# Patient Record
Sex: MALE | Race: BLACK OR AFRICAN AMERICAN | NOT HISPANIC OR LATINO | Employment: OTHER | URBAN - METROPOLITAN AREA
[De-identification: names, ages, dates, MRNs, and addresses within clinical notes are randomized per-mention and may not be internally consistent; named-entity substitution may affect disease eponyms.]

---

## 2017-01-05 ENCOUNTER — ANTI-COAG VISIT (OUTPATIENT)
Dept: CARDIOLOGY | Facility: CLINIC | Age: 67
End: 2017-01-05

## 2017-01-05 LAB — INR PPP: 4.52

## 2017-01-05 NOTE — PROGRESS NOTES
Verbal result taken from __[facility] with [facility]_______. PT/INR _45.3/4.52______ Date drawn_1/5/17_______ Hardcopy to be faxed.

## 2017-01-05 NOTE — PROGRESS NOTES
"Per MANDI Davis, "Pt took 20mg 12/28, 15mg 12/29, 10mg daily except 15mg we/fr of Coumadin. Pt had diarrhea and he took pepto bismol. He stopped the cranberry juice a month ago. Pt ate cabbage. "  Patient received a higher dose of warfarin last week and had diarrhea.  Hold then resume warfarin dose.  He will be counseled to take imodium instead of pepto bismol.  "

## 2017-01-11 ENCOUNTER — ANTI-COAG VISIT (OUTPATIENT)
Dept: CARDIOLOGY | Facility: CLINIC | Age: 67
End: 2017-01-11

## 2017-01-11 LAB — INR PPP: 1.49

## 2017-01-12 ENCOUNTER — PATIENT MESSAGE (OUTPATIENT)
Dept: DIABETES | Facility: CLINIC | Age: 67
End: 2017-01-12

## 2017-01-18 LAB — INR PPP: 3.36

## 2017-01-19 ENCOUNTER — ANTI-COAG VISIT (OUTPATIENT)
Dept: CARDIOLOGY | Facility: CLINIC | Age: 67
End: 2017-01-19

## 2017-01-19 NOTE — PROGRESS NOTES
INR significantly increased since last week without reported changes.  INR low on warfarin 70mg/week.  Begin new dose.

## 2017-01-25 ENCOUNTER — ANTI-COAG VISIT (OUTPATIENT)
Dept: CARDIOLOGY | Facility: CLINIC | Age: 67
End: 2017-01-25

## 2017-01-25 LAB — INR PPP: 2.32

## 2017-01-31 ENCOUNTER — LAB VISIT (OUTPATIENT)
Dept: LAB | Facility: HOSPITAL | Age: 67
End: 2017-01-31
Payer: MEDICARE

## 2017-01-31 ENCOUNTER — ANTI-COAG VISIT (OUTPATIENT)
Dept: CARDIOLOGY | Facility: CLINIC | Age: 67
End: 2017-01-31

## 2017-01-31 DIAGNOSIS — Z79.01 LONG TERM (CURRENT) USE OF ANTICOAGULANTS: ICD-10-CM

## 2017-01-31 DIAGNOSIS — Z79.01 LONG-TERM (CURRENT) USE OF ANTICOAGULANTS: ICD-10-CM

## 2017-01-31 DIAGNOSIS — I48.92 PAROXYSMAL ATRIAL FLUTTER: ICD-10-CM

## 2017-01-31 DIAGNOSIS — I48.91 ATRIAL FIBRILLATION: ICD-10-CM

## 2017-01-31 LAB
INR PPP: 4.7
PROTHROMBIN TIME: 47.9 SEC

## 2017-01-31 PROCEDURE — 36415 COLL VENOUS BLD VENIPUNCTURE: CPT

## 2017-01-31 PROCEDURE — 85610 PROTHROMBIN TIME: CPT

## 2017-01-31 NOTE — PROGRESS NOTES
Patient reports diarrhea last week and denies other changes.  Hold then rechallenge prior therapeutic warfarin regimen.

## 2017-02-08 ENCOUNTER — ANTI-COAG VISIT (OUTPATIENT)
Dept: CARDIOLOGY | Facility: CLINIC | Age: 67
End: 2017-02-08

## 2017-02-08 DIAGNOSIS — Z79.01 LONG TERM (CURRENT) USE OF ANTICOAGULANTS: ICD-10-CM

## 2017-02-08 DIAGNOSIS — I48.92 PAROXYSMAL ATRIAL FLUTTER: ICD-10-CM

## 2017-02-08 LAB — INR PPP: 2.28

## 2017-02-10 DIAGNOSIS — I10 ESSENTIAL HYPERTENSION: Chronic | ICD-10-CM

## 2017-02-10 DIAGNOSIS — I48.92 PAROXYSMAL ATRIAL FLUTTER: ICD-10-CM

## 2017-02-10 RX ORDER — DILTIAZEM HYDROCHLORIDE 240 MG/1
240 CAPSULE, EXTENDED RELEASE ORAL DAILY
Qty: 90 CAPSULE | Refills: 3 | Status: SHIPPED | OUTPATIENT
Start: 2017-02-10 | End: 2017-07-13 | Stop reason: ALTCHOICE

## 2017-02-15 ENCOUNTER — ANTI-COAG VISIT (OUTPATIENT)
Dept: CARDIOLOGY | Facility: CLINIC | Age: 67
End: 2017-02-15

## 2017-02-15 DIAGNOSIS — Z79.01 LONG TERM (CURRENT) USE OF ANTICOAGULANTS: ICD-10-CM

## 2017-02-15 DIAGNOSIS — I48.92 PAROXYSMAL ATRIAL FLUTTER: ICD-10-CM

## 2017-02-15 LAB — INR PPP: 3.88

## 2017-03-01 ENCOUNTER — ANTI-COAG VISIT (OUTPATIENT)
Dept: CARDIOLOGY | Facility: CLINIC | Age: 67
End: 2017-03-01

## 2017-03-01 DIAGNOSIS — I48.92 PAROXYSMAL ATRIAL FLUTTER: ICD-10-CM

## 2017-03-01 DIAGNOSIS — Z79.01 LONG TERM (CURRENT) USE OF ANTICOAGULANTS: ICD-10-CM

## 2017-03-01 LAB — INR PPP: 2.22

## 2017-03-07 DIAGNOSIS — I10 ESSENTIAL HYPERTENSION: ICD-10-CM

## 2017-03-07 DIAGNOSIS — E11.9 TYPE 2 DIABETES MELLITUS WITHOUT COMPLICATION: ICD-10-CM

## 2017-03-08 RX ORDER — PEN NEEDLE, DIABETIC 30 GX3/16"
NEEDLE, DISPOSABLE MISCELLANEOUS
Qty: 100 EACH | Refills: 3 | Status: SHIPPED | OUTPATIENT
Start: 2017-03-08 | End: 2017-08-10 | Stop reason: SDUPTHER

## 2017-03-08 RX ORDER — LOSARTAN POTASSIUM 100 MG/1
100 TABLET ORAL DAILY
Qty: 90 TABLET | Refills: 3 | Status: SHIPPED | OUTPATIENT
Start: 2017-03-08

## 2017-03-15 ENCOUNTER — ANTI-COAG VISIT (OUTPATIENT)
Dept: CARDIOLOGY | Facility: CLINIC | Age: 67
End: 2017-03-15

## 2017-03-15 DIAGNOSIS — I48.92 PAROXYSMAL ATRIAL FLUTTER: ICD-10-CM

## 2017-03-15 DIAGNOSIS — Z79.01 LONG TERM (CURRENT) USE OF ANTICOAGULANTS: ICD-10-CM

## 2017-03-15 LAB — INR PPP: 2.45

## 2017-03-16 ENCOUNTER — OFFICE VISIT (OUTPATIENT)
Dept: INTERNAL MEDICINE | Facility: CLINIC | Age: 67
End: 2017-03-16
Payer: MEDICARE

## 2017-03-16 ENCOUNTER — HOSPITAL ENCOUNTER (EMERGENCY)
Facility: HOSPITAL | Age: 67
Discharge: HOME | End: 2017-03-16
Attending: EMERGENCY MEDICINE
Payer: MEDICARE

## 2017-03-16 VITALS
DIASTOLIC BLOOD PRESSURE: 95 MMHG | SYSTOLIC BLOOD PRESSURE: 148 MMHG | TEMPERATURE: 98 F | WEIGHT: 172.38 LBS | HEART RATE: 88 BPM | BODY MASS INDEX: 27.7 KG/M2 | HEIGHT: 66 IN

## 2017-03-16 VITALS
SYSTOLIC BLOOD PRESSURE: 150 MMHG | HEIGHT: 66 IN | OXYGEN SATURATION: 100 % | RESPIRATION RATE: 18 BRPM | TEMPERATURE: 98 F | BODY MASS INDEX: 28.77 KG/M2 | DIASTOLIC BLOOD PRESSURE: 86 MMHG | WEIGHT: 179 LBS | HEART RATE: 88 BPM

## 2017-03-16 DIAGNOSIS — I48.91 ATRIAL FIBRILLATION WITH RVR: Primary | ICD-10-CM

## 2017-03-16 DIAGNOSIS — I48.91 ATRIAL FIBRILLATION WITH RAPID VENTRICULAR RESPONSE: Primary | ICD-10-CM

## 2017-03-16 DIAGNOSIS — E78.5 HYPERLIPIDEMIA, UNSPECIFIED HYPERLIPIDEMIA TYPE: ICD-10-CM

## 2017-03-16 DIAGNOSIS — E11.65 TYPE 2 DIABETES MELLITUS WITH HYPERGLYCEMIA, UNSPECIFIED LONG TERM INSULIN USE STATUS: ICD-10-CM

## 2017-03-16 DIAGNOSIS — I10 ESSENTIAL HYPERTENSION: ICD-10-CM

## 2017-03-16 DIAGNOSIS — Z79.4 TYPE 2 DIABETES MELLITUS WITH COMPLICATION, WITH LONG-TERM CURRENT USE OF INSULIN: ICD-10-CM

## 2017-03-16 DIAGNOSIS — E66.3 OVERWEIGHT: ICD-10-CM

## 2017-03-16 DIAGNOSIS — E11.8 TYPE 2 DIABETES MELLITUS WITH COMPLICATION, WITH LONG-TERM CURRENT USE OF INSULIN: ICD-10-CM

## 2017-03-16 DIAGNOSIS — Z91.81 RISK FOR FALLS: ICD-10-CM

## 2017-03-16 LAB
ALBUMIN SERPL BCP-MCNC: 3.8 G/DL
ALP SERPL-CCNC: 82 U/L
ALT SERPL W/O P-5'-P-CCNC: 30 U/L
ANION GAP SERPL CALC-SCNC: 12 MMOL/L
AST SERPL-CCNC: 17 U/L
BASOPHILS # BLD AUTO: 0.02 K/UL
BASOPHILS NFR BLD: 0.4 %
BILIRUB SERPL-MCNC: 1.6 MG/DL
BUN SERPL-MCNC: 20 MG/DL
CALCIUM SERPL-MCNC: 9.6 MG/DL
CHLORIDE SERPL-SCNC: 104 MMOL/L
CO2 SERPL-SCNC: 19 MMOL/L
CREAT SERPL-MCNC: 1.3 MG/DL
DIFFERENTIAL METHOD: ABNORMAL
EOSINOPHIL # BLD AUTO: 0 K/UL
EOSINOPHIL NFR BLD: 0.4 %
ERYTHROCYTE [DISTWIDTH] IN BLOOD BY AUTOMATED COUNT: 13.4 %
EST. GFR  (AFRICAN AMERICAN): >60 ML/MIN/1.73 M^2
EST. GFR  (NON AFRICAN AMERICAN): 56.1 ML/MIN/1.73 M^2
GLUCOSE SERPL-MCNC: 474 MG/DL
GLUCOSE SERPL-MCNC: 483 MG/DL (ref 70–110)
HCT VFR BLD AUTO: 41.5 %
HGB BLD-MCNC: 15.2 G/DL
INR PPP: 1.8
LYMPHOCYTES # BLD AUTO: 1.3 K/UL
LYMPHOCYTES NFR BLD: 27.2 %
MCH RBC QN AUTO: 29.1 PG
MCHC RBC AUTO-ENTMCNC: 36.6 %
MCV RBC AUTO: 79 FL
MONOCYTES # BLD AUTO: 0.4 K/UL
MONOCYTES NFR BLD: 8.4 %
NEUTROPHILS # BLD AUTO: 3.1 K/UL
NEUTROPHILS NFR BLD: 63.4 %
PLATELET # BLD AUTO: 201 K/UL
PMV BLD AUTO: 12.2 FL
POCT GLUCOSE: 262 MG/DL (ref 70–110)
POTASSIUM SERPL-SCNC: 4 MMOL/L
PROT SERPL-MCNC: 7.5 G/DL
PROTHROMBIN TIME: 17.9 SEC
RBC # BLD AUTO: 5.23 M/UL
SODIUM SERPL-SCNC: 135 MMOL/L
WBC # BLD AUTO: 4.86 K/UL

## 2017-03-16 PROCEDURE — 99284 EMERGENCY DEPT VISIT MOD MDM: CPT | Mod: 25

## 2017-03-16 PROCEDURE — 99291 CRITICAL CARE FIRST HOUR: CPT | Mod: ,,, | Performed by: EMERGENCY MEDICINE

## 2017-03-16 PROCEDURE — 63600175 PHARM REV CODE 636 W HCPCS: Performed by: EMERGENCY MEDICINE

## 2017-03-16 PROCEDURE — 3074F SYST BP LT 130 MM HG: CPT | Mod: GC,S$GLB,, | Performed by: HOSPITALIST

## 2017-03-16 PROCEDURE — 85610 PROTHROMBIN TIME: CPT

## 2017-03-16 PROCEDURE — 1160F RVW MEDS BY RX/DR IN RCRD: CPT | Mod: GC,S$GLB,, | Performed by: HOSPITALIST

## 2017-03-16 PROCEDURE — 93005 ELECTROCARDIOGRAM TRACING: CPT | Mod: S$GLB,,, | Performed by: INTERNAL MEDICINE

## 2017-03-16 PROCEDURE — 3046F HEMOGLOBIN A1C LEVEL >9.0%: CPT | Mod: GC,S$GLB,, | Performed by: HOSPITALIST

## 2017-03-16 PROCEDURE — 1157F ADVNC CARE PLAN IN RCRD: CPT | Mod: GC,S$GLB,, | Performed by: HOSPITALIST

## 2017-03-16 PROCEDURE — 4010F ACE/ARB THERAPY RXD/TAKEN: CPT | Mod: GC,S$GLB,, | Performed by: HOSPITALIST

## 2017-03-16 PROCEDURE — 96361 HYDRATE IV INFUSION ADD-ON: CPT

## 2017-03-16 PROCEDURE — 96374 THER/PROPH/DIAG INJ IV PUSH: CPT

## 2017-03-16 PROCEDURE — 82962 GLUCOSE BLOOD TEST: CPT

## 2017-03-16 PROCEDURE — 82948 REAGENT STRIP/BLOOD GLUCOSE: CPT | Mod: GC,S$GLB,, | Performed by: HOSPITALIST

## 2017-03-16 PROCEDURE — 25000003 PHARM REV CODE 250: Performed by: PHYSICIAN ASSISTANT

## 2017-03-16 PROCEDURE — 99999 PR PBB SHADOW E&M-EST. PATIENT-LVL IV: CPT | Mod: PBBFAC,GC,, | Performed by: HOSPITALIST

## 2017-03-16 PROCEDURE — 1159F MED LIST DOCD IN RCRD: CPT | Mod: GC,S$GLB,, | Performed by: HOSPITALIST

## 2017-03-16 PROCEDURE — 25000003 PHARM REV CODE 250: Performed by: EMERGENCY MEDICINE

## 2017-03-16 PROCEDURE — 3078F DIAST BP <80 MM HG: CPT | Mod: GC,S$GLB,, | Performed by: HOSPITALIST

## 2017-03-16 PROCEDURE — 85025 COMPLETE CBC W/AUTO DIFF WBC: CPT

## 2017-03-16 PROCEDURE — 1126F AMNT PAIN NOTED NONE PRSNT: CPT | Mod: GC,S$GLB,, | Performed by: HOSPITALIST

## 2017-03-16 PROCEDURE — 96376 TX/PRO/DX INJ SAME DRUG ADON: CPT

## 2017-03-16 PROCEDURE — 93010 ELECTROCARDIOGRAM REPORT: CPT | Mod: S$GLB,,, | Performed by: INTERNAL MEDICINE

## 2017-03-16 PROCEDURE — 93010 ELECTROCARDIOGRAM REPORT: CPT | Mod: ,,, | Performed by: INTERNAL MEDICINE

## 2017-03-16 PROCEDURE — 2022F DILAT RTA XM EVC RTNOPTHY: CPT | Mod: GC,S$GLB,, | Performed by: HOSPITALIST

## 2017-03-16 PROCEDURE — 99214 OFFICE O/P EST MOD 30 MIN: CPT | Mod: GC,S$GLB,, | Performed by: HOSPITALIST

## 2017-03-16 PROCEDURE — 96375 TX/PRO/DX INJ NEW DRUG ADDON: CPT

## 2017-03-16 PROCEDURE — 80053 COMPREHEN METABOLIC PANEL: CPT

## 2017-03-16 RX ORDER — DILTIAZEM HYDROCHLORIDE 240 MG/1
240 CAPSULE, COATED, EXTENDED RELEASE ORAL
Status: COMPLETED | OUTPATIENT
Start: 2017-03-16 | End: 2017-03-16

## 2017-03-16 RX ORDER — DILTIAZEM HYDROCHLORIDE 5 MG/ML
20 INJECTION INTRAVENOUS
Status: COMPLETED | OUTPATIENT
Start: 2017-03-16 | End: 2017-03-16

## 2017-03-16 RX ADMIN — SODIUM CHLORIDE 1000 ML: 0.9 INJECTION, SOLUTION INTRAVENOUS at 07:03

## 2017-03-16 RX ADMIN — INSULIN HUMAN 8 UNITS: 100 INJECTION, SOLUTION PARENTERAL at 06:03

## 2017-03-16 RX ADMIN — DILTIAZEM HYDROCHLORIDE 20 MG: 5 INJECTION INTRAVENOUS at 07:03

## 2017-03-16 RX ADMIN — DILTIAZEM HYDROCHLORIDE 20 MG: 5 INJECTION INTRAVENOUS at 05:03

## 2017-03-16 RX ADMIN — SODIUM CHLORIDE 1000 ML: 0.9 INJECTION, SOLUTION INTRAVENOUS at 06:03

## 2017-03-16 RX ADMIN — DILTIAZEM HYDROCHLORIDE 240 MG: 240 CAPSULE, COATED, EXTENDED RELEASE ORAL at 07:03

## 2017-03-16 NOTE — PROGRESS NOTES
I have reviewed and concur with the resident's history, physical, assessment, and plan.  I have personally interviewed and examined the patient  Ciro Chandler at bedside. See below addendum for my evaluation and additional findings.    Patient with h/o paroxysmal atrial fibrillation / flutter, HTN, h/o CVA, and uncontrolled DM here today for follow-up. Not adherent to medication regimen generally. Not sure of when he last took his long-acting insulin. Saw Cardiology 10/2016 and has not followed up as recommended. Also had initial visit with [facility] previously and has not yet followed up.    Tachycardic today with elevated BP, fatigued and anxious but no chest pain or dyspnea at present, no headache, vision change, or new focal neurologic change. Irregular rhythm on exam.   EKG performed. Patient began developing intermittent dizziness just after EKG.  Shows atrial fibrillation with RVR.    Sending patient to ER given atrial fib with RVR; he is high risk for further complications with this and this is likely complicated by his hyperglycemia. Merits further urgent/emergent monitoring and treatment beyond what can be done as outpatient.     Patient to return for close follow-up with primary care pending ER course.   Will also need follow up with cardiology.    Chang Mcdonnell MD

## 2017-03-16 NOTE — PROVIDER PROGRESS NOTES - EMERGENCY DEPT.
Encounter Date: 3/16/2017    ED Physician Progress Notes       SCRIBE NOTE: IBipin, am scribing for, and in the presence of,  Wanda Singer MD.  Physician Statement: IWanda MD, personally performed the services described in this documentation as scribed by Bipin Seay in my presence, and it is both accurate and complete.     Physician Note:   Moved to monitor bed.    EKG - STEMI Decision  Initial Reading: No STEMI present.

## 2017-03-16 NOTE — ED PROVIDER NOTES
Encounter Date: 3/16/2017    SCRIBE #1 NOTE: I, Giulia Valerio, am scribing for, and in the presence of,  Dr. Lopez. I have scribed the following portions of the note - the APC attestation.       History     Chief Complaint   Patient presents with    Atrial Fibrillation     Review of patient's allergies indicates:  No Known Allergies  HPI Comments: 66 yo M with a PMH of paroxysmal atrial flutter on coumadin and diltiazem, DM2 on insulin, HTN, HLD, hx of stroke, aneurysm presents to the ED send from primary care clinic for a fib with RVR. Pt was noted to be tachycardic in clinc. An EKG was performed in clinic which revealed A. fib with RVR.  Patient was sent to the ED for further evaluation.  Currently, patient denies chest pain, shortness of breath.  He reports an episode of lightheadedness upon standing earlier today.  He denies fever, chills, nausea, vomiting.    The history is provided by the patient.     Past Medical History:   Diagnosis Date    Aneurysm     Clotting disorder     blood clot takes coumadin    Diabetes mellitus     Diabetes mellitus type II     Hyperlipidemia     Hypertension     Stroke     (2) in the past    Urinary tract infection      History reviewed. No pertinent surgical history.  Family History   Problem Relation Age of Onset    Heart disease Mother     Diabetes Mother     Heart attack Mother     Hypertension Mother     Asthma Father     Vision loss Sister     Cancer Sister      breast    Cancer Brother      lung     Social History   Substance Use Topics    Smoking status: Never Smoker    Smokeless tobacco: None    Alcohol use No     Review of Systems   Constitutional: Negative for chills and fever.   HENT: Negative for sore throat.    Respiratory: Negative for shortness of breath.    Cardiovascular: Negative for chest pain.   Gastrointestinal: Negative for nausea and vomiting.   Genitourinary: Negative for dysuria.   Skin: Negative for rash.   Neurological: Positive for  light-headedness. Negative for syncope.       Physical Exam   Initial Vitals   BP Pulse Resp Temp SpO2   03/16/17 1651 03/16/17 1651 03/16/17 1651 03/16/17 1651 03/16/17 1651   156/101 131 20 98.4 °F (36.9 °C) 98 %     Physical Exam    Nursing note and vitals reviewed.  Constitutional: He appears well-developed and well-nourished. He is not diaphoretic.  Non-toxic appearance. He does not appear ill. No distress.   HENT:   Head: Normocephalic and atraumatic.   Eyes: EOM are normal.   Neck: Normal range of motion and phonation normal. Neck supple.   Cardiovascular: Regular rhythm. Tachycardia present.  Exam reveals no gallop, no distant heart sounds and no friction rub.    No murmur heard.  Pulmonary/Chest: Effort normal and breath sounds normal. No accessory muscle usage. No tachypnea. No respiratory distress. He has no decreased breath sounds. He has no wheezes. He has no rhonchi. He has no rales.   Abdominal: Soft. Normal appearance. He exhibits no distension and no mass. There is no tenderness.   Musculoskeletal: Normal range of motion.   Neurological: He is alert and oriented to person, place, and time.   Skin: Skin is warm and dry. No rash noted. No pallor.   Psychiatric: He has a normal mood and affect. His behavior is normal. Judgment and thought content normal.         ED Course   Procedures  Labs Reviewed   CBC W/ AUTO DIFFERENTIAL - Abnormal; Notable for the following:        Result Value    MCV 79 (*)     MCHC 36.6 (*)     All other components within normal limits   COMPREHENSIVE METABOLIC PANEL - Abnormal; Notable for the following:     Sodium 135 (*)     CO2 19 (*)     Glucose 474 (*)     Total Bilirubin 1.6 (*)     eGFR if non  56.1 (*)     All other components within normal limits   PROTIME-INR - Abnormal; Notable for the following:     Prothrombin Time 17.9 (*)     INR 1.8 (*)     All other components within normal limits   POCT GLUCOSE - Abnormal; Notable for the following:     POCT  Glucose 262 (*)     All other components within normal limits             Medical Decision Making:   History:   Old Medical Records: I decided to obtain old medical records.  Clinical Tests:   Lab Tests: Ordered and Reviewed  Medical Tests: Ordered and Reviewed       APC / Resident Notes:   MDM:  67-year-old male with a history of atrial flutter presents to the ED from clinic for evaluation of atrial fibrillation with RVR as well as hyperglycemia and uncontrolled DM.  Patient is tachycardic.  He is hypertensive at 156/101.  He is alert and responding appropriately.  He is nontoxic-appearing and in NAD.     Patient given IV diltiazem 20 mg in the ED.    Lab work remarkable for elevated glucose of 474.  Patient given IV fluids and insulin. Will reassess glucose after treatment. This pt was signed out to my attending, Dr. Lopez, who will assume continued care of this patient.            Scribe Attestation:   Scribe #1: I performed the above scribed service and the documentation accurately describes the services I performed. I attest to the accuracy of the note.    Attending Attestation:     Physician Attestation Statement for NP/PA:   I have conducted a face to face encounter with this patient in addition to the NP/PA, due to Medical Complexity    Other NP/PA Attestation Additions:      Medical Decision Making: A-fib with RVR, sent from clinic. Sugar is also elevated. His rate has been controlled with dose of diltiazem. Will give insulin and fluid to control sugar. He feels well. Will reassess and may be able to discharge if he continues to feel well.    8:24 PM  His rate is controlled and his sugar is down. I will discharge him home.     Attending Critical Care:   Critical Care Times:   ==============================================================  · Total Critical Care Time - exclusive of procedural time: 40 minutes.  ==============================================================  Critical care was necessary to  treat or prevent imminent or life-threatening deterioration of the following conditions: cardiac arrhythmia.     Physician Attestation for Scribe:  Physician Attestation Statement for Scribe #1: I, Dr. Lopez, reviewed documentation, as scribed by Giulia Valerio in my presence, and it is both accurate and complete.                 ED Course     Clinical Impression:   The primary encounter diagnosis was Atrial fibrillation with RVR. A diagnosis of Type 2 diabetes mellitus with hyperglycemia, unspecified long term insulin use status was also pertinent to this visit.    Disposition:   Disposition: Discharged  Condition: Stable       Tejas Lopez MD  03/23/17 0937

## 2017-03-16 NOTE — ED AVS SNAPSHOT
[facility]  [address]  [facility] [address]               Ciro Chandler   3/16/2017  5:21 PM   ED    Description:  Male : 1950   Department:  [facility]           Your Care was Coordinated By:     Provider Role From To    Tejas Lopez MD Attending Provider 17 0943 --    Mindy Estes PA-C Physician Assistant 17 [id number] --      Reason for Visit     Atrial Fibrillation           Diagnoses this Visit        Comments    Atrial fibrillation with RVR    -  Primary     Type 2 diabetes mellitus with hyperglycemia, unspecified long term insulin use status           ED Disposition     ED Disposition Condition Comment    Discharge             To Do List           Follow-up Information     Follow up with Lio Torres MD In 2 days.    Specialty:  Internal Medicine    Contact information:    [address]  [facility] [address]  [phone]        [facility]     [facility] -  Assistance  Registered nurses in the [facility] provide clinical advisement, health education, appointment booking, and other advisory services.  Call for this free service at [phone].             Medications           Message regarding Medications     Verify the changes and/or additions to your medication regime listed below are the same as discussed with your clinician today.  If any of these changes or additions are incorrect, please notify your healthcare provider.        These medications were administered today        Dose Freq    diltiaZEM injection 20 mg 20 mg ED 1 Time    Sig: Inject 4 mLs (20 mg total) into the vein ED 1 Time.    Class: Normal    Route: Intravenous    Cosign for Ordering: Accepted by Lalit Polo MD on 3/16/2017  5:58 PM    sodium chloride 0.9% bolus 1,000 mL 1,000 mL ED 1 Time    Sig: Inject 1,000 mLs into the vein ED 1 Time.    Class: Normal    Route: Intravenous    insulin  "regular injection 8 Units 8 Units ED 1 Time    Sig: Inject 8 Units into the vein ED 1 Time.    Class: Normal    Route: Intravenous    sodium chloride 0.9% bolus 1,000 mL 1,000 mL ED 1 Time    Sig: Inject 1,000 mLs into the vein ED 1 Time.    Class: Normal    Route: Intravenous    diltiaZEM injection 20 mg 20 mg ED 1 Time    Sig: Inject 4 mLs (20 mg total) into the vein ED 1 Time.    Class: Normal    Route: Intravenous    diltiaZEM 24 hr capsule 240 mg 240 mg ED 1 Time    Sig: Take 1 capsule (240 mg total) by mouth ED 1 Time.    Class: Normal    Route: Oral           Verify that the below list of medications is an accurate representation of the medications you are currently taking.  If none reported, the list may be blank. If incorrect, please contact your healthcare provider. Carry this list with you in case of emergency.           Current Medications     atorvastatin (LIPITOR) 80 MG tablet Take 1 tablet (80 mg total) by mouth once daily.    blood sugar diagnostic (ACCU-CHEK SMARTVIEW TEST STRIP) Strp 1 each by Misc.(Non-Drug; Combo Route) route 4 (four) times daily.    diltiaZEM (DILACOR XR) 240 MG CDCR Take 1 capsule (240 mg total) by mouth once daily.    docusate sodium (COLACE) 100 MG capsule Take 100 mg by mouth once daily.    econazole nitrate 1 % cream Apply topically 2 (two) times daily.    insulin aspart (NOVOLOG) 100 unit/mL injection Inject 6 Units into the skin 3 (three) times daily before meals.    insulin glargine (LANTUS) 100 unit/mL injection Increase daily Lantus to 35 U daily    lancets (ACCU-CHEK FASTCLIX) Misc 1 each by Misc.(Non-Drug; Combo Route) route 4 (four) times daily.    losartan (COZAAR) 100 MG tablet Take 1 tablet (100 mg total) by mouth once daily.    metformin (GLUCOPHAGE) 1000 MG tablet Take 1 tablet (1,000 mg total) by mouth 2 (two) times daily with meals.    pen needle, diabetic (BD ULTRA-FINE MALLORY PEN NEEDLES) 32 gauge x 5/32" Ndle Use with lantus pen    PREVNAR 13, PF, 0.5 mL " "Syrg ADM 0.5ML IM UTD    trazodone (DESYREL) 50 MG tablet 1 - 2 Tablet Oral At bedtime    warfarin (COUMADIN) 5 MG tablet As directed by coumadin clinic    aspirin (ECOTRIN) 81 MG EC tablet Take 1 tablet (81 mg total) by mouth once daily.    blood-glucose meter (ACCU-CHEK MALLORY) Misc 1 each by Misc.(Non-Drug; Combo Route) route once daily.    diltiaZEM 24 hr capsule 240 mg Take 1 capsule (240 mg total) by mouth ED 1 Time.    diltiaZEM injection 20 mg Inject 4 mLs (20 mg total) into the vein ED 1 Time.    diltiaZEM injection 20 mg Inject 4 mLs (20 mg total) into the vein ED 1 Time.    lancing device with lancets (ONE TOUCH DELICA LANC DEVICE) Kit 1 each by Misc.(Non-Drug; Combo Route) route 2 (two) times daily.           Clinical Reference Information           Your Vitals Were     BP Pulse Temp Resp Height Weight    138/78 78 98.4 °F (36.9 °C) (Oral) 20 5' 6" (1.676 m) 81.2 kg (179 lb)    SpO2 BMI             100% 28.89 kg/m2         Allergies as of 3/16/2017     No Known Allergies      Immunizations Administered on Date of Encounter - 3/16/2017     None      ED Micro, Lab, POCT     Start Ordered       Status Ordering Provider    03/16/17 1937 03/16/17 1937  POCT glucose  Once      Final result     03/16/17 1929 03/16/17 1928  POCT glucose  Once      Acknowledged     03/16/17 1743 03/16/17 1744  CBC auto differential  Once      Final result     03/16/17 1743 03/16/17 1744  Comprehensive metabolic panel  Once      Final result     03/16/17 1743 03/16/17 1744  Protime-INR  STAT      Final result       ED Imaging Orders     None       [facility] complies with applicable Federal civil rights laws and does not discriminate on the basis of race, color, national origin, age, disability, or sex.        Language Assistance Services     ATTENTION: Language assistance services are available, free of charge. Please call [phone].      ATENCIÓN: Si davisla esplady, tiene a her disposición servicios " gratuitos de asistencia lingüística. Lilia chery [phone].     MARCIN Ý: N?u b?n nói Ti?ng Vi?t, có các d?ch v? h? tr? ngôn ng? mi?n phí bambih cho b?n. G?i s? [phone].

## 2017-03-16 NOTE — PROGRESS NOTES
Subjective:       Patient ID: Ciro Chandler is a 67 y.o. male.      Chief Complaint: re-establish care, follow up for diabetes      HPI  Ciro Chandler is a 67 y.o. male with PMH of HTN, HLD, DMT2, paroxysmal Afib, depressive disorder who presents for 3 month follow up and re-establish care    He was last seen by Dr. Torres (PGY-1) November 2017.  Plan at that time was to increase home lantus to 35 U qhs, continue Novolog 6 U TIDWM, and continue metformin 1000 TWICE DAILY, with repeat HbA1C in three months from then.   His POCT glucose in clinic today is 483.    He has not been taking his insulin consistently.  He has difficulty recalling the last time he administered himself insulin.  He reports he often forgets and his wife has to remember for him.  The last time he got any insulin was his mealtime insulin at dinner last night.  He has not taken his lantus last night nor has he taken any of his meal time insulin today.  He saw diabetes education last time in October but missed his follow up appointment.      He does not have glucose logs with him but has the glucometer.  90 day average is 325.  30 day average is 349.      Today his BP is 148/95.   His wife reports he has been taking diltiazem 240 and losartan 100 mg every night consistently.    Cardiology was previously following for paroxysmal afib.  They showed up for their last appointment, but didn't get checked in, so they didn't get seen, and no appointment was rescheduled.      He had a 5 mm polyp resected during a colonoscopy October 2012.  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  He uses a broken cane.  His wife thinks he gets up too fast, takes a couple steps, and then falls.      He has never been a smoker  Last drink was over 5 years ago.          Review of Systems   Constitutional: Negative for chills, fever and malaise/fatigue.   HENT: Negative for sore  "throat.    Eyes: Negative for blurred vision and pain.   Respiratory: Negative for cough and shortness of breath.    Cardiovascular: Negative for chest pain and leg swelling.   Gastrointestinal: Negative for abdominal pain, nausea and vomiting.   Genitourinary: Negative for dysuria and frequency.   Musculoskeletal: Negative for back pain and myalgias.   Skin: Negative for itching and rash.   Neurological: Positive for dizziness. Negative for loss of consciousness and headaches.           Objective:     Vitals:    03/16/17 1422   BP: (!) 148/95   Pulse: 88   Temp: 97.7 °F (36.5 °C)     Estimated body mass index is 27.83 kg/(m^2) as calculated from the following:    Height as of this encounter: 5' 6" (1.676 m).    Weight as of this encounter: 78.2 kg (172 lb 6.4 oz).    Physical Exam   Constitutional: He is oriented to person, place, and time and well-developed, well-nourished, and in no distress.   HENT:   Head: Normocephalic and atraumatic.   Eyes: Conjunctivae and EOM are normal. Pupils are equal, round, and reactive to light.   Neck: Neck supple. No tracheal deviation present. No thyromegaly present.   Cardiovascular: Normal heart sounds and intact distal pulses.    No murmur heard.  Tachycardic on auscultation.  HR at least 120.   Pulmonary/Chest: Effort normal and breath sounds normal. No respiratory distress. He has no wheezes. He has no rales.   Abdominal: Soft. Bowel sounds are normal. He exhibits no distension. There is no tenderness.   Neurological: He is alert and oriented to person, place, and time. GCS score is 15.   Sensation in tact as tested by monofilament in 5 points in both feet bilaterally.   Skin: Skin is warm and dry.   Appears to have toenail fungus.         Assessment/Plan:       Afib with RVR  --was found to be tachycardic during physical exam, pulse at least >120  --EKG in clinic revealed Afib with RVR  --sending patient to the ER  --falls may be related to afib  --they showed up their last " cardiology appointment earlier this year (probably February), but they didn't get checked in and the doctor had left by then  --may need to have them re-schedule an appointment    DMT2  --needs more follow up with diabetes empowerment  --needs optometry referral  --needs repeat HbA1C    Health Maintenance  --no alcohol counseling, smoking/tobacco cessation counseling, illicit drug use cessation counseling needed at this time.  --has previously been tested for hepatitis before - was negative  --has previously been tested for HIV before - was negative    Fall risk  --will likely need PT  --may need walker.    HTN  --need to resolve Afib with RVR first     Overweight  --discussed appropriate diet prior to physical exam  --mobility is limited    HLD  --Continue with atorvastatin 80    Medication List with Changes/Refills   Current Medications    ASPIRIN (ECOTRIN) 81 MG EC TABLET    Take 1 tablet (81 mg total) by mouth once daily.    ATORVASTATIN (LIPITOR) 80 MG TABLET    Take 1 tablet (80 mg total) by mouth once daily.    BLOOD SUGAR DIAGNOSTIC (ACCU-CHEK SMARTVIEW TEST STRIP) STRP    1 each by Misc.(Non-Drug; Combo Route) route 4 (four) times daily.    BLOOD-GLUCOSE METER (ACCU-CHEK MALLORY) MISC    1 each by Misc.(Non-Drug; Combo Route) route once daily.    DILTIAZEM (DILACOR XR) 240 MG CDCR    Take 1 capsule (240 mg total) by mouth once daily.    DOCUSATE SODIUM (COLACE) 100 MG CAPSULE    Take 100 mg by mouth once daily.    ECONAZOLE NITRATE 1 % CREAM    Apply topically 2 (two) times daily.    INSULIN ASPART (NOVOLOG) 100 UNIT/ML INJECTION    Inject 6 Units into the skin 3 (three) times daily before meals.    INSULIN GLARGINE (LANTUS) 100 UNIT/ML INJECTION    Increase daily Lantus to 35 U daily    LANCETS (ACCU-CHEK FASTCLIX) MISC    1 each by Misc.(Non-Drug; Combo Route) route 4 (four) times daily.    LANCING DEVICE WITH LANCETS (ONE TOUCH DELICA LANC DEVICE) KIT    1 each by Misc.(Non-Drug; Combo Route) route 2 (two)  "times daily.    LOSARTAN (COZAAR) 100 MG TABLET    Take 1 tablet (100 mg total) by mouth once daily.    METFORMIN (GLUCOPHAGE) 1000 MG TABLET    Take 1 tablet (1,000 mg total) by mouth 2 (two) times daily with meals.    PEN NEEDLE, DIABETIC (BD ULTRA-FINE MALLORY PEN NEEDLES) 32 GAUGE X 5/32" NDLE    Use with lantus pen    PREVNAR 13, PF, 0.5 ML SYRG    ADM 0.5ML IM UTD    TRAZODONE (DESYREL) 50 MG TABLET    1 - 2 Tablet Oral At bedtime    WARFARIN (COUMADIN) 5 MG TABLET    As directed by coumadin clinic       RTC soon after discharge from ER.  Needs follow up with me in 6 weeks.      This case was discussed with Dr. Mcdonnell.    Nellie Bravo, PGY-1       "

## 2017-03-16 NOTE — ED NOTES
Patient moved to ED room 6 via wheelchair, patient assisted onto stretcher and changed into a gown. Patient placed on cardiac monitor, continuous pulse oximetry and automatic blood pressure cuff. Bed placed in low locked position, side rails up x 2, call light is within reach of patient or family, orientation to room and explanation of wait provided to family and patient, alarms set and turned on for monitor and pulse ox, awaiting MD evaluation and orders, will continue to monitor.

## 2017-03-16 NOTE — ED NOTES
LOC: The patient is awake, alert and aware of environment with an appropriate affect, the patient is oriented x 3 and speaking appropriately.  APPEARANCE: Patient resting comfortably and in no acute distress, patient is clean and well groomed, patient's clothing is properly fastened.  SKIN: The skin is warm and dry, patient has normal skin turgor and moist mucus membranes, skin intact, no breakdown or brusing noted.  MUSKULOSKELETAL: Patient moving all extremities well, no obvious swelling or deformities noted.  RESPIRATORY: Airway is open and patent, respirations are spontaneous, patient has a normal effort and rate. Breath sounds are clear and equal bilaterally.  CARDIAC: Abnormal heart sounds. No peripheral edema.  ABDOMEN: Soft and non tender to palpation, no distention noted. Bowel sounds present.  NEURO: No neuro deficits, hand grasp equal, no drift noted, no facial droop noted. Speech is clear.

## 2017-03-16 NOTE — ED TRIAGE NOTES
Patient states he was sent from the clinic for high blood pressure and high heart rate. Patient has no complaints. Denies fever, chest pain, sob.

## 2017-03-16 NOTE — Clinical Note
Tony - this patient was discharged home from the ER, and needs close ER follow up as well as f/u with cardiology and [facility]. Can you help get those scheduled?  Thank you! Chang Mcdonnell MD

## 2017-03-21 ENCOUNTER — TELEPHONE (OUTPATIENT)
Dept: INTERNAL MEDICINE | Facility: CLINIC | Age: 67
End: 2017-03-21

## 2017-03-21 DIAGNOSIS — Z79.4 TYPE 2 DIABETES MELLITUS WITH HYPERGLYCEMIA, WITH LONG-TERM CURRENT USE OF INSULIN: Primary | ICD-10-CM

## 2017-03-21 DIAGNOSIS — E11.65 TYPE 2 DIABETES MELLITUS WITH HYPERGLYCEMIA, WITH LONG-TERM CURRENT USE OF INSULIN: Primary | ICD-10-CM

## 2017-03-21 NOTE — TELEPHONE ENCOUNTER
He is coming to see us Thursday ,  We set up cardiology , the next Thursday , pt you will need to put a referral in and they will call him , empowerment we are un able to schedule he needs a refferal to diabetic education , thanks , leslie

## 2017-03-21 NOTE — TELEPHONE ENCOUNTER
----- Message from Nellie Bravo MD sent at 3/17/2017  2:31 PM CDT -----  Kalen Jimenez,    This patient got sent to the ER yesterday, but he needs to be seen next week.  He was also lost to follow up with Cardiology.  Can we set up an appointment for him to see cardiology and Diabetes Empowerment? He also likely needs PT.  Since he needs all these follow ups and PT he may need to be seen by a resident in clinic or at the very least be seen by me when I come back in 4 weeks.    Thanks for your help

## 2017-03-23 ENCOUNTER — OFFICE VISIT (OUTPATIENT)
Dept: INTERNAL MEDICINE | Facility: CLINIC | Age: 67
End: 2017-03-23
Payer: MEDICARE

## 2017-03-23 VITALS
HEIGHT: 67 IN | HEART RATE: 83 BPM | BODY MASS INDEX: 27.93 KG/M2 | DIASTOLIC BLOOD PRESSURE: 80 MMHG | SYSTOLIC BLOOD PRESSURE: 128 MMHG | WEIGHT: 177.94 LBS

## 2017-03-23 DIAGNOSIS — E11.22 TYPE 2 DIABETES MELLITUS WITH DIABETIC CHRONIC KIDNEY DISEASE, UNSPECIFIED CKD STAGE, UNSPECIFIED LONG TERM INSULIN USE STATUS: ICD-10-CM

## 2017-03-23 DIAGNOSIS — I48.92 PAROXYSMAL ATRIAL FLUTTER: Primary | ICD-10-CM

## 2017-03-23 PROCEDURE — 99999 PR PBB SHADOW E&M-EST. PATIENT-LVL III: CPT | Mod: PBBFAC,GC,, | Performed by: INTERNAL MEDICINE

## 2017-03-23 PROCEDURE — 1157F ADVNC CARE PLAN IN RCRD: CPT | Mod: GC,S$GLB,, | Performed by: INTERNAL MEDICINE

## 2017-03-23 PROCEDURE — 1126F AMNT PAIN NOTED NONE PRSNT: CPT | Mod: GC,S$GLB,, | Performed by: INTERNAL MEDICINE

## 2017-03-23 PROCEDURE — 3079F DIAST BP 80-89 MM HG: CPT | Mod: GC,S$GLB,, | Performed by: INTERNAL MEDICINE

## 2017-03-23 PROCEDURE — 2022F DILAT RTA XM EVC RTNOPTHY: CPT | Mod: GC,S$GLB,, | Performed by: INTERNAL MEDICINE

## 2017-03-23 PROCEDURE — 99214 OFFICE O/P EST MOD 30 MIN: CPT | Mod: GC,S$GLB,, | Performed by: INTERNAL MEDICINE

## 2017-03-23 PROCEDURE — 3046F HEMOGLOBIN A1C LEVEL >9.0%: CPT | Mod: GC,S$GLB,, | Performed by: INTERNAL MEDICINE

## 2017-03-23 PROCEDURE — 1159F MED LIST DOCD IN RCRD: CPT | Mod: GC,S$GLB,, | Performed by: INTERNAL MEDICINE

## 2017-03-23 PROCEDURE — 3074F SYST BP LT 130 MM HG: CPT | Mod: GC,S$GLB,, | Performed by: INTERNAL MEDICINE

## 2017-03-23 PROCEDURE — 4010F ACE/ARB THERAPY RXD/TAKEN: CPT | Mod: GC,S$GLB,, | Performed by: INTERNAL MEDICINE

## 2017-03-23 RX ORDER — AMOXICILLIN 250 MG
1 CAPSULE ORAL DAILY
COMMUNITY
Start: 2017-03-23

## 2017-03-23 NOTE — MR AVS SNAPSHOT
[facility]  [address]  [facility] [address]  Phone: [phone]  Fax: [phone]                  Ciro Chandler   3/23/2017 2:00 PM   Office Visit    Description:  Male : 1950   Provider:  GAYE Frederick   Department:  [facility]           Reason for Visit     Follow-up           Diagnoses this Visit        Comments    Paroxysmal atrial flutter    -  Primary     Type 2 diabetes mellitus with diabetic chronic kidney disease, unspecified CKD stage, unspecified long term insulin use status                To Do List           Future Appointments        Provider Department Dept Phone    3/30/2017 9:20 AM MD Rodrigo Jones Jr. [facility] - Cardiology [phone]    3/30/2017 1:00 PM Mary Alice Cornejo OD [facility] - Optometry [phone]      Goals (5 Years of Data)     None      PURCHASE these Medications (No prescription required)        Start End    senna-docusate 8.6-50 mg (SENNA WITH DOCUSATE SODIUM) 8.6-50 mg per tablet 3/23/2017     Sig - Route: Take 1 tablet by mouth once daily. - Oral    Class: OTC      [facility] On Call     [facility] On Call Nurse Care Line -  Assistance  Registered nurses in the [facility] On Call Center provide clinical advisement, health education, appointment booking, and other advisory services.  Call for this free service at [phone].             Medications           Message regarding Medications     Verify the changes and/or additions to your medication regime listed below are the same as discussed with your clinician today.  If any of these changes or additions are incorrect, please notify your healthcare provider.        START taking these NEW medications        Refills    senna-docusate 8.6-50 mg (SENNA WITH DOCUSATE SODIUM) 8.6-50 mg per tablet     Sig: Take 1 tablet by mouth once daily.    Class: OTC    Route: Oral      STOP taking these medications     docusate sodium (COLACE) 100 MG capsule Take 100  "mg by mouth once daily.           Verify that the below list of medications is an accurate representation of the medications you are currently taking.  If none reported, the list may be blank. If incorrect, please contact your healthcare provider. Carry this list with you in case of emergency.           Current Medications     aspirin (ECOTRIN) 81 MG EC tablet Take 1 tablet (81 mg total) by mouth once daily.    atorvastatin (LIPITOR) 80 MG tablet Take 1 tablet (80 mg total) by mouth once daily.    blood sugar diagnostic (ACCU-CHEK SMARTVIEW TEST STRIP) Strp 1 each by Misc.(Non-Drug; Combo Route) route 4 (four) times daily.    blood-glucose meter (ACCU-CHEK MALLORY) Misc 1 each by Misc.(Non-Drug; Combo Route) route once daily.    diltiaZEM (DILACOR XR) 240 MG CDCR Take 1 capsule (240 mg total) by mouth once daily.    econazole nitrate 1 % cream Apply topically 2 (two) times daily.    insulin aspart (NOVOLOG) 100 unit/mL injection Inject 6 Units into the skin 3 (three) times daily before meals.    insulin glargine (LANTUS) 100 unit/mL injection Increase daily Lantus to 35 U daily    lancets (ACCU-CHEK FASTCLIX) Misc 1 each by Misc.(Non-Drug; Combo Route) route 4 (four) times daily.    lancing device with lancets (ONE TOUCH DELICA LANC DEVICE) Kit 1 each by Misc.(Non-Drug; Combo Route) route 2 (two) times daily.    losartan (COZAAR) 100 MG tablet Take 1 tablet (100 mg total) by mouth once daily.    metformin (GLUCOPHAGE) 1000 MG tablet Take 1 tablet (1,000 mg total) by mouth 2 (two) times daily with meals.    pen needle, diabetic (BD ULTRA-FINE MALLORY PEN NEEDLES) 32 gauge x 5/32" Ndle Use with lantus pen    PREVNAR 13, PF, 0.5 mL Syrg ADM 0.5ML IM UTD    senna-docusate 8.6-50 mg (SENNA WITH DOCUSATE SODIUM) 8.6-50 mg per tablet Take 1 tablet by mouth once daily.    trazodone (DESYREL) 50 MG tablet 1 - 2 Tablet Oral At bedtime    warfarin (COUMADIN) 5 MG tablet As directed by coumadin clinic           Clinical Reference " "Information           Your Vitals Were     BP Pulse Height Weight BMI    128/80 (BP Location: Right arm, Patient Position: Sitting, BP Method: Automatic) 83 5' 6.5" (1.689 m) 80.7 kg (177 lb 14.6 oz) 28.29 kg/m2      Blood Pressure          Most Recent Value    BP  128/80      Allergies as of 3/23/2017     No Known Allergies      Immunizations Administered on Date of Encounter - 3/23/2017     None      Orders Placed During Today's Visit      Normal Orders This Visit    Ambulatory consult to Optometry     Future Labs/Procedures Expected by Expires    Hemoglobin A1c  3/23/2017 5/22/2018      Language Assistance Services     ATTENTION: Language assistance services are available, free of charge. Please call [phone].      ATENCIÓN: Si bailey truong, tiene a her disposición servicios gratuitos de asistencia lingüística. Llame al [phone].     CHÚ Ý: N?u b?n nói Ti?ng Vi?t, có các d?ch v? h? tr? ngôn ng? mi?n phí dành cho b?n. G?i s? [phone].         [facility] complies with applicable Federal civil rights laws and does not discriminate on the basis of race, color, national origin, age, disability, or sex.        "

## 2017-03-23 NOTE — PROGRESS NOTES
Subjective:       Patient ID: Ciro Chandler is a 67 y.o. male.    Chief Complaint: Follow-up ([facility] )    HPI     This is a 67 year old male with hx of pAFib/Aflutter on coumadin, HFpEF, DM type II on insulin with hgb a1c of 10.4 and hx of non-compliance, HTN, CVA with cerebral aneurysm s/p clipping and angioplasty with no residual weakness who comes in today for an ER follow up last Thursday. He came in to see his PCP and was noted to have irregular rhythm on exam with a following EKG revealing Aflutter also found to have hyperglycemia in the 400's and was sent to the ED where he received fluids and his arrhythmia resolved with receiving his home cardizem. Since discharged from the ED, he has the active complaints: postural dizziness/lihgtheadedness and constipation. He and his wife have been following his insulin regiment and checking his poct sugars [id number] with notable improvement in the past 3 days with good am checks and elevated sugars in the evening in comparison but mainly <200mg/dL. He is currently taking insulin lantus 35u qhs and 6u novolog tidwm. Compliant with other medications which include: coumadin 10mg daily, losartan 100mg daily, lipitor 80mg daily, diltiazem XR 240mg daily, and metformin 1000mg bid.       Review of Systems    Constitutional: no fever or chills  ENT: no nasal congestion or sore throat  Respiratory: no cough or shortness of breath  Cardiovascular: no chest pain or palpitations  Gastrointestinal: no nausea or vomiting, no abdominal pain or abdominal distention, +ve for constipation    Genitourinary: no hematuria or dysuria  Integument/Breast: no rash or pruritis  Hematologic/Lymphatic: no easy bruising or lymphadenopathy  Musculoskeletal: no arthralgias or myalgias  Neurological: no seizures or tremors  Endocrine: no heat or cold intolerance    Objective:      Physical Exam    General: chronically ill appearing, NAD  Eyes: conjunctivae/corneas clear. PERRL.  Neck: supple,  symmetrical, trachea midline, no JVD  Cardiovascular: Heart: irregular rate and rhythm, S1, S2 normal, no murmur, click, rub or gallop.  Lungs: left basilar crackles, no wheezes, normal respiratory effort  Chest Wall: no tenderness  Abdomen/Rectal: Abdomen: Non distended. + BS. No masses. No TTP. No rebound or guarding. Negative Guy's sign. Rectal: not examined  Extremities: no edema, redness or tenderness in the calves or thighs. Pulses: 2+ and symmetric  Skin: Skin color, texture, turgor normal. No rashes or lesions  Musculoskeletal: full range of motion of joints  Lymph Nodes: No cervical or supraclavicular adenopathy  Psych/Behavioral: Normal. Alert and oriented, appropriate affect.    Assessment:       1. Paroxysmal atrial flutter    2. Type 2 diabetes mellitus with diabetic chronic kidney disease, unspecified CKD stage, unspecified long term insulin use status        Plan:         ED follow up for   1. Hyperglycemia in patient with insulin-non-compliance: currently compliant. Obtaining hgb a1c today to check for new level, continue regiment. Was not able to get urine sample for microalbumin but will provide sample when comes next from [place], [place]  2. Aflutter: on diltiazem, rate controlled today although after walking. Consider switching to Toprol-XL given hx of HFpEF and AFlutter for AV barbara blocking.    Follow with PCP in one month.    Case and plan d/w Dr. Golden Mays, GAYE, PGY-2  [phone]

## 2017-03-23 NOTE — PROGRESS NOTES
Teaching Statement  I have personally taken the history and examined this patient and agree with the resident's history, exam and assessment and plan as stated above.  Cristofer Franks MD

## 2017-03-24 ENCOUNTER — PATIENT MESSAGE (OUTPATIENT)
Dept: INTERNAL MEDICINE | Facility: CLINIC | Age: 67
End: 2017-03-24

## 2017-03-28 ENCOUNTER — TELEPHONE (OUTPATIENT)
Dept: DIABETES | Facility: CLINIC | Age: 67
End: 2017-03-28

## 2017-03-28 NOTE — TELEPHONE ENCOUNTER
Left message for pt to return call to schedule follow up appt with the Diabetes educator.  Pt already had his initial assessment.  He canceled and no showed for his follow up visits.  Waiting for pt to return call to reschedule the follow up.  The new order is not needed.  Contact information provided.

## 2017-03-30 ENCOUNTER — OFFICE VISIT (OUTPATIENT)
Dept: CARDIOLOGY | Facility: CLINIC | Age: 67
End: 2017-03-30
Payer: MEDICARE

## 2017-03-30 ENCOUNTER — INITIAL CONSULT (OUTPATIENT)
Dept: OPTOMETRY | Facility: CLINIC | Age: 67
End: 2017-03-30
Payer: MEDICARE

## 2017-03-30 VITALS
BODY MASS INDEX: 28.91 KG/M2 | WEIGHT: 179.88 LBS | DIASTOLIC BLOOD PRESSURE: 79 MMHG | SYSTOLIC BLOOD PRESSURE: 128 MMHG | HEIGHT: 66 IN | OXYGEN SATURATION: 98 % | HEART RATE: 67 BPM

## 2017-03-30 DIAGNOSIS — R06.09 DYSPNEA ON EXERTION: ICD-10-CM

## 2017-03-30 DIAGNOSIS — H52.03 HYPEROPIA WITH PRESBYOPIA OF BOTH EYES: ICD-10-CM

## 2017-03-30 DIAGNOSIS — H52.4 HYPEROPIA WITH PRESBYOPIA OF BOTH EYES: ICD-10-CM

## 2017-03-30 DIAGNOSIS — H25.13 NUCLEAR SCLEROSIS, BILATERAL: ICD-10-CM

## 2017-03-30 DIAGNOSIS — E11.9 TYPE 2 DIABETES MELLITUS WITHOUT OPHTHALMIC MANIFESTATIONS: Primary | ICD-10-CM

## 2017-03-30 DIAGNOSIS — I48.92 PAROXYSMAL ATRIAL FLUTTER: Primary | ICD-10-CM

## 2017-03-30 DIAGNOSIS — I10 ESSENTIAL HYPERTENSION: ICD-10-CM

## 2017-03-30 DIAGNOSIS — E78.5 DYSLIPIDEMIA: ICD-10-CM

## 2017-03-30 PROCEDURE — 3074F SYST BP LT 130 MM HG: CPT | Mod: S$GLB,,, | Performed by: INTERNAL MEDICINE

## 2017-03-30 PROCEDURE — 1159F MED LIST DOCD IN RCRD: CPT | Mod: S$GLB,,, | Performed by: INTERNAL MEDICINE

## 2017-03-30 PROCEDURE — 1157F ADVNC CARE PLAN IN RCRD: CPT | Mod: S$GLB,,, | Performed by: INTERNAL MEDICINE

## 2017-03-30 PROCEDURE — 3074F SYST BP LT 130 MM HG: CPT | Mod: S$GLB,,, | Performed by: OPTOMETRIST

## 2017-03-30 PROCEDURE — 99214 OFFICE O/P EST MOD 30 MIN: CPT | Mod: S$GLB,,, | Performed by: STUDENT IN AN ORGANIZED HEALTH CARE EDUCATION/TRAINING PROGRAM

## 2017-03-30 PROCEDURE — 3078F DIAST BP <80 MM HG: CPT | Mod: S$GLB,,, | Performed by: INTERNAL MEDICINE

## 2017-03-30 PROCEDURE — 3078F DIAST BP <80 MM HG: CPT | Mod: S$GLB,,, | Performed by: OPTOMETRIST

## 2017-03-30 PROCEDURE — 93000 ELECTROCARDIOGRAM COMPLETE: CPT | Mod: S$GLB,,, | Performed by: INTERNAL MEDICINE

## 2017-03-30 PROCEDURE — 99999 PR PBB SHADOW E&M-EST. PATIENT-LVL III: CPT | Mod: PBBFAC,,, | Performed by: OPTOMETRIST

## 2017-03-30 PROCEDURE — 1160F RVW MEDS BY RX/DR IN RCRD: CPT | Mod: S$GLB,,, | Performed by: INTERNAL MEDICINE

## 2017-03-30 PROCEDURE — 99999 PR PBB SHADOW E&M-EST. PATIENT-LVL IV: CPT | Mod: PBBFAC,,,

## 2017-03-30 PROCEDURE — 1126F AMNT PAIN NOTED NONE PRSNT: CPT | Mod: S$GLB,,, | Performed by: INTERNAL MEDICINE

## 2017-03-30 PROCEDURE — 92004 COMPRE OPH EXAM NEW PT 1/>: CPT | Mod: S$GLB,,, | Performed by: OPTOMETRIST

## 2017-03-30 PROCEDURE — 92015 DETERMINE REFRACTIVE STATE: CPT | Mod: S$GLB,,, | Performed by: OPTOMETRIST

## 2017-03-30 NOTE — LETTER
March 30, 2017      GAYE Frederick  [address]  [facility] [address]           [facility] - Opt[facility]  [address]  [facility] [address]  Phone: [phone]  Fax: [phone]          Patient: Ciro Chandler   MR Number: [id number]   YOB: 1950   Date of Visit: 3/30/2017       Dear Dr. Esther Mays:    Thank you for referring Ciro Chandler to me for evaluation. Attached you will find relevant portions of my assessment and plan of care.    If you have questions, please do not hesitate to call me. I look forward to following Ciro Chandler along with you.    Sincerely,    Mary Alice Cornejo, OD    Enclosure  CC:  No Recipients    If you would like to receive this communication electronically, please contact [email or web address] or [phone] to request more information on [employer] access.    For providers and/or their staff who would like to refer a patient to [facility], please contact us through our one-stop-shop provider referral line, [facility], at [phone].    If you feel you have received this communication in error or would no longer like to receive these types of communications, please e-mail [email or web address]

## 2017-03-30 NOTE — PROGRESS NOTES
HPI     Patient's last dilated exam was: 2016 in [place]  Hemoglobin A1C       Date                     Value               Ref Range             Status                03/23/2017               11.7 (H)            4.5 - 6.2 %           Final                Pt states:lost glasses rx,  Blurred vision. Patient denies flashes,   floaters, pain and double vision. Here for diabetic ocular health check.   Blood sugar this morning was 198. Bump JANAE started apprx 2 weeks ago, has   not treated          Last edited by BEA Garcia on 3/30/2017  1:15 PM.     ROS     Positive for: Eyes    Negative for: Constitutional, Gastrointestinal, Neurological, Skin,   Genitourinary, Musculoskeletal, HENT, Endocrine, Cardiovascular,   Respiratory, Psychiatric, Allergic/Imm, Heme/Lymph    Last edited by Mary Alice Cornejo, AMIE on 3/30/2017  3:49 PM. (History)        Assessment /Plan     For exam results, see Encounter Report.    Type 2 diabetes mellitus without ophthalmic manifestations    Nuclear sclerosis, bilateral    Hyperopia with presbyopia of both eyes            1.  No retinopathy--monitor yearly.  Educated pt eye health correlates with blood sugar control.    2.  Educated on cataracts and affects on vision.  Monitor.  3.  Bifocal rx given      RTC 1 year for dm exam.

## 2017-03-30 NOTE — MR AVS SNAPSHOT
[facility]  [address]  [facility] [address]  Phone: [phone]                  Ciro Chandler   3/30/2017 9:20 AM   Office Visit    Description:  Male : 1950   Provider:  CARDIO, RESIDENT   Department:  [facility]           Reason for Visit     Atrial Flutter     Shortness of Breath           Diagnoses this Visit        Comments    Paroxysmal atrial flutter    -  Primary     Essential hypertension         Dyslipidemia         Dyspnea on exertion                To Do List           Future Appointments        Provider Department Dept Phone    3/30/2017 1:00 PM AMIE [facility] - Optometry [phone]      Goals (5 Years of Data)     None      Follow-Up and Disposition     Return in about 3 months (around 2017).    Follow-up and Disposition History      [facility] On Call     [facility] On Call Nurse Care Line -  Assistance  Unless otherwise directed by your provider, please contact [facility], our nurse care line that is available for  assistance.     Registered nurses in the [facility] On Call Center provide: appointment scheduling, clinical advisement, health education, and other advisory services.  Call: [phone] (toll free)               Medications           Message regarding Medications     Verify the changes and/or additions to your medication regime listed below are the same as discussed with your clinician today.  If any of these changes or additions are incorrect, please notify your healthcare provider.             Verify that the below list of medications is an accurate representation of the medications you are currently taking.  If none reported, the list may be blank. If incorrect, please contact your healthcare provider. Carry this list with you in case of emergency.           Current Medications     aspirin (ECOTRIN) 81 MG EC tablet Take 1 tablet (81 mg total) by mouth once daily.    atorvastatin (LIPITOR) 80 MG tablet Take 1  "tablet (80 mg total) by mouth once daily.    blood sugar diagnostic (ACCU-CHEK SMARTVIEW TEST STRIP) Strp 1 each by Misc.(Non-Drug; Combo Route) route 4 (four) times daily.    diltiaZEM (DILACOR XR) 240 MG CDCR Take 1 capsule (240 mg total) by mouth once daily.    econazole nitrate 1 % cream Apply topically 2 (two) times daily.    insulin aspart (NOVOLOG) 100 unit/mL injection Inject 6 Units into the skin 3 (three) times daily before meals.    insulin glargine (LANTUS) 100 unit/mL injection Increase daily Lantus to 35 U daily    lancets (ACCU-CHEK FASTCLIX) Misc 1 each by Misc.(Non-Drug; Combo Route) route 4 (four) times daily.    losartan (COZAAR) 100 MG tablet Take 1 tablet (100 mg total) by mouth once daily.    metformin (GLUCOPHAGE) 1000 MG tablet Take 1 tablet (1,000 mg total) by mouth 2 (two) times daily with meals.    pen needle, diabetic (BD ULTRA-FINE MALLORY PEN NEEDLES) 32 gauge x 5/32" Ndle Use with lantus pen    PREVNAR 13, PF, 0.5 mL Syrg ADM 0.5ML IM UTD    senna-docusate 8.6-50 mg (SENNA WITH DOCUSATE SODIUM) 8.6-50 mg per tablet Take 1 tablet by mouth once daily.    trazodone (DESYREL) 50 MG tablet 1 - 2 Tablet Oral At bedtime    warfarin (COUMADIN) 5 MG tablet As directed by coumadin clinic    blood-glucose meter (ACCU-CHEK MALLORY) Misc 1 each by Misc.(Non-Drug; Combo Route) route once daily.    lancing device with lancets (ONE TOUCH DELICA LANC DEVICE) Kit 1 each by Misc.(Non-Drug; Combo Route) route 2 (two) times daily.           Clinical Reference Information           Your Vitals Were     BP Pulse Height Weight SpO2 BMI    128/79 (BP Location: Left arm, Patient Position: Sitting, BP Method: Automatic) 67 5' 6" (1.676 m) 81.6 kg (179 lb 14.3 oz) 98% 29.04 kg/m2      Blood Pressure          Most Recent Value    Right Arm BP - Sitting  124/72    Left Arm BP - Sitting  128/79    BP  128/79      Allergies as of 3/30/2017     No Known Allergies      Immunizations Administered on Date of Encounter - " 3/30/2017     None      Orders Placed During Today's Visit      Normal Orders This Visit    Ambulatory Referral to Electrophysiology     IN OFFICE EKG 12-LEAD (to [place])     Future Labs/Procedures Expected by Expires    TSH  3/30/2017 5/29/2018      Language Assistance Services     ATTENTION: Language assistance services are available, free of charge. Please call [phone].      ATENCIÓN: Si habla español, tiene a her disposición servicios gratuitos de asistencia lingüística. Llame al [phone].     CHÚ Ý: N?u b?n nói Ti?ng Vi?t, có các d?ch v? h? tr? ngôn ng? mi?n phí dành cho b?n. G?i s? [phone].         [facility] complies with applicable Federal civil rights laws and does not discriminate on the basis of race, color, national origin, age, disability, or sex.

## 2017-03-30 NOTE — PROGRESS NOTES
I have personally seen and examined the patient. All labs and studies were reviewed. I agree with the resident's findings and plan as above. It is unclear if he is symptomatic due to his atrial arrhythmias, but it may be be the cause of his dyspnea. He is currently rate controlled and anticoagulated. Agree with referral to EP for CV vs ablation.

## 2017-03-30 NOTE — PROGRESS NOTES
Subjective:       Patient ID: Ciro Chandler is a 67 y.o. male.    Chief Complaint: Atrial Flutter (5 month f/u ) and Shortness of Breath (with exertion )    HPI   67 year old male with history of HLD, DM2 (11.7 3/17) HTN, CVA with cerebral aneurysm s/p clipping (2002),  paroxysmal Aflutter (coumadin and rate controlled diltiazem) presents for follow up following ED visit for Aflutter with RVR.  Patient was previously seen by Dr. Cervantes in 10/2016 where he switched his amlodipine to diltiazem.  Since his last cardiology visit patient was sent to the ED by his PCP for aflutter with RVR, and was found to be hyperglycemic.  He was referred to Diabetes management.  His last visit with his PCP on the 23rd showed better rate control.  Today patient reports postural dizziness/lightness, and SOB exertion 10-15 feet which he notes has improved since last visit in October.  Not aware of when he is out of rhythm.  Reports compliance with his medications and follows with coumadin clinic at [facility] in [facility].  He is retired, lives at home with his wife, and able to attend to his ADLs.  Currently denies chest pain, palpitations, worsening SOB on exertion, weight gain, abdominal pain, HA, or syncope.            Review of Systems   Constitutional: Negative for chills and fever.   HENT: Negative for congestion, rhinorrhea and sore throat.    Eyes: Negative for pain and visual disturbance.   Respiratory: Positive for shortness of breath. Negative for cough and chest tightness.    Cardiovascular: Negative for chest pain, palpitations and leg swelling.   Gastrointestinal: Positive for constipation. Negative for abdominal pain, diarrhea, nausea and vomiting.   Genitourinary: Negative for dysuria and urgency.   Musculoskeletal: Negative for back pain, gait problem and neck pain.   Skin: Negative for color change and rash.   Neurological: Positive for dizziness (postural ) and light-headedness (postural ). Negative for  syncope and headaches.   Hematological: Negative for adenopathy. Bruises/bleeds easily.         Past Medical History:   Diagnosis Date    Aneurysm     Clotting disorder     blood clot takes coumadin    Diabetes mellitus     Diabetes mellitus type II     Hyperlipidemia     Hypertension     Stroke     (2) in the past    Urinary tract infection        No past surgical history on file.    Family History   Problem Relation Age of Onset    Heart disease Mother     Diabetes Mother     Heart attack Mother     Hypertension Mother     Asthma Father     Vision loss Sister     Cancer Sister      breast    Cancer Brother      lung       Social History     Social History    Marital status:      Spouse name: N/A    Number of children: N/A    Years of education: N/A     Social History Main Topics    Smoking status: Never Smoker    Smokeless tobacco: Not on file    Alcohol use No    Drug use: No    Sexual activity: Not on file     Other Topics Concern    Not on file     Social History Narrative       Current Outpatient Prescriptions   Medication Sig Dispense Refill    atorvastatin (LIPITOR) 80 MG tablet Take 1 tablet (80 mg total) by mouth once daily. 90 tablet 3    blood sugar diagnostic (ACCU-CHEK SMARTVIEW TEST STRIP) Strp 1 each by Misc.(Non-Drug; Combo Route) route 4 (four) times daily. 150 each 11    diltiaZEM (DILACOR XR) 240 MG CDCR Take 1 capsule (240 mg total) by mouth once daily. 90 capsule 3    econazole nitrate 1 % cream Apply topically 2 (two) times daily. 85 g 2    insulin aspart (NOVOLOG) 100 unit/mL injection Inject 6 Units into the skin 3 (three) times daily before meals. 10 mL 3    insulin glargine (LANTUS) 100 unit/mL injection Increase daily Lantus to 35 U daily 1 vial 3    lancets (ACCU-CHEK FASTCLIX) Misc 1 each by Misc.(Non-Drug; Combo Route) route 4 (four) times daily. 150 each 11    losartan (COZAAR) 100 MG tablet Take 1 tablet (100 mg total) by mouth once daily. 90  "tablet 3    metformin (GLUCOPHAGE) 1000 MG tablet Take 1 tablet (1,000 mg total) by mouth 2 (two) times daily with meals. 180 tablet 3    pen needle, diabetic (BD ULTRA-FINE MALLORY PEN NEEDLES) 32 gauge x 5/32" Ndle Use with lantus pen 100 each 3    PREVNAR 13, PF, 0.5 mL Syrg ADM 0.5ML IM UTD  0    senna-docusate 8.6-50 mg (SENNA WITH DOCUSATE SODIUM) 8.6-50 mg per tablet Take 1 tablet by mouth once daily.      trazodone (DESYREL) 50 MG tablet 1 - 2 Tablet Oral At bedtime 90 tablet 3    warfarin (COUMADIN) 5 MG tablet As directed by coumadin clinic 100 tablet 11    aspirin (ECOTRIN) 81 MG EC tablet Take 1 tablet (81 mg total) by mouth once daily. 30 tablet 11    blood-glucose meter (ACCU-CHEK MALLORY) Misc 1 each by Misc.(Non-Drug; Combo Route) route once daily. 1 each 0    lancing device with lancets (ONE TOUCH DELICA LANC DEVICE) Kit 1 each by Misc.(Non-Drug; Combo Route) route 2 (two) times daily. 1 each 1     No current facility-administered medications for this visit.        Review of patient's allergies indicates:  No Known Allergies    Objective:      Physical Exam   Constitutional: He is oriented to person, place, and time. He appears well-developed and well-nourished.   HENT:   Head: Normocephalic and atraumatic.   Right Ear: External ear normal.   Left Ear: External ear normal.   Eyes: Conjunctivae and EOM are normal. Pupils are equal, round, and reactive to light.   Neck: Normal range of motion. Neck supple.   Cardiovascular: S1 normal and S2 normal.  An irregular rhythm present.   Decreased radial and pedal pulses  +2 Brachial     Pulmonary/Chest: Effort normal and breath sounds normal.   Abdominal: Soft. Bowel sounds are normal. He exhibits no distension. There is no tenderness.   Neurological: He is alert and oriented to person, place, and time.   Skin: Skin is dry.   Nursing note and vitals reviewed.      Assessment:       1. Paroxysmal atrial flutter    2. Essential hypertension    3. " Dyslipidemia    4. Dyspnea on exertion        Plan:       Paroxysmal Aflutter   - Continue diltiazem   - EKG today shows aflutter will refer to EP for ablation or cardioversion     HTN  - Continue current medical management, losartan and diltiazem     Dyslipidemia  -  Continue statin, labs per PCP     Dyspnea on exertion   - Reports chronic for past couple of months, but improved since previous visits  - EKG shows Aflutter will refer to EP  - Symptoms may improve with return to sinus rhythm      RTC in 3 months, obtain TSH, follow up with EP     Case discussed with Dr. Alycia Capps M.D.  IM PGY-1  Pager [phone]

## 2017-03-31 ENCOUNTER — TELEPHONE (OUTPATIENT)
Dept: CARDIOLOGY | Facility: CLINIC | Age: 67
End: 2017-03-31

## 2017-03-31 DIAGNOSIS — I15.9 SECONDARY HYPERTENSION: Primary | ICD-10-CM

## 2017-04-01 DIAGNOSIS — I48.92 ATRIAL FLUTTER, UNSPECIFIED TYPE: Primary | ICD-10-CM

## 2017-04-06 ENCOUNTER — ANTI-COAG VISIT (OUTPATIENT)
Dept: CARDIOLOGY | Facility: CLINIC | Age: 67
End: 2017-04-06

## 2017-04-06 ENCOUNTER — HOSPITAL ENCOUNTER (OUTPATIENT)
Dept: CARDIOLOGY | Facility: CLINIC | Age: 67
Discharge: HOME | End: 2017-04-06
Payer: MEDICARE

## 2017-04-06 ENCOUNTER — INITIAL CONSULT (OUTPATIENT)
Dept: ELECTROPHYSIOLOGY | Facility: CLINIC | Age: 67
End: 2017-04-06
Payer: MEDICARE

## 2017-04-06 VITALS
BODY MASS INDEX: 27.61 KG/M2 | WEIGHT: 175.94 LBS | HEIGHT: 67 IN | SYSTOLIC BLOOD PRESSURE: 138 MMHG | DIASTOLIC BLOOD PRESSURE: 72 MMHG | HEART RATE: 91 BPM

## 2017-04-06 DIAGNOSIS — E11.22 TYPE 2 DIABETES MELLITUS WITH DIABETIC CHRONIC KIDNEY DISEASE, UNSPECIFIED CKD STAGE, UNSPECIFIED LONG TERM INSULIN USE STATUS: ICD-10-CM

## 2017-04-06 DIAGNOSIS — I63.9 CEREBROVASCULAR ACCIDENT (CVA), UNSPECIFIED MECHANISM: ICD-10-CM

## 2017-04-06 DIAGNOSIS — Z79.01 LONG TERM (CURRENT) USE OF ANTICOAGULANTS: ICD-10-CM

## 2017-04-06 DIAGNOSIS — I10 ESSENTIAL HYPERTENSION: ICD-10-CM

## 2017-04-06 DIAGNOSIS — I48.92 ATRIAL FLUTTER, UNSPECIFIED TYPE: ICD-10-CM

## 2017-04-06 DIAGNOSIS — I48.3 TYPICAL ATRIAL FLUTTER: Primary | ICD-10-CM

## 2017-04-06 DIAGNOSIS — I48.19 PERSISTENT ATRIAL FIBRILLATION: ICD-10-CM

## 2017-04-06 DIAGNOSIS — I48.3 TYPICAL ATRIAL FLUTTER: ICD-10-CM

## 2017-04-06 LAB — INR PPP: 6.5

## 2017-04-06 PROCEDURE — 1159F MED LIST DOCD IN RCRD: CPT | Mod: S$GLB,,, | Performed by: INTERNAL MEDICINE

## 2017-04-06 PROCEDURE — 1160F RVW MEDS BY RX/DR IN RCRD: CPT | Mod: S$GLB,,, | Performed by: INTERNAL MEDICINE

## 2017-04-06 PROCEDURE — 3078F DIAST BP <80 MM HG: CPT | Mod: S$GLB,,, | Performed by: INTERNAL MEDICINE

## 2017-04-06 PROCEDURE — 93000 ELECTROCARDIOGRAM COMPLETE: CPT | Mod: S$GLB,,, | Performed by: INTERNAL MEDICINE

## 2017-04-06 PROCEDURE — 3075F SYST BP GE 130 - 139MM HG: CPT | Mod: S$GLB,,, | Performed by: INTERNAL MEDICINE

## 2017-04-06 PROCEDURE — 1157F ADVNC CARE PLAN IN RCRD: CPT | Mod: S$GLB,,, | Performed by: INTERNAL MEDICINE

## 2017-04-06 PROCEDURE — 99999 PR PBB SHADOW E&M-EST. PATIENT-LVL IV: CPT | Mod: PBBFAC,,, | Performed by: INTERNAL MEDICINE

## 2017-04-06 PROCEDURE — 99214 OFFICE O/P EST MOD 30 MIN: CPT | Mod: S$GLB,,, | Performed by: INTERNAL MEDICINE

## 2017-04-06 PROCEDURE — 3046F HEMOGLOBIN A1C LEVEL >9.0%: CPT | Mod: S$GLB,,, | Performed by: INTERNAL MEDICINE

## 2017-04-06 PROCEDURE — 1126F AMNT PAIN NOTED NONE PRSNT: CPT | Mod: S$GLB,,, | Performed by: INTERNAL MEDICINE

## 2017-04-06 PROCEDURE — 4010F ACE/ARB THERAPY RXD/TAKEN: CPT | Mod: S$GLB,,, | Performed by: INTERNAL MEDICINE

## 2017-04-06 NOTE — PROGRESS NOTES
"Per MANDI Davis, "Pt had diarrhea on Tuesday. Pt taking 10mg daily of Coumadin. He know to hold tonight. Pt went to ER a few weeks ago. For his heart ( trying to get it back in rhythm)."  Hold warfarin x 2 days then reduce weekly dose.  Patient will be advised to eat greens 1-2 times and Seek medical attention for signs/sx of bleeding.  Per Dr. Bloom note, patient rate controlled on diltiazem at recent ER visit.  He is considering DARRYL guided DCCV.  "

## 2017-04-06 NOTE — MR AVS SNAPSHOT
[facility]  [address]  [facility] [address]  Phone: [phone]  Fax: [phone]                  Ciro Chandler   2017 2:40 PM   Initial consult    Description:  Male : 1950   Provider:  Kar Bloom MD   Department:  [facility]           Reason for Visit     Atrial Fibrillation     Atrial Flutter           Diagnoses this Visit        Comments    Typical atrial flutter    -  Primary     Long term (current) use of anticoagulants         Cerebrovascular accident (CVA), unspecified mechanism         Essential hypertension         Type 2 diabetes mellitus with diabetic chronic kidney disease, unspecified CKD stage, unspecified long term insulin use status         Persistent atrial fibrillation                To Do List           Future Appointments        Provider Department Dept Phone    4/10/2017 1:45 PM ECHO, MAIN [place] [facility] Echo[facility]Stress Lab [phone]    2017 12:30 PM EKG, APPT [facility] - [phone]    2017 1:00 PM Kar Bloom MD [facility] - Arrhythmia [phone]      Goals (5 Years of Data)     None      Follow-Up and Disposition     Return in about 3 months (around 2017).      [facility] On [facility]     [facility] On Call Nurse Care Line -  Assistance  Unless otherwise directed by your provider, please contact [facility], our nurse care line that is available for  assistance.     Registered nurses in the [facility] Center provide: appointment scheduling, clinical advisement, health education, and other advisory services.  Call: [phone] (toll free)               Medications           Message regarding Medications     Verify the changes and/or additions to your medication regime listed below are the same as discussed with your clinician today.  If any of these changes or additions are incorrect, please notify your healthcare provider.        STOP taking these medications     aspirin (ECOTRIN) 81 MG EC tablet Take 1  "tablet (81 mg total) by mouth once daily.           Verify that the below list of medications is an accurate representation of the medications you are currently taking.  If none reported, the list may be blank. If incorrect, please contact your healthcare provider. Carry this list with you in case of emergency.           Current Medications     atorvastatin (LIPITOR) 80 MG tablet Take 1 tablet (80 mg total) by mouth once daily.    blood sugar diagnostic (ACCU-CHEK SMARTVIEW TEST STRIP) Strp 1 each by Misc.(Non-Drug; Combo Route) route 4 (four) times daily.    blood-glucose meter (ACCU-CHEK MALLORY) Misc 1 each by Misc.(Non-Drug; Combo Route) route once daily.    diltiaZEM (DILACOR XR) 240 MG CDCR Take 1 capsule (240 mg total) by mouth once daily.    econazole nitrate 1 % cream Apply topically 2 (two) times daily.    insulin aspart (NOVOLOG) 100 unit/mL injection Inject 6 Units into the skin 3 (three) times daily before meals.    insulin glargine (LANTUS) 100 unit/mL injection Increase daily Lantus to 35 U daily    lancets (ACCU-CHEK FASTCLIX) Misc 1 each by Misc.(Non-Drug; Combo Route) route 4 (four) times daily.    lancing device with lancets (ONE TOUCH DELICA LANC DEVICE) Kit 1 each by Misc.(Non-Drug; Combo Route) route 2 (two) times daily.    losartan (COZAAR) 100 MG tablet Take 1 tablet (100 mg total) by mouth once daily.    metformin (GLUCOPHAGE) 1000 MG tablet Take 1 tablet (1,000 mg total) by mouth 2 (two) times daily with meals.    pen needle, diabetic (BD ULTRA-FINE MALLORY PEN NEEDLES) 32 gauge x 5/32" Ndle Use with lantus pen    PREVNAR 13, PF, 0.5 mL Syrg ADM 0.5ML IM UTD    senna-docusate 8.6-50 mg (SENNA WITH DOCUSATE SODIUM) 8.6-50 mg per tablet Take 1 tablet by mouth once daily.    trazodone (DESYREL) 50 MG tablet 1 - 2 Tablet Oral At bedtime    warfarin (COUMADIN) 5 MG tablet As directed by coumadin clinic           Clinical Reference Information           Your Vitals Were     BP Pulse Height Weight BMI " "   138/72 91 5' 6.5" (1.689 m) 79.8 kg (175 lb 14.8 oz) 27.97 kg/m2      Blood Pressure          Most Recent Value    BP  138/72      Allergies as of 4/6/2017     No Known Allergies      Immunizations Administered on Date of Encounter - 4/6/2017     None      Orders Placed During Today's Visit     Future Labs/Procedures Expected by Expires    2D Echo Only  As directed 4/6/2018      Instructions      What Is Atrial Flutter/Atrial Fibrillation?      The heart has its own electrical system. This system makes the signals that start each heartbeat. The heartbeat begins in 1 of the 2 upper chambers of the heart (atria). A problem can make the atria beat faster than normal. The atria may beat fast but still evenly. This problem is called atrial flutter. If the atria beat very fast and also unevenly, it is called atrial fibrillation (AFib).  Causes of Atrial Flutter and Atrial Fibrillation  Causes of these problems can include:  · Previous heart attack  · High blood pressure  · Thyroid problems  In many cases, the cause is unknown.  When the Atria Beat Too Fast  The atria may beat fast only once in a while. This is called a paroxysmal heart rhythm problem. If they beat fast all the time, it is a chronic problem.  Atrial Flutter  With atrial flutter, electrical signals travel around and around inside the atria. These circling signals make the atria beat too fast:  · Atrial flutter can cause symptoms similar to AFib. It can also lead to the even faster, uneven rhythms of AFib.  Atrial Fibrillation (AFib)  With AFib, cells in the atria send extra electrical signals. These extra signals make the atria beat very fast. They also beat unevenly:  · The atria beat so fast and unevenly that they may quiver instead of praveen. If the atria dont contract, they dont move enough blood into the 2 lower chambers of the heart (ventricles). This can cause you to feel dizzy or weak.  · Blood that doesnt keep moving can pool and form " clots in the atria. These clots can move into other parts of the body and cause serious problems such as a stroke.   Symptoms of Atrial Flutter and AFib  These symptoms include the following:  · Palpitations (a fluttering, fast heartbeat)  · Weakness or tiredness  · Shortness of breath  · Chest pain or tightness  · Dizziness or lightheadedness  · Fainting spells   Date Last Reviewed: 2/26/2014 © 2000-2016 [employer]. [address] [place] [address]. All rights reserved. This information is not intended as a substitute for professional medical care. Always follow your healthcare professional's instructions.        Cardioversion  Cardioversion is a procedure to restore your hearts normal rhythm from a fast or irregular rhythm (arrhythmia) in the top or bottom chambers of your heart. You may have the procedure in a hospital or surgery center. Its often done on an outpatient (same day) basis. During the procedure, your doctor will give you medication to minimize discomfort. Then the doctor gives you a brief electric shock. This helps your heartbeat become normal again. In most cases, you can go home within hours of the procedure.     During cardioversion, you are fully sedated and wont feel a thing.     Before Your Procedure  · Tell your doctor what over-the-counter and prescription medications, herbs, and supplements you are taking.  · Take medication as directed. Your doctor may prescribe anticoagulants (blood thinners), depending on your situation. They help prevent blood clots from forming. Your doctor may order an ultrasound called transesophageal echocardiography. This will help your doctor figure out if you need a blood thinner.   · Ask your doctor about the risks and benefits of cardioversion.  · Sign your consent form.  · Dont eat or drink anything for 8 to 12 hours before your procedure.  · Follow any other instructions your doctor gives you.   · Arrange for an adult to drive you  home after the procedure.  During Your Procedure  · Your health care provider will place small pads (electrodes) on your chest to record your heartbeat at all times.  · Your health care provider will place an intravenous (IV) line in your arm. This gives you medication (sedation) that keeps you free of pain. Youll feel sleepy.  · Your health care provider will give you oxygen through a soft, plastic tube in your nose.  · Pads will be placed on your chest and back. Your health care provider will give you a very brief electric shock through the pads. Remember, because of sedation, you wont feel the shock.  · Your doctor will watch your heartbeat to make sure your normal rhythm has been restored.   After Your Procedure  · Your health care provider will monitor you until you are fully awake. Then youll be able to sit up, walk, and eat.  · In most cases, youll be able to go home after the sedation wears off. This usually takes a few hours.  · For a few days, the skin on your chest may feel a little sore, like a mild sunburn.  · Dont drive or operate heavy machinery for 24 hours after the procedure.  · The day after your procedure, try to take it easy. Take medication as directed.  · Call your doctor if you notice skipped beats, a rapid heartbeat, or chest tightness. These may be signs that an irregular heartbeat has returned.  Date Last Reviewed: 2/26/2014  © 7646-9553 [employer]. [address] [place] [address]. Todos los derechos reservados. Esta información no pretende sustituir la atención médica profesional. Sólo her médico puede diagnosticar y tratar un problema de sarah.             Language Assistance Services     ATTENTION: Language assistance services are available, free of charge. Please call [phone].      ATENCIÓN: Si habla español, tiene a her disposición servicios gratuitos de asistencia lingüística. Llame al [phone].     CHÚ Ý: N?u b?n nói Ti?ng Vi?t, có các d?ch  v? h? tr? ngôn ng? mi?n phí bambih cho b?n. G?i s? [phone].         [facility] complies with applicable Federal civil rights laws and does not discriminate on the basis of race, color, national origin, age, disability, or sex.

## 2017-04-06 NOTE — LETTER
April 6, 2017      Constantine Capps MD  [address]  [facility] [address]           [facility]  [address]  [facility] [address]  Phone: [phone]  Fax: [phone]          Patient: Ciro Chandler   MR Number: [id number]   YOB: 1950   Date of Visit: 4/6/2017       Dear Dr. Constantine Capps:    Thank you for referring Ciro Chandler to me for evaluation. Attached you will find relevant portions of my assessment and plan of care.    If you have questions, please do not hesitate to call me. I look forward to following Ciro Chandler along with you.    Sincerely,    Kar Bloom MD    Enclosure  CC:  No Recipients    If you would like to receive this communication electronically, please contact [email or web address] or [phone] to request more information on [employer] access.    For providers and/or their staff who would like to refer a patient to [facility], please contact us through our one-stop-shop provider referral line, [facility], at [phone].    If you feel you have received this communication in error or would no longer like to receive these types of communications, please e-mail [email or web address]

## 2017-04-06 NOTE — PATIENT INSTRUCTIONS
What Is Atrial Flutter/Atrial Fibrillation?      The heart has its own electrical system. This system makes the signals that start each heartbeat. The heartbeat begins in 1 of the 2 upper chambers of the heart (atria). A problem can make the atria beat faster than normal. The atria may beat fast but still evenly. This problem is called atrial flutter. If the atria beat very fast and also unevenly, it is called atrial fibrillation (AFib).  Causes of Atrial Flutter and Atrial Fibrillation  Causes of these problems can include:  · Previous heart attack  · High blood pressure  · Thyroid problems  In many cases, the cause is unknown.  When the Atria Beat Too Fast  The atria may beat fast only once in a while. This is called a paroxysmal heart rhythm problem. If they beat fast all the time, it is a chronic problem.  Atrial Flutter  With atrial flutter, electrical signals travel around and around inside the atria. These circling signals make the atria beat too fast:  · Atrial flutter can cause symptoms similar to AFib. It can also lead to the even faster, uneven rhythms of AFib.  Atrial Fibrillation (AFib)  With AFib, cells in the atria send extra electrical signals. These extra signals make the atria beat very fast. They also beat unevenly:  · The atria beat so fast and unevenly that they may quiver instead of praveen. If the atria dont contract, they dont move enough blood into the 2 lower chambers of the heart (ventricles). This can cause you to feel dizzy or weak.  · Blood that doesnt keep moving can pool and form clots in the atria. These clots can move into other parts of the body and cause serious problems such as a stroke.   Symptoms of Atrial Flutter and AFib  These symptoms include the following:  · Palpitations (a fluttering, fast heartbeat)  · Weakness or tiredness  · Shortness of breath  · Chest pain or tightness  · Dizziness or lightheadedness  · Fainting spells   Date Last Reviewed: 2/26/2014  ©  0508-1829 [employer]. [address] [place] [address], [place] [address]. All rights reserved. This information is not intended as a substitute for professional medical care. Always follow your healthcare professional's instructions.        Cardioversion  Cardioversion is a procedure to restore your hearts normal rhythm from a fast or irregular rhythm (arrhythmia) in the top or bottom chambers of your heart. You may have the procedure in a hospital or surgery center. Its often done on an outpatient (same day) basis. During the procedure, your doctor will give you medication to minimize discomfort. Then the doctor gives you a brief electric shock. This helps your heartbeat become normal again. In most cases, you can go home within hours of the procedure.     During cardioversion, you are fully sedated and wont feel a thing.     Before Your Procedure  · Tell your doctor what over-the-counter and prescription medications, herbs, and supplements you are taking.  · Take medication as directed. Your doctor may prescribe anticoagulants (blood thinners), depending on your situation. They help prevent blood clots from forming. Your doctor may order an ultrasound called transesophageal echocardiography. This will help your doctor figure out if you need a blood thinner.   · Ask your doctor about the risks and benefits of cardioversion.  · Sign your consent form.  · Dont eat or drink anything for 8 to 12 hours before your procedure.  · Follow any other instructions your doctor gives you.   · Arrange for an adult to drive you home after the procedure.  During Your Procedure  · Your health care provider will place small pads (electrodes) on your chest to record your heartbeat at all times.  · Your health care provider will place an intravenous (IV) line in your arm. This gives you medication (sedation) that keeps you free of pain. Youll feel sleepy.  · Your health care provider will give you oxygen through a soft,  plastic tube in your nose.  · Pads will be placed on your chest and back. Your health care provider will give you a very brief electric shock through the pads. Remember, because of sedation, you wont feel the shock.  · Your doctor will watch your heartbeat to make sure your normal rhythm has been restored.   After Your Procedure  · Your health care provider will monitor you until you are fully awake. Then youll be able to sit up, walk, and eat.  · In most cases, youll be able to go home after the sedation wears off. This usually takes a few hours.  · For a few days, the skin on your chest may feel a little sore, like a mild sunburn.  · Dont drive or operate heavy machinery for 24 hours after the procedure.  · The day after your procedure, try to take it easy. Take medication as directed.  · Call your doctor if you notice skipped beats, a rapid heartbeat, or chest tightness. These may be signs that an irregular heartbeat has returned.  Date Last Reviewed: 2/26/2014  © 5462-2076 [employer]. [address] [place] [address], [place] [address]. Todos los derechos reservados. Esta información no pretende sustituir la atención médica profesional. Sólo her médico puede diagnosticar y tratar un problema de sarah.
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
## 2017-04-07 ENCOUNTER — TELEPHONE (OUTPATIENT)
Dept: ELECTROPHYSIOLOGY | Facility: CLINIC | Age: 67
End: 2017-04-07

## 2017-04-09 ENCOUNTER — TELEPHONE (OUTPATIENT)
Dept: EMERGENCY MEDICINE | Facility: HOSPITAL | Age: 67
End: 2017-04-09

## 2017-04-09 NOTE — TELEPHONE ENCOUNTER
Called [phone].  Mr. Chandler' wife answered (Elle).  I conveyed the message that diabetes education has been trying to get in touch with them and they had previously scheduled appointments which were missed unfortunately.  Elle said they will try to get in touch with the diabetes educator.  She did not know the number, but I asked her to review the voicemail message which should have the number to call.

## 2017-04-10 ENCOUNTER — HOSPITAL ENCOUNTER (OUTPATIENT)
Dept: CARDIOLOGY | Facility: CLINIC | Age: 67
Discharge: HOME | End: 2017-04-10
Payer: MEDICARE

## 2017-04-10 ENCOUNTER — ANTI-COAG VISIT (OUTPATIENT)
Dept: CARDIOLOGY | Facility: CLINIC | Age: 67
End: 2017-04-10

## 2017-04-10 DIAGNOSIS — I48.3 TYPICAL ATRIAL FLUTTER: ICD-10-CM

## 2017-04-10 DIAGNOSIS — Z79.01 LONG TERM (CURRENT) USE OF ANTICOAGULANTS: ICD-10-CM

## 2017-04-10 DIAGNOSIS — I48.19 PERSISTENT ATRIAL FIBRILLATION: ICD-10-CM

## 2017-04-10 LAB — RETIRED EF AND QEF - SEE NOTES: 40 (ref 55–65)

## 2017-04-10 PROCEDURE — 93307 TTE W/O DOPPLER COMPLETE: CPT | Mod: S$GLB,,, | Performed by: INTERNAL MEDICINE

## 2017-04-11 ENCOUNTER — TELEPHONE (OUTPATIENT)
Dept: ELECTROPHYSIOLOGY | Facility: CLINIC | Age: 67
End: 2017-04-11

## 2017-04-11 NOTE — TELEPHONE ENCOUNTER
"Called Mr. Chandler to discuss his ECHO results.  His EF has dropped from 60 to 40%.  I discussed with him what this meant and how it could be from a reversible cause such as his dysrhythmia.  I again offered a cardioversion to restore normal rhythm.  We discussed at length in clinic how it was a low risk procedure and he would be sleeping for it.  I also discussed that I would advise he undergo a stress test.  He refused both and stated he has been dealing with too much and when it is "my time to go, it's my time".  I informed him again that that need not be the case currently and that his EF may improve with normal rhythm.  I asked him if I could discuss this with his wife and he stated he wanted to talk with her about it.  I asked if I could call him back in a few days and he said that would be fine.  "

## 2017-04-17 ENCOUNTER — ANTI-COAG VISIT (OUTPATIENT)
Dept: CARDIOLOGY | Facility: CLINIC | Age: 67
End: 2017-04-17

## 2017-04-17 DIAGNOSIS — Z79.01 LONG TERM (CURRENT) USE OF ANTICOAGULANTS: ICD-10-CM

## 2017-04-17 DIAGNOSIS — I48.3 TYPICAL ATRIAL FLUTTER: ICD-10-CM

## 2017-04-17 LAB — INR PPP: 3.06

## 2017-04-17 NOTE — PROGRESS NOTES
Per Narinder Worthington, with [facility], wife refused meter. She feels patient is not capable of testing his own levels.

## 2017-04-18 DIAGNOSIS — N18.6 TYPE 2 DIABETES MELLITUS WITH END-STAGE RENAL DISEASE: ICD-10-CM

## 2017-04-18 DIAGNOSIS — E11.22 TYPE 2 DIABETES MELLITUS WITH END-STAGE RENAL DISEASE: ICD-10-CM

## 2017-04-18 DIAGNOSIS — E11.49 DIABETES MELLITUS TYPE 2 WITH NEUROLOGICAL MANIFESTATIONS: ICD-10-CM

## 2017-04-19 ENCOUNTER — TELEPHONE (OUTPATIENT)
Dept: ELECTROPHYSIOLOGY | Facility: CLINIC | Age: 67
End: 2017-04-19

## 2017-04-19 DIAGNOSIS — I42.9 CARDIOMYOPATHY, UNSPECIFIED TYPE: Primary | ICD-10-CM

## 2017-04-19 RX ORDER — LANCETS 26 GAUGE
1 EACH MISCELLANEOUS 2 TIMES DAILY
Qty: 1 EACH | Refills: 1 | Status: ON HOLD | OUTPATIENT
Start: 2017-04-19 | End: 2018-08-07 | Stop reason: HOSPADM

## 2017-04-19 NOTE — TELEPHONE ENCOUNTER
Called Mr. Chandler again after giving him time to think about stress testing and his atrial arrhythmia.  He refuses cardioversion.  However he is willing to only perform a treadmill exercise stress test.  He does not want a pharmacologic test and states he can walk and jog on a treadmill.  I will place the order and notify Dr. Tong as well.

## 2017-04-20 ENCOUNTER — TELEPHONE (OUTPATIENT)
Dept: ELECTROPHYSIOLOGY | Facility: CLINIC | Age: 67
End: 2017-04-20

## 2017-04-24 ENCOUNTER — ANTI-COAG VISIT (OUTPATIENT)
Dept: CARDIOLOGY | Facility: CLINIC | Age: 67
End: 2017-04-24

## 2017-04-24 DIAGNOSIS — Z79.01 LONG TERM (CURRENT) USE OF ANTICOAGULANTS: ICD-10-CM

## 2017-04-24 DIAGNOSIS — I48.3 TYPICAL ATRIAL FLUTTER: ICD-10-CM

## 2017-04-24 LAB — INR PPP: 3.32

## 2017-05-01 ENCOUNTER — ANTI-COAG VISIT (OUTPATIENT)
Dept: CARDIOLOGY | Facility: CLINIC | Age: 67
End: 2017-05-01

## 2017-05-01 DIAGNOSIS — Z79.01 LONG TERM (CURRENT) USE OF ANTICOAGULANTS: ICD-10-CM

## 2017-05-01 DIAGNOSIS — I48.3 TYPICAL ATRIAL FLUTTER: ICD-10-CM

## 2017-05-01 LAB — INR PPP: 2.36

## 2017-05-02 ENCOUNTER — PATIENT MESSAGE (OUTPATIENT)
Dept: INTERNAL MEDICINE | Facility: CLINIC | Age: 67
End: 2017-05-02

## 2017-05-02 NOTE — TELEPHONE ENCOUNTER
----- Message from Kar Bloom MD sent at 4/6/2017  3:08 PM CDT -----  Kalen Bowie    I saw Mr. Chandler in clinic today.  We discussed DARRYL/DCCV.  He wants to think about it.  Can you call him in a week and see if he has made a decision?    Regards    Kar  
Either myself or staff will call next week to speak with Pt, and possibly schedule DARRYL/DCCV.   
no known precautions/limitations

## 2017-05-03 ENCOUNTER — PATIENT MESSAGE (OUTPATIENT)
Dept: INTERNAL MEDICINE | Facility: CLINIC | Age: 67
End: 2017-05-03

## 2017-05-04 ENCOUNTER — CLINICAL SUPPORT (OUTPATIENT)
Dept: DIABETES | Facility: CLINIC | Age: 67
End: 2017-05-04
Payer: MEDICARE

## 2017-05-04 DIAGNOSIS — E11.22 TYPE 2 DIABETES MELLITUS WITH DIABETIC CHRONIC KIDNEY DISEASE, UNSPECIFIED CKD STAGE, UNSPECIFIED LONG TERM INSULIN USE STATUS: ICD-10-CM

## 2017-05-04 PROCEDURE — G0108 DIAB MANAGE TRN  PER INDIV: HCPCS | Mod: S$GLB,,, | Performed by: DIETITIAN, REGISTERED

## 2017-05-04 NOTE — LETTER
May 4, 2017      Nellie Bravo MD  [address]  [facility] [address]         Patient: Ciro Chandler   MR Number: [id number]   YOB: 1950   Date of Visit: 5/4/2017       Dear Dr. Bravo:    Thank you for referring Ciro for diabetes self-management education and support. He has completed all components of our Diabetes Management Program and his Self-Management Support Plan. Below is a summary of his clinical outcomes and goal progress.    Patient Outcomes:    A1c Status:   Lab Results   Component Value Date    HGBA1C 11.7 (H) 03/23/2017    HGBA1C 10.4 (H) 11/30/2016     Goals  Healthy Eating: % Met (PT will eat more balanced meals and will switch to diet juice)  Met Percentage : 100%  Monitoring: Set (Pt will SMBG before every meal and before bed)  Start Date: 05/04/17  Target Date: 08/04/17  Medications: Set (Pt will take prandial insulin before meal rather than after meal)  Start Date: 05/04/17  Target Date: 08/04/17    Diabetes Self-Management Support Plan  Diabetes Learning: DM support group  Review Status: Patient has selected and agrees to support plan.    Follow up:   · Ciro to follow diabetes support plan indicated above  · Ciro to attend medical appointments as scheduled  · Ciro to update you on his DM education progress as needed      If you have questions, please do not hesitate to call me. I look forward to providing additional education and support as needed.    Sincerely,    Laura Mancera RD

## 2017-05-04 NOTE — PROGRESS NOTES
"Diabetes Education  Author: Laura Mancera RD, CDE  Date: 5/4/2017    Diabetes Education Visit  Diabetes Education Record Assessment/Progress: Comprehensive/Group (Pt present for 3 mo f/u. Pt seen for initial DM education in October 2016. Pt no-showed 2 previous appointments in January)    Diabetes Type  Diabetes Type : Type II    Nutrition  Meal Plan 24 Hour Recall - Breakfast: oatmeal OR grits with juice   Meal Plan 24 Hour Recall - Lunch: sandwich with water to drink  Meal Plan 24 Hour Recall - Dinner: smoked sausage sandwich OR restaurant sometimes  Meal Plan 24 Hour Recall - Snack: occasionally snacks on crackers, but not often    Monitoring   Blood Glucose Logs: Yes (logs present at visit showing SMBG 1-4 times per day BG ranging from  mg/dL)    Exercise   Exercise Type: walking  Intensity: Moderate  Frequency: 3-5 Times per week  Duration: 30 min    Current Diabetes Treatment   Current Treatment: Oral Medication, Insulin    Social History  Preferred Learning Method: Face to Face, Demonstration, Hands On, Reading Materials  Primary Support: Self, Spouse    Barriers to Change  Barriers to Change: None  Learning Challenges : Literacy    Readiness to Learn   Readiness to Learn : Acceptance    Diabetes Education Assessment/Progress  Acute Complications (preventing, detecting, and treating acute complications): Discussion, Individual Session, Written Materials Provided, Instructed, Competent (verbalizes/demonstrates) (discussed s/s of hypo and proper tx with "rule of 15")  Diabetes Disease Process (diabetes disease process and treatment options): Discussion, Individual Session, Competent (verbalizes/demonstrates)  Nutrition (Incorporating nutritional management into one's lifestyle): Discussion, Individual Session, Written Materials Provided, Instructed, Competent (verbalizes/demonstrates) (reviewed sources of carbs and importance of portion control with carb foods)  Physical Activity (incorporating " physical activity into one's lifestyle): Discussion, Individual Session, Competent (verbalizes/demonstrates) (encoruaged to continue with current activity)  Medications (states correct name, dose, onset, peak, duration, side effects & timing of meds): Discussion, Individual Session, Instructed, Competent (verbalizes/demonstrates) (stressed importance of not skipping doses of meds and timing of insulin - specifically taking prandial insulin before meals rather than after meals)  Monitoring (monitoring blood glucose/other parameters & using results): Discussion, Individual Session, Instructed, Competent (verbalizes/demonstrates) (instructed to SMBG ac/hs and to bring logs to all visits. Also instructed to contact provider if BG is consistently over 250 or under 70)  Goal Setting and Problem Solving (verbalizes behavior change strategies & sets realistic goals): Discussion, Individual Session, Competent (verbalizes/demonstrates)  Behavior Change (developing personal strategies to health & behavior change): Discussion, Individual Session, Comnpetent (verbalizes/demonstrates)  Psychosocial Issues (developing personal srategies to address psychosocial concerns): Discussion, Individual Session, Competent (verbalizes/demonstrates)    Goals  Healthy Eating: % Met (PT will eat more balanced meals and will switch to diet juice)  Met Percentage : 100%  Monitoring: Set (Pt will SMBG before every meal and before bed)  Start Date: 05/04/17  Target Date: 08/04/17  Medications: Set (Pt will take prandial insulin before meal rather than after meal)  Start Date: 05/04/17  Target Date: 08/04/17    Diabetes Self-Management Support Plan  Diabetes Learning: DM support group  Review Status: Patient has selected and agrees to support plan.    Diabetes Care Plan/Intervention  Education Plan/Intervention:  (Will contact PCP to have pt set up for f/u)    Education Units of Time   Time Spent: 30 min      Health Maintenance Topics with due  status: Not Due       Topic Last Completion Date    Colonoscopy 10/15/2012    Foot Exam 07/26/2016    Lipid Panel 07/26/2016    Influenza Vaccine 09/13/2016    Hemoglobin A1c 03/23/2017    Eye Exam 03/30/2017     Health Maintenance Due   Topic Date Due    TETANUS VACCINE  01/24/1967    Zoster Vaccine  01/24/2009    Pneumococcal (65+) (1 of 2 - PCV13) 01/24/2014    Abdominal Aortic Aneurysm Screening  01/24/2014

## 2017-05-08 ENCOUNTER — ANTI-COAG VISIT (OUTPATIENT)
Dept: CARDIOLOGY | Facility: CLINIC | Age: 67
End: 2017-05-08

## 2017-05-08 DIAGNOSIS — Z79.01 LONG TERM (CURRENT) USE OF ANTICOAGULANTS: ICD-10-CM

## 2017-05-08 DIAGNOSIS — I48.3 TYPICAL ATRIAL FLUTTER: ICD-10-CM

## 2017-05-08 LAB — INR PPP: 2.31

## 2017-05-22 ENCOUNTER — ANTI-COAG VISIT (OUTPATIENT)
Dept: CARDIOLOGY | Facility: CLINIC | Age: 67
End: 2017-05-22

## 2017-05-22 DIAGNOSIS — I48.3 TYPICAL ATRIAL FLUTTER: ICD-10-CM

## 2017-05-22 DIAGNOSIS — Z79.01 LONG TERM (CURRENT) USE OF ANTICOAGULANTS: ICD-10-CM

## 2017-05-22 LAB — INR PPP: 1.77

## 2017-05-25 ENCOUNTER — TELEPHONE (OUTPATIENT)
Dept: ELECTROPHYSIOLOGY | Facility: CLINIC | Age: 67
End: 2017-05-25

## 2017-05-25 NOTE — TELEPHONE ENCOUNTER
Please see previous notes. I called Pt again to scheduled stress test per Dr. Bloom, however, he did not answer. I left a voicemail asking him to call me back at his earliest convenience. I will leave myself a reminder to follow up again.

## 2017-06-01 ENCOUNTER — PATIENT MESSAGE (OUTPATIENT)
Dept: ELECTROPHYSIOLOGY | Facility: CLINIC | Age: 67
End: 2017-06-01

## 2017-06-05 ENCOUNTER — ANTI-COAG VISIT (OUTPATIENT)
Dept: CARDIOLOGY | Facility: CLINIC | Age: 67
End: 2017-06-05

## 2017-06-05 DIAGNOSIS — Z79.01 LONG TERM (CURRENT) USE OF ANTICOAGULANTS: ICD-10-CM

## 2017-06-05 DIAGNOSIS — I48.3 TYPICAL ATRIAL FLUTTER: ICD-10-CM

## 2017-06-05 LAB — INR PPP: 3

## 2017-06-28 ENCOUNTER — PATIENT MESSAGE (OUTPATIENT)
Dept: INTERNAL MEDICINE | Facility: CLINIC | Age: 67
End: 2017-06-28

## 2017-06-29 ENCOUNTER — ANTI-COAG VISIT (OUTPATIENT)
Dept: CARDIOLOGY | Facility: CLINIC | Age: 67
End: 2017-06-29

## 2017-06-29 DIAGNOSIS — I48.3 TYPICAL ATRIAL FLUTTER: ICD-10-CM

## 2017-06-29 DIAGNOSIS — Z79.01 LONG TERM (CURRENT) USE OF ANTICOAGULANTS: ICD-10-CM

## 2017-06-29 LAB — INR PPP: 2.2

## 2017-07-06 ENCOUNTER — PATIENT MESSAGE (OUTPATIENT)
Dept: INTERNAL MEDICINE | Facility: CLINIC | Age: 67
End: 2017-07-06

## 2017-07-06 DIAGNOSIS — N18.2 TYPE 2 DIABETES MELLITUS WITH STAGE 2 CHRONIC KIDNEY DISEASE, WITH LONG-TERM CURRENT USE OF INSULIN: ICD-10-CM

## 2017-07-06 DIAGNOSIS — E11.22 TYPE 2 DIABETES MELLITUS WITH STAGE 2 CHRONIC KIDNEY DISEASE, WITH LONG-TERM CURRENT USE OF INSULIN: ICD-10-CM

## 2017-07-06 DIAGNOSIS — Z79.4 TYPE 2 DIABETES MELLITUS WITH STAGE 2 CHRONIC KIDNEY DISEASE, WITH LONG-TERM CURRENT USE OF INSULIN: ICD-10-CM

## 2017-07-06 RX ORDER — INSULIN GLARGINE 100 [IU]/ML
35 INJECTION, SOLUTION SUBCUTANEOUS NIGHTLY
Qty: 2 VIAL | Refills: 0 | Status: SHIPPED | OUTPATIENT
Start: 2017-07-06 | End: 2017-09-11

## 2017-07-06 RX ORDER — INSULIN ASPART 100 [IU]/ML
6 INJECTION, SOLUTION INTRAVENOUS; SUBCUTANEOUS
Qty: 1 VIAL | Refills: 0 | Status: SHIPPED | OUTPATIENT
Start: 2017-07-06 | End: 2017-09-04

## 2017-07-06 NOTE — TELEPHONE ENCOUNTER
Refilled novolog and lantus enough for about 2 months, asked Tony to please schedule follow up with me as this patient has not had their HbA1C monitored or diabetes regimen directly addressed for several months.

## 2017-07-07 ENCOUNTER — PATIENT MESSAGE (OUTPATIENT)
Dept: INTERNAL MEDICINE | Facility: CLINIC | Age: 67
End: 2017-07-07

## 2017-07-07 NOTE — TELEPHONE ENCOUNTER
apparently he cancelled , I called and left a  message for him to call back , and also sent a my chart message

## 2017-07-10 ENCOUNTER — HOSPITAL ENCOUNTER (OUTPATIENT)
Dept: CARDIOLOGY | Facility: CLINIC | Age: 67
Discharge: HOME | End: 2017-07-10
Payer: MEDICARE

## 2017-07-10 DIAGNOSIS — I42.9 CARDIOMYOPATHY, UNSPECIFIED TYPE: ICD-10-CM

## 2017-07-10 LAB
AORTIC VALVE REGURGITATION: ABNORMAL
ESTIMATED PA SYSTOLIC PRESSURE: 26.43
RETIRED EF AND QEF - SEE NOTES: 38 (ref 55–65)
TRICUSPID VALVE REGURGITATION: ABNORMAL

## 2017-07-10 PROCEDURE — 93351 STRESS TTE COMPLETE: CPT | Mod: S$GLB,,, | Performed by: INTERNAL MEDICINE

## 2017-07-10 PROCEDURE — 93325 DOPPLER ECHO COLOR FLOW MAPG: CPT | Mod: S$GLB,,, | Performed by: INTERNAL MEDICINE

## 2017-07-10 PROCEDURE — 93320 DOPPLER ECHO COMPLETE: CPT | Mod: S$GLB,,, | Performed by: INTERNAL MEDICINE

## 2017-07-13 ENCOUNTER — OFFICE VISIT (OUTPATIENT)
Dept: ELECTROPHYSIOLOGY | Facility: CLINIC | Age: 67
End: 2017-07-13
Payer: MEDICARE

## 2017-07-13 ENCOUNTER — HOSPITAL ENCOUNTER (OUTPATIENT)
Dept: CARDIOLOGY | Facility: CLINIC | Age: 67
Discharge: HOME | End: 2017-07-13
Payer: MEDICARE

## 2017-07-13 VITALS
WEIGHT: 181.69 LBS | BODY MASS INDEX: 29.2 KG/M2 | HEIGHT: 66 IN | DIASTOLIC BLOOD PRESSURE: 82 MMHG | SYSTOLIC BLOOD PRESSURE: 116 MMHG | HEART RATE: 106 BPM

## 2017-07-13 DIAGNOSIS — I10 ESSENTIAL HYPERTENSION: ICD-10-CM

## 2017-07-13 DIAGNOSIS — I42.9 CARDIOMYOPATHY, UNSPECIFIED TYPE: ICD-10-CM

## 2017-07-13 DIAGNOSIS — E11.22 TYPE 2 DIABETES MELLITUS WITH DIABETIC CHRONIC KIDNEY DISEASE, UNSPECIFIED CKD STAGE, UNSPECIFIED LONG TERM INSULIN USE STATUS: ICD-10-CM

## 2017-07-13 DIAGNOSIS — I15.9 SECONDARY HYPERTENSION: ICD-10-CM

## 2017-07-13 DIAGNOSIS — I48.3 TYPICAL ATRIAL FLUTTER: Primary | ICD-10-CM

## 2017-07-13 DIAGNOSIS — I48.19 PERSISTENT ATRIAL FIBRILLATION: ICD-10-CM

## 2017-07-13 PROCEDURE — 1159F MED LIST DOCD IN RCRD: CPT | Mod: S$GLB,,, | Performed by: INTERNAL MEDICINE

## 2017-07-13 PROCEDURE — 4010F ACE/ARB THERAPY RXD/TAKEN: CPT | Mod: S$GLB,,, | Performed by: INTERNAL MEDICINE

## 2017-07-13 PROCEDURE — 1126F AMNT PAIN NOTED NONE PRSNT: CPT | Mod: S$GLB,,, | Performed by: INTERNAL MEDICINE

## 2017-07-13 PROCEDURE — 93000 ELECTROCARDIOGRAM COMPLETE: CPT | Mod: S$GLB,,, | Performed by: INTERNAL MEDICINE

## 2017-07-13 PROCEDURE — 3046F HEMOGLOBIN A1C LEVEL >9.0%: CPT | Mod: S$GLB,,, | Performed by: INTERNAL MEDICINE

## 2017-07-13 PROCEDURE — 99213 OFFICE O/P EST LOW 20 MIN: CPT | Mod: S$GLB,,, | Performed by: INTERNAL MEDICINE

## 2017-07-13 PROCEDURE — 99999 PR PBB SHADOW E&M-EST. PATIENT-LVL IV: CPT | Mod: PBBFAC,,, | Performed by: INTERNAL MEDICINE

## 2017-07-13 RX ORDER — METOPROLOL SUCCINATE 50 MG/1
50 TABLET, EXTENDED RELEASE ORAL DAILY
Qty: 90 TABLET | Refills: 3 | Status: ON HOLD | OUTPATIENT
Start: 2017-07-13 | End: 2018-08-07 | Stop reason: HOSPADM
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
## 2017-07-15 ENCOUNTER — PATIENT MESSAGE (OUTPATIENT)
Dept: ELECTROPHYSIOLOGY | Facility: CLINIC | Age: 67
End: 2017-07-15

## 2017-07-17 ENCOUNTER — TELEPHONE (OUTPATIENT)
Dept: ELECTROPHYSIOLOGY | Facility: CLINIC | Age: 67
End: 2017-07-17

## 2017-07-17 NOTE — TELEPHONE ENCOUNTER
Spoke to pt wife who had questions regarding cardioversion, answered all questions. She is going to discuss with the pt and call back with any further questions or to schedule a dccv. encouraged pt to call back with any questions or concerns.

## 2017-07-24 ENCOUNTER — PATIENT MESSAGE (OUTPATIENT)
Dept: INTERNAL MEDICINE | Facility: CLINIC | Age: 67
End: 2017-07-24

## 2017-07-31 ENCOUNTER — CLINICAL SUPPORT (OUTPATIENT)
Dept: ELECTROPHYSIOLOGY | Facility: CLINIC | Age: 67
End: 2017-07-31
Payer: MEDICARE

## 2017-07-31 DIAGNOSIS — I48.3 TYPICAL ATRIAL FLUTTER: ICD-10-CM

## 2017-07-31 DIAGNOSIS — I48.19 PERSISTENT ATRIAL FIBRILLATION: ICD-10-CM

## 2017-07-31 PROCEDURE — 93224 XTRNL ECG REC UP TO 48 HRS: CPT | Mod: S$GLB,,, | Performed by: INTERNAL MEDICINE

## 2017-08-03 ENCOUNTER — ANTI-COAG VISIT (OUTPATIENT)
Dept: CARDIOLOGY | Facility: CLINIC | Age: 67
End: 2017-08-03

## 2017-08-03 DIAGNOSIS — I48.3 TYPICAL ATRIAL FLUTTER: ICD-10-CM

## 2017-08-03 DIAGNOSIS — Z79.01 LONG TERM (CURRENT) USE OF ANTICOAGULANTS: ICD-10-CM

## 2017-08-03 LAB — INR PPP: 3.1

## 2017-08-07 ENCOUNTER — TELEPHONE (OUTPATIENT)
Dept: ELECTROPHYSIOLOGY | Facility: CLINIC | Age: 67
End: 2017-08-07

## 2017-08-07 DIAGNOSIS — I48.3 TYPICAL ATRIAL FLUTTER: ICD-10-CM

## 2017-08-07 DIAGNOSIS — I42.9 CARDIOMYOPATHY, UNSPECIFIED TYPE: ICD-10-CM

## 2017-08-07 DIAGNOSIS — I48.19 PERSISTENT ATRIAL FIBRILLATION: Primary | ICD-10-CM

## 2017-08-07 RX ORDER — AMIODARONE HYDROCHLORIDE 200 MG/1
TABLET ORAL
Qty: 120 TABLET | Refills: 2 | Status: SHIPPED | OUTPATIENT
Start: 2017-08-07

## 2017-08-07 NOTE — TELEPHONE ENCOUNTER
Called patient to discuss his holter monitor, which revealed he is in sinus rhythm. Do to him have some likely symptoms of heart failure with mild-moderately depressed LVEF which could be arrhythmia related, best option is for a trial of amiodarone to see if maintaining sinus rhythm will improve his heart function. I would not use multaq due to have some heart failure symptoms, albeit NYHA class II. I would not try sotalol or tikosyn as I think ECG monitoring will be difficult for him. So will try amiodarone if his EF improves with sinus rhythm after 3-4 months then will change him to a class Ic agent. If his EF remains the same despite prolonged sinus rhythm then will stop amiodarone and rate control if AF/AFL returns.    Will start amiodarone 400mg bid x 2 weeks then 200 mg daily. Will see him back in 4 months with an ECHO just prior to his return visit. Will notify coumadin clinic he is starting on amiodarone. The patient was educated about the long-term risks of amiodarone exposure.

## 2017-08-07 NOTE — PROGRESS NOTES
Received message from Dr. Bloom that patient will be starting amiodarone, beginning with 400mg BID loading dose.  Reschedule INR to next week to assess anticipated DDI.

## 2017-08-08 ENCOUNTER — OFFICE VISIT (OUTPATIENT)
Dept: INTERNAL MEDICINE | Facility: CLINIC | Age: 67
End: 2017-08-08
Payer: MEDICARE

## 2017-08-08 DIAGNOSIS — E78.5 HYPERLIPIDEMIA, UNSPECIFIED HYPERLIPIDEMIA TYPE: ICD-10-CM

## 2017-08-08 DIAGNOSIS — Z79.4 TYPE 2 DIABETES MELLITUS WITHOUT COMPLICATION, WITH LONG-TERM CURRENT USE OF INSULIN: ICD-10-CM

## 2017-08-08 DIAGNOSIS — R41.3 MEMORY CHANGES: Primary | ICD-10-CM

## 2017-08-08 DIAGNOSIS — E11.9 TYPE 2 DIABETES MELLITUS WITHOUT COMPLICATION, WITH LONG-TERM CURRENT USE OF INSULIN: ICD-10-CM

## 2017-08-08 DIAGNOSIS — Z86.010 HISTORY OF COLON POLYPS: ICD-10-CM

## 2017-08-08 DIAGNOSIS — Z00.00 HEALTHCARE MAINTENANCE: ICD-10-CM

## 2017-08-08 PROCEDURE — 1159F MED LIST DOCD IN RCRD: CPT | Mod: GC,S$GLB,, | Performed by: HOSPITALIST

## 2017-08-08 PROCEDURE — 3008F BODY MASS INDEX DOCD: CPT | Mod: GC,S$GLB,, | Performed by: HOSPITALIST

## 2017-08-08 PROCEDURE — 99214 OFFICE O/P EST MOD 30 MIN: CPT | Mod: GC,S$GLB,, | Performed by: HOSPITALIST

## 2017-08-08 PROCEDURE — 3045F PR MOST RECENT HEMOGLOBIN A1C LEVEL 7.0-9.0%: CPT | Mod: GC,S$GLB,, | Performed by: HOSPITALIST

## 2017-08-08 PROCEDURE — 4010F ACE/ARB THERAPY RXD/TAKEN: CPT | Mod: GC,S$GLB,, | Performed by: HOSPITALIST

## 2017-08-08 PROCEDURE — 99999 PR PBB SHADOW E&M-EST. PATIENT-LVL III: CPT | Mod: PBBFAC,GC,, | Performed by: HOSPITALIST

## 2017-08-08 NOTE — PROGRESS NOTES
Subjective:       Patient ID: Ciro Chandler is a 67 y.o. male.      Chief Complaint: follow up      HPI  Ciro Chandler is a 67 y.o. male with PMH of HTN, HLD, DM2, paroxysmal Afib, depressive disorder    Following up for diabetes, hypertension, fall risk, and new complaint of memory issues.    He is a poor historian and is not forthcoming with issues and is unable to accurately recall prior issues.    In terms of the diabetes.  He and his wife forgot to bring the glucometer today.  But wife says he has been running 160s-175s in blood glucose with am glucoses of 95. He has been complaint with his medications for diabetes but las hbA1C was in march and was 11.7.    Wife reports he is consistently following up with EP for his paroxysmal Afib.      He is due for colonoscopy from his history of tubular adenomatous polyp (was due last October).    He is a fall risk patient.  Has not had a fall in over a year.  Uses a cane at baseline.      Has been having memory issues.  Does not recall calling his wife multiple times, will call multiple times during the day.  He will forget whether or not he has done something.  Does not remember details of conversations.  He has been having these issues for the past several months.  Less conversational and interactive at home.  Can feed and dress himself but does not drive because wife is worried he will get lost if he drives on his own. Sleep has been good.  Interest and guilt level have been okay.  Energy has been low.  Appetite has been okay. Concentration has been low.  Denies depressed thoughts.      Wife requests more assistance with taking care of him at home.      Review of Systems   HENT: Negative for hearing loss.    Eyes: Negative for discharge.   Respiratory: Negative for wheezing.    Cardiovascular: Negative for chest pain and palpitations.   Gastrointestinal: Negative for blood in stool, constipation, diarrhea and vomiting.   Genitourinary: Negative for hematuria and  "urgency.   Musculoskeletal: Negative for neck pain.   Neurological: Negative for weakness and headaches.   Endo/Heme/Allergies: Negative for polydipsia.   Psychiatric/Behavioral: Positive for memory loss. Negative for depression.           Objective:     There were no vitals filed for this visit.  Estimated body mass index is 29.32 kg/m² as calculated from the following:    Height as of 7/13/17: 5' 6" (1.676 m).    Weight as of 7/13/17: 82.4 kg (181 lb 10.5 oz).    Physical Exam   Constitutional: He is oriented to person, place, and time.   Thin appearing   HENT:   Head: Normocephalic and atraumatic.   Eyes: Conjunctivae and EOM are normal. Pupils are equal, round, and reactive to light.   Neck: Neck supple. No tracheal deviation present. No thyromegaly present.   Cardiovascular: Normal rate, regular rhythm, normal heart sounds and intact distal pulses.    No murmur heard.  Pulmonary/Chest: Effort normal and breath sounds normal. No respiratory distress. He has no wheezes. He has no rales.   Abdominal: Soft. Bowel sounds are normal. He exhibits no distension. There is no tenderness.   Neurological: He is alert and oriented to person, place, and time. GCS score is 15.   No neuropathy appreciated on monofilament testing, no obvious diabetic foot wounds   Skin: Skin is warm and dry.         Assessment/Plan:       T2DM  Last HbA1C was in March 23 nd was 11.7  Repeat HbA1C  Saw optometry on 3/30/17 - no manifestations, presbyopia present  Last saw diabetes education 5/4/17  Will need refills for insulin and/or metformin based on HbA1C    HTN  --compliant with losart, blood pressure reasonably controlled in clinic    Memory Issues  --MMSE attempted but he seemed to have trouble comprehending most of the questions and/or couldn't answer most of them and was unable to perform most of the tasks.  --dementia work up: TSH, RPR, Vitamin B12    Paroxysmal Afib  --on coumadin, follows up in coumadin clinic  --follows closely with " "EP Dr. Bloom  --rate control medications per EP  --refuses cardioversion, EP trying to convince him  --24 hour Holter monitor per EP reveals NSR  --he will follow up with EP in 6 months    History of tubular adenomatous polyp  --repeat colonoscopy ordered    Fall Risk  --ambulatory referral to PT  --Home Health referal ordered: SW to help provide resources, RN to help with DM and BP monitoring  --highly encouraged patient and his wife to find a PCP in their local area in [place] where they live.      Medication List with Changes/Refills   Current Medications    AMIODARONE (PACERONE) 200 MG TAB    Take 2 pills twice daily for 2 weeks then take 1 pill daily thereafter    ATORVASTATIN (LIPITOR) 80 MG TABLET    Take 1 tablet (80 mg total) by mouth once daily.    BLOOD SUGAR DIAGNOSTIC (ACCU-CHEK SMARTVIEW TEST STRIP) STRP    1 each by Misc.(Non-Drug; Combo Route) route 4 (four) times daily.    BLOOD-GLUCOSE METER (ACCU-CHEK MALLORY) MISC    1 each by Misc.(Non-Drug; Combo Route) route once daily.    ECONAZOLE NITRATE 1 % CREAM    Apply topically 2 (two) times daily.    INSULIN ASPART (NOVOLOG) 100 UNIT/ML INJECTION    Inject 6 Units into the skin 3 (three) times daily before meals.    INSULIN GLARGINE (LANTUS) 100 UNIT/ML INJECTION    Inject 35 Units into the skin every evening.    LANCETS (ACCU-CHEK FASTCLIX) MISC    1 each by Misc.(Non-Drug; Combo Route) route 4 (four) times daily.    LANCING DEVICE WITH LANCETS (ONETOUCH DELICA LANC DEVICE) KIT    1 each by Misc.(Non-Drug; Combo Route) route 2 (two) times daily.    LOSARTAN (COZAAR) 100 MG TABLET    Take 1 tablet (100 mg total) by mouth once daily.    METFORMIN (GLUCOPHAGE) 1000 MG TABLET    Take 1 tablet (1,000 mg total) by mouth 2 (two) times daily with meals.    METOPROLOL SUCCINATE (TOPROL-XL) 50 MG 24 HR TABLET    Take 1 tablet (50 mg total) by mouth once daily.    PEN NEEDLE, DIABETIC (BD ULTRA-FINE MALLORY PEN NEEDLES) 32 GAUGE X 5/32" NDLE    Use with lantus " pen    PREVNAR 13, PF, 0.5 ML SYRG    ADM 0.5ML IM UTD    SENNA-DOCUSATE 8.6-50 MG (SENNA WITH DOCUSATE SODIUM) 8.6-50 MG PER TABLET    Take 1 tablet by mouth once daily.    TRAZODONE (DESYREL) 50 MG TABLET    1 - 2 Tablet Oral At bedtime    WARFARIN (COUMADIN) 5 MG TABLET    As directed by coumadin clinic       RTC in one month    This case was discussed with Dr. Chanel, PGY-2      I have personally seen and examined patient and agree with the A/P as noted above.    Poncho William

## 2017-08-09 ENCOUNTER — TELEPHONE (OUTPATIENT)
Dept: INTERNAL MEDICINE | Facility: CLINIC | Age: 67
End: 2017-08-09

## 2017-08-09 DIAGNOSIS — Z79.4 TYPE 2 DIABETES MELLITUS WITHOUT COMPLICATION, WITH LONG-TERM CURRENT USE OF INSULIN: ICD-10-CM

## 2017-08-09 DIAGNOSIS — E11.9 TYPE 2 DIABETES MELLITUS WITHOUT COMPLICATION, WITH LONG-TERM CURRENT USE OF INSULIN: ICD-10-CM

## 2017-08-09 NOTE — TELEPHONE ENCOUNTER
----- Message from Nellie Bravo MD sent at 8/9/2017 11:05 AM CDT -----  Dr. Louise    I wanted to touch base with you regarding this patient:    1.  The dementia work up we have done so far has been negative.  Do you think we need to move on to imaging or other diagnostic tests?    2. The HbA1C is 7.3.  As discussed, they have been waiting for refills on their long acting and short acting insulin.  The patient's wife was trying to tell me that the way their needles/vials work he is not able to inject himself with the 35 lantus because he doesn't want to stick himself twice after administering himself 30 units.  He and his wife prefer the quickpens.  I wanted to ask for your help on how exactly to order what they are asking for and whether or not we should refill his metformin.

## 2017-08-09 NOTE — TELEPHONE ENCOUNTER
Dr Bravo,   I recommend giving a trial to Tresiba 200u/ML which is a  double strength basal insulin so you would give 1/2 his normal dose; I would prescribe Tresiba 200U/ml at 18 units daily(=36 units of the lantus).  I would recommend a CT head due to his younger age and consider start of Aricept(at 5mg daily with food); you can increase to 10 mg with his RTC.  Let me know if I can help otherwise, Poncho William

## 2017-08-10 ENCOUNTER — TELEPHONE (OUTPATIENT)
Dept: INTERNAL MEDICINE | Facility: CLINIC | Age: 67
End: 2017-08-10

## 2017-08-10 DIAGNOSIS — Z79.4 TYPE 2 DIABETES MELLITUS WITHOUT COMPLICATION, WITH LONG-TERM CURRENT USE OF INSULIN: ICD-10-CM

## 2017-08-10 DIAGNOSIS — E11.9 TYPE 2 DIABETES MELLITUS WITHOUT COMPLICATION, WITH LONG-TERM CURRENT USE OF INSULIN: ICD-10-CM

## 2017-08-10 DIAGNOSIS — R41.89 COGNITIVE DECLINE: Primary | ICD-10-CM

## 2017-08-10 RX ORDER — METFORMIN HYDROCHLORIDE 1000 MG/1
1000 TABLET ORAL 2 TIMES DAILY WITH MEALS
Qty: 180 TABLET | Refills: 2 | Status: SHIPPED | OUTPATIENT
Start: 2017-08-10 | End: 2017-08-10 | Stop reason: SDUPTHER

## 2017-08-10 RX ORDER — INSULIN ASPART 100 [IU]/ML
6 INJECTION, SOLUTION INTRAVENOUS; SUBCUTANEOUS
Qty: 6 SYRINGE | Refills: 1 | Status: ON HOLD | OUTPATIENT
Start: 2017-08-10 | End: 2018-08-07 | Stop reason: HOSPADM

## 2017-08-10 RX ORDER — METFORMIN HYDROCHLORIDE 1000 MG/1
1000 TABLET ORAL 2 TIMES DAILY WITH MEALS
Qty: 180 TABLET | Refills: 2 | Status: ON HOLD | OUTPATIENT
Start: 2017-08-10 | End: 2018-08-07 | Stop reason: HOSPADM

## 2017-08-10 RX ORDER — INSULIN DEGLUDEC 200 U/ML
36 INJECTION, SOLUTION SUBCUTANEOUS NIGHTLY
Qty: 6 SYRINGE | Refills: 1 | Status: ON HOLD | OUTPATIENT
Start: 2017-08-10 | End: 2018-08-07 | Stop reason: HOSPADM

## 2017-08-10 RX ORDER — PEN NEEDLE, DIABETIC 30 GX3/16"
NEEDLE, DISPOSABLE MISCELLANEOUS
Qty: 100 EACH | Refills: 2 | Status: SHIPPED | OUTPATIENT
Start: 2017-08-10

## 2017-08-10 NOTE — TELEPHONE ENCOUNTER
Nellie,  I'm so sorry for the confusion. You're correct the doseage is till 36units but the volume is 1/2 of the 100Unit /ml volume of the lantus. It's given in an even # units and should be covered by his insurance. Call me at [phone] to follow up when you get a chance. Thanks, Poncho Louise

## 2017-08-10 NOTE — TELEPHONE ENCOUNTER
Spoke with Nellie and confirmed the below. His Lantus will be changed to Tresiba  200U/ml at 36units daily.

## 2017-08-10 NOTE — TELEPHONE ENCOUNTER
Called Mr Chandler, wife answered phone.  Wife is primary caretaker.  Explained to her that HbA1C is 7.4 a bit above goal range.  Sent new prescriptions for insulin to his pharmacy as well as renewed prescription for metformin.  Also conveyed need for CT head to evaluate for cause of cognitive decline given lab work up was negative.  Conveyed we could consider starting a new medication (aricpet) at the following visit with me.  Answered all questions.

## 2017-08-11 NOTE — PROGRESS NOTES
Garland melendez from lab stated patient had no pt/inr drawn since 8/3.  Called patient whom rescheduled missed 8/9 to 8/14.

## 2017-08-14 ENCOUNTER — PATIENT MESSAGE (OUTPATIENT)
Dept: INTERNAL MEDICINE | Facility: CLINIC | Age: 67
End: 2017-08-14

## 2017-08-15 ENCOUNTER — PATIENT MESSAGE (OUTPATIENT)
Dept: INTERNAL MEDICINE | Facility: CLINIC | Age: 67
End: 2017-08-15

## 2017-08-21 ENCOUNTER — PATIENT MESSAGE (OUTPATIENT)
Dept: INTERNAL MEDICINE | Facility: CLINIC | Age: 67
End: 2017-08-21

## 2017-08-24 ENCOUNTER — ANTI-COAG VISIT (OUTPATIENT)
Dept: CARDIOLOGY | Facility: CLINIC | Age: 67
End: 2017-08-24

## 2017-08-24 ENCOUNTER — PATIENT MESSAGE (OUTPATIENT)
Dept: INTERNAL MEDICINE | Facility: CLINIC | Age: 67
End: 2017-08-24

## 2017-08-24 DIAGNOSIS — E11.22 TYPE 2 DIABETES MELLITUS WITH END-STAGE RENAL DISEASE: ICD-10-CM

## 2017-08-24 DIAGNOSIS — F51.01 PRIMARY INSOMNIA: ICD-10-CM

## 2017-08-24 DIAGNOSIS — E11.49 DIABETES MELLITUS TYPE 2 WITH NEUROLOGICAL MANIFESTATIONS: ICD-10-CM

## 2017-08-24 DIAGNOSIS — N18.6 TYPE 2 DIABETES MELLITUS WITH END-STAGE RENAL DISEASE: ICD-10-CM

## 2017-08-24 DIAGNOSIS — I48.3 TYPICAL ATRIAL FLUTTER: ICD-10-CM

## 2017-08-24 DIAGNOSIS — Z79.01 LONG TERM (CURRENT) USE OF ANTICOAGULANTS: ICD-10-CM

## 2017-08-24 DIAGNOSIS — I48.0 PAROXYSMAL ATRIAL FIBRILLATION: ICD-10-CM

## 2017-08-24 LAB — INR PPP: 4.66

## 2017-08-24 NOTE — PROGRESS NOTES
Verbal result taken from ____David_____. PT/INR __46.2 / 4.66_____ Date drawn____8/24/2017____ Hardcopy to be faxed.    The pt missed his last INR check on 8/14 and went into the lab today - 8/24.

## 2017-08-24 NOTE — PROGRESS NOTES
Pt took the wrong dose (5mg daily except 10mg m/w/f ) of Coumadin.  We are likely seeing the interaction between coumadin and amiodarone

## 2017-08-24 NOTE — TELEPHONE ENCOUNTER
----- Message from Avril Stewart sent at 8/24/2017  1:56 PM CDT -----  Contact: spou [phone]  Patient is seeing dr Bravo and is calling regarding Home Hlth Order and Physical Therapy orders that need to be called into [employer] at [phone] for verbal order, reference number [id number].  Ms Chandler spoke with [facility] and they are waiting for 's office to call in the orders. Please advise.    Thank you

## 2017-08-26 RX ORDER — WARFARIN SODIUM 5 MG/1
TABLET ORAL
Qty: 100 TABLET | Refills: 11 | Status: SHIPPED | OUTPATIENT
Start: 2017-08-26 | End: 2017-08-31

## 2017-08-26 RX ORDER — TRAZODONE HYDROCHLORIDE 50 MG/1
TABLET ORAL
Qty: 90 TABLET | Refills: 3 | Status: SHIPPED | OUTPATIENT
Start: 2017-08-26 | End: 2017-08-31

## 2017-08-26 RX ORDER — ATORVASTATIN CALCIUM 80 MG/1
80 TABLET, FILM COATED ORAL DAILY
Qty: 90 TABLET | Refills: 3 | Status: SHIPPED | OUTPATIENT
Start: 2017-08-26 | End: 2017-08-31

## 2017-08-26 RX ORDER — LANCETS
1 EACH MISCELLANEOUS 4 TIMES DAILY
Qty: 150 EACH | Refills: 11 | Status: SHIPPED | OUTPATIENT
Start: 2017-08-26 | End: 2017-08-31

## 2017-08-27 ENCOUNTER — TELEPHONE (OUTPATIENT)
Dept: INTERNAL MEDICINE | Facility: CLINIC | Age: 67
End: 2017-08-27

## 2017-08-28 ENCOUNTER — PATIENT MESSAGE (OUTPATIENT)
Dept: ELECTROPHYSIOLOGY | Facility: CLINIC | Age: 67
End: 2017-08-28

## 2017-08-28 NOTE — TELEPHONE ENCOUNTER
----- Message from Nellie Bravo MD sent at 8/26/2017 11:41 AM CDT -----  Hi Dr. Louise,    I've received several refill requests for this patient.      1) I'm considering lowering the statin dose before refilling it given the last lipid panel results.    2) I'm not sure what to do with the warfarin request.  From what the coumadin clinic has been documenting, I'm not sure what his current dosing is.  Should I let the coumadin clinic exclusively refill any warfarin prescriptions?    3)  I'm not entirely sure I should refill the trazadone, given his family member had concerns with memory issues and I noticed issues with his alertness in clinic.

## 2017-08-31 ENCOUNTER — TELEPHONE (OUTPATIENT)
Dept: INTERNAL MEDICINE | Facility: CLINIC | Age: 67
End: 2017-08-31

## 2017-08-31 ENCOUNTER — ANTI-COAG VISIT (OUTPATIENT)
Dept: CARDIOLOGY | Facility: CLINIC | Age: 67
End: 2017-08-31

## 2017-08-31 DIAGNOSIS — Z79.01 LONG TERM (CURRENT) USE OF ANTICOAGULANTS: ICD-10-CM

## 2017-08-31 DIAGNOSIS — I48.3 TYPICAL ATRIAL FLUTTER: ICD-10-CM

## 2017-08-31 DIAGNOSIS — R53.81 DEBILITY: Primary | ICD-10-CM

## 2017-08-31 LAB — INR PPP: 3.33

## 2017-08-31 RX ORDER — WARFARIN SODIUM 5 MG/1
TABLET ORAL
Qty: 100 TABLET | Refills: 11 | Status: ON HOLD | OUTPATIENT
Start: 2017-08-31 | End: 2018-07-18

## 2017-08-31 RX ORDER — LANCETS
1 EACH MISCELLANEOUS 4 TIMES DAILY
Qty: 150 EACH | Refills: 11 | Status: SHIPPED | OUTPATIENT
Start: 2017-08-31

## 2017-08-31 RX ORDER — TRAZODONE HYDROCHLORIDE 50 MG/1
TABLET ORAL
Qty: 90 TABLET | Refills: 3 | Status: ON HOLD | OUTPATIENT
Start: 2017-08-31 | End: 2018-08-07 | Stop reason: HOSPADM

## 2017-08-31 RX ORDER — ATORVASTATIN CALCIUM 80 MG/1
40 TABLET, FILM COATED ORAL DAILY
Qty: 90 TABLET | Refills: 3 | Status: SHIPPED | OUTPATIENT
Start: 2017-08-31

## 2017-08-31 NOTE — TELEPHONE ENCOUNTER
Called insurance company which could not clarify what the issue with the referral request was.  Called Elle, Mr Ramesh' wife, who said issue had something to do with insurance not receiving the referral for home health.  [employer] conveyed to me that they do not need referrals for what was ordered at the appointment.  Conveyed to her she may need to find a PCP in her local area in [place] to help her with resources for home health.  Also conveyed that I ordered the referral again, but in case it does not go through she may need to find a doctor under her plan in her local area that can help.  Otherwise we are here to help as much as we can.  Also conveyed I refilled the requested prescriptions.

## 2017-09-07 ENCOUNTER — ANTI-COAG VISIT (OUTPATIENT)
Dept: CARDIOLOGY | Facility: CLINIC | Age: 67
End: 2017-09-07

## 2017-09-07 DIAGNOSIS — I48.3 TYPICAL ATRIAL FLUTTER: ICD-10-CM

## 2017-09-07 DIAGNOSIS — Z79.01 LONG TERM (CURRENT) USE OF ANTICOAGULANTS: ICD-10-CM

## 2017-09-07 LAB — INR PPP: 2.09

## 2017-09-11 ENCOUNTER — OFFICE VISIT (OUTPATIENT)
Dept: INTERNAL MEDICINE | Facility: CLINIC | Age: 67
End: 2017-09-11
Payer: MEDICARE

## 2017-09-11 VITALS
WEIGHT: 184.31 LBS | BODY MASS INDEX: 28.93 KG/M2 | HEIGHT: 67 IN | DIASTOLIC BLOOD PRESSURE: 88 MMHG | OXYGEN SATURATION: 97 % | HEART RATE: 89 BPM | SYSTOLIC BLOOD PRESSURE: 143 MMHG

## 2017-09-11 DIAGNOSIS — E11.9 TYPE 2 DIABETES MELLITUS WITHOUT COMPLICATION, WITH LONG-TERM CURRENT USE OF INSULIN: ICD-10-CM

## 2017-09-11 DIAGNOSIS — Z79.4 TYPE 2 DIABETES MELLITUS WITHOUT COMPLICATION, WITH LONG-TERM CURRENT USE OF INSULIN: ICD-10-CM

## 2017-09-11 DIAGNOSIS — Z09 FOLLOW UP: Primary | ICD-10-CM

## 2017-09-11 LAB
CREAT UR-MCNC: 161 MG/DL
MICROALBUMIN UR DL<=1MG/L-MCNC: 22 UG/ML
MICROALBUMIN/CREATININE RATIO: 13.7 UG/MG

## 2017-09-11 PROCEDURE — 1159F MED LIST DOCD IN RCRD: CPT | Mod: GC,S$GLB,, | Performed by: HOSPITALIST

## 2017-09-11 PROCEDURE — 4010F ACE/ARB THERAPY RXD/TAKEN: CPT | Mod: GC,S$GLB,, | Performed by: HOSPITALIST

## 2017-09-11 PROCEDURE — 3008F BODY MASS INDEX DOCD: CPT | Mod: GC,S$GLB,, | Performed by: HOSPITALIST

## 2017-09-11 PROCEDURE — 3079F DIAST BP 80-89 MM HG: CPT | Mod: GC,S$GLB,, | Performed by: HOSPITALIST

## 2017-09-11 PROCEDURE — 3077F SYST BP >= 140 MM HG: CPT | Mod: GC,S$GLB,, | Performed by: HOSPITALIST

## 2017-09-11 PROCEDURE — 82570 ASSAY OF URINE CREATININE: CPT

## 2017-09-11 PROCEDURE — 99999 PR PBB SHADOW E&M-EST. PATIENT-LVL V: CPT | Mod: PBBFAC,GC,, | Performed by: HOSPITALIST

## 2017-09-11 PROCEDURE — 3045F PR MOST RECENT HEMOGLOBIN A1C LEVEL 7.0-9.0%: CPT | Mod: GC,S$GLB,, | Performed by: HOSPITALIST

## 2017-09-11 PROCEDURE — 1126F AMNT PAIN NOTED NONE PRSNT: CPT | Mod: GC,S$GLB,, | Performed by: HOSPITALIST

## 2017-09-11 PROCEDURE — 99214 OFFICE O/P EST MOD 30 MIN: CPT | Mod: GC,S$GLB,, | Performed by: HOSPITALIST

## 2017-09-11 NOTE — PATIENT INSTRUCTIONS
We have ordered a HbA1C for two months from now.  We have ordered urine studies today.  Please complete this labwork prior to your follow up appointment.      We have reordered your Head CT scan.  Please be on the look out for a phone call.    We have ordered a referral to neurology to help further work up the memory issues.    We have ordered a case management referral to help us coordinate your care.
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
## 2017-09-12 ENCOUNTER — OUTPATIENT CASE MANAGEMENT (OUTPATIENT)
Dept: ADMINISTRATIVE | Facility: OTHER | Age: 67
End: 2017-09-12

## 2017-09-12 NOTE — PROGRESS NOTES
Thank you for the referral.  Patient has been assigned to Frank Espinosa LCSW for low risk screening for Outpatient Case Management.     Reason for referral: Low risk    Follow up  Need assistance with making sure he gets his Head CT, colonoscopy records, labwork, PT, homehealth. Wife has had difficulty receiving phone calls to schedule all these.    Thank you,    Ayana Flores, SSC

## 2017-09-13 ENCOUNTER — ANTI-COAG VISIT (OUTPATIENT)
Dept: CARDIOLOGY | Facility: CLINIC | Age: 67
End: 2017-09-13

## 2017-09-13 LAB — INR PPP: 1.92

## 2017-09-21 LAB — INR PPP: 2.27

## 2017-09-22 ENCOUNTER — ANTI-COAG VISIT (OUTPATIENT)
Dept: CARDIOLOGY | Facility: CLINIC | Age: 67
End: 2017-09-22

## 2017-09-22 DIAGNOSIS — I48.3 TYPICAL ATRIAL FLUTTER: ICD-10-CM

## 2017-09-22 DIAGNOSIS — Z79.01 LONG TERM (CURRENT) USE OF ANTICOAGULANTS: ICD-10-CM

## 2017-09-25 ENCOUNTER — OUTPATIENT CASE MANAGEMENT (OUTPATIENT)
Dept: ADMINISTRATIVE | Facility: OTHER | Age: 67
End: 2017-09-25

## 2017-09-25 NOTE — PROGRESS NOTES
9/25/2017:  Attempt #:  1  This LCSW attempted to reach patient/caregiver to provide resource and left a message requesting a return call.

## 2017-09-27 ENCOUNTER — ANTI-COAG VISIT (OUTPATIENT)
Dept: CARDIOLOGY | Facility: CLINIC | Age: 67
End: 2017-09-27

## 2017-09-27 LAB — INR PPP: 2.89

## 2017-10-02 ENCOUNTER — PATIENT MESSAGE (OUTPATIENT)
Dept: INTERNAL MEDICINE | Facility: CLINIC | Age: 67
End: 2017-10-02

## 2017-10-03 ENCOUNTER — PATIENT MESSAGE (OUTPATIENT)
Dept: INTERNAL MEDICINE | Facility: CLINIC | Age: 67
End: 2017-10-03

## 2017-10-09 ENCOUNTER — OUTPATIENT CASE MANAGEMENT (OUTPATIENT)
Dept: ADMINISTRATIVE | Facility: OTHER | Age: 67
End: 2017-10-09

## 2017-10-09 NOTE — PROGRESS NOTES
"10/9/2017:   spoke to patient's wife via telephone in order to complete assessment.  She reported that patient is home alone during the day while she is working at [employer].  She stated that she would like home health for this patient to assist with medications, fixing meals, and because patient is "weak" and walks with a cane.   contacted [facility] who reported that their sister agency, [facility] [phone] accepts [facility] and has an office in [facility] and can visit patient if doctor orders services.   sent message to PCP, Dr. Ballard notifying her of this.       also mailed wife information re: [place] Waiver Program.  Pt is not a .      Pt has appointment for Head CT (11/13) and stated that [place] doctors are trying to obtain medical records.    "

## 2017-10-13 ENCOUNTER — ANTI-COAG VISIT (OUTPATIENT)
Dept: CARDIOLOGY | Facility: CLINIC | Age: 67
End: 2017-10-13

## 2017-10-13 DIAGNOSIS — I48.3 TYPICAL ATRIAL FLUTTER: ICD-10-CM

## 2017-10-13 NOTE — PROGRESS NOTES
The pt has transferred his warfarin monitoring to his local cardiologist in [place].  Discharge from the .

## 2017-11-13 ENCOUNTER — HOSPITAL ENCOUNTER (OUTPATIENT)
Dept: RADIOLOGY | Facility: HOSPITAL | Age: 67
Discharge: HOME | End: 2017-11-13
Attending: HOSPITALIST
Payer: MEDICARE

## 2017-11-13 DIAGNOSIS — Z09 FOLLOW UP: ICD-10-CM

## 2017-11-13 PROCEDURE — 70450 CT HEAD/BRAIN W/O DYE: CPT | Mod: 26,,, | Performed by: RADIOLOGY

## 2017-11-13 PROCEDURE — 70450 CT HEAD/BRAIN W/O DYE: CPT | Mod: TC

## 2017-11-21 ENCOUNTER — PATIENT MESSAGE (OUTPATIENT)
Dept: INTERNAL MEDICINE | Facility: CLINIC | Age: 67
End: 2017-11-21

## 2017-11-28 ENCOUNTER — TELEPHONE (OUTPATIENT)
Dept: INTERNAL MEDICINE | Facility: CLINIC | Age: 67
End: 2017-11-28

## 2017-11-29 NOTE — TELEPHONE ENCOUNTER
Called Ms Chandler' wife, conveyed to her the head CT results.  Decreased cerebral volume and chronic changes.  Neurology referral was ordered at last visit but she and patient did not see a neurologist.  Offered her to have one of our MAs set up a neuro appointment however she was able to find a new PCP closer to home in [facility] and opted to ask for a referral through them.  Advised she give us a call if she is unable to have Mr Chandler see a neurologist in [place] so we can set up a referral here.

## 2018-01-10 ENCOUNTER — TELEPHONE (OUTPATIENT)
Dept: ELECTROPHYSIOLOGY | Facility: CLINIC | Age: 68
End: 2018-01-10

## 2018-07-16 PROBLEM — I61.9 ICH (INTRACEREBRAL HEMORRHAGE): Status: ACTIVE | Noted: 2018-07-16

## 2018-07-16 NOTE — PLAN OF CARE
Please call extension [phone] (if nobody answers, this will flip to a beeper, so put in your call back number) upon patient arrival to floor for Hospital Medicine admit team assignment and for additional admit orders for the patient.  Do not page the attending, staff physician associated with the patient on arrival (may not be in-house at the time of arrival).  Rather, always call [phone] to reach the triage physician for orders and team assignment.        Outside Transfer Acceptance Note     Patients name: FRANCISCO DE SOUZA, MRN: [id number]     Transferring Physician or Mid-Level provider/Clinic giving report:   DR. SAMANTA RODRIGUEZ, NP KATTY BROWN AT [facility] IN [place].       Accepting Physician for admission to hospital: Oz Blanco M.D.      Date of acceptance:  7/16/18, LEVEL 4, TELEMETRY    Reason for transfer:  FAMILY REQUEST FOR SECOND OPINION FOR ICH     Report from Physician/Mid-Level Provider:    HPI: Mr. De Souza is a 69yo man with a past medical history of aneurysm (some intervention 20 years ago at [facility] for this per daughter), PAfib, HLD, HTN, and DM2.  He was recently admitted to [facility] in [facility] on 7/9/18 (7 days ago).      He was found on CT head to have an intracranial hemorrhage to the right thalamas with extension to the right lateral ventricle and posterior horn with ventricular enlargement.  He was admitted to the [facility] with Neurosurgical consultation.  They elected to treat the patient conservatively with BP control, though no evacuation or  shunt placement.      Unfortunately, since that time the patients mental status has not improved.  At baseline (per wife), he is able to walk and talk some, but not able to perform his ADLs.  Now he just lies in bed and rarely opens his eyes to exam at times.  He is unable to eat, so an NGT has been placed, for which he is receiving tube feedings with Peptamen AF continuous at 60ccs per hour with  100cc free H2O flushes q4 hours.      The wife (and recently the daughter after arrival today) are not accepting of his condition, unwilling to discuss with Palliative Care, who were consulted today to meet with them, and are requesting transfer to [place] campus for NSG consult to clean out his head.  He has had an EEG which reveals diffuse slowing, but no epileptiform spikes.  He has not had a repeat CT head since 7/10, shortly after admission.  He is on no empiric AEDs, but has had no seizure activity since admission.      His glucose is being controlled on Lantus 15U qHS with Humalog 5 units Q8 hours with moderate dose SSI protocol.  He has no skin breakdown.  He has no sources of infection currently, and is on no antibiotics.  His rate is AFib, but controlled on Amiodorone and Lopressor.  His CXR is unremarkable.      Currently the family is unwilling to consider PEG placement until second opinion is performed.  He is unable to cooperate with PT at all, so does not qualify for SNF, LTAC or inpatient rehabilitation.      VS: T 98.6, P 86, RR 18, /73    Labs: ALL NORMAL: Na 138, Cr 0.7, Gluc 200s    Radiographs: SEE ABOVE  HPI    To Do List upon arrival:  Consult speech, nutrition, social work, Neurosurgery, PT and OT, and Palliative Care.  Will need long term, definitive source of nutrition (tube feeds currently via NGT, noted above).     Please call extension [phone] (if nobody answers, this will flip to a beeper, so put in your call back number) upon patient arrival to floor for Hospital Medicine admit team assignment and for additional admit orders for the patient.  Do not page the attending, staff physician associate with the patient on arrival (may not be in-house at the time of arrival).  Rather, always call [phone] to reach the triage physician for orders and team assignment.

## 2018-07-17 ENCOUNTER — HOSPITAL ENCOUNTER (INPATIENT)
Facility: HOSPITAL | Age: 68
LOS: 23 days | Discharge: NURSING, REHAB OR LONG-TERM CARE | DRG: 023 | End: 2018-08-09
Attending: INTERNAL MEDICINE | Admitting: INTERNAL MEDICINE
Payer: MEDICARE

## 2018-07-17 DIAGNOSIS — I61.3 RIGHT-SIDED NONTRAUMATIC INTRACEREBRAL HEMORRHAGE OF BRAINSTEM: ICD-10-CM

## 2018-07-17 DIAGNOSIS — I61.9 ICH (INTRACEREBRAL HEMORRHAGE): ICD-10-CM

## 2018-07-17 DIAGNOSIS — I61.0 NONTRAUMATIC SUBCORTICAL HEMORRHAGE OF RIGHT CEREBRAL HEMISPHERE: Primary | ICD-10-CM

## 2018-07-17 DIAGNOSIS — I48.91 ATRIAL FIBRILLATION: ICD-10-CM

## 2018-07-17 DIAGNOSIS — I61.5: ICD-10-CM

## 2018-07-17 DIAGNOSIS — I63.9 STROKE: ICD-10-CM

## 2018-07-17 DIAGNOSIS — G93.5 BRAIN COMPRESSION: ICD-10-CM

## 2018-07-17 PROCEDURE — 11000001 HC ACUTE MED/SURG PRIVATE ROOM

## 2018-07-18 PROBLEM — F03.90 DEMENTIA WITHOUT BEHAVIORAL DISTURBANCE: Status: ACTIVE | Noted: 2018-07-18

## 2018-07-18 PROBLEM — I61.3 NONTRAUMATIC INTRACEREBRAL HEMORRHAGE IN BRAINSTEM: Status: ACTIVE | Noted: 2018-07-16

## 2018-07-18 PROBLEM — I61.5 NONTRAUMATIC INTRAVENTRICULAR INTRACEREBRAL HEMORRHAGE: Status: ACTIVE | Noted: 2018-07-18

## 2018-07-18 LAB
ALBUMIN SERPL BCP-MCNC: 2.9 G/DL
ALLENS TEST: ABNORMAL
ALP SERPL-CCNC: 76 U/L
ALT SERPL W/O P-5'-P-CCNC: 24 U/L
ANION GAP SERPL CALC-SCNC: 12 MMOL/L
APTT BLDCRRT: 22.8 SEC
AST SERPL-CCNC: 11 U/L
BASOPHILS # BLD AUTO: 0.03 K/UL
BASOPHILS NFR BLD: 0.4 %
BILIRUB DIRECT SERPL-MCNC: 0.3 MG/DL
BILIRUB SERPL-MCNC: 0.8 MG/DL
BUN SERPL-MCNC: 23 MG/DL
CALCIUM SERPL-MCNC: 9.2 MG/DL
CHLORIDE SERPL-SCNC: 103 MMOL/L
CO2 SERPL-SCNC: 24 MMOL/L
CREAT SERPL-MCNC: 0.9 MG/DL
DELSYS: ABNORMAL
DIFFERENTIAL METHOD: ABNORMAL
EOSINOPHIL # BLD AUTO: 0.1 K/UL
EOSINOPHIL NFR BLD: 0.7 %
ERYTHROCYTE [DISTWIDTH] IN BLOOD BY AUTOMATED COUNT: 12.8 %
EST. GFR  (AFRICAN AMERICAN): >60 ML/MIN/1.73 M^2
EST. GFR  (NON AFRICAN AMERICAN): >60 ML/MIN/1.73 M^2
ESTIMATED AVG GLUCOSE: 160 MG/DL
FIO2: 21
GLUCOSE SERPL-MCNC: 239 MG/DL
HBA1C MFR BLD HPLC: 7.2 %
HCO3 UR-SCNC: 28.9 MMOL/L (ref 24–28)
HCT VFR BLD AUTO: 38.3 %
HGB BLD-MCNC: 12.7 G/DL
IMM GRANULOCYTES # BLD AUTO: 0.04 K/UL
IMM GRANULOCYTES NFR BLD AUTO: 0.5 %
INR PPP: 0.9
LYMPHOCYTES # BLD AUTO: 1.4 K/UL
LYMPHOCYTES NFR BLD: 17.4 %
MCH RBC QN AUTO: 29 PG
MCHC RBC AUTO-ENTMCNC: 33.2 G/DL
MCV RBC AUTO: 87 FL
MODE: ABNORMAL
MONOCYTES # BLD AUTO: 0.9 K/UL
MONOCYTES NFR BLD: 11.4 %
NEUTROPHILS # BLD AUTO: 5.6 K/UL
NEUTROPHILS NFR BLD: 69.6 %
NRBC BLD-RTO: 0 /100 WBC
PCO2 BLDA: 42.6 MMHG (ref 35–45)
PH SMN: 7.44 [PH] (ref 7.35–7.45)
PLATELET # BLD AUTO: 231 K/UL
PMV BLD AUTO: 12 FL
PO2 BLDA: 33 MMHG (ref 40–60)
POC BE: 5 MMOL/L
POC SATURATED O2: 65 % (ref 95–100)
POC TCO2: 30 MMOL/L (ref 24–29)
POCT GLUCOSE: 215 MG/DL (ref 70–110)
POCT GLUCOSE: 271 MG/DL (ref 70–110)
POCT GLUCOSE: 277 MG/DL (ref 70–110)
POCT GLUCOSE: 310 MG/DL (ref 70–110)
POTASSIUM SERPL-SCNC: 3.9 MMOL/L
PROT SERPL-MCNC: 6.9 G/DL
PROTHROMBIN TIME: 9.8 SEC
RBC # BLD AUTO: 4.38 M/UL
SAMPLE: ABNORMAL
SITE: ABNORMAL
SODIUM SERPL-SCNC: 139 MMOL/L
SP02: 95
WBC # BLD AUTO: 8.05 K/UL

## 2018-07-18 PROCEDURE — 83036 HEMOGLOBIN GLYCOSYLATED A1C: CPT

## 2018-07-18 PROCEDURE — 63600175 PHARM REV CODE 636 W HCPCS: Performed by: STUDENT IN AN ORGANIZED HEALTH CARE EDUCATION/TRAINING PROGRAM

## 2018-07-18 PROCEDURE — 25000003 PHARM REV CODE 250: Performed by: STUDENT IN AN ORGANIZED HEALTH CARE EDUCATION/TRAINING PROGRAM

## 2018-07-18 PROCEDURE — 93005 ELECTROCARDIOGRAM TRACING: CPT

## 2018-07-18 PROCEDURE — 80048 BASIC METABOLIC PNL TOTAL CA: CPT

## 2018-07-18 PROCEDURE — 85610 PROTHROMBIN TIME: CPT

## 2018-07-18 PROCEDURE — 99223 1ST HOSP IP/OBS HIGH 75: CPT | Mod: ,,, | Performed by: PHYSICIAN ASSISTANT

## 2018-07-18 PROCEDURE — 95951 HC EEG MONITORING/VIDEO RECORD: CPT

## 2018-07-18 PROCEDURE — 97162 PT EVAL MOD COMPLEX 30 MIN: CPT

## 2018-07-18 PROCEDURE — 95951 PR EEG MONITORING/VIDEORECORD: CPT | Mod: 26,,, | Performed by: PSYCHIATRY & NEUROLOGY

## 2018-07-18 PROCEDURE — 36415 COLL VENOUS BLD VENIPUNCTURE: CPT

## 2018-07-18 PROCEDURE — 85730 THROMBOPLASTIN TIME PARTIAL: CPT

## 2018-07-18 PROCEDURE — 93010 ELECTROCARDIOGRAM REPORT: CPT | Mod: ,,, | Performed by: INTERNAL MEDICINE

## 2018-07-18 PROCEDURE — 85025 COMPLETE CBC W/AUTO DIFF WBC: CPT

## 2018-07-18 PROCEDURE — 94761 N-INVAS EAR/PLS OXIMETRY MLT: CPT

## 2018-07-18 PROCEDURE — 20000000 HC ICU ROOM

## 2018-07-18 PROCEDURE — 97530 THERAPEUTIC ACTIVITIES: CPT

## 2018-07-18 PROCEDURE — 27000221 HC OXYGEN, UP TO 24 HOURS

## 2018-07-18 PROCEDURE — G8978 MOBILITY CURRENT STATUS: HCPCS | Mod: CM

## 2018-07-18 PROCEDURE — G8979 MOBILITY GOAL STATUS: HCPCS | Mod: CL

## 2018-07-18 PROCEDURE — 99223 1ST HOSP IP/OBS HIGH 75: CPT | Mod: AI,GC,, | Performed by: HOSPITALIST

## 2018-07-18 PROCEDURE — 25000003 PHARM REV CODE 250: Performed by: PHYSICIAN ASSISTANT

## 2018-07-18 PROCEDURE — 80076 HEPATIC FUNCTION PANEL: CPT

## 2018-07-18 RX ORDER — LABETALOL HYDROCHLORIDE 5 MG/ML
10 INJECTION, SOLUTION INTRAVENOUS EVERY 4 HOURS PRN
Status: DISCONTINUED | OUTPATIENT
Start: 2018-07-18 | End: 2018-07-19

## 2018-07-18 RX ORDER — AMOXICILLIN 250 MG
1 CAPSULE ORAL DAILY
Status: DISCONTINUED | OUTPATIENT
Start: 2018-07-18 | End: 2018-07-30

## 2018-07-18 RX ORDER — ATORVASTATIN CALCIUM 20 MG/1
40 TABLET, FILM COATED ORAL DAILY
Status: DISCONTINUED | OUTPATIENT
Start: 2018-07-18 | End: 2018-08-02

## 2018-07-18 RX ORDER — GLUCAGON 1 MG
1 KIT INJECTION
Status: DISCONTINUED | OUTPATIENT
Start: 2018-07-18 | End: 2018-07-18

## 2018-07-18 RX ORDER — INSULIN ASPART 100 [IU]/ML
1-10 INJECTION, SOLUTION INTRAVENOUS; SUBCUTANEOUS EVERY 6 HOURS PRN
Status: DISCONTINUED | OUTPATIENT
Start: 2018-07-18 | End: 2018-07-18

## 2018-07-18 RX ORDER — SODIUM CHLORIDE 0.9 % (FLUSH) 0.9 %
5 SYRINGE (ML) INJECTION
Status: DISCONTINUED | OUTPATIENT
Start: 2018-07-18 | End: 2018-08-09 | Stop reason: HOSPADM

## 2018-07-18 RX ORDER — LOSARTAN POTASSIUM 50 MG/1
100 TABLET ORAL DAILY
Status: DISCONTINUED | OUTPATIENT
Start: 2018-07-18 | End: 2018-08-02

## 2018-07-18 RX ORDER — INSULIN ASPART 100 [IU]/ML
0-5 INJECTION, SOLUTION INTRAVENOUS; SUBCUTANEOUS
Status: DISCONTINUED | OUTPATIENT
Start: 2018-07-18 | End: 2018-07-18

## 2018-07-18 RX ORDER — AMIODARONE HYDROCHLORIDE 200 MG/1
200 TABLET ORAL DAILY
Status: DISCONTINUED | OUTPATIENT
Start: 2018-07-18 | End: 2018-08-02

## 2018-07-18 RX ORDER — METOPROLOL TARTRATE 25 MG/1
25 TABLET, FILM COATED ORAL 2 TIMES DAILY
Status: DISCONTINUED | OUTPATIENT
Start: 2018-07-18 | End: 2018-08-02

## 2018-07-18 RX ORDER — INSULIN ASPART 100 [IU]/ML
1-10 INJECTION, SOLUTION INTRAVENOUS; SUBCUTANEOUS EVERY 6 HOURS PRN
Status: DISCONTINUED | OUTPATIENT
Start: 2018-07-18 | End: 2018-07-19

## 2018-07-18 RX ORDER — GLUCAGON 1 MG
1 KIT INJECTION
Status: DISCONTINUED | OUTPATIENT
Start: 2018-07-18 | End: 2018-08-09 | Stop reason: HOSPADM

## 2018-07-18 RX ORDER — HEPARIN SODIUM 5000 [USP'U]/ML
5000 INJECTION, SOLUTION INTRAVENOUS; SUBCUTANEOUS EVERY 8 HOURS
Status: DISCONTINUED | OUTPATIENT
Start: 2018-07-18 | End: 2018-07-18

## 2018-07-18 RX ADMIN — METOPROLOL TARTRATE 25 MG: 25 TABLET, FILM COATED ORAL at 09:07

## 2018-07-18 RX ADMIN — METOPROLOL TARTRATE 25 MG: 25 TABLET, FILM COATED ORAL at 10:07

## 2018-07-18 RX ADMIN — SENNOSIDES AND DOCUSATE SODIUM 1 TABLET: 8.6; 5 TABLET ORAL at 10:07

## 2018-07-18 RX ADMIN — LOSARTAN POTASSIUM 100 MG: 50 TABLET, FILM COATED ORAL at 10:07

## 2018-07-18 RX ADMIN — HEPARIN SODIUM 5000 UNITS: 5000 INJECTION, SOLUTION INTRAVENOUS; SUBCUTANEOUS at 06:07

## 2018-07-18 RX ADMIN — ATORVASTATIN CALCIUM 40 MG: 20 TABLET, FILM COATED ORAL at 10:07

## 2018-07-18 RX ADMIN — INSULIN DETEMIR 15 UNITS: 100 INJECTION, SOLUTION SUBCUTANEOUS at 09:07

## 2018-07-18 RX ADMIN — INSULIN DETEMIR 16 UNITS: 100 INJECTION, SOLUTION SUBCUTANEOUS at 02:07

## 2018-07-18 RX ADMIN — INSULIN ASPART 4 UNITS: 100 INJECTION, SOLUTION INTRAVENOUS; SUBCUTANEOUS at 06:07

## 2018-07-18 RX ADMIN — LABETALOL HYDROCHLORIDE 10 MG: 5 INJECTION, SOLUTION INTRAVENOUS at 06:07

## 2018-07-18 RX ADMIN — AMIODARONE HYDROCHLORIDE 200 MG: 200 TABLET ORAL at 10:07

## 2018-07-18 NOTE — CONSULTS
"[facility]  Neurosurgery  Consult Note    Consults  Subjective:     Chief Complaint: ICH    History of Present Illness: 67 yo M with PMHx of HTN, HLD, DM, dementia, A. Fib, on coumadin, prior cerebral aneurysm treated 20 years ago at [facility]. Pt who presented [facility] in [place], [place] on 7/9/18 to with sudden-onset headache, right eye deviation, and L sided weakness. Head CT at that time revealed right thalamic hemorrhage.  Pt was treated conservatively without neurosurgical intervention.  Per wife, she was unhappy with the care in [place].  States patient has been very lethargic since he was initially admitted, without improvement,  and felt as though the "nothing was being done for him" She requested that patient be transferred to [facility] for further evaluation/treatment.  Pt was accepted to [facility] by Hospital medicine service, and currently admitted to the floor.  Pt has been lethargic and and unable to FC's since admission.  Pt has NG tube in place, as unable to swallow.  Pt is DNR.  Prior to his recent hospital admission, wife states patient was functioning well, ambulating normally, no previous weakness/speech difficulty.  He takes aspirin and coumadin at home.  HCT was done this morning which shows acute          Prescriptions Prior to Admission   Medication Sig Dispense Refill Last Dose    amiodarone (PACERONE) 200 MG Tab Take 2 pills twice daily for 2 weeks then take 1 pill daily thereafter 120 tablet 2     atorvastatin (LIPITOR) 80 MG tablet Take 0.5 tablets (40 mg total) by mouth once daily. 90 tablet 3     blood sugar diagnostic (ACCU-CHEK SMARTVIEW TEST STRIP) Strp 1 each by Misc.(Non-Drug; Combo Route) route 4 (four) times daily. 150 each 11     blood-glucose meter (ACCU-CHEK MALLORY) Misc 1 each by Misc.(Non-Drug; Combo Route) route once daily. 1 each 0 Taking    econazole nitrate 1 % cream Apply topically 2 (two) times daily. 85 g 2 Taking    insulin aspart (NOVOLOG) 100 unit/mL InPn pen " "Inject 6 Units into the skin 3 (three) times daily with meals. 6 Syringe 1     insulin degludec (TRESIBA FLEXTOUCH U-200) 200 unit/mL (3 mL) InPn Inject 36 Units into the skin every evening. 6 Syringe 1     lancets (ACCU-CHEK FASTCLIX) Misc 1 each by Misc.(Non-Drug; Combo Route) route 4 (four) times daily. 150 each 11     lancing device with lancets (ONETOUCH DELICA LANC DEVICE) Kit 1 each by Misc.(Non-Drug; Combo Route) route 2 (two) times daily. 1 each 1 Taking    losartan (COZAAR) 100 MG tablet Take 1 tablet (100 mg total) by mouth once daily. 90 tablet 3 Taking    metformin (GLUCOPHAGE) 1000 MG tablet Take 1 tablet (1,000 mg total) by mouth 2 (two) times daily with meals. 180 tablet 2     metoprolol succinate (TOPROL-XL) 50 MG 24 hr tablet Take 1 tablet (50 mg total) by mouth once daily. 90 tablet 3     pen needle, diabetic (BD ULTRA-FINE MALLORY PEN NEEDLES) 32 gauge x 5/32" Ndle Use with lantus pen 100 each 2     PREVNAR 13, PF, 0.5 mL Syrg ADM 0.5ML IM UTD  0 Taking    senna-docusate 8.6-50 mg (SENNA WITH DOCUSATE SODIUM) 8.6-50 mg per tablet Take 1 tablet by mouth once daily.   Taking    trazodone (DESYREL) 50 MG tablet 1 - 2 Tablet Oral At bedtime 90 tablet 3     warfarin (COUMADIN) 5 MG tablet As directed by coumadin clinic 100 tablet 11        Review of patient's allergies indicates:  No Known Allergies    Past Medical History:   Diagnosis Date    Aneurysm     Clotting disorder     blood clot takes coumadin    Diabetes mellitus     Diabetes mellitus type II     Hyperlipidemia     Hypertension     PAF (paroxysmal atrial fibrillation)     Stroke     (2) in the past    Urinary tract infection      No past surgical history on file.  Family History     Problem Relation (Age of Onset)    Asthma Father    Cancer Sister, Brother    Diabetes Mother    Heart attack Mother    Heart disease Mother    Hypertension Mother    Vision loss Sister        Social History Main Topics    Smoking status: Never " Smoker    Smokeless tobacco: Not on file    Alcohol use No    Drug use: No    Sexual activity: Not on file     Review of Systems   Unable to obtain 2/2 mental status     Objective:     Weight: 78.9 kg (173 lb 15.1 oz)  Body mass index is 24.96 kg/m².  Vital Signs (Most Recent):  Temp: 98.7 °F (37.1 °C) (07/18/18 0726)  Pulse: 68 (07/18/18 1106)  Resp: 16 (07/18/18 0726)  BP: 136/81 (07/18/18 0726)  SpO2: 98 % (07/18/18 0914) Vital Signs (24h Range):  Temp:  [98.6 °F (37 °C)-100.1 °F (37.8 °C)] 98.7 °F (37.1 °C)  Pulse:  [68-96] 68  Resp:  [16-18] 16  SpO2:  [97 %-100 %] 98 %  BP: (116-159)/(69-81) 136/81       Date 07/18/18 0700 - 07/19/18 0659   Shift 7496-4875 3257-3846 7837-2170 24 Hour Total   I  N  T  A  K  E   Shift Total  (mL/kg)       O  U  T  P  U  T   Urine  (mL/kg/hr) 250   250    Shift Total  (mL/kg) 250  (3.2)   250  (3.2)   Weight (kg) 78.9 78.9 78.9 78.9                     Neurosurgery Physical Exam     General: Pt in bed, comatose, no distress   Neck: supple, without obvious masses or lesions  Skin: grossly intact in all 4 extremities without obvious rashes or lesions  Heart: RRR  Lungs:  normal respiratory effort.  No distress   Abdomen: soft, non-distended    Extremities: no cyanosis or edema, or clubbing  Neurologic:  GCS: Motor: 5/Verbal: 1/Eyes: 2 Total: GCS 8  PERRL  Opens eyes to deep stimulation   Juwan symmetrically. Localizing to pain   DTR's - 2 + and symmetric in UE and LE  Colón's - Negative           Ankle Clonus - Negative           Babinski  - Negative         Significant Labs:    Recent Labs  Lab 07/18/18  0212   *      K 3.9      CO2 24   BUN 23   CREATININE 0.9   CALCIUM 9.2       Recent Labs  Lab 07/18/18  0212   WBC 8.05   HGB 12.7*   HCT 38.3*          Recent Labs  Lab 07/18/18  1010   INR 0.9   APTT 22.8     Microbiology Results (last 7 days)     ** No results found for the last 168 hours. **          Significant Diagnostics:    HCT 7/18/18:  personally reviewed     Assessment/Plan:     Nontraumatic intraventricular intracerebral hemorrhage    69 yo male with right thalamic hemorrhage initially found on 7/9/18 at outside hospital in [place]  - HCT today shows acute right thalamic hemorrhage with intraventricular extension.  Ventricular system is enlarged on today's scan, HCT on 11/13/17 shows baseline ventricular enlargement.  There are no films available from recent admission in [place] for comparison   - Pt is currently comatose with GCS of 8  - Place on telemetry and continuous pulse ox  - Check Stat coags   - Recommend transfer to ICU status given current clinic status and concern for airway protection   - Pt needs full AMS work up including EEG to r/o non convulsive status   - Repeat HCT in 6 hours  - Will follow, please call with questions or any change in neurologic status  - Have discussed with LIANE Whitten  Neurosurgery  [facility]

## 2018-07-18 NOTE — PROGRESS NOTES
Pt is non responsive but does move all extremities. , VSS per flow sheet. [phone] paged. Daughter came to bedside and related some of pt neuro Hx, daughter says that pt does respond verbally to family and that mother is the one that can get pt to talk the most. Daughter states that pt can swallow and that pt recently drank 2 cups of water through a straw. Daughter stated that [facility] were giving pt 10mg of dementia medication and that pt is only supposed to get 5mg dose because the 10mg dose make pt basically catatonic.

## 2018-07-18 NOTE — ASSESSMENT & PLAN NOTE
R thalamic ICH while on warfarin with IVH, OSH declined intervention, here for second opinion. Patient lethargic with GCS 8, EEG shows diffuse slowing    - Head CT  - Neurosurgery consult  - PT, OT consults  - NG tube in place per x-ray. Swallow eval, continue Peptamin at 60 mL/hr  - Neuro checks  - F/u BMP, Mg, P, CBC

## 2018-07-18 NOTE — PLAN OF CARE
07/18/18 1216   Discharge Assessment   Assessment Type Discharge Planning Assessment   Confirmed/corrected address and phone number on facesheet? Yes   Assessment information obtained from? Caregiver;Medical Record  (wife at bedside)   Expected Length of Stay (days) 5   Communicated expected length of stay with patient/caregiver no   Prior to hospitilization cognitive status: Not Oriented to Time   Prior to hospitalization functional status: Needs Assistance   Current cognitive status: Coma/Sedated/Intubated   Current Functional Status: Completely Dependent   Facility Arrived From: [facility] in [place]   Lives With spouse   Able to Return to Prior Arrangements unable to determine at this time (comments)   Is patient able to care for self after discharge? No   Who are your caregiver(s) and their phone number(s)? wife, Elle   Patient's perception of discharge disposition other (comments)  (TBD)   Readmission Within The Last 30 Days no previous admission in last 30 days   Patient currently being followed by outpatient case management? No   Patient currently receives any other outside agency services? No   Equipment Currently Used at Home rollator  (sometimes uses)   Do you have any problems affording any of your prescribed medications? No   Is the patient taking medications as prescribed? yes   Does the patient have transportation home? Yes   Transportation Available family or friend will provide  (may need stretcher transport)   Does the patient receive services at the Coumadin Clinic? No   Discharge Plan A (TBD)   Patient/Family In Agreement With Plan yes

## 2018-07-18 NOTE — PLAN OF CARE
Problem: Physical Therapy Goal  Goal: Physical Therapy Goal  Goals to be met by: 18    Patient will increase functional independence with mobility by performin. Supine to sit with Maximum Assistance  2. Sit to supine with Maximum Assistance  3. Rolling to Left and Right with Maximum Assistance.  4. Sitting at edge of bed x10 minutes with Maximum Assistance  5. Lower extremity exercise program x10 reps per handout, with assistance as needed     Outcome: Ongoing (interventions implemented as appropriate)  Physical therapy evaluation completed. Plan of care initiated.    Nenita Giles, CALE  2018

## 2018-07-18 NOTE — PT/OT/SLP PROGRESS
Occupational Therapy      Patient Name:  Ciro Chandler   MRN:  [id number]    Patient not seen today secondary to t/f to ICU; please re-consult with OT evaluation and treat orders when/if appropriate.    ZOE Johnson  7/18/2018  Pager: [phone]

## 2018-07-18 NOTE — HPI
"69 yo M with PMHx of HTN, HLD, DM, dementia, A. Fib, on coumadin, prior cerebral aneurysm treated 20 years ago at [facility]. Pt who presented [facility] in [place], [place] on 7/9/18 to with sudden-onset headache, right eye deviation, and L sided weakness. Head CT at that time revealed right thalamic hemorrhage.  Pt was treated conservatively without neurosurgical intervention.  Per wife, she was unhappy with the care in [place].  States patient has been very lethargic since he was initially admitted, without improvement,  and felt as though the "nothing was being done for him" She requested that patient be transferred to [facility] for further evaluation/treatment.  Pt was accepted to [facility] by Hospital medicine service, and currently admitted to the floor.  Pt has been lethargic and and unable to FC's since admission.    68 M with R thalamic ICH/IVH (ICH score 2) initially found on 7/9/18 admitted to OSH in [place] w/o intervention.     7/18: Pt has NG tube in place, as unable to swallow.  Pt is DNR.  Prior to his recent hospital admission, wife states patient was functioning well, ambulating normally, no previous weakness/speech difficulty.  He takes aspirin and coumadin at home (on hold now).  HCT was done this morning which shows acute blood with ventriculomegaly.        "

## 2018-07-18 NOTE — PLAN OF CARE
Patient in the Neuro ICU.    Discharge Planning Assessment completed by prior CM.  This CM will follow patient for discharge needs while in the ICU.    Marisabel Paige RN, CCRN-K, [facility]  Neuro-Critical Care   [phone]

## 2018-07-18 NOTE — PROGRESS NOTES
Pt SBP parameter set below 160 per G.LIANE Turner. PT SBP above 160. NSCCU notified. Nurse awaiting PRN medication orders

## 2018-07-18 NOTE — PT/OT/SLP PROGRESS
Speech Language Pathology      Ciro Chandler  MRN: [id number]    Patient not seen today secondary to transfer to ICU. New ST orders needed for patient.    Raheem Guthrie CF-SLP  Speech-Language Pathology  Pager: [phone]

## 2018-07-18 NOTE — PROGRESS NOTES
Neuro Critical Care at bedside to evaluate.  Wife at bedside during evaluation.  RT at bedside attempting ABG.  Wife verbally agreed to intubation if necessary.  Patient to be transferred to neuro ICU for closer monitoring.  Primary team and Charge RN notified.

## 2018-07-18 NOTE — HPI
68M with dementia at baseline, on warfarin for afib, presented on 7/9/18 to [facility] in [place], [place] with sudden-onset headache, R eye deviation, and L sided weakness. CT revealed R thalamic hemorrhage. Treated conservatively with blood pressure control without evacuation or shunt placement. Unfortunately, his status has not improved since that time. Per chart review, he is largely unable to follow commands or open eyes. He is now getting nutrition via NGT. Family has been reportedly unwilling to discuss palliative care. He was transferred [place] [facility] by family request for second opinion regarding surgical intervention.

## 2018-07-18 NOTE — SUBJECTIVE & OBJECTIVE
"Past Medical History:   Diagnosis Date    Aneurysm     Clotting disorder     blood clot takes coumadin    Diabetes mellitus     Diabetes mellitus type II     Hyperlipidemia     Hypertension     PAF (paroxysmal atrial fibrillation)     Stroke     (2) in the past    Urinary tract infection        No past surgical history on file.    Review of patient's allergies indicates:  No Known Allergies    No current facility-administered medications on file prior to encounter.      Current Outpatient Prescriptions on File Prior to Encounter   Medication Sig    amiodarone (PACERONE) 200 MG Tab Take 2 pills twice daily for 2 weeks then take 1 pill daily thereafter    atorvastatin (LIPITOR) 80 MG tablet Take 0.5 tablets (40 mg total) by mouth once daily.    blood sugar diagnostic (ACCU-CHEK SMARTVIEW TEST STRIP) Strp 1 each by Misc.(Non-Drug; Combo Route) route 4 (four) times daily.    blood-glucose meter (ACCU-CHEK MALLORY) Misc 1 each by Misc.(Non-Drug; Combo Route) route once daily.    econazole nitrate 1 % cream Apply topically 2 (two) times daily.    insulin aspart (NOVOLOG) 100 unit/mL InPn pen Inject 6 Units into the skin 3 (three) times daily with meals.    insulin degludec (TRESIBA FLEXTOUCH U-200) 200 unit/mL (3 mL) InPn Inject 36 Units into the skin every evening.    lancets (ACCU-CHEK FASTCLIX) Misc 1 each by Misc.(Non-Drug; Combo Route) route 4 (four) times daily.    lancing device with lancets (ONETOUCH DELICA LANC DEVICE) Kit 1 each by Misc.(Non-Drug; Combo Route) route 2 (two) times daily.    losartan (COZAAR) 100 MG tablet Take 1 tablet (100 mg total) by mouth once daily.    metformin (GLUCOPHAGE) 1000 MG tablet Take 1 tablet (1,000 mg total) by mouth 2 (two) times daily with meals.    metoprolol succinate (TOPROL-XL) 50 MG 24 hr tablet Take 1 tablet (50 mg total) by mouth once daily.    pen needle, diabetic (BD ULTRA-FINE MALLORY PEN NEEDLES) 32 gauge x 5/32" Ndle Use with lantus pen    PREVNAR " 13, PF, 0.5 mL Syrg ADM 0.5ML IM UTD    senna-docusate 8.6-50 mg (SENNA WITH DOCUSATE SODIUM) 8.6-50 mg per tablet Take 1 tablet by mouth once daily.    trazodone (DESYREL) 50 MG tablet 1 - 2 Tablet Oral At bedtime    warfarin (COUMADIN) 5 MG tablet As directed by coumadin clinic     Family History     Problem Relation (Age of Onset)    Asthma Father    Cancer Sister, Brother    Diabetes Mother    Heart attack Mother    Heart disease Mother    Hypertension Mother    Vision loss Sister        Social History Main Topics    Smoking status: Never Smoker    Smokeless tobacco: Not on file    Alcohol use No    Drug use: No    Sexual activity: Not on file     Review of Systems   Unable to perform ROS: Patient unresponsive     Objective:     Vital Signs (Most Recent):  Temp: 98.6 °F (37 °C) (07/18/18 0015)  Pulse: 88 (07/18/18 0015)  Resp: 16 (07/18/18 0015)  BP: (!) 159/80 (07/18/18 0015)  SpO2: 100 % (07/18/18 0015) Vital Signs (24h Range):  Temp:  [98.3 °F (36.8 °C)-100.1 °F (37.8 °C)] 98.6 °F (37 °C)  Pulse:  [70-90] 88  Resp:  [16-18] 16  SpO2:  [97 %-100 %] 100 %  BP: (125-159)/(65-80) 159/80     Weight: 78.9 kg (173 lb 15.1 oz)  Body mass index is 24.96 kg/m².    Physical Exam   Constitutional: He appears well-developed. He appears lethargic.   HENT:   Head: Normocephalic and atraumatic.   Eyes: Pupils are equal, round, and reactive to light.   Cardiovascular: Normal rate, regular rhythm and normal heart sounds.  Exam reveals no gallop.    No murmur heard.  Pulmonary/Chest: Effort normal and breath sounds normal. He has no wheezes.   Abdominal: Soft. He exhibits no distension.   Neurological: He appears lethargic. He displays normal reflexes. GCS eye subscore is 2. GCS verbal subscore is 2. GCS motor subscore is 4.   Skin: Skin is warm and dry.         CRANIAL NERVES     CN III, IV, VI   Pupils are equal, round, and reactive to light.       Significant Labs: All pertinent labs within the past 24 hours have  been reviewed.    Significant Imaging: I have reviewed all pertinent imaging results/findings within the past 24 hours.

## 2018-07-18 NOTE — ASSESSMENT & PLAN NOTE
69 yo male with right thalamic hemorrhage initially found on 7/9/18 at outside hospital in [place]  - HCT today shows acute right thalamic hemorrhage with intraventricular extension.  Ventricular system is enlarged on today's scan, HCT on 11/13/17 shows baseline ventricular enlargement.  There are no films available from recent admission in [place] for comparison   - Pt is currently comatose with GCS of 8  - Place on telemetry and continuous pulse ox  - Check Stat coags   - Recommend transfer to ICU status given current clinic status and concern for airway protection   - Pt needs full AMS work up including EEG to r/o non convulsive status   - Repeat HCT in AM  - Will follow, please call with questions or any change in neurologic status  - Have discussed with Dr. Nazario

## 2018-07-18 NOTE — PT/OT/SLP EVAL
Physical Therapy Evaluation    Patient Name:  Ciro Chandler   MRN:  [id number]    Recommendations:     Discharge Recommendations:  nursing facility, basic, nursing facility, skilled (TBD pending patient ability to participate)  Discharge Equipment Recommendations:  (TBD)   Barriers to discharge: Decreased caregiver support    Assessment:     Ciro Chandler is a 68 y.o. 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  He presents with the following impairments/functional limitations:  weakness, impaired endurance, impaired self care skills, impaired functional mobilty, impaired balance, impaired cognition, decreased coordination, decreased upper extremity function, decreased lower extremity function, decreased safety awareness, decreased ROM.  Upon evaluation patient non-verbal and with low arousal, needing total A x2 for all bed mobility and to sit edge of bed. Unable to follow commands and eyes open <25% of session. Patient had R ICH on 7/9/18. Per wife's report at bedside, prior to ICH, patient was independent with all functional mobility and able to ambulate household and community distances without AD. Patient was independent with ADLs except using shower chair and wife assistance for bathing. Recommend d/c to SNF or basic nursing facility pending cognitive status and ability to participate. Patient appropriate for limited skilled PT services pending arousal and ability to participate; recommend at 2x/week to monitor.    Rehab Prognosis:  fair; patient would benefit from acute skilled PT services to address these deficits and reach maximum level of function.      Recent Surgery: * No surgery found *      Plan:     During this hospitalization, patient to be seen 2 x/week to address the above listed problems via therapeutic activities, therapeutic exercises, neuromuscular re-education  · Plan of Care Expires:  08/18/18   Plan of Care Reviewed with: daughter, spouse,  patient    Subjective     Communicated with nsg prior to session.  Patient found supine upon PT entry to room, agreeable to evaluation.      Chief Complaint: Decreased functional mobility  Patient comments/goals: Unable to verbalize  Pain/Comfort:  · Pain Rating 1: 0/10    Patients cultural, spiritual, Pentec[employer]al conflicts given the current situation: none stated    Living Environment:  Patient History:  · Home environment: Lives with wife in single story home with 1 ANABEL  · Assistance provided:  Wife (unable to provide assistance during the day while at work)  · Bathroom set up:  Walk-in shower with shower chair    Previous Level of Mobilty  · Transfers: independent   · Gait: independent without AD for household and community distances    Additional Roles and Hx of Patient  · Working: retired  · Driving: no  · Hobbies:spending time with family  · Hx of Falls: 1 fall within last 3 months    DME: shower chair  - Bold implies equipment being used      Objective:     Patient found with: peripheral IV, telemetry, Condom Catheter     General Precautions: Standard, fall, aspiration   Orthopedic Precautions:N/A   Braces: N/A       Exams:  Cognitive Exam  Patient is lethargic, unable to follow commands. Non-verbal during session, unable to assess orientation.   Fine Motor Coordination -       Impaired   Postural Exam Patient presented with the following abnormalities: -       Rounded shoulders  -       Forward head  -       Posterior pelvic tilt   Sensation Grimacing with sternal rub, cold wash cloth to face.  Non-responsive to pinching at forearm   Skin Integrity/Edema -       Skin integrity: Visible skin intact   R LE ROM Spontaneous movement of RLE   R LE Strength Unable to test 2/2 cognitive status   L LE ROM Spontaneous movement of LLE   L LE Strength  Unable to test 2/2 cognitive status       Functional Mobility  Bed Mobility  Scooting: dependence  Supine to Sit: total assistance and of 2 persons   Sit to Supine: total  assistance and of 2 persons   Transfers Sit <> Stand: Not attempted 2/2 assist needed for sit           Balance   Static Sitting total assistance       AM-PAC 6 CLICK MOBILITY  Total Score:8       Therapeutic Activities and Exercises:  Patient sat edge of bed for 10 minutes with total assistance. Slightly increased arousal compared to supine position but overall eyes open <25% of time. Patient not visually tracking when eyes open; potential L-sided neglect.     PT educated pt on the following  - role of PT  - PT POC- explained that physical therapy will keep on PT services to monitor appropriateness for skilled PT pending cognitive status/ability to participate. Explained that while PT was able to get patient sitting, it did take total A of 2 and that patient was non-responsive to any verbal commands and minimally responsive to noxious stimuli.  - discharge recommendation (nursing facility, skilled or basic) and equipment needs (TBD)  - level of assistance currently req (total A)and safety precautions with nsg staff   -Educated to keep lights on/blinds open during day to increase arousal/attempt to regulate sleep-wake cycle.   All questions and concerns answered and addressed. White board updated with pertinent information. Nsg notified.       Patient left supine with all lines intact and call button in reach.    GOALS:    Physical Therapy Goals        Problem: Physical Therapy Goal    Goal Priority Disciplines Outcome Goal Variances Interventions   Physical Therapy Goal     PT/OT, PT      Description:  Goals to be met by: 18    Patient will increase functional independence with mobility by performin. Supine to sit with Maximum Assistance  2. Sit to supine with Maximum Assistance  3. Rolling to Left and Right with Maximum Assistance.  4. Sitting at edge of bed x10 minutes with Maximum Assistance  5. Lower extremity exercise program x10 reps per handout, with assistance as needed                       History:     Past Medical History:   Diagnosis Date    Aneurysm     Clotting disorder     blood clot takes coumadin    Diabetes mellitus     Diabetes mellitus type II     Hyperlipidemia     Hypertension     PAF (paroxysmal atrial fibrillation)     Stroke     (2) in the past    Urinary tract infection        No past surgical history on file.    Clinical Decision Making:     History  Co-morbidities and personal factors that may impact the plan of care Examination  Body Structures and Functions, activity limitations and participation restrictions that may impact the plan of care Clinical Presentation   Decision Making/ Complexity Score   Co-morbidities:   [] Time since onset of injury / illness / exacerbation  [x] Status of current condition  [x]Patient's cognitive status and safety concerns    [] Multiple Medical Problems (see med hx)  Personal Factors:   [] Patient's age  [] Prior Level of function   [] Patient's home situation (environment and family support)  [] Patient's level of motivation  [] Expected progression of patient      HISTORY:(criteria)    [] 36600 - no personal factors/history    [x] 06805 - has 1-2 personal factor/comorbidity     [] 76207 - has >3 personal factor/comorbidity     Body Regions:  [] Objective examination findings  [] Head     []  Neck  [] Trunk   [x] Upper Extremity  [x] Lower Extremity    Body Systems:  [] For communication ability, affect, cognition, language, and learning style: the assessment of the ability to make needs known, consciousness, orientation (person, place, and time), expected emotional /behavioral responses, and learning preferences (eg, learning barriers, education  needs)  [x] For the neuromuscular system: a general assessment of gross coordinated movement (eg, balance, gait, locomotion, transfers, and transitions) and motor function  (motor control and motor learning)  [] For the musculoskeletal system: the assessment of gross symmetry, gross range  of motion, gross strength, height, and weight  [] For the integumentary system: the assessment of pliability(texture), presence of scar formation, skin color, and skin integrity  [] For cardiovascular/pulmonary system: the assessment of heart rate, respiratory rate, blood pressure, and edema     Activity limitations:    [x] Patient's cognitive status and saf ety concerns          [x] Status of current condition      [] Weight bearing restriction  [] Cardiopulmunary Restriction    Participation Restrictions:   [] Goals and goal agreement with the patient     [] Rehab potential (prognosis) and probable outcome      Examination of Body System: (criteria)    [] 85220 - addressing 1-2 elements    [] 75314 - addressing a total of 3 or more elements     [x] 36548 -  Addressing a total of 4 or more elements         Clinical Presentation: (criteria)  Evolving - 76238     On examination of body system using standardized tests and measures patient presents with 4 or more elements from any of the following: body structures and functions, activity limitations, and/or participation restrictions.  Leading to a clinical presentation that is considered evolving with changing characteristics                              Clinical Decision Making  (Eval Complexity):  Moderate - 39512     Time Tracking:     PT Received On: 07/18/18  PT Start Time: 0746     PT Stop Time: 0807  PT Total Time (min): 21 min     Billable Minutes: Evaluation 11 and Therapeutic Activity 10      CALE Michel  07/18/2018

## 2018-07-18 NOTE — SUBJECTIVE & OBJECTIVE
"Prescriptions Prior to Admission   Medication Sig Dispense Refill Last Dose    amiodarone (PACERONE) 200 MG Tab Take 2 pills twice daily for 2 weeks then take 1 pill daily thereafter 120 tablet 2     atorvastatin (LIPITOR) 80 MG tablet Take 0.5 tablets (40 mg total) by mouth once daily. 90 tablet 3     blood sugar diagnostic (ACCU-CHEK SMARTVIEW TEST STRIP) Strp 1 each by Misc.(Non-Drug; Combo Route) route 4 (four) times daily. 150 each 11     blood-glucose meter (ACCU-CHEK MALLORY) Misc 1 each by Misc.(Non-Drug; Combo Route) route once daily. 1 each 0 Taking    econazole nitrate 1 % cream Apply topically 2 (two) times daily. 85 g 2 Taking    insulin aspart (NOVOLOG) 100 unit/mL InPn pen Inject 6 Units into the skin 3 (three) times daily with meals. 6 Syringe 1     insulin degludec (TRESIBA FLEXTOUCH U-200) 200 unit/mL (3 mL) InPn Inject 36 Units into the skin every evening. 6 Syringe 1     lancets (ACCU-CHEK FASTCLIX) Misc 1 each by Misc.(Non-Drug; Combo Route) route 4 (four) times daily. 150 each 11     lancing device with lancets (ONETOUCH DELICA LANC DEVICE) Kit 1 each by Misc.(Non-Drug; Combo Route) route 2 (two) times daily. 1 each 1 Taking    losartan (COZAAR) 100 MG tablet Take 1 tablet (100 mg total) by mouth once daily. 90 tablet 3 Taking    metformin (GLUCOPHAGE) 1000 MG tablet Take 1 tablet (1,000 mg total) by mouth 2 (two) times daily with meals. 180 tablet 2     metoprolol succinate (TOPROL-XL) 50 MG 24 hr tablet Take 1 tablet (50 mg total) by mouth once daily. 90 tablet 3     pen needle, diabetic (BD ULTRA-FINE MALLORY PEN NEEDLES) 32 gauge x 5/32" Ndle Use with lantus pen 100 each 2     PREVNAR 13, PF, 0.5 mL Syrg ADM 0.5ML IM UTD  0 Taking    senna-docusate 8.6-50 mg (SENNA WITH DOCUSATE SODIUM) 8.6-50 mg per tablet Take 1 tablet by mouth once daily.   Taking    trazodone (DESYREL) 50 MG tablet 1 - 2 Tablet Oral At bedtime 90 tablet 3     warfarin (COUMADIN) 5 MG tablet As directed by " coumadin clinic 100 tablet 11        Review of patient's allergies indicates:  No Known Allergies    Past Medical History:   Diagnosis Date    Aneurysm     Clotting disorder     blood clot takes coumadin    Diabetes mellitus     Diabetes mellitus type II     Hyperlipidemia     Hypertension     PAF (paroxysmal atrial fibrillation)     Stroke     (2) in the past    Urinary tract infection      No past surgical history on file.  Family History     Problem Relation (Age of Onset)    Asthma Father    Cancer Sister, Brother    Diabetes Mother    Heart attack Mother    Heart disease Mother    Hypertension Mother    Vision loss Sister        Social History Main Topics    Smoking status: Never Smoker    Smokeless tobacco: Not on file    Alcohol use No    Drug use: No    Sexual activity: Not on file     Review of Systems   Unable to obtain 2/2 mental status     Objective:     Weight: 78.9 kg (173 lb 15.1 oz)  Body mass index is 24.96 kg/m².  Vital Signs (Most Recent):  Temp: 98.7 °F (37.1 °C) (07/18/18 0726)  Pulse: 68 (07/18/18 1106)  Resp: 16 (07/18/18 0726)  BP: 136/81 (07/18/18 0726)  SpO2: 98 % (07/18/18 0914) Vital Signs (24h Range):  Temp:  [98.6 °F (37 °C)-100.1 °F (37.8 °C)] 98.7 °F (37.1 °C)  Pulse:  [68-96] 68  Resp:  [16-18] 16  SpO2:  [97 %-100 %] 98 %  BP: (116-159)/(69-81) 136/81       Date 07/18/18 0700 - 07/19/18 0659   Shift 2357-5622 2938-3368 6883-6168 24 Hour Total   I  N  T  A  K  E   Shift Total  (mL/kg)       O  U  T  P  U  T   Urine  (mL/kg/hr) 250   250    Shift Total  (mL/kg) 250  (3.2)   250  (3.2)   Weight (kg) 78.9 78.9 78.9 78.9                     Neurosurgery Physical Exam     General: Pt in bed, comatose, no distress   Neck: supple, without obvious masses or lesions  Skin: grossly intact in all 4 extremities without obvious rashes or lesions  Heart: RRR  Lungs:  normal respiratory effort.  No distress   Abdomen: soft, non-distended    Extremities: no cyanosis or edema, or  clubbing  Neurologic:  GCS: Motor: 5/Verbal: 1/Eyes: 2 Total: GCS 8  PERRL  Opens eyes to deep stimulation   Juwan symmetrically. Localizing to pain   DTR's - 2 + and symmetric in UE and LE  Colón's - Negative           Ankle Clonus - Negative           Babinski  - Negative         Significant Labs:    Recent Labs  Lab 07/18/18  0212   *      K 3.9      CO2 24   BUN 23   CREATININE 0.9   CALCIUM 9.2       Recent Labs  Lab 07/18/18  0212   WBC 8.05   HGB 12.7*   HCT 38.3*          Recent Labs  Lab 07/18/18  1010   INR 0.9   APTT 22.8     Microbiology Results (last 7 days)     ** No results found for the last 168 hours. **          Significant Diagnostics:    HCT 7/18/18: personally reviewed

## 2018-07-18 NOTE — H&P
[facility] Hospital Medicine  History & Physical    Patient Name: Ciro Chandler  MRN: [id number]  Admission Date: 7/17/2018  Attending Physician: Saw Shaffer MD   Primary Care Provider: Nellie Bravo MD    Hospital Medicine Team: [facility] HOSP MED 3 Gelacio Ellington MD     Patient information was obtained from spouse/SO, parent and past medical records.     Subjective:     Principal Problem:ICH (intracerebral hemorrhage)    Chief Complaint:   Chief Complaint   Patient presents with    Cerebrovascular Accident     ICH on 7/9, transfer from [facility] for second opinion        HPI: 68M with dementia at baseline, on warfarin for afib, presented on 7/9/18 to [facility] in [place], [place] with sudden-onset headache, R eye deviation, and L sided weakness. CT revealed R thalamic hemorrhage. Treated conservatively with blood pressure control without evacuation or shunt placement. Unfortunately, his status has not improved since that time. Per chart review, he is largely unable to follow commands or open eyes. He is now getting nutrition via NGT. Family has been reportedly unwilling to discuss palliative care. He was transferred to [facility] by family request for second opinion regarding surgical intervention.    Past Medical History:   Diagnosis Date    Aneurysm     Clotting disorder     blood clot takes coumadin    Diabetes mellitus     Diabetes mellitus type II     Hyperlipidemia     Hypertension     PAF (paroxysmal atrial fibrillation)     Stroke     (2) in the past    Urinary tract infection        No past surgical history on file.    Review of patient's allergies indicates:  No Known Allergies    No current facility-administered medications on file prior to encounter.      Current Outpatient Prescriptions on File Prior to Encounter   Medication Sig    amiodarone (PACERONE) 200 MG Tab Take 2 pills twice daily for 2 weeks then take 1 pill daily thereafter    atorvastatin (LIPITOR) 80 MG  "tablet Take 0.5 tablets (40 mg total) by mouth once daily.    blood sugar diagnostic (ACCU-CHEK SMARTVIEW TEST STRIP) Strp 1 each by Misc.(Non-Drug; Combo Route) route 4 (four) times daily.    blood-glucose meter (ACCU-CHEK MALLORY) Misc 1 each by Misc.(Non-Drug; Combo Route) route once daily.    econazole nitrate 1 % cream Apply topically 2 (two) times daily.    insulin aspart (NOVOLOG) 100 unit/mL InPn pen Inject 6 Units into the skin 3 (three) times daily with meals.    insulin degludec (TRESIBA FLEXTOUCH U-200) 200 unit/mL (3 mL) InPn Inject 36 Units into the skin every evening.    lancets (ACCU-CHEK FASTCLIX) Misc 1 each by Misc.(Non-Drug; Combo Route) route 4 (four) times daily.    lancing device with lancets (ONETOUCH DELICA LANC DEVICE) Kit 1 each by Misc.(Non-Drug; Combo Route) route 2 (two) times daily.    losartan (COZAAR) 100 MG tablet Take 1 tablet (100 mg total) by mouth once daily.    metformin (GLUCOPHAGE) 1000 MG tablet Take 1 tablet (1,000 mg total) by mouth 2 (two) times daily with meals.    metoprolol succinate (TOPROL-XL) 50 MG 24 hr tablet Take 1 tablet (50 mg total) by mouth once daily.    pen needle, diabetic (BD ULTRA-FINE MALLORY PEN NEEDLES) 32 gauge x 5/32" Ndle Use with lantus pen    PREVNAR 13, PF, 0.5 mL Syrg ADM 0.5ML IM UTD    senna-docusate 8.6-50 mg (SENNA WITH DOCUSATE SODIUM) 8.6-50 mg per tablet Take 1 tablet by mouth once daily.    trazodone (DESYREL) 50 MG tablet 1 - 2 Tablet Oral At bedtime    warfarin (COUMADIN) 5 MG tablet As directed by coumadin clinic     Family History     Problem Relation (Age of Onset)    Asthma Father    Cancer Sister, Brother    Diabetes Mother    Heart attack Mother    Heart disease Mother    Hypertension Mother    Vision loss Sister        Social History Main Topics    Smoking status: Never Smoker    Smokeless tobacco: Not on file    Alcohol use No    Drug use: No    Sexual activity: Not on file     Review of Systems   Unable to " perform ROS: Patient unresponsive     Objective:     Vital Signs (Most Recent):  Temp: 98.6 °F (37 °C) (07/18/18 0015)  Pulse: 88 (07/18/18 0015)  Resp: 16 (07/18/18 0015)  BP: (!) 159/80 (07/18/18 0015)  SpO2: 100 % (07/18/18 0015) Vital Signs (24h Range):  Temp:  [98.3 °F (36.8 °C)-100.1 °F (37.8 °C)] 98.6 °F (37 °C)  Pulse:  [70-90] 88  Resp:  [16-18] 16  SpO2:  [97 %-100 %] 100 %  BP: (125-159)/(65-80) 159/80     Weight: 78.9 kg (173 lb 15.1 oz)  Body mass index is 24.96 kg/m².    Physical Exam   Constitutional: He appears well-developed. He appears lethargic.   HENT:   Head: Normocephalic and atraumatic.   Eyes: Pupils are equal, round, and reactive to light.   Cardiovascular: Normal rate, regular rhythm and normal heart sounds.  Exam reveals no gallop.    No murmur heard.  Pulmonary/Chest: Effort normal and breath sounds normal. He has no wheezes.   Abdominal: Soft. He exhibits no distension.   Neurological: He appears lethargic. He displays normal reflexes. GCS eye subscore is 2. GCS verbal subscore is 2. GCS motor subscore is 4.   Skin: Skin is warm and dry.         CRANIAL NERVES     CN III, IV, VI   Pupils are equal, round, and reactive to light.       Significant Labs: All pertinent labs within the past 24 hours have been reviewed.    Significant Imaging: I have reviewed all pertinent imaging results/findings within the past 24 hours.    Assessment/Plan:     * ICH (intracerebral hemorrhage)    R thalamic ICH while on warfarin with IVH, OSH declined intervention, here for second opinion. Patient lethargic with GCS 8, EEG shows diffuse slowing    - Head CT  - Neurosurgery consult  - PT, OT consults  - NG tube in place per x-ray. Swallow eval, continue Peptamin at 60 mL/hr  - Neuro checks  - F/u BMP, Mg, P, CBC        Persistent atrial fibrillation    Warfarin d/kyung at OSH s/p ICH.        Dyslipidemia    -Continue atorvastatin        Essential hypertension    BP stable    - Continue amlodipine, losartan         Diabetes mellitus, type 2    - Continue home basal insulin, with SS          VTE Risk Mitigation         Ordered     heparin (porcine) injection 5,000 Units  Every 8 hours      07/18/18 6156             Gelacio Ellington MD  Department of Hospital Medicine   [facility]

## 2018-07-19 PROBLEM — I61.5 NONTRAUMATIC INTRAVENTRICULAR INTRACEREBRAL HEMORRHAGE: Status: ACTIVE | Noted: 2018-07-19

## 2018-07-19 PROBLEM — I61.9 ICH (INTRACEREBRAL HEMORRHAGE): Status: ACTIVE | Noted: 2018-07-19

## 2018-07-19 LAB
ALBUMIN SERPL BCP-MCNC: 2.9 G/DL
ALLENS TEST: ABNORMAL
ALLENS TEST: ABNORMAL
ALP SERPL-CCNC: 75 U/L
ALT SERPL W/O P-5'-P-CCNC: 21 U/L
ANION GAP SERPL CALC-SCNC: 13 MMOL/L
ANION GAP SERPL CALC-SCNC: 13 MMOL/L
AST SERPL-CCNC: 13 U/L
BASOPHILS # BLD AUTO: 0.03 K/UL
BASOPHILS # BLD AUTO: 0.03 K/UL
BASOPHILS NFR BLD: 0.4 %
BASOPHILS NFR BLD: 0.4 %
BILIRUB SERPL-MCNC: 0.6 MG/DL
BUN SERPL-MCNC: 24 MG/DL
BUN SERPL-MCNC: 24 MG/DL
CALCIUM SERPL-MCNC: 9.5 MG/DL
CALCIUM SERPL-MCNC: 9.5 MG/DL
CHLORIDE SERPL-SCNC: 103 MMOL/L
CHLORIDE SERPL-SCNC: 103 MMOL/L
CO2 SERPL-SCNC: 24 MMOL/L
CO2 SERPL-SCNC: 24 MMOL/L
CREAT SERPL-MCNC: 0.9 MG/DL
CREAT SERPL-MCNC: 0.9 MG/DL
DELSYS: ABNORMAL
DELSYS: ABNORMAL
DIFFERENTIAL METHOD: ABNORMAL
DIFFERENTIAL METHOD: ABNORMAL
EOSINOPHIL # BLD AUTO: 0.1 K/UL
EOSINOPHIL # BLD AUTO: 0.1 K/UL
EOSINOPHIL NFR BLD: 1 %
EOSINOPHIL NFR BLD: 1 %
ERYTHROCYTE [DISTWIDTH] IN BLOOD BY AUTOMATED COUNT: 12.8 %
ERYTHROCYTE [DISTWIDTH] IN BLOOD BY AUTOMATED COUNT: 12.8 %
ERYTHROCYTE [SEDIMENTATION RATE] IN BLOOD BY WESTERGREN METHOD: 13 MM/H
ERYTHROCYTE [SEDIMENTATION RATE] IN BLOOD BY WESTERGREN METHOD: 9 MM/H
EST. GFR  (AFRICAN AMERICAN): >60 ML/MIN/1.73 M^2
EST. GFR  (AFRICAN AMERICAN): >60 ML/MIN/1.73 M^2
EST. GFR  (NON AFRICAN AMERICAN): >60 ML/MIN/1.73 M^2
EST. GFR  (NON AFRICAN AMERICAN): >60 ML/MIN/1.73 M^2
FIO2: 21
GLUCOSE SERPL-MCNC: 247 MG/DL
GLUCOSE SERPL-MCNC: 247 MG/DL
HCO3 UR-SCNC: 27.3 MMOL/L (ref 24–28)
HCO3 UR-SCNC: 32.7 MMOL/L (ref 24–28)
HCT VFR BLD AUTO: 35.5 %
HCT VFR BLD AUTO: 35.5 %
HGB BLD-MCNC: 11.8 G/DL
HGB BLD-MCNC: 11.8 G/DL
IMM GRANULOCYTES # BLD AUTO: 0.02 K/UL
IMM GRANULOCYTES # BLD AUTO: 0.02 K/UL
IMM GRANULOCYTES NFR BLD AUTO: 0.3 %
IMM GRANULOCYTES NFR BLD AUTO: 0.3 %
LYMPHOCYTES # BLD AUTO: 1 K/UL
LYMPHOCYTES # BLD AUTO: 1 K/UL
LYMPHOCYTES NFR BLD: 14.6 %
LYMPHOCYTES NFR BLD: 14.6 %
MAGNESIUM SERPL-MCNC: 2.2 MG/DL
MAGNESIUM SERPL-MCNC: 2.2 MG/DL
MCH RBC QN AUTO: 29.6 PG
MCH RBC QN AUTO: 29.6 PG
MCHC RBC AUTO-ENTMCNC: 33.2 G/DL
MCHC RBC AUTO-ENTMCNC: 33.2 G/DL
MCV RBC AUTO: 89 FL
MCV RBC AUTO: 89 FL
MODE: ABNORMAL
MODE: ABNORMAL
MONOCYTES # BLD AUTO: 0.7 K/UL
MONOCYTES # BLD AUTO: 0.7 K/UL
MONOCYTES NFR BLD: 10.4 %
MONOCYTES NFR BLD: 10.4 %
NEUTROPHILS # BLD AUTO: 5.2 K/UL
NEUTROPHILS # BLD AUTO: 5.2 K/UL
NEUTROPHILS NFR BLD: 73.3 %
NEUTROPHILS NFR BLD: 73.3 %
NRBC BLD-RTO: 0 /100 WBC
NRBC BLD-RTO: 0 /100 WBC
PCO2 BLDA: 38 MMHG (ref 35–45)
PCO2 BLDA: 48.4 MMHG (ref 35–45)
PH SMN: 7.44 [PH] (ref 7.35–7.45)
PH SMN: 7.46 [PH] (ref 7.35–7.45)
PHOSPHATE SERPL-MCNC: 3.5 MG/DL
PHOSPHATE SERPL-MCNC: 3.5 MG/DL
PLATELET # BLD AUTO: 271 K/UL
PLATELET # BLD AUTO: 271 K/UL
PMV BLD AUTO: 11.6 FL
PMV BLD AUTO: 11.6 FL
PO2 BLDA: 25 MMHG (ref 40–60)
PO2 BLDA: 72 MMHG (ref 80–100)
POC BE: 3 MMOL/L
POC BE: 9 MMOL/L
POC SATURATED O2: 48 % (ref 95–100)
POC SATURATED O2: 95 % (ref 95–100)
POC TCO2: 28 MMOL/L (ref 23–27)
POC TCO2: 34 MMOL/L (ref 24–29)
POCT GLUCOSE: 180 MG/DL (ref 70–110)
POCT GLUCOSE: 196 MG/DL (ref 70–110)
POCT GLUCOSE: 207 MG/DL (ref 70–110)
POCT GLUCOSE: 243 MG/DL (ref 70–110)
POCT GLUCOSE: 250 MG/DL (ref 70–110)
POCT GLUCOSE: 253 MG/DL (ref 70–110)
POTASSIUM SERPL-SCNC: 4 MMOL/L
POTASSIUM SERPL-SCNC: 4 MMOL/L
PROT SERPL-MCNC: 7.1 G/DL
RBC # BLD AUTO: 3.99 M/UL
RBC # BLD AUTO: 3.99 M/UL
SAMPLE: ABNORMAL
SAMPLE: ABNORMAL
SITE: ABNORMAL
SITE: ABNORMAL
SODIUM SERPL-SCNC: 140 MMOL/L
SODIUM SERPL-SCNC: 140 MMOL/L
SP02: 100
SP02: 97
WBC # BLD AUTO: 7.11 K/UL
WBC # BLD AUTO: 7.11 K/UL

## 2018-07-19 PROCEDURE — 25000003 PHARM REV CODE 250: Performed by: PSYCHIATRY & NEUROLOGY

## 2018-07-19 PROCEDURE — 99900035 HC TECH TIME PER 15 MIN (STAT)

## 2018-07-19 PROCEDURE — 82803 BLOOD GASES ANY COMBINATION: CPT

## 2018-07-19 PROCEDURE — 63600175 PHARM REV CODE 636 W HCPCS: Performed by: PHYSICIAN ASSISTANT

## 2018-07-19 PROCEDURE — 25000003 PHARM REV CODE 250: Performed by: NURSE PRACTITIONER

## 2018-07-19 PROCEDURE — 20000000 HC ICU ROOM

## 2018-07-19 PROCEDURE — 84100 ASSAY OF PHOSPHORUS: CPT

## 2018-07-19 PROCEDURE — 99233 SBSQ HOSP IP/OBS HIGH 50: CPT | Mod: 25,,, | Performed by: PSYCHIATRY & NEUROLOGY

## 2018-07-19 PROCEDURE — 93306 TTE W/DOPPLER COMPLETE: CPT | Mod: 26,,, | Performed by: INTERNAL MEDICINE

## 2018-07-19 PROCEDURE — 94761 N-INVAS EAR/PLS OXIMETRY MLT: CPT

## 2018-07-19 PROCEDURE — 25000003 PHARM REV CODE 250: Performed by: STUDENT IN AN ORGANIZED HEALTH CARE EDUCATION/TRAINING PROGRAM

## 2018-07-19 PROCEDURE — 36620 INSERTION CATHETER ARTERY: CPT | Mod: ,,, | Performed by: PSYCHIATRY & NEUROLOGY

## 2018-07-19 PROCEDURE — 80053 COMPREHEN METABOLIC PANEL: CPT

## 2018-07-19 PROCEDURE — 63600175 PHARM REV CODE 636 W HCPCS: Performed by: STUDENT IN AN ORGANIZED HEALTH CARE EDUCATION/TRAINING PROGRAM

## 2018-07-19 PROCEDURE — 85025 COMPLETE CBC W/AUTO DIFF WBC: CPT

## 2018-07-19 PROCEDURE — 83735 ASSAY OF MAGNESIUM: CPT

## 2018-07-19 RX ORDER — AMLODIPINE BESYLATE 10 MG/1
10 TABLET ORAL DAILY
Status: DISCONTINUED | OUTPATIENT
Start: 2018-07-19 | End: 2018-08-02

## 2018-07-19 RX ORDER — LANOLIN ALCOHOL/MO/W.PET/CERES
800 CREAM (GRAM) TOPICAL
Status: DISCONTINUED | OUTPATIENT
Start: 2018-07-19 | End: 2018-08-01

## 2018-07-19 RX ORDER — HYDRALAZINE HYDROCHLORIDE 20 MG/ML
10 INJECTION INTRAMUSCULAR; INTRAVENOUS ONCE
Status: COMPLETED | OUTPATIENT
Start: 2018-07-19 | End: 2018-07-19

## 2018-07-19 RX ORDER — INSULIN ASPART 100 [IU]/ML
1-10 INJECTION, SOLUTION INTRAVENOUS; SUBCUTANEOUS EVERY 4 HOURS PRN
Status: DISCONTINUED | OUTPATIENT
Start: 2018-07-19 | End: 2018-08-09 | Stop reason: HOSPADM

## 2018-07-19 RX ORDER — POTASSIUM CHLORIDE 20 MEQ/15ML
40 SOLUTION ORAL
Status: DISCONTINUED | OUTPATIENT
Start: 2018-07-19 | End: 2018-08-01

## 2018-07-19 RX ORDER — POTASSIUM CHLORIDE 20 MEQ/15ML
60 SOLUTION ORAL
Status: DISCONTINUED | OUTPATIENT
Start: 2018-07-19 | End: 2018-08-01

## 2018-07-19 RX ORDER — SODIUM CHLORIDE 9 MG/ML
INJECTION, SOLUTION INTRAVENOUS CONTINUOUS
Status: DISCONTINUED | OUTPATIENT
Start: 2018-07-19 | End: 2018-07-24

## 2018-07-19 RX ORDER — SODIUM,POTASSIUM PHOSPHATES 280-250MG
2 POWDER IN PACKET (EA) ORAL
Status: DISCONTINUED | OUTPATIENT
Start: 2018-07-19 | End: 2018-08-01

## 2018-07-19 RX ORDER — HYDRALAZINE HYDROCHLORIDE 25 MG/1
25 TABLET, FILM COATED ORAL EVERY 8 HOURS
Status: DISCONTINUED | OUTPATIENT
Start: 2018-07-19 | End: 2018-07-23

## 2018-07-19 RX ADMIN — AMLODIPINE BESYLATE 10 MG: 10 TABLET ORAL at 10:07

## 2018-07-19 RX ADMIN — INSULIN DETEMIR 15 UNITS: 100 INJECTION, SOLUTION SUBCUTANEOUS at 09:07

## 2018-07-19 RX ADMIN — HYDRALAZINE HYDROCHLORIDE 25 MG: 25 TABLET ORAL at 09:07

## 2018-07-19 RX ADMIN — LOSARTAN POTASSIUM 100 MG: 50 TABLET, FILM COATED ORAL at 08:07

## 2018-07-19 RX ADMIN — INSULIN ASPART 1 UNITS: 100 INJECTION, SOLUTION INTRAVENOUS; SUBCUTANEOUS at 09:07

## 2018-07-19 RX ADMIN — SODIUM CHLORIDE: 0.9 INJECTION, SOLUTION INTRAVENOUS at 10:07

## 2018-07-19 RX ADMIN — INSULIN ASPART 2 UNITS: 100 INJECTION, SOLUTION INTRAVENOUS; SUBCUTANEOUS at 11:07

## 2018-07-19 RX ADMIN — ATORVASTATIN CALCIUM 40 MG: 20 TABLET, FILM COATED ORAL at 08:07

## 2018-07-19 RX ADMIN — HYDRALAZINE HYDROCHLORIDE 10 MG: 20 INJECTION INTRAMUSCULAR; INTRAVENOUS at 11:07

## 2018-07-19 RX ADMIN — HYDRALAZINE HYDROCHLORIDE 25 MG: 25 TABLET ORAL at 10:07

## 2018-07-19 RX ADMIN — INSULIN ASPART 3 UNITS: 100 INJECTION, SOLUTION INTRAVENOUS; SUBCUTANEOUS at 04:07

## 2018-07-19 RX ADMIN — METOPROLOL TARTRATE 25 MG: 25 TABLET, FILM COATED ORAL at 08:07

## 2018-07-19 RX ADMIN — AMIODARONE HYDROCHLORIDE 200 MG: 200 TABLET ORAL at 08:07

## 2018-07-19 RX ADMIN — SENNOSIDES AND DOCUSATE SODIUM 1 TABLET: 8.6; 5 TABLET ORAL at 08:07

## 2018-07-19 RX ADMIN — INSULIN ASPART 4 UNITS: 100 INJECTION, SOLUTION INTRAVENOUS; SUBCUTANEOUS at 08:07

## 2018-07-19 RX ADMIN — INSULIN ASPART 4 UNITS: 100 INJECTION, SOLUTION INTRAVENOUS; SUBCUTANEOUS at 04:07

## 2018-07-19 RX ADMIN — INSULIN ASPART 2 UNITS: 100 INJECTION, SOLUTION INTRAVENOUS; SUBCUTANEOUS at 12:07

## 2018-07-19 RX ADMIN — METOPROLOL TARTRATE 25 MG: 25 TABLET, FILM COATED ORAL at 09:07

## 2018-07-19 NOTE — ASSESSMENT & PLAN NOTE
--initial bleed  On 7/9, no decline in exam since then  --hourly  Neuro checks, EEG, SBP <160.   --NSGY following  --coumadin held since admission in [place], INR 3 at that time. Today's lab with INR 0.9.   --repeat CT in AM  -- PRN ABG if signs of poor airway protection. Currently stable vitals

## 2018-07-19 NOTE — PROGRESS NOTES
[facility]  Neurocritical Care  Progress Note    Admit Date: 7/17/2018  Service Date: 07/19/2018  Length of Stay: 2    Subjective:     Chief Complaint: Nontraumatic intraventricular intracerebral hemorrhage    History of Present Illness: 67 yo M with PMHx of HTN, HLD, DM, dementia, A. Fib, on coumadin as outpatient who was transferred to [facility] from [facility] where he was reportedly admitted for R thalamic IPH w IVH. Per OSH records, patient admitted to ICU and sent to floor with little follow up imaging and no interval improvement in exam. Wife requested second opinion thus was transferred to [facility] however for unknown reason was admitted to hospital medicine service last night. NCC called this morning after seen by Nsgy due to concern for airway protection. CT this morning with R thalamic IPH and lateral ventricle extension bilaterally and slight enlargement of ventricular system. Of note baseline enlargement of vents at previous CT a year ago. On exam patient with GCS of 8, however per wife and OSH records, this is baseline since initial admission in [place]. Will admit to [facility] for higher level of care.       Hospital Course: --admitted to [facility] 7/18.     Interval History:  No acute adverse events overnight    Review of Systems  Unable to obtain a complete ROS due to level of consciousness.  Objective:     Vitals:  Temp: 98.5 °F (36.9 °C)  Pulse: 69  Rhythm: normal sinus rhythm  BP: (!) 144/72  MAP (mmHg): 102  Resp: 14  SpO2: 97 %  O2 Device (Oxygen Therapy): room air    Temp  Min: 98.5 °F (36.9 °C)  Max: 99.7 °F (37.6 °C)  Pulse  Min: 60  Max: 83  BP  Min: 124/63  Max: 182/93  MAP (mmHg)  Min: 88  Max: 130  Resp  Min: 11  Max: 33  SpO2  Min: 93 %  Max: 100 %    07/18 0701 - 07/19 0700  In: 40   Out: 700 [Urine:700]   Unmeasured Output  Stool Occurrence: 0       Physical Exam   Constitutional: He appears well-developed and well-nourished. He appears lethargic.   HENT:   Head: Normocephalic.    Eyes: EOM are normal. Pupils are equal, round, and reactive to light.   Neck: Normal range of motion.   Cardiovascular: Normal rate and regular rhythm.    Pulmonary/Chest: Effort normal.   Abdominal: Soft.   Neurological: He appears lethargic. GCS eye subscore is 3. GCS verbal subscore is 3. GCS motor subscore is 5.   Difficult to arouse - patient's daughter states that patient is selectively interactive with family members since admission           Medications:  Continuous  sodium chloride 0.9% Last Rate: 75 mL/hr at 07/19/18 1400   Scheduled  amiodarone 200 mg Daily   amLODIPine 10 mg Daily   atorvastatin 40 mg Daily   hydrALAZINE 25 mg Q8H   insulin detemir U-100 15 Units QHS   losartan 100 mg Daily   metoprolol tartrate 25 mg BID   senna-docusate 8.6-50 mg 1 tablet Daily   PRN  dextrose 50% 12.5 g PRN   dextrose 50% 12.5 g PRN   glucagon (human recombinant) 1 mg PRN   insulin aspart U-100 1-10 Units Q4H PRN   magnesium oxide 800 mg PRN   magnesium oxide 800 mg PRN   potassium chloride 10% 40 mEq PRN   potassium chloride 10% 40 mEq PRN   potassium chloride 10% 60 mEq PRN   potassium, sodium phosphates 2 packet PRN   potassium, sodium phosphates 2 packet PRN   potassium, sodium phosphates 2 packet PRN   sodium chloride 0.9% 5 mL PRN         Diet  Diet NPO  Diet NPO      Pulse: 72 (07/19/18 1502)  Resp: 18 (07/19/18 1502)  BP: (!) 151/79 (07/19/18 1502)  SpO2: 98 % (07/19/18 1502)                07/18 0701 - 07/19 0700  In: 40   Out: 700 [Urine:700]       2  07/18 0701 - 07/19 0700  In: 40   Out: 700 [Urine:700]     Recent Labs  Lab 07/19/18  1142   PH 7.438   PCO2 48.4*   PO2 25*   BE 9     Recent Labs  Lab 07/19/18  0445     140   K 4.0  4.0     103   CO2 24  24   BUN 24*  24*   CREATININE 0.9  0.9   CALCIUM 9.5  9.5   MG 2.2  2.2   PHOS 3.5  3.5     Recent Labs  Lab 07/19/18  0445   WBC 7.11  7.11   HGB 11.8*  11.8*     271     Recent Labs  Lab 07/19/18  0445   PROT 7.1   ALBUMIN  2.9*   AST 13   ALT 21   ALKPHOS 75   BILITOT 0.6     Recent Labs      07/19/18   0435  07/19/18   0847  07/19/18   1140   POCTGLUCOSE  253*  207*  196*     Continuous  sodium chloride 0.9% Last Rate: 75 mL/hr at 07/19/18 1500   Scheduled  amiodarone 200 mg Daily   amLODIPine 10 mg Daily   atorvastatin 40 mg Daily   hydrALAZINE 25 mg Q8H   insulin detemir U-100 15 Units QHS   losartan 100 mg Daily   metoprolol tartrate 25 mg BID   senna-docusate 8.6-50 mg 1 tablet Daily   PRN  dextrose 50% 12.5 g PRN   dextrose 50% 12.5 g PRN   glucagon (human recombinant) 1 mg PRN   insulin aspart U-100 1-10 Units Q4H PRN   magnesium oxide 800 mg PRN   magnesium oxide 800 mg PRN   potassium chloride 10% 40 mEq PRN   potassium chloride 10% 40 mEq PRN   potassium chloride 10% 60 mEq PRN   potassium, sodium phosphates 2 packet PRN   potassium, sodium phosphates 2 packet PRN   potassium, sodium phosphates 2 packet PRN   sodium chloride 0.9% 5 mL PRN           Lines/Drains/Airways     Drain                 NG/OG Tube 07/16/18 1200 Left nostril 3 days    Male External Urinary Catheter 07/18/18 1800 Medium less than 1 day          Peripheral Intravenous Line                 Peripheral IV - Single Lumen 03/16/17 1732 Right Wrist 489 days         Midline Catheter Insertion/Assessment  - Single Lumen 07/12/18 1200 Right basilic vein (medial side of arm) 7 days         Peripheral IV - Single Lumen 07/19/18 0400 Left Hand less than 1 day                        Assessment/Plan:     Neuro   * Nontraumatic intraventricular intracerebral hemorrhage    See ICH        ICH (intracerebral hemorrhage)    ICH score of 2 on admission  NSGY following  -160  No anti-platelet  Pt/ot/st  Elevate HOB  Repeat scan stable  EEG negative for epileptiform activity          Cardiac/Vascular   Cardiomyopathy    Repeat ECHO today        Persistent atrial fibrillation    --continue home BB and amio  --rate controlled  --hold coumadin         Endocrine   Type 2  diabetes mellitus with diabetic polyneuropathy, with long-term current use of insulin    --continue detemir , adjusting SSI            Brain compresison  -see ICH      Prophylaxis:  Venous Thromboembolism: mechanical  Stress Ulcer: NA  Ventilator Pneumonia: not applicable     Activity Orders          Straight Cath starting at 07/18 2197        DNR    Charles Hayes MD  Neurocritical Care  [facility]

## 2018-07-19 NOTE — HPI
69 yo M with PMHx of HTN, HLD, DM, dementia, A. Fib, on coumadin as outpatient who was transferred to [facility] from [place] facility where he was reportedly admitted for R thalamic IPH w IVH. Per OSH records, patient admitted to ICU and sent to floor with little follow up imaging and no interval improvement in exam. Wife requested second opinion thus was transferred to [facility] however for unknown reason was admitted to hospital medicine service last night. NCC called this morning after seen by Nsgy due to concern for airway protection. CT this morning with R thalamic IPH and lateral ventricle extension bilaterally and slight enlargement of ventricular system. Of note baseline enlargement of vents at previous CT a year ago. On exam patient with GCS of 8, however per wife and OSH records, this is baseline since initial admission in [place]. Will admit to [facility]CC for higher level of care.

## 2018-07-19 NOTE — PROCEDURES
DATE OF SERVICE:  07/18/2018    EEG NUMBER:  [id number].    REFERRING PHYSICIAN:  Saw Shaffer M.D.    This EEG was performed to assess for subclinical seizures.    ICU EEG/VIDEO MONITORING REPORT    METHODOLOGY:  Electroencephalographic (EEG) is recorded with electrodes placed   according to the International 10-20 placement system.  Thirty two (32) channels   of digital signal (sampling rate of 512/sec), including T1 and T2, were   simultaneously recorded from the scalp and may include EKG, EMG, and/or eye   monitors.  Recording band pass was 0.1 to 512 Hz.  Digital video recording of   the patient is simultaneously recorded with the EEG.  The patient is instructed   to report clinical symptoms which may occur during the recording session.  EEG   and video recording are stored and archived in digital format.  Activation   procedures, which include photic stimulation, hyperventilation and instructing   patients to perform simple tasks, are done in selected patients.    The EEG is displayed on a monitor screen and can be reviewed using different   montages.  Computer-assisted analysis is employed to detect spike and   electrographic seizure activity.  The entire record is submitted for computer   analysis.  The entire recording is visually reviewed, and the times identified   by computer analysis as being spikes or seizures are reviewed again.    Compressed spectral analysis (CSA) is also performed on the activity recorded   from each individual channel.  This is displayed as a power display of   frequencies from 0 to 30 Hz over time.  The CSA is reviewed looking for   asymmetries in power between homologous areas of the scalp, then compared with   the original EEG recording.    [employer] software was also utilized in the review of this study.  This software   suite analyzes the EEG recording in multiple domains.  Coherence and rhythmicity   are computed to identify EEG sections which may contain organized  seizures.    Each channel undergoes analysis to detect the presence of spike and sharp waves   which have special and morphological characteristics of epileptic activity.  The   routine EEG recording is converted from special into frequency domain.  This is   then displayed comparing homologous areas to identify areas of significant   asymmetry.  Algorithm to identify non-cortically generated artifact is used to   separate artifact from the EEG.    RECORDING TIMES:  Start on 07/18/2018 at hour 17, minute 0, second 35.  End on 07/19/2018 at hour 2, minute 14, second 43.  The electrodes were removed from the head from hour 2, minute 14 on 07/19/2018.    A total time of video EEG recording for this study was 11 hours and 11 minutes.    EEG FINDINGS:  The recording was obtained with a number of standard bipolar and   referential montages during wakefulness, drowsiness and sleep.  In the alert   state, the posterior background rhythm was a symmetric, well-modulated 7 Hz   rhythm, which reacted symmetrically to eye opening.  Activation procedures not   performed.  During drowsiness, the background rhythm waxed and waned and there   were periods of slowing.  During stage II sleep, symmetric V waves and sleep   spindles were noted.  In addition, periods of frontal intermittent rhythmical   delta activity were noted.  There were no interictal epileptiform abnormalities   and no clinical or electrographic seizures were recorded.    The EKG channel revealed sinus rhythm.    IMPRESSION:  This is an abnormal EEG during wakefulness, drowsiness and sleep.    Diffuse disorganized slowing of the background was noted.  Periods of frontal   intermittent rhythmical delta activity were noted.      CLINICAL CORRELATION:  The patient is a 68-year-old male with a history of   intracerebral hemorrhage who is currently not maintained on any anti-seizure   medication.  This is an abnormal EEG during wakefulness, drowsiness and sleep.    The  overall degree of slowing and disorganization is suggestive of a   mild-to-moderate degree of encephalopathy nonspecific to the cause.  There is no   evidence of an epileptic process on this recording.  No seizures were recorded   during this study.      FAK/IN  dd: 07/19/2018 17:06:41 (CDT)  td: 07/19/2018 17:40:55 (CDT)  Doc ID   #[id number]  Job ID #[id number]    CC:

## 2018-07-19 NOTE — HOSPITAL COURSE
--admitted to [facility] 7/18.   7/21: EVD at bedside by NSGY  7/23 EVD in place lowered to 5cm h2o per nsgy.. Blood sugars running >200 adjusted detemir. Increased hydralazine for better BP control  7/24: EVD at 5, insulin adjustments   7/25: EVD clamped per Nsgy, [facility] tonight  7/26: Held tube feeds, insulin adjustments, Nsgy contacted about MSC and EVD.   7/27: Evd pulled per Nsgy, Ngt replace  7/28: Patient doing well. EVD got D/C yesterday. Pending transfer to stroke team. Patient has dysphagia and some difficulty clearing secretions.  7/30: TF restarted, Resp Cx, ABX adjustment, MBS tomorrow   7/31: Unable to perform MBS d/t drowsiness. Believe drowsiness d/t infection. Discuss step down to Vascular Neurology.

## 2018-07-19 NOTE — H&P
[facility]  Neurocritical Care  History & Physical    Admit Date: 7/17/2018  Service Date: 07/18/2018  Length of Stay: 1    Subjective:     Chief Complaint: Nontraumatic intraventricular intracerebral hemorrhage    History of Present Illness: 67 yo M with PMHx of HTN, HLD, DM, dementia, A. Fib, on coumadin as outpatient who was transferred to [facility] from [place] facility where he was reportedly admitted for R thalamic IPH w IVH. Per OSH records, patient admitted to ICU and sent to floor with little follow up imaging and no interval improvement in exam. Wife requested second opinion thus was transferred to [facility] however for unknown reason was admitted to hospital medicine service last night. NCC called this morning after seen by Nsgy due to concern for airway protection. CT this morning with R thalamic IPH and lateral ventricle extension bilaterally and slight enlargement of ventricular system. Of note baseline enlargement of vents at previous CT a year ago. On exam patient with GCS of 8, however per wife and OSH records, this is baseline since initial admission in [place]. Will admit to [facility] for higher level of care.     Past Medical History:   Diagnosis Date    Aneurysm     Clotting disorder     blood clot takes coumadin    Diabetes mellitus     Diabetes mellitus type II     Hyperlipidemia     Hypertension     PAF (paroxysmal atrial fibrillation)     Stroke     (2) in the past    Urinary tract infection      History reviewed. No pertinent surgical history.    No current facility-administered medications on file prior to encounter.      Current Outpatient Prescriptions on File Prior to Encounter   Medication Sig Dispense Refill    amiodarone (PACERONE) 200 MG Tab Take 2 pills twice daily for 2 weeks then take 1 pill daily thereafter 120 tablet 2    atorvastatin (LIPITOR) 80 MG tablet Take 0.5 tablets (40 mg total) by mouth once daily. 90 tablet 3    blood sugar diagnostic (ACCU-CHEK  "SMARTVIEW TEST STRIP) Strp 1 each by Misc.(Non-Drug; Combo Route) route 4 (four) times daily. 150 each 11    blood-glucose meter (ACCU-CHEK MALLORY) Misc 1 each by Misc.(Non-Drug; Combo Route) route once daily. 1 each 0    econazole nitrate 1 % cream Apply topically 2 (two) times daily. 85 g 2    insulin aspart (NOVOLOG) 100 unit/mL InPn pen Inject 6 Units into the skin 3 (three) times daily with meals. 6 Syringe 1    insulin degludec (TRESIBA FLEXTOUCH U-200) 200 unit/mL (3 mL) InPn Inject 36 Units into the skin every evening. 6 Syringe 1    lancets (ACCU-CHEK FASTCLIX) Misc 1 each by Misc.(Non-Drug; Combo Route) route 4 (four) times daily. 150 each 11    lancing device with lancets (ONETOUCH DELICA LANC DEVICE) Kit 1 each by Misc.(Non-Drug; Combo Route) route 2 (two) times daily. 1 each 1    losartan (COZAAR) 100 MG tablet Take 1 tablet (100 mg total) by mouth once daily. 90 tablet 3    metformin (GLUCOPHAGE) 1000 MG tablet Take 1 tablet (1,000 mg total) by mouth 2 (two) times daily with meals. 180 tablet 2    metoprolol succinate (TOPROL-XL) 50 MG 24 hr tablet Take 1 tablet (50 mg total) by mouth once daily. 90 tablet 3    pen needle, diabetic (BD ULTRA-FINE MALLORY PEN NEEDLES) 32 gauge x 5/32" Ndle Use with lantus pen 100 each 2    PREVNAR 13, PF, 0.5 mL Syrg ADM 0.5ML IM UTD  0    senna-docusate 8.6-50 mg (SENNA WITH DOCUSATE SODIUM) 8.6-50 mg per tablet Take 1 tablet by mouth once daily.      trazodone (DESYREL) 50 MG tablet 1 - 2 Tablet Oral At bedtime 90 tablet 3    [DISCONTINUED] warfarin (COUMADIN) 5 MG tablet As directed by coumadin clinic 100 tablet 11     Allergies: Patient has no known allergies.    Family History   Problem Relation Age of Onset    Heart disease Mother     Diabetes Mother     Heart attack Mother     Hypertension Mother     Asthma Father     Vision loss Sister     Cancer Sister         breast    Cancer Brother         lung       Social History   Substance Use Topics    " Smoking status: Never Smoker    Smokeless tobacco: Not on file    Alcohol use No      Review of Systems:  Unable to obtain a complete ROS due to level of consciousness.     Vitals:   Temp: 99.5 °F (37.5 °C)  Pulse: 71  Rhythm: normal sinus rhythm  BP: (!) 140/73  MAP (mmHg): 116  Resp: 16  SpO2: 100 %  O2 Device (Oxygen Therapy): room air    Temp  Min: 97.4 °F (36.3 °C)  Max: 100.1 °F (37.8 °C)  Pulse  Min: 66  Max: 96  BP  Min: 116/69  Max: 170/81  MAP (mmHg)  Min: 88  Max: 116  Resp  Min: 16  Max: 25  SpO2  Min: 93 %  Max: 100 %    07/17 0701 - 07/18 0700  In: -   Out: 250 [Urine:250]   Unmeasured Output  Stool Occurrence: 0     Examination:   Constitutional: Well-nourished and -developed. No apparent distress.   Eyes: Conjunctiva clear, anicteric. Lids no lesions.  Head/Ears/Nose/Mouth/Throat/Neck: drumucous membranes. External ears, nose atraumatic.   Cardiovascular: Regular rhythm. No murmurs. No leg edema.  Respiratory: Comfortable respirations. Clear to auscultation.  Gastrointestinal: No hernia. Soft, nondistended, nontender. + bowel sounds.    Neurologic:   -E2V1M5  --somnolent, opens eyes and grimaces to painful stimuli only  --corneal reflexes intact, blinks to threat bilaterally  --localizes with RUE, moves all others spontaneously, left gaze deficit noted  --follows command only after prompting him to wake up and yelling loudly, wiggles fingers on R hand  --increased tone to LUE    Today I independently reviewed pertinent imaging, cardiology, notably: CT head  Assessment/Plan:     Neuro   * Nontraumatic intraventricular intracerebral hemorrhage    --initial bleed  On 7/9, no decline in exam since then  --hourly  Neuro checks, EEG, SBP <160.   --NSGY following  --coumadin held since admission in [place], INR 3 at that time. Today's lab with INR 0.9.   --repeat CT in AM  -- PRN ABG if signs of poor airway protection. Currently stable vitals        Dementia without behavioral disturbance    --holding home  aricept  --NPH history?        Cardiac/Vascular   Cardiomyopathy    EF 40 on echo 1 year ago  Making urine, holding IVF for now  Follow CXR          Persistent atrial fibrillation    --continue home BB and amio  --rate controlled  --hold coumadin         Endocrine   Type 2 diabetes mellitus with diabetic polyneuropathy, with long-term current use of insulin    --continue detemir , adjusting SSI            Prophylaxis:  Venous Thromboembolism: mechanical  Stress Ulcer: None  Ventilator Pneumonia: not applicable     Activity Orders          Straight Cath starting at 07/18 1348        DNR    Sarah Beth Turner PA-C  Neurocritical Care  [facility]

## 2018-07-19 NOTE — ASSESSMENT & PLAN NOTE
69 yo male with right thalamic hemorrhage (ICH score 2) initially found on 7/9/18 at outside hospital in [place]  - HCT shows acute right thalamic hemorrhage with intraventricular extension.  Ventricular system is enlarged on 7/18 scan, HCT on 11/13/17 shows baseline ventricular enlargement.  There are no films available from recent admission in [place] for comparison   - Continue NCC   - Pt needs full AMS work up including EEG to r/o non convulsive status, f/u EEG  - Repeat HCT this AM stable.   - Will follow, please call with questions or any change in neurologic status  - Patient is DNR.

## 2018-07-19 NOTE — PROGRESS NOTES
"  [facility]  Adult Nutrition  Consult Note    SUMMARY     Recommendations    1. TF recommendations: Diabetisource @ 65 mL/hr to provide 1872 calories (96% EEN), 94 grams of protein (100% EPN), 1276 mL fluid.    - Hold for residuals >500 mL; additional fluid per MD.  2. If able to advance diet, recommend 2000 kcal ADA diet (texture per SLP).   3. RD to monitor & follow-up.    Goals: Meet % EEN, EPN  Nutrition Goal Status: new  Communication of RD Recs: reviewed with RN    Reason for Assessment    Reason for Assessment: new tube feeding  Diagnosis: other (see comments) (Intraventricular intracerebral hemorrhage)  Relevant Medical History: DM, HTN, HLD, Dementia  Interdisciplinary Rounds: did not attend    General Information Comments: Pt NPO, NGT present. TF ordered, but not running at time of visit. Family member at bedside unsure of UBW & PO intake PTA. Patient appears well nourished but RD unable to determine patient's UBW & PO intake PTA. Therefore, RD unable to determine if patient meets criteria for malnutrition at this time.  Nutrition Discharge Planning: Unable to determine    Nutrition/Diet History    Patient Reported Diet/Restrictions/Preferences: other (see comments) (CHESTER)  Do you have any cultural, spiritual, Yazidism conflicts, given your current situation?: none stated  Factors Affecting Nutritional Intake: NPO, difficulty/impaired swallowing    Anthropometrics    Temp: 98.5 °F (36.9 °C)  Height Method: Stated (per family)  Height: 5' 10" (177.8 cm)  Height (inches): 70 in  Weight Method: Bed Scale  Weight: 78.9 kg (173 lb 15.1 oz)  Weight (lb): 173.94 lb  Ideal Body Weight (IBW), Male: 166 lb  % Ideal Body Weight, Male (lb): 104.78 lb  BMI (Calculated): 25  BMI Grade: 25 - 29.9 - overweight  Usual Body Weight (UBW), kg:  (CHESTER)    Lab/Procedures/Meds    Pertinent Labs Reviewed: reviewed  Pertinent Labs Comments: Gluc 247, A1C 7.2  Pertinent Medications Reviewed: " reviewed    Physical Findings/Assessment    Overall Physical Appearance: nourished, lethargic  Tubes: nasogastric tube  Oral/Mouth Cavity: WDL  Skin: intact    Estimated/Assessed Needs    Weight Used For Calorie Calculations: 79 kg (174 lb 2.6 oz)     Energy Calorie Requirements (kcal): 1951 kcal/d  Energy Need Method: [place]-St Jeor (1.25 PAL)     Protein Requirements: 79-95 g/d (1-1.2 g/kg)  Weight Used For Protein Calculations: 79 kg (174 lb 2.6 oz)     Fluid Need Method: other (see comments) (Per MD or 1 mL/kcal)     CHO Requirement: 50% total kcals    Nutrition Prescription Ordered    Current Diet Order: NPO    Evaluation of Received Nutrient/Fluid Intake    Comments: LBM: not recorded    Nutrition Risk    Level of Risk/Frequency of Follow-up:  (2x/week)     Assessment and Plan    Nutrition Problem  Inadequate energy intake    Related to (etiology):   Inability to consume sufficient energy    Signs and Symptoms (as evidenced by):   NPO with no alternate means of nutrition    Nutrition Diagnosis Status:   New    Monitor and Evaluation    Food and Nutrient Intake: energy intake, food and beverage intake, enteral nutrition intake  Food and Nutrient Adminstration: diet order, enteral and parenteral nutrition administration  Physical Activity and Function: nutrition-related ADLs and IADLs  Anthropometric Measurements: weight, weight change  Biochemical Data, Medical Tests and Procedures: lipid profile, inflammatory profile, electrolyte and renal panel, gastrointestinal profile, glucose/endocrine profile  Nutrition-Focused Physical Findings: overall appearance     Nutrition Follow-Up    RD Follow-up?: Yes

## 2018-07-19 NOTE — PROGRESS NOTES
"[facility]  Neurosurgery  Progress Note    Subjective:     History of Present Illness: 67 yo M with PMHx of HTN, HLD, DM, dementia, A. Fib, on coumadin, prior cerebral aneurysm treated 20 years ago at [facility]. Pt who presented [facility] in [place], [place] on 7/9/18 to with sudden-onset headache, right eye deviation, and L sided weakness. Head CT at that time revealed right thalamic hemorrhage.  Pt was treated conservatively without neurosurgical intervention.  Per wife, she was unhappy with the care in [place].  States patient has been very lethargic since he was initially admitted, without improvement,  and felt as though the "nothing was being done for him" She requested that patient be transferred to [facility] for further evaluation/treatment.  Pt was accepted to [facility] by Hospital medicine service, and currently admitted to the floor.  Pt has been lethargic and and unable to FC's since admission.    68 M with R thalamic ICH/IVH (ICH score 2) initially found on 7/9/18 admitted to OSH in [place] w/o intervention.     7/18: Pt has NG tube in place, as unable to swallow.  Pt is DNR.  Prior to his recent hospital admission, wife states patient was functioning well, ambulating normally, no previous weakness/speech difficulty.  He takes aspirin and coumadin at home (on hold now).  HCT was done this morning which shows acute blood with ventriculomegaly.          Post-Op Info:  * No surgery found *         Interval History: NAEON.    Medications:  Continuous Infusions:  Scheduled Meds:   amiodarone  200 mg Per NG tube Daily    atorvastatin  40 mg Per NG tube Daily    insulin detemir U-100  15 Units Subcutaneous QHS    losartan  100 mg Per NG tube Daily    metoprolol tartrate  25 mg Per NG tube BID    senna-docusate 8.6-50 mg  1 tablet Per NG tube Daily     PRN Meds:dextrose 50%, dextrose 50%, glucagon (human recombinant), insulin aspart U-100, labetalol, sodium chloride 0.9%     Review of Systems  Objective: "     Weight: 78.9 kg (173 lb 15.1 oz)  Body mass index is 24.96 kg/m².  Vital Signs (Most Recent):  Temp: 98.6 °F (37 °C) (07/19/18 0702)  Pulse: 78 (07/19/18 0800)  Resp: 13 (07/19/18 0800)  BP: (!) 159/77 (07/19/18 0800)  SpO2: 100 % (07/19/18 0800) Vital Signs (24h Range):  Temp:  [97.4 °F (36.3 °C)-99.7 °F (37.6 °C)] 98.6 °F (37 °C)  Pulse:  [66-83] 78  Resp:  [13-33] 13  SpO2:  [93 %-100 %] 100 %  BP: (124-182)/(63-93) 159/77                           NG/OG Tube 07/16/18 1200 Left nostril (Active)   Placement Check placement verified by aspirate characteristics;placement verified by distal tube length measurement 7/19/2018  7:02 AM   Distal Tube Length (cm) 70 7/19/2018  7:02 AM   Tolerance no signs/symptoms of discomfort 7/19/2018  7:02 AM   Securement anchored to nostril center w/ adhesive device 7/19/2018  7:02 AM   Clamp Status/Tolerance clamped;no abdominal discomfort;no abdominal distention;no emesis;no nausea;no residual;no restlessness 7/19/2018  7:02 AM   Insertion Site Appearance no redness, warmth, tenderness, skin breakdown, drainage 7/19/2018  7:02 AM   Flush/Irrigation flushed w/;water;no resistance met 7/19/2018  7:02 AM   Feeding Action feeding held 7/19/2018  7:02 AM   Intake (mL) 40 mL 7/18/2018  9:05 PM   Residual Amount (ml) 0 ml 7/18/2018  9:05 PM       Male External Urinary Catheter 07/18/18 1800 Medium (Active)   Collection Container Urimeter 7/19/2018  7:02 AM   Skin no redness;no breakdown 7/19/2018  7:02 AM   Tolerance no signs/symptoms of discomfort 7/19/2018  7:02 AM   Output (mL) 130 mL 7/19/2018  6:05 AM       Neurosurgery Physical Exam   [id number]  PERRL  Localizing RUE    Significant Labs:    Recent Labs  Lab 07/18/18 0212 07/19/18  0445   * 247*  247*    140  140   K 3.9 4.0  4.0    103  103   CO2 24 24  24   BUN 23 24*  24*   CREATININE 0.9 0.9  0.9   CALCIUM 9.2 9.5  9.5   MG  --  2.2  2.2       Recent Labs  Lab 07/18/18 0212 07/19/18  0445   WBC 8.05  7.11  7.11   HGB 12.7* 11.8*  11.8*   HCT 38.3* 35.5*  35.5*    271  271       Recent Labs  Lab 07/18/18  1010   INR 0.9   APTT 22.8     Microbiology Results (last 7 days)     ** No results found for the last 168 hours. **        Recent Lab Results       07/19/18  0445 07/19/18  0435 07/19/18  0017 07/18/18  2032 07/18/18  1824      Immature Granulocytes 0.3          0.3         Immature Grans (Abs) 0.02  Comment:  Mild elevation in immature granulocytes is non specific and   can be seen in a variety of conditions including stress response,   acute inflammation, trauma and pregnancy. Correlation with other   laboratory and clinical findings is essential.            0.02  Comment:  Mild elevation in immature granulocytes is non specific and   can be seen in a variety of conditions including stress response,   acute inflammation, trauma and pregnancy. Correlation with other   laboratory and clinical findings is essential.           Albumin 2.9(L)         Alkaline Phosphatase 75         Allens Test          ALT 21         Anion Gap 13          13         aPTT          AST 13         Baso # 0.03          0.03         Basophil% 0.4          0.4         Bilirubin, Direct          Total Bilirubin 0.6  Comment:  For infants and newborns, interpretation of results should be based  on gestational age, weight and in agreement with clinical  observations.  Premature Infant recommended reference ranges:  Up to 24 hours.............<8.0 mg/dL  Up to 48 hours............<12.0 mg/dL  3-5 days..................<15.0 mg/dL  6-29 days.................<15.0 mg/dL           Site          BUN, Bld 24(H)          24(H)         Calcium 9.5          9.5         Chloride 103          103         CO2 24          24         Creatinine 0.9          0.9         DelSys          Differential Method Automated          Automated         eGFR if  >60.0          >60.0         eGFR if non African American  >60.0  Comment:  Calculation used to obtain the estimated glomerular filtration  rate (eGFR) is the CKD-EPI equation.             >60.0  Comment:  Calculation used to obtain the estimated glomerular filtration  rate (eGFR) is the CKD-EPI equation.            Eos # 0.1          0.1         Eosinophil% 1.0          1.0         FiO2          Glucose 247(H)          247(H)         Gran # (ANC) 5.2          5.2         Gran% 73.3(H)          73.3(H)         Hematocrit 35.5(L)          35.5(L)         Hemoglobin 11.8(L)          11.8(L)         Coumadin Monitoring INR          Lymph # 1.0          1.0         Lymph% 14.6(L)          14.6(L)         Magnesium 2.2          2.2         MCH 29.6          29.6         MCHC 33.2          33.2         MCV 89          89         Mode          Mono # 0.7          0.7         Mono% 10.4          10.4         MPV 11.6          11.6         nRBC 0          0         Phosphorus 3.5          3.5         Platelets 271          271         POC BE          POC HCO3          POC PCO2          POC PH          POC PO2          POC SATURATED O2          POC TCO2          POCT Glucose  253(H) 250(H) 277(H) 310(H)     Potassium 4.0          4.0         Total Protein 7.1         Protime          RBC 3.99(L)          3.99(L)         RDW 12.8          12.8         Sample          Sodium 140          140         Sp02          WBC 7.11          7.11                     07/18/18  1424 07/18/18  1201 07/18/18  1121 07/18/18  1010      Immature Granulocytes         Immature Grans (Abs)         Albumin   2.9(L)      Alkaline Phosphatase   76      Allens Test N/A        ALT   24      Anion Gap         aPTT    22.8  Comment:  aPTT therapeutic range = 39-69 seconds     AST   11      Baso #         Basophil%         Bilirubin, Direct   0.3      Total Bilirubin   0.8  Comment:  For infants and newborns, interpretation of results should be based  on gestational age, weight and in agreement with  clinical  observations.  Premature Infant recommended reference ranges:  Up to 24 hours.............<8.0 mg/dL  Up to 48 hours............<12.0 mg/dL  3-5 days..................<15.0 mg/dL  6-29 days.................<15.0 mg/dL        Site Other        BUN, Bld         Calcium         Chloride         CO2         Creatinine         DelSys Room Air        Differential Method         eGFR if          eGFR if non          Eos #         Eosinophil%         FiO2 21        Glucose         Gran # (ANC)         Gran%         Hematocrit         Hemoglobin         Coumadin Monitoring INR    0.9  Comment:  Coumadin Therapy:  2.0 - 3.0 for INR for all indicators except mechanical heart valves  and antiphospholipid syndromes which should use 2.5 - 3.5.       Lymph #         Lymph%         Magnesium         MCH         MCHC         MCV         Mode SPONT        Mono #         Mono%         MPV         nRBC         Phosphorus         Platelets         POC BE 5        POC HCO3 28.9(H)        POC PCO2 42.6        POC PH 7.440        POC PO2 33(LL)        POC SATURATED O2 65(L)        POC TCO2 30(H)        POCT Glucose  271(H)       Potassium         Total Protein   6.9      Protime    9.8     RBC         RDW         Sample VENOUS        Sodium         Sp02 95        WBC               Significant Diagnostics:  I have reviewed all pertinent imaging results/findings within the past 24 hours.    Assessment/Plan:     * Nontraumatic intraventricular intracerebral hemorrhage    67 yo male with right thalamic hemorrhage (ICH score 2) initially found on 7/9/18 at outside hospital in [place]  - HCT shows acute right thalamic hemorrhage with intraventricular extension.  Ventricular system is enlarged on 7/18 scan, HCT on 11/13/17 shows baseline ventricular enlargement.  There are no films available from recent admission in [place] for comparison   - Continue NCC   - Pt needs full AMS work up including EEG to r/o non  convulsive status, f/u EEG  - Repeat HCT this AM stable.   - Will follow, please call with questions or any change in neurologic status  - Patient is DNR.             Lin Valles MD  Neurosurgery  [facility]

## 2018-07-19 NOTE — PLAN OF CARE
Problem: Patient Care Overview  Goal: Plan of Care Review  Outcome: Ongoing (interventions implemented as appropriate)  Recommendations     1. TF recommendations: Diabetisource @ 65 mL/hr to provide 1872 calories (96% EEN), 94 grams of protein (100% EPN), 1276 mL fluid.               - Hold for residuals >500 mL; additional fluid per MD.  2. If able to advance diet, recommend 2000 kcal ADA diet (texture per SLP).   3. RD to monitor & follow-up.

## 2018-07-19 NOTE — PLAN OF CARE
Problem: Patient Care Overview  Goal: Plan of Care Review  Outcome: Ongoing (interventions implemented as appropriate)  POC reviewed with pt at 0500. Pt globally aphasic; no evidence of learning. Questions and concerns addressed. No acute events overnight. No BM. CT obtained. Will continue to monitor. See flowsheets for full assessment and VS info

## 2018-07-19 NOTE — ASSESSMENT & PLAN NOTE
ICH score of 2 on admission  NSGY following  -160  No anti-platelet  Pt/ot/st  Elevate HOB  Repeat scan stable  EEG negative for epileptiform activity

## 2018-07-19 NOTE — PLAN OF CARE
Problem: Patient Care Overview  Goal: Plan of Care Review  POC reviewed with pt and daughter. Daughter verbalized understanding. Questions and concerns addressed. No acute events today. Pt started on NS@75 and TF are to be restarted pending pt ABG value.  Pt progressing toward goals. Will continue to monitor. See flowsheets for full assessment and VS info.

## 2018-07-19 NOTE — HOSPITAL COURSE
7/18-7/19: Intubated and then extubated. cEEG placed.   7/21: no seizures, will place EVD today  7/22: NAEON, more alert this morning s/p EVD placement yesterday  7/26: removing EVD today  7/27: evd kept in place though clamped without any change in mental status; plan to remove evd today

## 2018-07-19 NOTE — SUBJECTIVE & OBJECTIVE
Interval History:  No acute adverse events overnight    Review of Systems  Unable to obtain a complete ROS due to level of consciousness.  Objective:     Vitals:  Temp: 98.5 °F (36.9 °C)  Pulse: 69  Rhythm: normal sinus rhythm  BP: (!) 144/72  MAP (mmHg): 102  Resp: 14  SpO2: 97 %  O2 Device (Oxygen Therapy): room air    Temp  Min: 98.5 °F (36.9 °C)  Max: 99.7 °F (37.6 °C)  Pulse  Min: 60  Max: 83  BP  Min: 124/63  Max: 182/93  MAP (mmHg)  Min: 88  Max: 130  Resp  Min: 11  Max: 33  SpO2  Min: 93 %  Max: 100 %    07/18 0701 - 07/19 0700  In: 40   Out: 700 [Urine:700]   Unmeasured Output  Stool Occurrence: 0       Physical Exam   Constitutional: He appears well-developed and well-nourished. He appears lethargic.   HENT:   Head: Normocephalic.   Eyes: EOM are normal. Pupils are equal, round, and reactive to light.   Neck: Normal range of motion.   Cardiovascular: Normal rate and regular rhythm.    Pulmonary/Chest: Effort normal.   Abdominal: Soft.   Neurological: He appears lethargic. GCS eye subscore is 3. GCS verbal subscore is 3. GCS motor subscore is 5.   Difficult to arouse - patient's daughter states that patient is selectively interactive with family members since admission           Medications:  Continuous  sodium chloride 0.9% Last Rate: 75 mL/hr at 07/19/18 1400   Scheduled  amiodarone 200 mg Daily   amLODIPine 10 mg Daily   atorvastatin 40 mg Daily   hydrALAZINE 25 mg Q8H   insulin detemir U-100 15 Units QHS   losartan 100 mg Daily   metoprolol tartrate 25 mg BID   senna-docusate 8.6-50 mg 1 tablet Daily   PRN  dextrose 50% 12.5 g PRN   dextrose 50% 12.5 g PRN   glucagon (human recombinant) 1 mg PRN   insulin aspart U-100 1-10 Units Q4H PRN   magnesium oxide 800 mg PRN   magnesium oxide 800 mg PRN   potassium chloride 10% 40 mEq PRN   potassium chloride 10% 40 mEq PRN   potassium chloride 10% 60 mEq PRN   potassium, sodium phosphates 2 packet PRN   potassium, sodium phosphates 2 packet PRN   potassium,  sodium phosphates 2 packet PRN   sodium chloride 0.9% 5 mL PRN         Diet  Diet NPO  Diet NPO      Pulse: 72 (07/19/18 1502)  Resp: 18 (07/19/18 1502)  BP: (!) 151/79 (07/19/18 1502)  SpO2: 98 % (07/19/18 1502)                07/18 0701 - 07/19 0700  In: 40   Out: 700 [Urine:700]       2  07/18 0701 - 07/19 0700  In: 40   Out: 700 [Urine:700]     Recent Labs  Lab 07/19/18  1142   PH 7.438   PCO2 48.4*   PO2 25*   BE 9     Recent Labs  Lab 07/19/18  0445     140   K 4.0  4.0     103   CO2 24  24   BUN 24*  24*   CREATININE 0.9  0.9   CALCIUM 9.5  9.5   MG 2.2  2.2   PHOS 3.5  3.5     Recent Labs  Lab 07/19/18  0445   WBC 7.11  7.11   HGB 11.8*  11.8*     271     Recent Labs  Lab 07/19/18  0445   PROT 7.1   ALBUMIN 2.9*   AST 13   ALT 21   ALKPHOS 75   BILITOT 0.6     Recent Labs      07/19/18   0435  07/19/18   0847  07/19/18   1140   POCTGLUCOSE  253*  207*  196*     Continuous  sodium chloride 0.9% Last Rate: 75 mL/hr at 07/19/18 1500   Scheduled  amiodarone 200 mg Daily   amLODIPine 10 mg Daily   atorvastatin 40 mg Daily   hydrALAZINE 25 mg Q8H   insulin detemir U-100 15 Units QHS   losartan 100 mg Daily   metoprolol tartrate 25 mg BID   senna-docusate 8.6-50 mg 1 tablet Daily   PRN  dextrose 50% 12.5 g PRN   dextrose 50% 12.5 g PRN   glucagon (human recombinant) 1 mg PRN   insulin aspart U-100 1-10 Units Q4H PRN   magnesium oxide 800 mg PRN   magnesium oxide 800 mg PRN   potassium chloride 10% 40 mEq PRN   potassium chloride 10% 40 mEq PRN   potassium chloride 10% 60 mEq PRN   potassium, sodium phosphates 2 packet PRN   potassium, sodium phosphates 2 packet PRN   potassium, sodium phosphates 2 packet PRN   sodium chloride 0.9% 5 mL PRN           Lines/Drains/Airways     Drain                 NG/OG Tube 07/16/18 1200 Left nostril 3 days    Male External Urinary Catheter 07/18/18 1800 Medium less than 1 day          Peripheral Intravenous Line                 Peripheral IV - Single  Lumen 03/16/17 1732 Right Wrist 489 days         Midline Catheter Insertion/Assessment  - Single Lumen 07/12/18 1200 Right basilic vein (medial side of arm) 7 days         Peripheral IV - Single Lumen 07/19/18 0400 Left Hand less than 1 day

## 2018-07-20 PROBLEM — R13.10 DYSPHAGIA: Status: ACTIVE | Noted: 2018-07-20

## 2018-07-20 LAB
ANION GAP SERPL CALC-SCNC: 13 MMOL/L
BASOPHILS # BLD AUTO: 0.03 K/UL
BASOPHILS NFR BLD: 0.3 %
BUN SERPL-MCNC: 23 MG/DL
CALCIUM SERPL-MCNC: 9.1 MG/DL
CHLORIDE SERPL-SCNC: 107 MMOL/L
CO2 SERPL-SCNC: 21 MMOL/L
CREAT SERPL-MCNC: 0.9 MG/DL
DIFFERENTIAL METHOD: ABNORMAL
EOSINOPHIL # BLD AUTO: 0.1 K/UL
EOSINOPHIL NFR BLD: 1.2 %
ERYTHROCYTE [DISTWIDTH] IN BLOOD BY AUTOMATED COUNT: 12.8 %
EST. GFR  (AFRICAN AMERICAN): >60 ML/MIN/1.73 M^2
EST. GFR  (NON AFRICAN AMERICAN): >60 ML/MIN/1.73 M^2
GLUCOSE SERPL-MCNC: 239 MG/DL
HCT VFR BLD AUTO: 35.7 %
HGB BLD-MCNC: 11.8 G/DL
IMM GRANULOCYTES # BLD AUTO: 0.03 K/UL
IMM GRANULOCYTES NFR BLD AUTO: 0.3 %
LYMPHOCYTES # BLD AUTO: 1.1 K/UL
LYMPHOCYTES NFR BLD: 12.2 %
MAGNESIUM SERPL-MCNC: 2.1 MG/DL
MCH RBC QN AUTO: 29.2 PG
MCHC RBC AUTO-ENTMCNC: 33.1 G/DL
MCV RBC AUTO: 88 FL
MONOCYTES # BLD AUTO: 0.8 K/UL
MONOCYTES NFR BLD: 8.5 %
NEUTROPHILS # BLD AUTO: 7 K/UL
NEUTROPHILS NFR BLD: 77.5 %
NRBC BLD-RTO: 0 /100 WBC
PHOSPHATE SERPL-MCNC: 3.3 MG/DL
PLATELET # BLD AUTO: 299 K/UL
PMV BLD AUTO: 11.2 FL
POCT GLUCOSE: 204 MG/DL (ref 70–110)
POCT GLUCOSE: 230 MG/DL (ref 70–110)
POCT GLUCOSE: 233 MG/DL (ref 70–110)
POCT GLUCOSE: 233 MG/DL (ref 70–110)
POCT GLUCOSE: 238 MG/DL (ref 70–110)
POTASSIUM SERPL-SCNC: 3.9 MMOL/L
RBC # BLD AUTO: 4.04 M/UL
SODIUM SERPL-SCNC: 141 MMOL/L
WBC # BLD AUTO: 9.09 K/UL

## 2018-07-20 PROCEDURE — 80048 BASIC METABOLIC PNL TOTAL CA: CPT

## 2018-07-20 PROCEDURE — 63600175 PHARM REV CODE 636 W HCPCS: Performed by: NURSE PRACTITIONER

## 2018-07-20 PROCEDURE — 83735 ASSAY OF MAGNESIUM: CPT

## 2018-07-20 PROCEDURE — 84100 ASSAY OF PHOSPHORUS: CPT

## 2018-07-20 PROCEDURE — 97164 PT RE-EVAL EST PLAN CARE: CPT

## 2018-07-20 PROCEDURE — 25000003 PHARM REV CODE 250: Performed by: NURSE PRACTITIONER

## 2018-07-20 PROCEDURE — 20000000 HC ICU ROOM

## 2018-07-20 PROCEDURE — 99233 SBSQ HOSP IP/OBS HIGH 50: CPT | Mod: GC,,, | Performed by: PSYCHIATRY & NEUROLOGY

## 2018-07-20 PROCEDURE — 97110 THERAPEUTIC EXERCISES: CPT

## 2018-07-20 PROCEDURE — G8979 MOBILITY GOAL STATUS: HCPCS | Mod: CL

## 2018-07-20 PROCEDURE — 25000003 PHARM REV CODE 250: Performed by: STUDENT IN AN ORGANIZED HEALTH CARE EDUCATION/TRAINING PROGRAM

## 2018-07-20 PROCEDURE — G8978 MOBILITY CURRENT STATUS: HCPCS | Mod: CM

## 2018-07-20 PROCEDURE — 85025 COMPLETE CBC W/AUTO DIFF WBC: CPT

## 2018-07-20 PROCEDURE — 63600175 PHARM REV CODE 636 W HCPCS: Performed by: STUDENT IN AN ORGANIZED HEALTH CARE EDUCATION/TRAINING PROGRAM

## 2018-07-20 RX ORDER — MIDAZOLAM HYDROCHLORIDE 1 MG/ML
0.5 INJECTION INTRAMUSCULAR; INTRAVENOUS ONCE
Status: COMPLETED | OUTPATIENT
Start: 2018-07-20 | End: 2018-07-20

## 2018-07-20 RX ADMIN — SENNOSIDES AND DOCUSATE SODIUM 1 TABLET: 8.6; 5 TABLET ORAL at 08:07

## 2018-07-20 RX ADMIN — INSULIN DETEMIR 5 UNITS: 100 INJECTION, SOLUTION SUBCUTANEOUS at 10:07

## 2018-07-20 RX ADMIN — ATORVASTATIN CALCIUM 40 MG: 20 TABLET, FILM COATED ORAL at 08:07

## 2018-07-20 RX ADMIN — AMLODIPINE BESYLATE 10 MG: 10 TABLET ORAL at 08:07

## 2018-07-20 RX ADMIN — INSULIN ASPART 4 UNITS: 100 INJECTION, SOLUTION INTRAVENOUS; SUBCUTANEOUS at 08:07

## 2018-07-20 RX ADMIN — LOSARTAN POTASSIUM 100 MG: 50 TABLET, FILM COATED ORAL at 08:07

## 2018-07-20 RX ADMIN — METOPROLOL TARTRATE 25 MG: 25 TABLET, FILM COATED ORAL at 08:07

## 2018-07-20 RX ADMIN — AMIODARONE HYDROCHLORIDE 200 MG: 200 TABLET ORAL at 08:07

## 2018-07-20 RX ADMIN — HYDRALAZINE HYDROCHLORIDE 25 MG: 25 TABLET ORAL at 06:07

## 2018-07-20 RX ADMIN — HYDRALAZINE HYDROCHLORIDE 25 MG: 25 TABLET ORAL at 09:07

## 2018-07-20 RX ADMIN — INSULIN ASPART 4 UNITS: 100 INJECTION, SOLUTION INTRAVENOUS; SUBCUTANEOUS at 04:07

## 2018-07-20 RX ADMIN — INSULIN ASPART 2 UNITS: 100 INJECTION, SOLUTION INTRAVENOUS; SUBCUTANEOUS at 09:07

## 2018-07-20 RX ADMIN — HYDRALAZINE HYDROCHLORIDE 25 MG: 25 TABLET ORAL at 03:07

## 2018-07-20 RX ADMIN — METOPROLOL TARTRATE 25 MG: 25 TABLET, FILM COATED ORAL at 09:07

## 2018-07-20 RX ADMIN — INSULIN ASPART 4 UNITS: 100 INJECTION, SOLUTION INTRAVENOUS; SUBCUTANEOUS at 11:07

## 2018-07-20 RX ADMIN — MIDAZOLAM HYDROCHLORIDE 0.5 MG: 1 INJECTION, SOLUTION INTRAMUSCULAR; INTRAVENOUS at 02:07

## 2018-07-20 NOTE — PROGRESS NOTES
[facility]  Neurocritical Care  Progress Note    Admit Date: 7/17/2018  Service Date: 07/20/2018  Length of Stay: 3    Subjective:     Chief Complaint: Nontraumatic intraventricular intracerebral hemorrhage    History of Present Illness: 67 yo M with PMHx of HTN, HLD, DM, dementia, A. Fib, on coumadin as outpatient who was transferred to [facility] from [facility] where he was reportedly admitted for R thalamic IPH w IVH. Per OSH records, patient admitted to ICU and sent to floor with little follow up imaging and no interval improvement in exam. Wife requested second opinion thus was transferred to [facility] however for unknown reason was admitted to hospital medicine service last night. NCC called this morning after seen by Nsgy due to concern for airway protection. CT this morning with R thalamic IPH and lateral ventricle extension bilaterally and slight enlargement of ventricular system. Of note baseline enlargement of vents at previous CT a year ago. On exam patient with GCS of 8, however per wife and OSH records, this is baseline since initial admission in [place]. Will admit to [facility] for higher level of care.       Hospital Course: --admitted to [facility] 7/18.     Interval History:  No acute adverse events overnight    Review of Systems    Unable to obtain a complete ROS due to level of consciousness.  Objective:     Vitals:  Temp: 99.1 °F (37.3 °C)  Pulse: 65  Rhythm: normal sinus rhythm  BP: (!) 141/67  MAP (mmHg): 97  Resp: 17  SpO2: 98 %  O2 Device (Oxygen Therapy): room air    Temp  Min: 97.7 °F (36.5 °C)  Max: 99.1 °F (37.3 °C)  Pulse  Min: 60  Max: 89  BP  Min: 124/65  Max: 199/93  MAP (mmHg)  Min: 88  Max: 134  Resp  Min: 11  Max: 31  SpO2  Min: 96 %  Max: 100 %    07/19 0701 - 07/20 0700  In: 1661.3 [I.V.:1461.3]  Out: 1050 [Urine:1050]   Unmeasured Output  Stool Occurrence: 0       Physical Exam   Constitutional: He appears well-developed and well-nourished. He appears lethargic.   HENT:   Head:  Normocephalic.   Eyes: EOM are normal. Pupils are equal, round, and reactive to light.   Neck: Normal range of motion.   Cardiovascular: Normal rate and regular rhythm.    Pulmonary/Chest: Effort normal.   Abdominal: Soft.   Neurological: He appears lethargic. GCS eye subscore is 3. GCS verbal subscore is 3. GCS motor subscore is 5.   Difficult to arouse - patient's daughter states that patient is selectively interactive with family members since admission           Medications:  Continuous    sodium chloride 0.9% Last Rate: 75 mL/hr at 07/20/18 1005   Scheduled    amiodarone 200 mg Daily   amLODIPine 10 mg Daily   atorvastatin 40 mg Daily   hydrALAZINE 25 mg Q8H   insulin detemir U-100 20 Units QHS   losartan 100 mg Daily   metoprolol tartrate 25 mg BID   senna-docusate 8.6-50 mg 1 tablet Daily   PRN    dextrose 50% 12.5 g PRN   dextrose 50% 12.5 g PRN   glucagon (human recombinant) 1 mg PRN   insulin aspart U-100 1-10 Units Q4H PRN   magnesium oxide 800 mg PRN   magnesium oxide 800 mg PRN   potassium chloride 10% 40 mEq PRN   potassium chloride 10% 40 mEq PRN   potassium chloride 10% 60 mEq PRN   potassium, sodium phosphates 2 packet PRN   potassium, sodium phosphates 2 packet PRN   potassium, sodium phosphates 2 packet PRN   sodium chloride 0.9% 5 mL PRN         Diet  Diet NPO  Diet NPO      Pulse: 65 (07/20/18 1005)  Resp: 17 (07/20/18 1005)  BP: (!) 141/67 (07/20/18 1005)  SpO2: 98 % (07/20/18 1005)                07/19 0701 - 07/20 0700  In: 1661.3 [I.V.:1461.3]  Out: 1050 [Urine:1050]       3  07/19 0701 - 07/20 0700  In: 1661.3 [I.V.:1461.3]  Out: 1050 [Urine:1050]     Recent Labs  Lab 07/19/18  2302   PH 7.464*   PCO2 38.0   PO2 72*   BE 3       Recent Labs  Lab 07/20/18  0208      K 3.9      CO2 21*   BUN 23   CREATININE 0.9   CALCIUM 9.1   MG 2.1   PHOS 3.3       Recent Labs  Lab 07/20/18  0208   WBC 9.09   HGB 11.8*      No results for input(s): PROT, ALBUMIN, AST, ALT, LDH, ALKPHOS,  BILITOT in the last 24 hours.  Recent Labs      07/19/18   2128  07/20/18   0207  07/20/18   0816   POCTGLUCOSE  180*  204*  238*     Continuous    sodium chloride 0.9% Last Rate: 75 mL/hr at 07/20/18 1005   Scheduled    amiodarone 200 mg Daily   amLODIPine 10 mg Daily   atorvastatin 40 mg Daily   hydrALAZINE 25 mg Q8H   insulin detemir U-100 20 Units QHS   losartan 100 mg Daily   metoprolol tartrate 25 mg BID   senna-docusate 8.6-50 mg 1 tablet Daily   PRN    dextrose 50% 12.5 g PRN   dextrose 50% 12.5 g PRN   glucagon (human recombinant) 1 mg PRN   insulin aspart U-100 1-10 Units Q4H PRN   magnesium oxide 800 mg PRN   magnesium oxide 800 mg PRN   potassium chloride 10% 40 mEq PRN   potassium chloride 10% 40 mEq PRN   potassium chloride 10% 60 mEq PRN   potassium, sodium phosphates 2 packet PRN   potassium, sodium phosphates 2 packet PRN   potassium, sodium phosphates 2 packet PRN   sodium chloride 0.9% 5 mL PRN           Lines/Drains/Airways     Drain                 NG/OG Tube 07/16/18 1200 Left nostril 3 days    Male External Urinary Catheter 07/18/18 1800 Medium 1 day          Arterial Line                 Arterial Line 07/19/18 2245 Left Radial less than 1 day          Peripheral Intravenous Line                 Peripheral IV - Single Lumen 03/16/17 1732 Right Wrist 490 days         Midline Catheter Insertion/Assessment  - Single Lumen 07/12/18 1200 Right basilic vein (medial side of arm) 7 days         Peripheral IV - Single Lumen 07/19/18 0400 Left Hand 1 day                        Assessment/Plan:     Neuro   * Nontraumatic intraventricular intracerebral hemorrhage    See ICH        ICH (intracerebral hemorrhage)    ICH score of 2 on admission  NSGY following  -160  No anti-platelet  Pt/ot/st  Elevate HOB  Repeat scan stable  EEG negative for epileptiform activity  Likely stepdown to floor tomorrow          Cardiac/Vascular   Persistent atrial fibrillation    -continue home BB and amio  -rate  controlled  -hold coumadin due to ICH        Endocrine   Type 2 diabetes mellitus with diabetic polyneuropathy, with long-term current use of insulin    -increase detemir , adjusting SSI  -start tube feeds today        GI   Dysphagia    SLP  Start TF today            Prophylaxis:  Venous Thromboembolism: mechanical  Stress Ulcer: NA  Ventilator Pneumonia: not applicable     Activity Orders          Straight Cath starting at 07/18 0237        DNR    Charles Hayes MD  Neurocritical Care  [facility]

## 2018-07-20 NOTE — PLAN OF CARE
Problem: Physical Therapy Goal  Goal: Physical Therapy Goal  Goals to be met by: 8/3/18    1. Pt will perform rolling to the R and L with moderate assistance.   2. Pt will perform supine to sit from both sides of the bed with max assistance.  3. Pt will perform sit to supine with max assistance.  4. Pt will sit EOB x 5 minutes with min assistance to prepare for functional tasks in sitting.   5. Pt will perform sit to stand transfers with moderate assistance.    6. Pt will perform bed <> chair transfers with max assistance.  7. Pt will perform gait x 10 feet with max assistance  8. Family will be independent with ROM using handout.      Outcome: Ongoing (interventions implemented as appropriate)  Re-eval completed.  Results, POC, and therapy recommendations discussed with patient and dtr.   Complete evaluation documentation to follow.    Mobility Recommendations: EOB with therapy only  D/c recommendations: SNF in [place]     Suzanna Rey, PT  7/20/2018  [phone] (pager)

## 2018-07-20 NOTE — PLAN OF CARE
Problem: Patient Care Overview  Goal: Plan of Care Review  Outcome: Ongoing (interventions implemented as appropriate)   07/19/18 1915   Coping/Psychosocial   Plan Of Care Reviewed With patient;daughter             POC reviewed with pt and pts daughter. Pt globally aphasic, unable to state understanding. Pts daughter states understanding.   Pt rested off and on this evening.   Grew more agitated overnight. Tense. BP increasing periodically.   Hydralazine given x 1 for SBP > 160   0.5 mg versed given for agitation. Tolerated well.   No acute neuro changes overnight.   See flowsheets for full assessment and VS info.

## 2018-07-20 NOTE — ASSESSMENT & PLAN NOTE
67 yo male with right thalamic hemorrhage (ICH score 2) initially found on 7/9/18 at outside hospital in [place]  - HCT shows acute right thalamic hemorrhage with intraventricular extension.  Ventricular system is enlarged on 7/18 scan, HCT on 11/13/17 shows baseline ventricular enlargement.  There are no films available from recent admission in [place] for comparison   - Continue NCC   -  f/u EEG  - Repeat HCT stable.   - Will follow, please call with questions or any change in neurologic status  - Patient is DNR.

## 2018-07-20 NOTE — ASSESSMENT & PLAN NOTE
ICH score of 2 on admission  NSGY following  -160  No anti-platelet  Pt/ot/st  Elevate HOB  Repeat scan stable  EEG negative for epileptiform activity  Likely stepdown to floor tomorrow

## 2018-07-20 NOTE — PROCEDURES
"Ciro Chandler is a 68 y.o. male patient.    Temp: 98.4 °F (36.9 °C) (07/19/18 2303)  Pulse: 66 (07/19/18 2303)  Resp: (!) 22 (07/19/18 2303)  BP: 134/68 (07/19/18 2303)  SpO2: 96 % (07/19/18 2303)  Weight: 78.9 kg (173 lb 15.1 oz) (07/19/18 1400)  Height: 5' 10" (177.8 cm) (07/19/18 1400)       Arterial Line  Date/Time: 7/19/2018 11:24 PM  Location procedure was performed: [facility] NEURO CRITICAL CARE  Performed by: TIFFANY BARROW  Authorized by: TIFFANY BARROW   Preparation: Patient was prepped and draped in the usual sterile fashion.  Indications: multiple ABGs  Location: left radial  Needle gauge: 20  Seldinger technique: Seldinger technique used  Number of attempts: 1  Technical procedures used: ultrasound  Complications: No  Post-procedure: dressing applied  Post-procedure CMS: normal  Patient tolerance: Patient tolerated the procedure well with no immediate complications          Tiffany Barrow  7/19/2018  "

## 2018-07-20 NOTE — PLAN OF CARE
SW met with Pt daughter at bedside. Discussed therapy recs for SNF and provided list. Family to review and give choices asap.     Kelley Bonilla LMSW  Neurocritical Care   [facility]  [id number]

## 2018-07-20 NOTE — PLAN OF CARE
Problem: Patient Care Overview  Goal: Plan of Care Review  Outcome: Ongoing (interventions implemented as appropriate)  POC reviewed with pt and family at 1400. Pt's daughter and wife verbalized understanding. Questions and concerns addressed. No acute events today. Pt progressing toward goals. Pt started on tube feeds. Will continue to monitor. See flowsheets for full assessment and VS info.

## 2018-07-20 NOTE — SUBJECTIVE & OBJECTIVE
Interval History:  No acute adverse events overnight    Review of Systems    Unable to obtain a complete ROS due to level of consciousness.  Objective:     Vitals:  Temp: 99.1 °F (37.3 °C)  Pulse: 65  Rhythm: normal sinus rhythm  BP: (!) 141/67  MAP (mmHg): 97  Resp: 17  SpO2: 98 %  O2 Device (Oxygen Therapy): room air    Temp  Min: 97.7 °F (36.5 °C)  Max: 99.1 °F (37.3 °C)  Pulse  Min: 60  Max: 89  BP  Min: 124/65  Max: 199/93  MAP (mmHg)  Min: 88  Max: 134  Resp  Min: 11  Max: 31  SpO2  Min: 96 %  Max: 100 %    07/19 0701 - 07/20 0700  In: 1661.3 [I.V.:1461.3]  Out: 1050 [Urine:1050]   Unmeasured Output  Stool Occurrence: 0       Physical Exam   Constitutional: He appears well-developed and well-nourished. He appears lethargic.   HENT:   Head: Normocephalic.   Eyes: EOM are normal. Pupils are equal, round, and reactive to light.   Neck: Normal range of motion.   Cardiovascular: Normal rate and regular rhythm.    Pulmonary/Chest: Effort normal.   Abdominal: Soft.   Neurological: He appears lethargic. GCS eye subscore is 3. GCS verbal subscore is 3. GCS motor subscore is 5.   Difficult to arouse - patient's daughter states that patient is selectively interactive with family members since admission           Medications:  Continuous    sodium chloride 0.9% Last Rate: 75 mL/hr at 07/20/18 1005   Scheduled    amiodarone 200 mg Daily   amLODIPine 10 mg Daily   atorvastatin 40 mg Daily   hydrALAZINE 25 mg Q8H   insulin detemir U-100 20 Units QHS   losartan 100 mg Daily   metoprolol tartrate 25 mg BID   senna-docusate 8.6-50 mg 1 tablet Daily   PRN    dextrose 50% 12.5 g PRN   dextrose 50% 12.5 g PRN   glucagon (human recombinant) 1 mg PRN   insulin aspart U-100 1-10 Units Q4H PRN   magnesium oxide 800 mg PRN   magnesium oxide 800 mg PRN   potassium chloride 10% 40 mEq PRN   potassium chloride 10% 40 mEq PRN   potassium chloride 10% 60 mEq PRN   potassium, sodium phosphates 2 packet PRN   potassium, sodium phosphates 2  packet PRN   potassium, sodium phosphates 2 packet PRN   sodium chloride 0.9% 5 mL PRN         Diet  Diet NPO  Diet NPO      Pulse: 65 (07/20/18 1005)  Resp: 17 (07/20/18 1005)  BP: (!) 141/67 (07/20/18 1005)  SpO2: 98 % (07/20/18 1005)                07/19 0701 - 07/20 0700  In: 1661.3 [I.V.:1461.3]  Out: 1050 [Urine:1050]       3  07/19 0701 - 07/20 0700  In: 1661.3 [I.V.:1461.3]  Out: 1050 [Urine:1050]     Recent Labs  Lab 07/19/18  2302   PH 7.464*   PCO2 38.0   PO2 72*   BE 3       Recent Labs  Lab 07/20/18  0208      K 3.9      CO2 21*   BUN 23   CREATININE 0.9   CALCIUM 9.1   MG 2.1   PHOS 3.3       Recent Labs  Lab 07/20/18  0208   WBC 9.09   HGB 11.8*      No results for input(s): PROT, ALBUMIN, AST, ALT, LDH, ALKPHOS, BILITOT in the last 24 hours.  Recent Labs      07/19/18   2128  07/20/18   0207  07/20/18   0816   POCTGLUCOSE  180*  204*  238*     Continuous    sodium chloride 0.9% Last Rate: 75 mL/hr at 07/20/18 1005   Scheduled    amiodarone 200 mg Daily   amLODIPine 10 mg Daily   atorvastatin 40 mg Daily   hydrALAZINE 25 mg Q8H   insulin detemir U-100 20 Units QHS   losartan 100 mg Daily   metoprolol tartrate 25 mg BID   senna-docusate 8.6-50 mg 1 tablet Daily   PRN    dextrose 50% 12.5 g PRN   dextrose 50% 12.5 g PRN   glucagon (human recombinant) 1 mg PRN   insulin aspart U-100 1-10 Units Q4H PRN   magnesium oxide 800 mg PRN   magnesium oxide 800 mg PRN   potassium chloride 10% 40 mEq PRN   potassium chloride 10% 40 mEq PRN   potassium chloride 10% 60 mEq PRN   potassium, sodium phosphates 2 packet PRN   potassium, sodium phosphates 2 packet PRN   potassium, sodium phosphates 2 packet PRN   sodium chloride 0.9% 5 mL PRN           Lines/Drains/Airways     Drain                 NG/OG Tube 07/16/18 1200 Left nostril 3 days    Male External Urinary Catheter 07/18/18 1800 Medium 1 day          Arterial Line                 Arterial Line 07/19/18 2245 Left Radial less than 1 day           Peripheral Intravenous Line                 Peripheral IV - Single Lumen 03/16/17 1732 Right Wrist 490 days         Midline Catheter Insertion/Assessment  - Single Lumen 07/12/18 1200 Right basilic vein (medial side of arm) 7 days         Peripheral IV - Single Lumen 07/19/18 0400 Left Hand 1 day

## 2018-07-20 NOTE — PT/OT/SLP RE-EVAL
Physical Therapy Re-evaluation    Patient Name:  Ciro Chandler   MRN:  [id number]    Recommendations:     Discharge Recommendations:  nursing facility, skilled   Discharge Equipment Recommendations: hospital bed, wheelchair   Barriers to discharge: Decreased caregiver support      Plan:     During this hospitalization, patient to be seen 4 x/week to address the above listed problems via gait training, therapeutic activities, therapeutic exercises, neuromuscular re-education, wheelchair management/training  · Plan of Care Expires:  08/20/18   Plan of Care Reviewed with: patient, daughter      History:     Past Medical History:   Diagnosis Date    Aneurysm     Clotting disorder     blood clot takes coumadin    Diabetes mellitus     Diabetes mellitus type II     Hyperlipidemia     Hypertension     PAF (paroxysmal atrial fibrillation)     Stroke     (2) in the past    Urinary tract infection        History reviewed. No pertinent surgical history.  Interval history:   7/18 PT eval in am  7/18 t/f ICU for respiratory concerns and intubation watch    Subjective     Communicated with RN prior to session.  Patient found supine in bed with dtr at bedside upon PT entry to room, agreeable to evaluation.      Chief Complaint: pulling at NG tube  Patient comments/goals: Family has goal of patient returning home  Pain/Comfort:  · Pain Rating 1: 0/10  · Pain Rating Post-Intervention 1: 0/10    Living Environment:  Patient History:  Patient lives with his wife in a 1 story home with 1 step to enter.  His wife works during the day and is not available to provide assistance.  Walk-in shower with shower chair. Prior to admit he was independent with mobility without an AD.  1 fall in the last 3 months.  Owns walker but did not use.  Dementia at baseline but able to be home without supervision prior to admit.    DME: shower chair    Objective:     Patient found with: pulse ox (continuous), telemetry, peripheral IV, NG tube,  arterial line, bed alarm, blood pressure cuff, pressure relief boots, SCD, Condom Catheter (mitten restraints)     General Precautions: Standard, aphasia, aspiration, fall   Patient was found supine in bed with bilateral mitten restraints.  He was moving all 4 extremeties but not following commands.     PHYSICAL EXAMINATION  Cognitive Function:  - Oriented to: unable to verbalize  - Level of Alertness: lethargic, slightly agitated  - Follows Commands/attention: Inattentive  - Communication: global aphasia  - Safety awareness/insight to disability: impaired  Musculoskeletal System  Upper Extremities:   ROM: PROM WFL  Strength: moves bilateral UE against gravity  Lower Extremities:  ROM: PROM WFL  Strength: moved bilateral LE against gravity   Muscular Tone: no hypertonicity, but patient resists PROM  Cardiopulmonary System:   Recent vitals:   Vitals:    07/20/18 1105   BP: (!) 158/79   Pulse: 74   Resp: (!) 28   Temp: 98.1 °F (36.7 °C)   Neuromuscular System:  - Sensation: CHESTER  - Coordination: CHESTER  Posture and gross symmetry: L sided LOB  Vision:  R gaze deviation, spontaneous tracking to the L but inconsistent    BALANCE:  Sitting:  - Level of assistance: moderate assistance for L sided LOB and safety conerns  - Time: 5 mintues  Standing:  - Level of assistance: max assistance x 2 people  - Time: 2 minutes  - Posture: L sided lean/LOB, fwd trunk flexion, decreased voluntary weight bearing through LEs     FUNCTIONAL MOBILITY ASSESSMENT:  Bed Mobility: performed with HOB flat  - Rolling/Turning R: total assistance  - Rolling/Turning L: total assistance   - Supine > sit: total assistance d/t confusion   - Sit > supine: total assistance     Transfers:  - Sit <> stand transfer: max assistance x 2 people    Therapeutic Activities, Education, or Exercises:  PROM/AAROM x all four extremeties 10x/ea in all available joints.     Therapist educated patient and dtr on the role of PT, POC, and therapy recommendations of SNF.   Therapist discussed the patients current mobility status, deficits, and level of assistance with dtr and RN.  PT demonstrated UE and LE ROM and gave dtr written handout.  Time was provided for active listening, discussion of health disposition, and discussion of safe discharge recommendaitons. Therapist answered questions to patient/familys satisfaction within scope of practice.  Patient and family are aware of patient's deficits and therapy progression. White board updated to reflect current level of assistance.    FUNCTIONAL OUTCOME MEASURES:   AM-PAC 6 CLICK MOBILITY  Total Score:9     Patient left supine with all lines intact, call button in reach, bed alarm on, restraints reapplied at end of session, rn notified and dtr present.    GOALS:    Physical Therapy Goals        Problem: Physical Therapy Goal    Goal Priority Disciplines Outcome Goal Variances Interventions   Physical Therapy Goal     PT/OT, PT Ongoing (interventions implemented as appropriate)     Description:  Goals to be met by: 8/3/18    1. Pt will perform rolling to the R and L with moderate assistance.   2. Pt will perform supine to sit from both sides of the bed with max assistance.  3. Pt will perform sit to supine with max assistance.  4. Pt will sit EOB x 5 minutes with min assistance to prepare for functional tasks in sitting.   5. Pt will perform sit to stand transfers with moderate assistance.    6. Pt will perform bed <> chair transfers with max assistance.  7. Pt will perform gait x 10 feet with max assistance  8. Family will be independent with ROM using handout.                      Assessment:     Ciro Chandler is a 68 y.o. male admitted with a medical diagnosis of Nontraumatic intraventricular intracerebral hemorrhage. The patient was more alert and interactive than the initial therapy evaluation.  He was unable to follow commands, but did move all 4 extremities against gravity.  He presents with the following impairments/functional  limitations:  impaired self care skills, impaired balance, decreased coordination, decreased safety awareness, impaired endurance, impaired functional mobilty, visual deficits, decreased upper extremity function, decreased lower extremity function, impaired cognition, gait instability, abnormal tone, impaired fine motor, impaired coordination, impaired cardiopulmonary response to activity.  Despite LE strength, he exhibited reduced voluntary weight bearing through his legs in standing.  Cognition and agitation contributed to poor performance with OOB mobility.  He has the potential to progress EOB mobility, standing and gait with additional therapy.  He would benefit from a trial of skilled PT services to progress mobility and determine meaningful participation in therapy.     Rehab Prognosis:  Limited to fair; patient would benefit from acute skilled PT services to address these deficits and reach maximum level of function.      Recent Surgery: * No surgery found *      Time Tracking:     PT Received On: 07/20/18  PT Start Time: 1100     PT Stop Time: 1132  PT Total Time (min): 32 min     Billable Minutes: Re-eval 20 and Therapeutic Exercise 12      Suzanna Rey, PT  07/20/2018

## 2018-07-21 PROBLEM — G91.1 OBSTRUCTIVE HYDROCEPHALUS: Status: ACTIVE | Noted: 2018-07-21

## 2018-07-21 PROBLEM — G93.40 ENCEPHALOPATHY ACUTE: Status: ACTIVE | Noted: 2018-07-21

## 2018-07-21 PROBLEM — G93.5 BRAIN COMPRESSION: Status: ACTIVE | Noted: 2018-07-21

## 2018-07-21 LAB
ANION GAP SERPL CALC-SCNC: 12 MMOL/L
BASOPHILS # BLD AUTO: 0.02 K/UL
BASOPHILS NFR BLD: 0.3 %
BUN SERPL-MCNC: 22 MG/DL
CALCIUM SERPL-MCNC: 9 MG/DL
CHLORIDE SERPL-SCNC: 110 MMOL/L
CO2 SERPL-SCNC: 21 MMOL/L
CREAT SERPL-MCNC: 0.8 MG/DL
DIFFERENTIAL METHOD: ABNORMAL
EOSINOPHIL # BLD AUTO: 0.1 K/UL
EOSINOPHIL NFR BLD: 1.3 %
ERYTHROCYTE [DISTWIDTH] IN BLOOD BY AUTOMATED COUNT: 12.9 %
EST. GFR  (AFRICAN AMERICAN): >60 ML/MIN/1.73 M^2
EST. GFR  (NON AFRICAN AMERICAN): >60 ML/MIN/1.73 M^2
GLUCOSE SERPL-MCNC: 252 MG/DL
HCT VFR BLD AUTO: 32.5 %
HGB BLD-MCNC: 10.7 G/DL
IMM GRANULOCYTES # BLD AUTO: 0.03 K/UL
IMM GRANULOCYTES NFR BLD AUTO: 0.4 %
LYMPHOCYTES # BLD AUTO: 1.1 K/UL
LYMPHOCYTES NFR BLD: 14.8 %
MAGNESIUM SERPL-MCNC: 1.6 MG/DL
MCH RBC QN AUTO: 29.3 PG
MCHC RBC AUTO-ENTMCNC: 32.9 G/DL
MCV RBC AUTO: 89 FL
MONOCYTES # BLD AUTO: 0.6 K/UL
MONOCYTES NFR BLD: 8.6 %
NEUTROPHILS # BLD AUTO: 5.4 K/UL
NEUTROPHILS NFR BLD: 74.6 %
NRBC BLD-RTO: 0 /100 WBC
PHOSPHATE SERPL-MCNC: 3.2 MG/DL
PLATELET # BLD AUTO: 261 K/UL
PMV BLD AUTO: 11.1 FL
POCT GLUCOSE: 179 MG/DL (ref 70–110)
POCT GLUCOSE: 188 MG/DL (ref 70–110)
POCT GLUCOSE: 196 MG/DL (ref 70–110)
POCT GLUCOSE: 261 MG/DL (ref 70–110)
POCT GLUCOSE: 261 MG/DL (ref 70–110)
POTASSIUM SERPL-SCNC: 3.9 MMOL/L
RBC # BLD AUTO: 3.65 M/UL
SODIUM SERPL-SCNC: 143 MMOL/L
WBC # BLD AUTO: 7.18 K/UL

## 2018-07-21 PROCEDURE — 61210 BURR HOLE IMPLT VENTR CATH: CPT

## 2018-07-21 PROCEDURE — 25000003 PHARM REV CODE 250: Performed by: NURSE PRACTITIONER

## 2018-07-21 PROCEDURE — 99233 SBSQ HOSP IP/OBS HIGH 50: CPT | Mod: ,,, | Performed by: PSYCHIATRY & NEUROLOGY

## 2018-07-21 PROCEDURE — 80048 BASIC METABOLIC PNL TOTAL CA: CPT

## 2018-07-21 PROCEDURE — 63600175 PHARM REV CODE 636 W HCPCS: Performed by: PSYCHIATRY & NEUROLOGY

## 2018-07-21 PROCEDURE — 25000003 PHARM REV CODE 250: Performed by: STUDENT IN AN ORGANIZED HEALTH CARE EDUCATION/TRAINING PROGRAM

## 2018-07-21 PROCEDURE — 99223 1ST HOSP IP/OBS HIGH 75: CPT | Mod: GC,,, | Performed by: PSYCHIATRY & NEUROLOGY

## 2018-07-21 PROCEDURE — 20000000 HC ICU ROOM

## 2018-07-21 PROCEDURE — 85025 COMPLETE CBC W/AUTO DIFF WBC: CPT

## 2018-07-21 PROCEDURE — 25000003 PHARM REV CODE 250: Performed by: PSYCHIATRY & NEUROLOGY

## 2018-07-21 PROCEDURE — 83735 ASSAY OF MAGNESIUM: CPT

## 2018-07-21 PROCEDURE — 99232 SBSQ HOSP IP/OBS MODERATE 35: CPT | Mod: 25,,, | Performed by: NEUROLOGICAL SURGERY

## 2018-07-21 PROCEDURE — 61107 TDH PNXR IMPLT VENTR CATH: CPT | Mod: ,,, | Performed by: NEUROLOGICAL SURGERY

## 2018-07-21 PROCEDURE — 84100 ASSAY OF PHOSPHORUS: CPT

## 2018-07-21 RX ORDER — INSULIN ASPART 100 [IU]/ML
4 INJECTION, SOLUTION INTRAVENOUS; SUBCUTANEOUS
Status: DISCONTINUED | OUTPATIENT
Start: 2018-07-21 | End: 2018-07-24

## 2018-07-21 RX ORDER — ACETAMINOPHEN 325 MG/1
650 TABLET ORAL EVERY 6 HOURS PRN
Status: DISCONTINUED | OUTPATIENT
Start: 2018-07-21 | End: 2018-08-02

## 2018-07-21 RX ORDER — INSULIN ASPART 100 [IU]/ML
4 INJECTION, SOLUTION INTRAVENOUS; SUBCUTANEOUS
Status: DISCONTINUED | OUTPATIENT
Start: 2018-07-21 | End: 2018-07-21

## 2018-07-21 RX ORDER — FENTANYL CITRATE 50 UG/ML
100 INJECTION, SOLUTION INTRAMUSCULAR; INTRAVENOUS ONCE
Status: COMPLETED | OUTPATIENT
Start: 2018-07-21 | End: 2018-07-21

## 2018-07-21 RX ORDER — HYDRALAZINE HYDROCHLORIDE 25 MG/1
25 TABLET, FILM COATED ORAL ONCE
Status: COMPLETED | OUTPATIENT
Start: 2018-07-21 | End: 2018-07-21

## 2018-07-21 RX ORDER — LABETALOL HYDROCHLORIDE 5 MG/ML
10 INJECTION, SOLUTION INTRAVENOUS EVERY 4 HOURS PRN
Status: DISCONTINUED | OUTPATIENT
Start: 2018-07-21 | End: 2018-07-24

## 2018-07-21 RX ORDER — PHENYLEPHRINE HCL IN 0.9% NACL 1 MG/10 ML
100 SYRINGE (ML) INTRAVENOUS EVERY 10 MIN PRN
Status: DISCONTINUED | OUTPATIENT
Start: 2018-07-21 | End: 2018-08-01

## 2018-07-21 RX ORDER — MIDAZOLAM HYDROCHLORIDE 1 MG/ML
4 INJECTION INTRAMUSCULAR; INTRAVENOUS ONCE
Status: COMPLETED | OUTPATIENT
Start: 2018-07-21 | End: 2018-07-21

## 2018-07-21 RX ADMIN — MAGNESIUM OXIDE TAB 400 MG (241.3 MG ELEMENTAL MG) 800 MG: 400 (241.3 MG) TAB at 02:07

## 2018-07-21 RX ADMIN — HYDRALAZINE HYDROCHLORIDE 25 MG: 25 TABLET, FILM COATED ORAL at 04:07

## 2018-07-21 RX ADMIN — AMIODARONE HYDROCHLORIDE 200 MG: 200 TABLET ORAL at 08:07

## 2018-07-21 RX ADMIN — MAGNESIUM OXIDE TAB 400 MG (241.3 MG ELEMENTAL MG) 800 MG: 400 (241.3 MG) TAB at 06:07

## 2018-07-21 RX ADMIN — METOPROLOL TARTRATE 25 MG: 25 TABLET, FILM COATED ORAL at 08:07

## 2018-07-21 RX ADMIN — LOSARTAN POTASSIUM 100 MG: 50 TABLET, FILM COATED ORAL at 08:07

## 2018-07-21 RX ADMIN — SENNOSIDES AND DOCUSATE SODIUM 1 TABLET: 8.6; 5 TABLET ORAL at 08:07

## 2018-07-21 RX ADMIN — HYDRALAZINE HYDROCHLORIDE 25 MG: 25 TABLET ORAL at 02:07

## 2018-07-21 RX ADMIN — INSULIN ASPART 4 UNITS: 100 INJECTION, SOLUTION INTRAVENOUS; SUBCUTANEOUS at 06:07

## 2018-07-21 RX ADMIN — INSULIN ASPART 6 UNITS: 100 INJECTION, SOLUTION INTRAVENOUS; SUBCUTANEOUS at 09:07

## 2018-07-21 RX ADMIN — INSULIN ASPART 4 UNITS: 100 INJECTION, SOLUTION INTRAVENOUS; SUBCUTANEOUS at 02:07

## 2018-07-21 RX ADMIN — METOPROLOL TARTRATE 25 MG: 25 TABLET, FILM COATED ORAL at 09:07

## 2018-07-21 RX ADMIN — HYDRALAZINE HYDROCHLORIDE 25 MG: 25 TABLET ORAL at 06:07

## 2018-07-21 RX ADMIN — INSULIN ASPART 4 UNITS: 100 INJECTION, SOLUTION INTRAVENOUS; SUBCUTANEOUS at 09:07

## 2018-07-21 RX ADMIN — AMLODIPINE BESYLATE 10 MG: 10 TABLET ORAL at 08:07

## 2018-07-21 RX ADMIN — INSULIN ASPART 2 UNITS: 100 INJECTION, SOLUTION INTRAVENOUS; SUBCUTANEOUS at 02:07

## 2018-07-21 RX ADMIN — HYDRALAZINE HYDROCHLORIDE 25 MG: 25 TABLET ORAL at 09:07

## 2018-07-21 RX ADMIN — INSULIN ASPART 1 UNITS: 100 INJECTION, SOLUTION INTRAVENOUS; SUBCUTANEOUS at 09:07

## 2018-07-21 RX ADMIN — FENTANYL CITRATE 100 MCG: 50 INJECTION INTRAMUSCULAR; INTRAVENOUS at 02:07

## 2018-07-21 RX ADMIN — INSULIN ASPART 3 UNITS: 100 INJECTION, SOLUTION INTRAVENOUS; SUBCUTANEOUS at 02:07

## 2018-07-21 RX ADMIN — MIDAZOLAM HYDROCHLORIDE 2 MG: 1 INJECTION, SOLUTION INTRAMUSCULAR; INTRAVENOUS at 02:07

## 2018-07-21 RX ADMIN — ATORVASTATIN CALCIUM 40 MG: 20 TABLET, FILM COATED ORAL at 08:07

## 2018-07-21 RX ADMIN — INSULIN ASPART 2 UNITS: 100 INJECTION, SOLUTION INTRAVENOUS; SUBCUTANEOUS at 06:07

## 2018-07-21 NOTE — PROGRESS NOTES
[facility]  Neurocritical Care  Progress Note    Admit Date: 7/17/2018  Service Date: 07/21/2018  Length of Stay: 4    Subjective:     Chief Complaint: Nontraumatic intraventricular intracerebral hemorrhage    History of Present Illness: 69 yo M with PMHx of HTN, HLD, DM, dementia, A. Fib, on coumadin as outpatient who was transferred to [facility] from [facility] where he was reportedly admitted for R thalamic IPH w IVH. Per OSH records, patient admitted to ICU and sent to floor with little follow up imaging and no interval improvement in exam. Wife requested second opinion thus was transferred to [facility] however for unknown reason was admitted to hospital medicine service last night. [facility] called this morning after seen by Nsgy due to concern for airway protection. CT this morning with R thalamic IPH and lateral ventricle extension bilaterally and slight enlargement of ventricular system. Of note baseline enlargement of vents at previous CT a year ago. On exam patient with GCS of 8, however per wife and OSH records, this is baseline since initial admission in [place]. Will admit to [facility] for higher level of care.       Hospital Course: --admitted to [facility] 7/18.   7/21: EVD at bedside by NSGY    Review of Symptoms:   Unable to fully assess due to mental status    Physical Exam:  GA: comfortable, no acute distress.   HEENT: No scleral icterus or JVD.   Pulmonary: Clear to auscultation A/L. No wheezing, crackles, or rhonchi.  Cardiac: RRR S1 & S2 w/o rubs/murmurs/gallops.   Abdominal: Bowel sounds present x 4. No appreciable hepatosplenomegaly.  Skin: No jaundice, rashes, or visible lesions.  Pulses: 2+ Dp bilat    Neuro:  --sedation: none  --GCS: [id number]  --Mental Status: intermittent commands, wont open eyes unless yelled at repeatedly then only briefly, encephalopathy    --CN II-XII grossly intact.   --Pupils 3-->2mm, PERRL.   --brainstem: intact  --Motor: withdrawal throughout, spontaneous movements when  observed  --sensory: intact to soft touch and pain throughout  --Reflexes: not tested  --Gait: deferred      Recent Labs  Lab 07/21/18  0249   WBC 7.18   RBC 3.65*   HGB 10.7*   HCT 32.5*      MCV 89   MCH 29.3   MCHC 32.9       Recent Labs  Lab 07/21/18  0249   CALCIUM 9.0      K 3.9   CO2 21*      BUN 22   CREATININE 0.8     No results for input(s): PT, INR, APTT in the last 24 hours.       Temp: 98.5 °F (36.9 °C)  Pulse: 67  Rhythm: normal sinus rhythm  BP: (!) 142/67  MAP (mmHg): 97  ICP Mean (mmHg): 4 mmHg  Resp: 15  SpO2: 100 %  O2 Device (Oxygen Therapy): room air    Temp  Min: 98.5 °F (36.9 °C)  Max: 99 °F (37.2 °C)  Pulse  Min: 58  Max: 77  BP  Min: 124/67  Max: 172/91  MAP (mmHg)  Min: 90  Max: 125  ICP Mean (mmHg)  Min: 4 mmHg  Max: 7 mmHg  Resp  Min: 12  Max: 24  SpO2  Min: 100 %  Max: 100 %    07/20 0701 - 07/21 0700  In: 2515 [I.V.:1800]  Out: 400 [Urine:400]   Unmeasured Output  Stool Occurrence: 0    Nutrition Prescription Ordered  Current Diet Order: NPO    Body mass index is 24.96 kg/m².      I have personally reviewed all labs, imaging, and studies today    Assessment/Plan:     Neuro   * Nontraumatic intraventricular intracerebral hemorrhage    See ICH        Encephalopathy acute    Due to ICH and hydrocephalus        Brain compression    From hydrocephalus from IVH  neurochecks        Obstructive hydrocephalus    EVD at bedside by NSGy this morning        ICH (intracerebral hemorrhage)    EVD this morning for worsening mental status  hydrocephalus  SBP control  PT OT SLP          Dementia without behavioral disturbance    --holding home aricept          Cardiac/Vascular   Cardiomyopathy    Monitor  Low normal EF        Persistent atrial fibrillation    -continue home BB and amio  -rate controlled  -hold coumadin due to ICH        Dyslipidemia    Statin          Essential hypertension    monitor        Endocrine   Type 2 diabetes mellitus with diabetic polyneuropathy, with  long-term current use of insulin    -increase detemir , adjusting SSI  Tube feeds  Add scheduled aspart        GI   Dysphagia    SLP  NGT            Prophylaxis:  Venous Thromboembolism: mechanical  Stress Ulcer: NA  Ventilator Pneumonia: not applicable     Activity Orders          Straight Cath starting at 07/18 0237        DNR    Farhan Yan MD  Neurocritical Care  [facility]    Level III

## 2018-07-21 NOTE — PROCEDURES
PREOPERATIVE DIAGNOSES: R thalamic ICH, hydrocephalus    POSTOPERATIVE DIAGNOSES: R thalamic ICH, hydrocephalus    PROCEDURE: Sharon hole and insertion of external ventricular drain catheter.    ANESTHESIA: Local    PROCEDURE:   Consent was obtained. Scalp was clipped. Patient was prepped with ChloraPrep and Duraprep. Kocher's point was identified on Right side. Incision was infiltrated with 1% Xylocaine with epinephrine [id number]. Patient received preoperative antibiotics. Patient was prepped and draped in typical sterile fashion.     1cm incision was made with scalpel. A self-retaining retractor was placed. Sharon hole was drilled with the cranial twist drill. The dura was punctured with a spinal needle. Ventricular catheter was then passed to a depth of 5cm with return of CSF and then soft passed to a final depth of 7cm.  The CSF had adequate flow. The distal portion of catheter was then tunneled through separate incision and secured with #3-0 silk suture and staples. The sharon hole incision was then closed with #3-0 monocryl. The patient tolerated the procedure well. No complications. Sponge and needle counts were correct. Blood loss is minimal. None replaced.

## 2018-07-21 NOTE — SUBJECTIVE & OBJECTIVE
Past Medical History:   Diagnosis Date    Aneurysm     Clotting disorder     blood clot takes coumadin    Diabetes mellitus     Diabetes mellitus type II     Hyperlipidemia     Hypertension     PAF (paroxysmal atrial fibrillation)     Stroke     (2) in the past    Urinary tract infection      History reviewed. No pertinent surgical history.  Family History   Problem Relation Age of Onset    Heart disease Mother     Diabetes Mother     Heart attack Mother     Hypertension Mother     Asthma Father     Vision loss Sister     Cancer Sister         breast    Cancer Brother         lung     Social History   Substance Use Topics    Smoking status: Never Smoker    Smokeless tobacco: Not on file    Alcohol use No     Review of patient's allergies indicates:  No Known Allergies    Medications: I have reviewed the current medication administration record.    Prescriptions Prior to Admission   Medication Sig Dispense Refill Last Dose    amiodarone (PACERONE) 200 MG Tab Take 2 pills twice daily for 2 weeks then take 1 pill daily thereafter 120 tablet 2     atorvastatin (LIPITOR) 80 MG tablet Take 0.5 tablets (40 mg total) by mouth once daily. 90 tablet 3     blood sugar diagnostic (ACCU-CHEK SMARTVIEW TEST STRIP) Strp 1 each by Misc.(Non-Drug; Combo Route) route 4 (four) times daily. 150 each 11     blood-glucose meter (ACCU-CHEK MALLORY) Misc 1 each by Misc.(Non-Drug; Combo Route) route once daily. 1 each 0 Taking    econazole nitrate 1 % cream Apply topically 2 (two) times daily. 85 g 2 Taking    insulin aspart (NOVOLOG) 100 unit/mL InPn pen Inject 6 Units into the skin 3 (three) times daily with meals. 6 Syringe 1     insulin degludec (TRESIBA FLEXTOUCH U-200) 200 unit/mL (3 mL) InPn Inject 36 Units into the skin every evening. 6 Syringe 1     lancets (ACCU-CHEK FASTCLIX) Misc 1 each by Misc.(Non-Drug; Combo Route) route 4 (four) times daily. 150 each 11     lancing device with lancets (ONETOUCH  "DELICA LANC DEVICE) Kit 1 each by Misc.(Non-Drug; Combo Route) route 2 (two) times daily. 1 each 1 Taking    losartan (COZAAR) 100 MG tablet Take 1 tablet (100 mg total) by mouth once daily. 90 tablet 3 Taking    metformin (GLUCOPHAGE) 1000 MG tablet Take 1 tablet (1,000 mg total) by mouth 2 (two) times daily with meals. 180 tablet 2     metoprolol succinate (TOPROL-XL) 50 MG 24 hr tablet Take 1 tablet (50 mg total) by mouth once daily. 90 tablet 3     pen needle, diabetic (BD ULTRA-FINE MALLORY PEN NEEDLES) 32 gauge x 5/32" Ndle Use with lantus pen 100 each 2     PREVNAR 13, PF, 0.5 mL Syrg ADM 0.5ML IM UTD  0 Taking    senna-docusate 8.6-50 mg (SENNA WITH DOCUSATE SODIUM) 8.6-50 mg per tablet Take 1 tablet by mouth once daily.   Taking    trazodone (DESYREL) 50 MG tablet 1 - 2 Tablet Oral At bedtime 90 tablet 3     [DISCONTINUED] warfarin (COUMADIN) 5 MG tablet As directed by coumadin clinic 100 tablet 11        Review of Systems   Unable to perform ROS: Patient nonverbal     Objective:     Vital Signs (Most Recent):  Temp: 98.5 °F (36.9 °C) (07/21/18 1105)  Pulse: 63 (07/21/18 1400)  Resp: 15 (07/21/18 1400)  BP: 124/67 (07/21/18 1400)  SpO2: 100 % (07/21/18 1400)    Vital Signs Range (Last 24H):  Temp:  [98.3 °F (36.8 °C)-99 °F (37.2 °C)]   Pulse:  [58-77]   Resp:  [12-27]   BP: (124-172)/(67-91)   SpO2:  [99 %-100 %]   Arterial Line BP: ()/(41-66)     Physical Exam    Neurological Exam:   LOC: drowsy  Attention Span: poor  Language: Mute  Articulation: Mute/Anarthric  Orientation: non verbal  Visual Fields: Full  EOM (CN III, IV, VI): Full/intact  Pupils (CN II, III): PERRL  Facial Sensation (CN V): Normal  Facial Movement (CN VII): Symmetric facial expression    Gag Reflex: present  Reflexes: 2+ throughout  Motor: Arm left  Paresis: 2/5  Leg left  Paresis: 2/5  Arm right  Paresis: 2/5  Leg right Paresis: 2/5  Cebellar: No evidence of appendicular or axial ataxia  Sensation: Intact to light touch, " temperature and vibration  Tone: Normal tone throughout      Laboratory:  CMP:   Recent Labs  Lab 07/21/18  0249   CALCIUM 9.0      K 3.9   CO2 21*      BUN 22   CREATININE 0.8     CBC:   Recent Labs  Lab 07/21/18  0249   WBC 7.18   RBC 3.65*   HGB 10.7*   HCT 32.5*      MCV 89   MCH 29.3   MCHC 32.9     Lipid Panel: No results for input(s): CHOL, LDLCALC, HDL, TRIG in the last 168 hours.  Coagulation:   Recent Labs  Lab 07/18/18  1010   INR 0.9   APTT 22.8     Hgb A1C:   Recent Labs  Lab 07/18/18  0212   HGBA1C 7.2*     TSH: No results for input(s): TSH in the last 168 hours.    Diagnostic Results:      Brain imaging:    CTH: 1. Redemonstration of evolving right thalamic hemorrhage with mild adjacent edema and slight localized mass effect.  Subtle, subcentimeter focus of hyperdensity within the left thalamus, concerning for additional focus of evolving hemorrhage.  2. Persistent interventricular extension of hemorrhage with unchanged dilatation of the ventricular system concerning for component of hydrocephalus.  3. Generalized cerebral volume loss, chronic microvascular ischemic change and remote infarcts as above.    Vessel Imaging:  none    Cardiac Evaluation:   ECHO- CONCLUSIONS     1 - Low normal left ventricular systolic function.     2 - Indeterminate LV diastolic function.     3 - Normal right ventricular systolic function .     4 - There is anterior pericardial fat pad..

## 2018-07-21 NOTE — ASSESSMENT & PLAN NOTE
Antithrombotics for secondary stroke prevention: Anticoagulants: holding a/c due to bleed    Statins for secondary stroke prevention and hyperlipidemia, if present:   Statins: Atorvastatin- 40 mg daily    Aggressive risk factor modification: HTN, DM, HLD, Diet, Exercise, A-Fib     Rehab efforts: Occupational Therapy, PT/OT/SLP to evaluate and treat, PM&R consult     Diagnostics ordered/pending: None     VTE prophylaxis: None: Reason for No Pharmacological VTE Prophylaxis: History of systemic or intracranial bleeding    BP parameters: ICH: SBP <140

## 2018-07-21 NOTE — SUBJECTIVE & OBJECTIVE
Interval History: Neuro stable, will place EVD today    Medications:  Continuous Infusions:   sodium chloride 0.9% 75 mL/hr at 07/21/18 1405     Scheduled Meds:   amiodarone  200 mg Per NG tube Daily    amLODIPine  10 mg Per NG tube Daily    atorvastatin  40 mg Per NG tube Daily    hydrALAZINE  25 mg Per NG tube Q8H    insulin aspart U-100  4 Units Subcutaneous Q4H    insulin detemir U-100  20 Units Subcutaneous QHS    losartan  100 mg Per NG tube Daily    metoprolol tartrate  25 mg Per NG tube BID    senna-docusate 8.6-50 mg  1 tablet Per NG tube Daily    sodium chloride 0.9%  1,000 mL Intravenous Once     PRN Meds:dextrose 50%, dextrose 50%, glucagon (human recombinant), insulin aspart U-100, magnesium oxide, magnesium oxide, phenylephrine HCl in 0.9% NaCl, potassium chloride 10%, potassium chloride 10%, potassium chloride 10%, potassium, sodium phosphates, potassium, sodium phosphates, potassium, sodium phosphates, sodium chloride 0.9%     Review of Systems    Objective:     Weight: 78.9 kg (173 lb 15.1 oz)  Body mass index is 24.96 kg/m².  Vital Signs (Most Recent):  Temp: 98.5 °F (36.9 °C) (07/21/18 1105)  Pulse: 63 (07/21/18 1400)  Resp: 15 (07/21/18 1400)  BP: 124/67 (07/21/18 1400)  SpO2: 100 % (07/21/18 1400) Vital Signs (24h Range):  Temp:  [98.5 °F (36.9 °C)-99 °F (37.2 °C)] 98.5 °F (36.9 °C)  Pulse:  [58-77] 63  Resp:  [12-24] 15  SpO2:  [100 %] 100 %  BP: (124-172)/(67-91) 124/67  Arterial Line BP: ()/(41-66) 136/64       Date 07/21/18 0700 - 07/22/18 0659   Shift 8005-5773 4955-9863 5617-7931 24 Hour Total   I  N  T  A  K  E   NG/   180    Shift Total  (mL/kg) 180  (2.3)   180  (2.3)   O  U  T  P  U  T   Urine  (mL/kg/hr) 230  (0.4)   230    Shift Total  (mL/kg) 230  (2.9)   230  (2.9)   Weight (kg) 78.9 78.9 78.9 78.9                        NG/OG Tube 07/16/18 1200 Left nostril (Active)   Placement Check placement verified by x-ray 7/20/2018  7:05 AM   Distal Tube Length (cm) 70  7/20/2018  7:05 AM   Tolerance no signs/symptoms of discomfort 7/20/2018  7:05 AM   Securement anchored to nostril center w/ adhesive device 7/20/2018  7:05 AM   Clamp Status/Tolerance clamped;no abdominal distention;no emesis;no nausea;no abdominal discomfort;no residual;no restlessness 7/20/2018  7:05 AM   Insertion Site Appearance no redness, warmth, tenderness, skin breakdown, drainage 7/20/2018  7:05 AM   Flush/Irrigation flushed w/;water;no resistance met 7/20/2018  7:05 AM   Feeding Action feeding held 7/19/2018  3:02 PM   Intake (mL) 80 mL 7/20/2018  9:05 AM   Residual Amount (ml) 0 ml 7/19/2018  7:15 PM       Male External Urinary Catheter 07/18/18 1800 Medium (Active)   Collection Container Urimeter 7/20/2018  7:05 AM   Securement Method secured to top of thigh w/ adhesive device 7/20/2018  7:05 AM   Skin no redness;no breakdown 7/20/2018  7:05 AM   Tolerance no signs/symptoms of discomfort 7/20/2018  7:05 AM   Output (mL) 225 mL 7/20/2018  9:05 AM       Neurosurgery Physical Exam    E3V3M5  AOx1, repeats name  R gaze  Does not blink to threat in L eye  Purposeful movements in BUE  Moving RLE spontaneously  Withdraw LLE    Significant Labs:    Recent Labs  Lab 07/20/18  0208 07/21/18  0249   * 252*    143   K 3.9 3.9    110   CO2 21* 21*   BUN 23 22   CREATININE 0.9 0.8   CALCIUM 9.1 9.0   MG 2.1 1.6       Recent Labs  Lab 07/20/18  0208 07/21/18  0249   WBC 9.09 7.18   HGB 11.8* 10.7*   HCT 35.7* 32.5*    261     No results for input(s): LABPT, INR, APTT in the last 48 hours.  Microbiology Results (last 7 days)     ** No results found for the last 168 hours. **        Recent Lab Results       07/21/18  1451 07/21/18  0900 07/21/18  0249 07/21/18  0246 07/20/18  2116      Immature Granulocytes   0.4       Immature Grans (Abs)   0.03  Comment:  Mild elevation in immature granulocytes is non specific and   can be seen in a variety of conditions including stress response,   acute  inflammation, trauma and pregnancy. Correlation with other   laboratory and clinical findings is essential.         Anion Gap   12       Baso #   0.02       Basophil%   0.3       BUN, Bld   22       Calcium   9.0       Chloride   110       CO2   21(L)       Creatinine   0.8       Differential Method   Automated       eGFR if    >60.0       eGFR if non    >60.0  Comment:  Calculation used to obtain the estimated glomerular filtration  rate (eGFR) is the CKD-EPI equation.          Eos #   0.1       Eosinophil%   1.3       Glucose   252(H)       Gran # (ANC)   5.4       Gran%   74.6(H)       Hematocrit   32.5(L)       Hemoglobin   10.7(L)       Lymph #   1.1       Lymph%   14.8(L)       Magnesium   1.6       MCH   29.3       MCHC   32.9       MCV   89       Mono #   0.6       Mono%   8.6       MPV   11.1       nRBC   0       Phosphorus   3.2       Platelets   261       POCT Glucose 188(H) 261(H)  261(H) 230(H)     Potassium   3.9       RBC   3.65(L)       RDW   12.9       Sodium   143       WBC   7.18                   07/20/18  1655      Immature Granulocytes      Immature Grans (Abs)      Anion Gap      Baso #      Basophil%      BUN, Bld      Calcium      Chloride      CO2      Creatinine      Differential Method      eGFR if       eGFR if non       Eos #      Eosinophil%      Glucose      Gran # (ANC)      Gran%      Hematocrit      Hemoglobin      Lymph #      Lymph%      Magnesium      MCH      MCHC      MCV      Mono #      Mono%      MPV      nRBC      Phosphorus      Platelets      POCT Glucose 233(H)     Potassium      RBC      RDW      Sodium      WBC            Significant Diagnostics:  I have reviewed all pertinent imaging results/findings within the past 24 hours.

## 2018-07-21 NOTE — PROGRESS NOTES
"[facility]  Neurosurgery  Progress Note    Subjective:     History of Present Illness: 67 yo M with PMHx of HTN, HLD, DM, dementia, A. Fib, on coumadin, prior cerebral aneurysm treated 20 years ago at [facility]. Pt who presented [facility] in [place], [place] on 7/9/18 to with sudden-onset headache, right eye deviation, and L sided weakness. Head CT at that time revealed right thalamic hemorrhage.  Pt was treated conservatively without neurosurgical intervention.  Per wife, she was unhappy with the care in [place].  States patient has been very lethargic since he was initially admitted, without improvement,  and felt as though the "nothing was being done for him" She requested that patient be transferred to [facility] for further evaluation/treatment.  Pt was accepted to [facility] by Hospital medicine service, and currently admitted to the floor.  Pt has been lethargic and and unable to FC's since admission.    68 M with R thalamic ICH/IVH (ICH score 2) initially found on 7/9/18 admitted to OSH in [place] w/o intervention.     7/18: Pt has NG tube in place, as unable to swallow.  Pt is DNR.  Prior to his recent hospital admission, wife states patient was functioning well, ambulating normally, no previous weakness/speech difficulty.  He takes aspirin and coumadin at home (on hold now).  HCT was done this morning which shows acute blood with ventriculomegaly.          Post-Op Info:  * No surgery found *         Interval History: Neuro stable, will place EVD today    Medications:  Continuous Infusions:   sodium chloride 0.9% 75 mL/hr at 07/21/18 1405     Scheduled Meds:   amiodarone  200 mg Per NG tube Daily    amLODIPine  10 mg Per NG tube Daily    atorvastatin  40 mg Per NG tube Daily    hydrALAZINE  25 mg Per NG tube Q8H    insulin aspart U-100  4 Units Subcutaneous Q4H    insulin detemir U-100  20 Units Subcutaneous QHS    losartan  100 mg Per NG tube Daily    metoprolol tartrate  25 mg Per NG tube BID    " senna-docusate 8.6-50 mg  1 tablet Per NG tube Daily    sodium chloride 0.9%  1,000 mL Intravenous Once     PRN Meds:dextrose 50%, dextrose 50%, glucagon (human recombinant), insulin aspart U-100, magnesium oxide, magnesium oxide, phenylephrine HCl in 0.9% NaCl, potassium chloride 10%, potassium chloride 10%, potassium chloride 10%, potassium, sodium phosphates, potassium, sodium phosphates, potassium, sodium phosphates, sodium chloride 0.9%     Review of Systems    Objective:     Weight: 78.9 kg (173 lb 15.1 oz)  Body mass index is 24.96 kg/m².  Vital Signs (Most Recent):  Temp: 98.5 °F (36.9 °C) (07/21/18 1105)  Pulse: 63 (07/21/18 1400)  Resp: 15 (07/21/18 1400)  BP: 124/67 (07/21/18 1400)  SpO2: 100 % (07/21/18 1400) Vital Signs (24h Range):  Temp:  [98.5 °F (36.9 °C)-99 °F (37.2 °C)] 98.5 °F (36.9 °C)  Pulse:  [58-77] 63  Resp:  [12-24] 15  SpO2:  [100 %] 100 %  BP: (124-172)/(67-91) 124/67  Arterial Line BP: ()/(41-66) 136/64       Date 07/21/18 0700 - 07/22/18 0659   Shift 6690-4785 2595-9984 1487-1616 24 Hour Total   I  N  T  A  K  E   NG/   180    Shift Total  (mL/kg) 180  (2.3)   180  (2.3)   O  U  T  P  U  T   Urine  (mL/kg/hr) 230  (0.4)   230    Shift Total  (mL/kg) 230  (2.9)   230  (2.9)   Weight (kg) 78.9 78.9 78.9 78.9                        NG/OG Tube 07/16/18 1200 Left nostril (Active)   Placement Check placement verified by x-ray 7/20/2018  7:05 AM   Distal Tube Length (cm) 70 7/20/2018  7:05 AM   Tolerance no signs/symptoms of discomfort 7/20/2018  7:05 AM   Securement anchored to nostril center w/ adhesive device 7/20/2018  7:05 AM   Clamp Status/Tolerance clamped;no abdominal distention;no emesis;no nausea;no abdominal discomfort;no residual;no restlessness 7/20/2018  7:05 AM   Insertion Site Appearance no redness, warmth, tenderness, skin breakdown, drainage 7/20/2018  7:05 AM   Flush/Irrigation flushed w/;water;no resistance met 7/20/2018  7:05 AM   Feeding Action feeding held  7/19/2018  3:02 PM   Intake (mL) 80 mL 7/20/2018  9:05 AM   Residual Amount (ml) 0 ml 7/19/2018  7:15 PM       Male External Urinary Catheter 07/18/18 1800 Medium (Active)   Collection Container Urimeter 7/20/2018  7:05 AM   Securement Method secured to top of thigh w/ adhesive device 7/20/2018  7:05 AM   Skin no redness;no breakdown 7/20/2018  7:05 AM   Tolerance no signs/symptoms of discomfort 7/20/2018  7:05 AM   Output (mL) 225 mL 7/20/2018  9:05 AM       Neurosurgery Physical Exam    E3V3M5  AOx1, repeats name  R gaze  Does not blink to threat in L eye  Purposeful movements in BUE  Moving RLE spontaneously  Withdraw LLE    Significant Labs:    Recent Labs  Lab 07/20/18  0208 07/21/18  0249   * 252*    143   K 3.9 3.9    110   CO2 21* 21*   BUN 23 22   CREATININE 0.9 0.8   CALCIUM 9.1 9.0   MG 2.1 1.6       Recent Labs  Lab 07/20/18  0208 07/21/18  0249   WBC 9.09 7.18   HGB 11.8* 10.7*   HCT 35.7* 32.5*    261     No results for input(s): LABPT, INR, APTT in the last 48 hours.  Microbiology Results (last 7 days)     ** No results found for the last 168 hours. **        Recent Lab Results       07/21/18  1451 07/21/18  0900 07/21/18  0249 07/21/18  0246 07/20/18  2116      Immature Granulocytes   0.4       Immature Grans (Abs)   0.03  Comment:  Mild elevation in immature granulocytes is non specific and   can be seen in a variety of conditions including stress response,   acute inflammation, trauma and pregnancy. Correlation with other   laboratory and clinical findings is essential.         Anion Gap   12       Baso #   0.02       Basophil%   0.3       BUN, Bld   22       Calcium   9.0       Chloride   110       CO2   21(L)       Creatinine   0.8       Differential Method   Automated       eGFR if    >60.0       eGFR if non    >60.0  Comment:  Calculation used to obtain the estimated glomerular filtration  rate (eGFR) is the CKD-EPI equation.           Eos #   0.1       Eosinophil%   1.3       Glucose   252(H)       Gran # (ANC)   5.4       Gran%   74.6(H)       Hematocrit   32.5(L)       Hemoglobin   10.7(L)       Lymph #   1.1       Lymph%   14.8(L)       Magnesium   1.6       MCH   29.3       MCHC   32.9       MCV   89       Mono #   0.6       Mono%   8.6       MPV   11.1       nRBC   0       Phosphorus   3.2       Platelets   261       POCT Glucose 188(H) 261(H)  261(H) 230(H)     Potassium   3.9       RBC   3.65(L)       RDW   12.9       Sodium   143       WBC   7.18                   07/20/18  1655      Immature Granulocytes      Immature Grans (Abs)      Anion Gap      Baso #      Basophil%      BUN, Bld      Calcium      Chloride      CO2      Creatinine      Differential Method      eGFR if       eGFR if non       Eos #      Eosinophil%      Glucose      Gran # (ANC)      Gran%      Hematocrit      Hemoglobin      Lymph #      Lymph%      Magnesium      MCH      MCHC      MCV      Mono #      Mono%      MPV      nRBC      Phosphorus      Platelets      POCT Glucose 233(H)     Potassium      RBC      RDW      Sodium      WBC            Significant Diagnostics:  I have reviewed all pertinent imaging results/findings within the past 24 hours.    Assessment/Plan:     * Nontraumatic intraventricular intracerebral hemorrhage    67 yo male with right thalamic hemorrhage (ICH score 2) initially found on 7/9/18 at outside hospital in [place]  HCT shows acute right thalamic hemorrhage with intraventricular extension.  Ventricular system is enlarged on 7/18 scan, HCT on 11/13/17 shows baseline ventricular enlargement.  There are no films available from recent admission in [place] for comparison     - EVD to be placed today  - CT in morning  - q1h neuro checks  - EEG without seizures  - Continue ICU care  - Patient is DNR.   - Will follow, please call with questions or any change in neurologic status            Stevan Joseph,  MD  Neurosurgery  [facility]

## 2018-07-21 NOTE — ASSESSMENT & PLAN NOTE
69 yo male with right thalamic hemorrhage (ICH score 2) initially found on 7/9/18 at outside hospital in [place]  HCT shows acute right thalamic hemorrhage with intraventricular extension.  Ventricular system is enlarged on 7/18 scan, HCT on 11/13/17 shows baseline ventricular enlargement.  There are no films available from recent admission in [place] for comparison     - EVD to be placed today  - CT in morning  - q1h neuro checks  - EEG without seizures  - Continue ICU care  - Patient is DNR.   - Will follow, please call with questions or any change in neurologic status

## 2018-07-21 NOTE — PLAN OF CARE
Problem: Patient Care Overview  Goal: Plan of Care Review  Outcome: Ongoing (interventions implemented as appropriate)  POC reviewed with pt and family at 1400. Pt wife verbalized understanding. Patient unable to verbalize understanding secondary to altered mental status. Questions and concerns addressed. No acute events today. EVD placed at bedside by neurosurgery. Pt progressing toward goals. Will continue to monitor. See flowsheets for full assessment and VS info.

## 2018-07-21 NOTE — NURSING
Opal AGUILAR with PETER at bedside to pale EVD drain. 2mg of versed and 100 mcg of fentanyl administer per MD order.

## 2018-07-21 NOTE — HOSPITAL COURSE
Patient admitted for hypertensive R thalamic intracerebral hemorrhage with interventricular extension.  Neurosurgery consulted and placed EVD.  Hydrocephalus improving and EVD removed.  He will be on norvasc 10mg qd, hydralazine 100mg TID, losartan 100mg qd for hypertension.  Hemorrhage stable and restarted on coumadin, amiodarone and metoprolol for atrial fibrillation/flutter.  For ischemic stroke prevention he will continue home medication atorvastatin 40mg qhs.  PT, OT, ST evaluated patient and recommended skilled nursing facility and a pureed diet with thin liquids.  Patient should follow up in stroke clinic with 4-6 weeks.     Hospital course complicated by aspiration pneumonia.  Started on unasyn and switched to avelox at discharge.  He experienced urinary retention, land catheter was placed and started on flomax.  He will follow up outpatient urology in 2 weeks. Started modafinil for lethargy.

## 2018-07-21 NOTE — PLAN OF CARE
Problem: Patient Care Overview  Goal: Plan of Care Review  Outcome: Ongoing (interventions implemented as appropriate)   07/20/18 1630   Coping/Psychosocial   Plan Of Care Reviewed With patient;daughter;spouse             POC reviewed with pt and pts wife. Pt globally aphasic unable to state understanding, wife states understanding.   Pt rested well this evening.   Afebrile.   No acute neuro changes overnight.   See flowsheets for full assessment and VS info.

## 2018-07-21 NOTE — CONSULTS
[facility]  Vascular Neurology  Comprehensive Stroke Center  Consult Note    Inpatient consult to Vascular (Stroke) Neurology  Consult performed by: JANNA MURRY  Consult ordered by: SCOT CHIN        Assessment/Plan:     Patient is a 68 y.o. year old male with L thalamic bleed with IVH s/p EVD placement on 7/21/18. Monitoring for clinical improvement. Holding a/c (AF).     ICH (intracerebral hemorrhage)      Antithrombotics for secondary stroke prevention: Anticoagulants: holding a/c due to bleed    Statins for secondary stroke prevention and hyperlipidemia, if present:   Statins: Atorvastatin- 40 mg daily    Aggressive risk factor modification: HTN, DM, HLD, Diet, Exercise, A-Fib     Rehab efforts: Occupational Therapy, PT/OT/SLP to evaluate and treat, PM&R consult     Diagnostics ordered/pending: None     VTE prophylaxis: None: Reason for No Pharmacological VTE Prophylaxis: History of systemic or intracranial bleeding    BP parameters: ICH: SBP <140            Persistent atrial fibrillation    Hold anticoagulation due to bleed  Continue rate control and amio        Essential hypertension    Continue antihypertensives as per primary team            STROKE DOCUMENTATION          NIH Scale:  1a. Level Of Consciousness: 2-->Not alert: requires repeated stimulation to attend, or is obtunded and requires strong or painful stimulation to make movements (not stereotyped)  1b. LOC Questions: 2-->Answers neither question correctly  1c. LOC Commands: 2-->Performs neither task correctly  2. Best Gaze: 1-->Partial gaze palsy: gaze is abnormal in one or both eyes, but forced deviation or total gaze paresis is not present  3. Visual: 0-->No visual loss  4. Facial Palsy: 0-->Normal symmetrical movements  5a. Motor Arm, Left: 3-->No effort against gravity: limb falls  5b. Motor Arm, Right: 3-->No effort against gravity: limb falls  6a. Motor Leg, Left: 3-->No effort against gravity: leg falls to  bed immediately  6b. Motor Leg, Right: 3-->No effort against gravity: leg falls to bed immediately  7. Limb Ataxia: 0-->Absent  8. Sensory: 0-->Normal: no sensory loss  9. Best Language: 3-->Mute, global aphasia: no usable speech or auditory comprehension  10. Dysarthria: 2-->Severe dysarthria: patients speech is so slurred as to be unintelligible in the absence of or out of proportion to any dysphasia, or is mute/anarthric  11. Extinction and Inattention (formerly Neglect): 0-->No abnormality  Total (NIH Stroke Scale): 24    Modified Tanisha    Lucian Coma Scale:10   ABCD2 Score:    [id number] Score:6  HAS -BLED Score:2  ICH Score:3  Hunt & Fox Classification:       Thrombolysis Candidate? No, CT findings (ICH, SAH, etc)       Interventional Revascularization Candidate?   Is the patient eligible for mechanical endovascular reperfusion (SAVANAH)?  No; No large vessel occlusion      Hemorrhagic change of an Ischemic Stroke: Does this patient have an ischemic stroke with hemorrhagic changes? No     Subjective:     History of Present Illness:  69 yo M with PMHx of HTN, HLD, DM, dementia, A. Fib, on coumadin as outpatient who was transferred to [facility] from [facility] where he was reportedly admitted for R thalamic IPH w IVH on 7/9/2018. Per OSH records, patient admitted to ICU and sent to floor with little follow up imaging and no interval improvement in exam. Wife requested second opinion thus was transferred to [facility]. Admitted to [facility] for higher level of care on 7/18. PAtient with slight clinical improvement as he is opening his eyes at times. Wife states that he will speak a few words to her here and there this morning. Patient non verbal with me and not following commands.         Past Medical History:   Diagnosis Date    Aneurysm     Clotting disorder     blood clot takes coumadin    Diabetes mellitus     Diabetes mellitus type II     Hyperlipidemia     Hypertension     PAF (paroxysmal atrial  fibrillation)     Stroke     (2) in the past    Urinary tract infection      History reviewed. No pertinent surgical history.  Family History   Problem Relation Age of Onset    Heart disease Mother     Diabetes Mother     Heart attack Mother     Hypertension Mother     Asthma Father     Vision loss Sister     Cancer Sister         breast    Cancer Brother         lung     Social History   Substance Use Topics    Smoking status: Never Smoker    Smokeless tobacco: Not on file    Alcohol use No     Review of patient's allergies indicates:  No Known Allergies    Medications: I have reviewed the current medication administration record.    Prescriptions Prior to Admission   Medication Sig Dispense Refill Last Dose    amiodarone (PACERONE) 200 MG Tab Take 2 pills twice daily for 2 weeks then take 1 pill daily thereafter 120 tablet 2     atorvastatin (LIPITOR) 80 MG tablet Take 0.5 tablets (40 mg total) by mouth once daily. 90 tablet 3     blood sugar diagnostic (ACCU-CHEK SMARTVIEW TEST STRIP) Strp 1 each by Misc.(Non-Drug; Combo Route) route 4 (four) times daily. 150 each 11     blood-glucose meter (ACCU-CHEK MALLORY) Misc 1 each by Misc.(Non-Drug; Combo Route) route once daily. 1 each 0 Taking    econazole nitrate 1 % cream Apply topically 2 (two) times daily. 85 g 2 Taking    insulin aspart (NOVOLOG) 100 unit/mL InPn pen Inject 6 Units into the skin 3 (three) times daily with meals. 6 Syringe 1     insulin degludec (TRESIBA FLEXTOUCH U-200) 200 unit/mL (3 mL) InPn Inject 36 Units into the skin every evening. 6 Syringe 1     lancets (ACCU-CHEK FASTCLIX) Misc 1 each by Misc.(Non-Drug; Combo Route) route 4 (four) times daily. 150 each 11     lancing device with lancets (ONETOUCH DELICA LANC DEVICE) Kit 1 each by Misc.(Non-Drug; Combo Route) route 2 (two) times daily. 1 each 1 Taking    losartan (COZAAR) 100 MG tablet Take 1 tablet (100 mg total) by mouth once daily. 90 tablet 3 Taking    metformin  "(GLUCOPHAGE) 1000 MG tablet Take 1 tablet (1,000 mg total) by mouth 2 (two) times daily with meals. 180 tablet 2     metoprolol succinate (TOPROL-XL) 50 MG 24 hr tablet Take 1 tablet (50 mg total) by mouth once daily. 90 tablet 3     pen needle, diabetic (BD ULTRA-FINE MALLORY PEN NEEDLES) 32 gauge x 5/32" Ndle Use with lantus pen 100 each 2     PREVNAR 13, PF, 0.5 mL Syrg ADM 0.5ML IM UTD  0 Taking    senna-docusate 8.6-50 mg (SENNA WITH DOCUSATE SODIUM) 8.6-50 mg per tablet Take 1 tablet by mouth once daily.   Taking    trazodone (DESYREL) 50 MG tablet 1 - 2 Tablet Oral At bedtime 90 tablet 3     [DISCONTINUED] warfarin (COUMADIN) 5 MG tablet As directed by coumadin clinic 100 tablet 11        Review of Systems   Unable to perform ROS: Patient nonverbal     Objective:     Vital Signs (Most Recent):  Temp: 98.5 °F (36.9 °C) (07/21/18 1105)  Pulse: 63 (07/21/18 1400)  Resp: 15 (07/21/18 1400)  BP: 124/67 (07/21/18 1400)  SpO2: 100 % (07/21/18 1400)    Vital Signs Range (Last 24H):  Temp:  [98.3 °F (36.8 °C)-99 °F (37.2 °C)]   Pulse:  [58-77]   Resp:  [12-27]   BP: (124-172)/(67-91)   SpO2:  [99 %-100 %]   Arterial Line BP: ()/(41-66)     Physical Exam    Neurological Exam:   LOC: drowsy  Attention Span: poor  Language: Mute  Articulation: Mute/Anarthric  Orientation: non verbal  Visual Fields: Full  EOM (CN III, IV, VI): Full/intact  Pupils (CN II, III): PERRL  Facial Sensation (CN V): Normal  Facial Movement (CN VII): Symmetric facial expression    Gag Reflex: present  Reflexes: 2+ throughout  Motor: Arm left  Paresis: 2/5  Leg left  Paresis: 2/5  Arm right  Paresis: 2/5  Leg right Paresis: 2/5  Cebellar: No evidence of appendicular or axial ataxia  Sensation: Intact to light touch, temperature and vibration  Tone: Normal tone throughout      Laboratory:  CMP:   Recent Labs  Lab 07/21/18  0249   CALCIUM 9.0      K 3.9   CO2 21*      BUN 22   CREATININE 0.8     CBC:   Recent Labs  Lab " 07/21/18  0249   WBC 7.18   RBC 3.65*   HGB 10.7*   HCT 32.5*      MCV 89   MCH 29.3   MCHC 32.9     Lipid Panel: No results for input(s): CHOL, LDLCALC, HDL, TRIG in the last 168 hours.  Coagulation:   Recent Labs  Lab 07/18/18  1010   INR 0.9   APTT 22.8     Hgb A1C:   Recent Labs  Lab 07/18/18  0212   HGBA1C 7.2*     TSH: No results for input(s): TSH in the last 168 hours.    Diagnostic Results:      Brain imaging:    CTH: 1. Redemonstration of evolving right thalamic hemorrhage with mild adjacent edema and slight localized mass effect.  Subtle, subcentimeter focus of hyperdensity within the left thalamus, concerning for additional focus of evolving hemorrhage.  2. Persistent interventricular extension of hemorrhage with unchanged dilatation of the ventricular system concerning for component of hydrocephalus.  3. Generalized cerebral volume loss, chronic microvascular ischemic change and remote infarcts as above.    Vessel Imaging:  none    Cardiac Evaluation:   ECHO- CONCLUSIONS     1 - Low normal left ventricular systolic function.     2 - Indeterminate LV diastolic function.     3 - Normal right ventricular systolic function .     4 - There is anterior pericardial fat pad..       Glo Hair MD  Comprehensive Stroke Center  Department of Vascular Neurology   [facility]

## 2018-07-21 NOTE — HPI
69 yo M with PMHx of HTN, HLD, DM, dementia, A. Fib, on coumadin as outpatient who was transferred to [facility] from [facility] where he was reportedly admitted for R thalamic IPH w IVH on 7/9/2018. Per OSH records, patient admitted to ICU and sent to floor with little follow up imaging and no interval improvement in exam. Wife requested second opinion thus was transferred to [facility]. Admitted to [facility] for higher level of care on 7/18. PAtient with slight clinical improvement as he is opening his eyes at times. Wife states that he will speak a few words to her here and there this morning. Patient non verbal with me and not following commands.

## 2018-07-22 LAB
ANION GAP SERPL CALC-SCNC: 8 MMOL/L
BASOPHILS # BLD AUTO: 0.04 K/UL
BASOPHILS NFR BLD: 0.5 %
BUN SERPL-MCNC: 17 MG/DL
CALCIUM SERPL-MCNC: 8.6 MG/DL
CHLORIDE SERPL-SCNC: 110 MMOL/L
CO2 SERPL-SCNC: 24 MMOL/L
CREAT SERPL-MCNC: 0.8 MG/DL
DIFFERENTIAL METHOD: ABNORMAL
EOSINOPHIL # BLD AUTO: 0.1 K/UL
EOSINOPHIL NFR BLD: 1.9 %
ERYTHROCYTE [DISTWIDTH] IN BLOOD BY AUTOMATED COUNT: 12.5 %
EST. GFR  (AFRICAN AMERICAN): >60 ML/MIN/1.73 M^2
EST. GFR  (NON AFRICAN AMERICAN): >60 ML/MIN/1.73 M^2
GLUCOSE SERPL-MCNC: 235 MG/DL
HCT VFR BLD AUTO: 32.1 %
HGB BLD-MCNC: 10.6 G/DL
IMM GRANULOCYTES # BLD AUTO: 0.04 K/UL
IMM GRANULOCYTES NFR BLD AUTO: 0.5 %
LYMPHOCYTES # BLD AUTO: 0.9 K/UL
LYMPHOCYTES NFR BLD: 11.7 %
MAGNESIUM SERPL-MCNC: 1.8 MG/DL
MAGNESIUM SERPL-MCNC: 1.8 MG/DL
MCH RBC QN AUTO: 29 PG
MCHC RBC AUTO-ENTMCNC: 33 G/DL
MCV RBC AUTO: 88 FL
MONOCYTES # BLD AUTO: 0.5 K/UL
MONOCYTES NFR BLD: 6.8 %
NEUTROPHILS # BLD AUTO: 5.8 K/UL
NEUTROPHILS NFR BLD: 78.6 %
NRBC BLD-RTO: 0 /100 WBC
PHOSPHATE SERPL-MCNC: 2.3 MG/DL
PHOSPHATE SERPL-MCNC: 2.5 MG/DL
PLATELET # BLD AUTO: 262 K/UL
PMV BLD AUTO: 11.7 FL
POCT GLUCOSE: 203 MG/DL (ref 70–110)
POCT GLUCOSE: 218 MG/DL (ref 70–110)
POCT GLUCOSE: 222 MG/DL (ref 70–110)
POCT GLUCOSE: 227 MG/DL (ref 70–110)
POCT GLUCOSE: 227 MG/DL (ref 70–110)
POCT GLUCOSE: 243 MG/DL (ref 70–110)
POTASSIUM SERPL-SCNC: 3.7 MMOL/L
POTASSIUM SERPL-SCNC: 4 MMOL/L
RBC # BLD AUTO: 3.65 M/UL
SODIUM SERPL-SCNC: 142 MMOL/L
WBC # BLD AUTO: 7.38 K/UL

## 2018-07-22 PROCEDURE — 84100 ASSAY OF PHOSPHORUS: CPT | Mod: 91

## 2018-07-22 PROCEDURE — 83735 ASSAY OF MAGNESIUM: CPT | Mod: 91

## 2018-07-22 PROCEDURE — 25000003 PHARM REV CODE 250: Performed by: PSYCHIATRY & NEUROLOGY

## 2018-07-22 PROCEDURE — 80048 BASIC METABOLIC PNL TOTAL CA: CPT

## 2018-07-22 PROCEDURE — 99232 SBSQ HOSP IP/OBS MODERATE 35: CPT | Mod: ,,, | Performed by: NEUROLOGICAL SURGERY

## 2018-07-22 PROCEDURE — 20000000 HC ICU ROOM

## 2018-07-22 PROCEDURE — 25000003 PHARM REV CODE 250: Performed by: STUDENT IN AN ORGANIZED HEALTH CARE EDUCATION/TRAINING PROGRAM

## 2018-07-22 PROCEDURE — 85025 COMPLETE CBC W/AUTO DIFF WBC: CPT

## 2018-07-22 PROCEDURE — 63600175 PHARM REV CODE 636 W HCPCS: Performed by: PSYCHIATRY & NEUROLOGY

## 2018-07-22 PROCEDURE — 84132 ASSAY OF SERUM POTASSIUM: CPT

## 2018-07-22 PROCEDURE — 99233 SBSQ HOSP IP/OBS HIGH 50: CPT | Mod: ,,, | Performed by: PSYCHIATRY & NEUROLOGY

## 2018-07-22 PROCEDURE — 84100 ASSAY OF PHOSPHORUS: CPT

## 2018-07-22 PROCEDURE — 25000003 PHARM REV CODE 250: Performed by: NURSE PRACTITIONER

## 2018-07-22 PROCEDURE — 83735 ASSAY OF MAGNESIUM: CPT

## 2018-07-22 PROCEDURE — 63600175 PHARM REV CODE 636 W HCPCS: Performed by: NURSE PRACTITIONER

## 2018-07-22 RX ORDER — HEPARIN SODIUM 5000 [USP'U]/ML
5000 INJECTION, SOLUTION INTRAVENOUS; SUBCUTANEOUS EVERY 8 HOURS
Status: DISCONTINUED | OUTPATIENT
Start: 2018-07-22 | End: 2018-08-09

## 2018-07-22 RX ADMIN — HEPARIN SODIUM 5000 UNITS: 5000 INJECTION, SOLUTION INTRAVENOUS; SUBCUTANEOUS at 10:07

## 2018-07-22 RX ADMIN — HEPARIN SODIUM 5000 UNITS: 5000 INJECTION, SOLUTION INTRAVENOUS; SUBCUTANEOUS at 09:07

## 2018-07-22 RX ADMIN — INSULIN ASPART 4 UNITS: 100 INJECTION, SOLUTION INTRAVENOUS; SUBCUTANEOUS at 06:07

## 2018-07-22 RX ADMIN — METOPROLOL TARTRATE 25 MG: 25 TABLET, FILM COATED ORAL at 08:07

## 2018-07-22 RX ADMIN — POTASSIUM & SODIUM PHOSPHATES POWDER PACK 280-160-250 MG 2 PACKET: 280-160-250 PACK at 09:07

## 2018-07-22 RX ADMIN — INSULIN ASPART 2 UNITS: 100 INJECTION, SOLUTION INTRAVENOUS; SUBCUTANEOUS at 01:07

## 2018-07-22 RX ADMIN — INSULIN ASPART 4 UNITS: 100 INJECTION, SOLUTION INTRAVENOUS; SUBCUTANEOUS at 03:07

## 2018-07-22 RX ADMIN — LOSARTAN POTASSIUM 100 MG: 50 TABLET, FILM COATED ORAL at 08:07

## 2018-07-22 RX ADMIN — INSULIN ASPART 4 UNITS: 100 INJECTION, SOLUTION INTRAVENOUS; SUBCUTANEOUS at 09:07

## 2018-07-22 RX ADMIN — HYDRALAZINE HYDROCHLORIDE 25 MG: 25 TABLET ORAL at 02:07

## 2018-07-22 RX ADMIN — INSULIN ASPART 4 UNITS: 100 INJECTION, SOLUTION INTRAVENOUS; SUBCUTANEOUS at 05:07

## 2018-07-22 RX ADMIN — INSULIN ASPART 2 UNITS: 100 INJECTION, SOLUTION INTRAVENOUS; SUBCUTANEOUS at 09:07

## 2018-07-22 RX ADMIN — INSULIN ASPART 4 UNITS: 100 INJECTION, SOLUTION INTRAVENOUS; SUBCUTANEOUS at 10:07

## 2018-07-22 RX ADMIN — LABETALOL HYDROCHLORIDE 10 MG: 5 INJECTION, SOLUTION INTRAVENOUS at 12:07

## 2018-07-22 RX ADMIN — AMLODIPINE BESYLATE 10 MG: 10 TABLET ORAL at 08:07

## 2018-07-22 RX ADMIN — AMIODARONE HYDROCHLORIDE 200 MG: 200 TABLET ORAL at 08:07

## 2018-07-22 RX ADMIN — MAGNESIUM OXIDE TAB 400 MG (241.3 MG ELEMENTAL MG) 800 MG: 400 (241.3 MG) TAB at 06:07

## 2018-07-22 RX ADMIN — INSULIN DETEMIR 5 UNITS: 100 INJECTION, SOLUTION SUBCUTANEOUS at 10:07

## 2018-07-22 RX ADMIN — METOPROLOL TARTRATE 25 MG: 25 TABLET, FILM COATED ORAL at 09:07

## 2018-07-22 RX ADMIN — LABETALOL HYDROCHLORIDE 10 MG: 5 INJECTION, SOLUTION INTRAVENOUS at 07:07

## 2018-07-22 RX ADMIN — INSULIN ASPART 4 UNITS: 100 INJECTION, SOLUTION INTRAVENOUS; SUBCUTANEOUS at 02:07

## 2018-07-22 RX ADMIN — LABETALOL HYDROCHLORIDE 10 MG: 5 INJECTION, SOLUTION INTRAVENOUS at 03:07

## 2018-07-22 RX ADMIN — HYDRALAZINE HYDROCHLORIDE 25 MG: 25 TABLET ORAL at 09:07

## 2018-07-22 RX ADMIN — POTASSIUM & SODIUM PHOSPHATES POWDER PACK 280-160-250 MG 2 PACKET: 280-160-250 PACK at 06:07

## 2018-07-22 RX ADMIN — LABETALOL HYDROCHLORIDE 10 MG: 5 INJECTION, SOLUTION INTRAVENOUS at 06:07

## 2018-07-22 RX ADMIN — SENNOSIDES AND DOCUSATE SODIUM 1 TABLET: 8.6; 5 TABLET ORAL at 08:07

## 2018-07-22 RX ADMIN — HYDRALAZINE HYDROCHLORIDE 25 MG: 25 TABLET ORAL at 06:07

## 2018-07-22 RX ADMIN — INSULIN ASPART 4 UNITS: 100 INJECTION, SOLUTION INTRAVENOUS; SUBCUTANEOUS at 01:07

## 2018-07-22 RX ADMIN — POTASSIUM CHLORIDE 40 MEQ: 20 SOLUTION ORAL at 06:07

## 2018-07-22 RX ADMIN — ATORVASTATIN CALCIUM 40 MG: 20 TABLET, FILM COATED ORAL at 08:07

## 2018-07-22 NOTE — SUBJECTIVE & OBJECTIVE
Interval History:  No acute adverse events overnight    Review of Systems    Unable to obtain a complete ROS due to level of consciousness.  Objective:     Vitals:  Temp: 99.1 °F (37.3 °C)  Pulse: 65  Rhythm: normal sinus rhythm  BP: (!) 157/76  MAP (mmHg): 105  ICP Mean (mmHg): 0 mmHg  Resp: (!) 22  SpO2: 100 %    Temp  Min: 98.5 °F (36.9 °C)  Max: 99.4 °F (37.4 °C)  Pulse  Min: 61  Max: 77  BP  Min: 108/56  Max: 175/91  MAP (mmHg)  Min: 79  Max: 126  ICP Mean (mmHg)  Min: 0 mmHg  Max: 9 mmHg  Resp  Min: 10  Max: 25  SpO2  Min: 99 %  Max: 100 %    07/21 0701 - 07/22 0700  In: 3055 [I.V.:1800]  Out: 1186 [Urine:1070; Drains:116]   Unmeasured Output  Stool Occurrence: 0       Physical Exam   Constitutional: He appears well-developed and well-nourished. He appears lethargic.   HENT:   Head: Normocephalic.   Eyes: EOM are normal. Pupils are equal, round, and reactive to light.   Neck: Normal range of motion.   Cardiovascular: Normal rate and regular rhythm.    Pulmonary/Chest: Effort normal.   Abdominal: Soft.   Neurological: He appears lethargic. GCS eye subscore is 3. GCS verbal subscore is 3. GCS motor subscore is 5.   Difficult to arouse - patient's daughter states that patient is selectively interactive with family members since admission           Medications:  Continuous    sodium chloride 0.9% Last Rate: 75 mL/hr at 07/22/18 1105   Scheduled    amiodarone 200 mg Daily   amLODIPine 10 mg Daily   atorvastatin 40 mg Daily   heparin (porcine) 5,000 Units Q8H   hydrALAZINE 25 mg Q8H   insulin aspart U-100 4 Units Q4H   insulin detemir U-100 20 Units QHS   insulin detemir U-100 5 Units Daily   losartan 100 mg Daily   metoprolol tartrate 25 mg BID   senna-docusate 8.6-50 mg 1 tablet Daily   sodium chloride 0.9% 1,000 mL Once   PRN    acetaminophen 650 mg Q6H PRN   dextrose 50% 12.5 g PRN   dextrose 50% 12.5 g PRN   glucagon (human recombinant) 1 mg PRN   insulin aspart U-100 1-10 Units Q4H PRN   labetalol 10 mg Q4H  PRN   magnesium oxide 800 mg PRN   magnesium oxide 800 mg PRN   phenylephrine HCl in 0.9% NaCl 100 mcg Q10 Min PRN   potassium chloride 10% 40 mEq PRN   potassium chloride 10% 40 mEq PRN   potassium chloride 10% 60 mEq PRN   potassium, sodium phosphates 2 packet PRN   potassium, sodium phosphates 2 packet PRN   potassium, sodium phosphates 2 packet PRN   sodium chloride 0.9% 5 mL PRN         Diet  Diet NPO  Diet NPO      Pulse: 65 (07/22/18 1105)  Resp: (!) 22 (07/22/18 1105)  BP: (!) 157/76 (07/22/18 1105)  SpO2: 100 % (07/22/18 1105)      ICP Mean (mmHg):  [0 mmHg-9 mmHg] 0 mmHg  CPP (mmHg calculated using NBP):  [] (P) 105  CPP:  [70 mmHg-105 mmHg] 71 mmHg         07/21 0701 - 07/22 0700  In: 3055 [I.V.:1800]  Out: 1186 [Urine:1070; Drains:116]       5  07/21 0701 - 07/22 0700  In: 3055 [I.V.:1800]  Out: 1186 [Urine:1070; Drains:116]   No results for input(s): PH, PCO2, PO2, BE, POCLAC, LACTATE in the last 24 hours.    Recent Labs  Lab 07/22/18  0157 07/22/18  1021     --    K 3.7 4.0     --    CO2 24  --    BUN 17  --    CREATININE 0.8  --    CALCIUM 8.6*  --    MG 1.8  --    PHOS 2.5*  --        Recent Labs  Lab 07/22/18  0157   WBC 7.38   HGB 10.6*      No results for input(s): PROT, ALBUMIN, AST, ALT, LDH, ALKPHOS, BILITOT in the last 24 hours.  Recent Labs      07/22/18   0133  07/22/18   0613  07/22/18   1017   POCTGLUCOSE  222*  227*  227*     Continuous    sodium chloride 0.9% Last Rate: 75 mL/hr at 07/22/18 1105   Scheduled    amiodarone 200 mg Daily   amLODIPine 10 mg Daily   atorvastatin 40 mg Daily   heparin (porcine) 5,000 Units Q8H   hydrALAZINE 25 mg Q8H   insulin aspart U-100 4 Units Q4H   insulin detemir U-100 20 Units QHS   insulin detemir U-100 5 Units Daily   losartan 100 mg Daily   metoprolol tartrate 25 mg BID   senna-docusate 8.6-50 mg 1 tablet Daily   sodium chloride 0.9% 1,000 mL Once   PRN    acetaminophen 650 mg Q6H PRN   dextrose 50% 12.5 g PRN   dextrose 50%  12.5 g PRN   glucagon (human recombinant) 1 mg PRN   insulin aspart U-100 1-10 Units Q4H PRN   labetalol 10 mg Q4H PRN   magnesium oxide 800 mg PRN   magnesium oxide 800 mg PRN   phenylephrine HCl in 0.9% NaCl 100 mcg Q10 Min PRN   potassium chloride 10% 40 mEq PRN   potassium chloride 10% 40 mEq PRN   potassium chloride 10% 60 mEq PRN   potassium, sodium phosphates 2 packet PRN   potassium, sodium phosphates 2 packet PRN   potassium, sodium phosphates 2 packet PRN   sodium chloride 0.9% 5 mL PRN           Lines/Drains/Airways     Drain                 NG/OG Tube 07/16/18 1200 Left nostril 5 days    Male External Urinary Catheter 07/18/18 1800 Medium 3 days         ICP/Ventriculostomy 07/21/18 1425 Ventricular drainage catheter with ICP microsensor Right Occipital region less than 1 day          Arterial Line                 Arterial Line 07/19/18 2245 Left Radial 2 days          Peripheral Intravenous Line                 Peripheral IV - Single Lumen 03/16/17 1732 Right Wrist 492 days         Peripheral IV - Single Lumen 07/19/18 0400 Left Hand 3 days         Peripheral IV - Single Lumen 07/21/18 0503 Right Forearm 1 day

## 2018-07-22 NOTE — PLAN OF CARE
Problem: Patient Care Overview  Goal: Plan of Care Review  Outcome: Ongoing (interventions implemented as appropriate)  POC reviewed with pt and family at 1400. Pt family verbalized understanding, pt unable to verbalize understanding secondary to altered mental status. Questions and concerns addressed. No acute events today. Pt progressing toward goals. Will continue to monitor. See flowsheets for full assessment and VS info.

## 2018-07-22 NOTE — PROGRESS NOTES
POC reviewed with pt and spouse at 0500. Pt confused. Spouse verbalized understanding. Questions and concerns addressed. No acute events overnight. EVD open at 10. CT complete. Tube feeds at goal of 65. Pt progressing toward goals. Will continue to monitor. See flowsheets for full assessment and VS info

## 2018-07-22 NOTE — PROGRESS NOTES
Patient's chart was reviewed by a stroke team provider and patient was seen with VN staff. Patient with EVD placed this morning. Minimal change today on exam.  Patient intubated.  There is no new imaging to review.  Pending diagnostics to follow up on include: none  For other recommendations please see our previous note completed on: 7/21/18      There are no new recommendations at this time. Will continue to follow. Discussed patient with staff. Please contact stroke team for any questions or concerns.

## 2018-07-22 NOTE — PROGRESS NOTES
[facility]  Neurocritical Care  Progress Note    Admit Date: 7/17/2018  Service Date: 07/22/2018  Length of Stay: 5    Subjective:     Chief Complaint: Nontraumatic intraventricular intracerebral hemorrhage    History of Present Illness: 67 yo M with PMHx of HTN, HLD, DM, dementia, A. Fib, on coumadin as outpatient who was transferred to [facility] from [facility] where he was reportedly admitted for R thalamic IPH w IVH. Per OSH records, patient admitted to ICU and sent to floor with little follow up imaging and no interval improvement in exam. Wife requested second opinion thus was transferred to [facility] however for unknown reason was admitted to hospital medicine service last night. [facility] called this morning after seen by Nsshannan due to concern for airway protection. CT this morning with R thalamic IPH and lateral ventricle extension bilaterally and slight enlargement of ventricular system. Of note baseline enlargement of vents at previous CT a year ago. On exam patient with GCS of 8, however per wife and OSH records, this is baseline since initial admission in [place]. Will admit to [facility] for higher level of care.       Hospital Course: --admitted to [facility] 7/18.   7/21: EVD at bedside by YEIMY    Interval History:  No acute adverse events overnight    Review of Systems    Unable to obtain a complete ROS due to level of consciousness.  Objective:     Vitals:  Temp: 99.1 °F (37.3 °C)  Pulse: 65  Rhythm: normal sinus rhythm  BP: (!) 157/76  MAP (mmHg): 105  ICP Mean (mmHg): 0 mmHg  Resp: (!) 22  SpO2: 100 %    Temp  Min: 98.5 °F (36.9 °C)  Max: 99.4 °F (37.4 °C)  Pulse  Min: 61  Max: 77  BP  Min: 108/56  Max: 175/91  MAP (mmHg)  Min: 79  Max: 126  ICP Mean (mmHg)  Min: 0 mmHg  Max: 9 mmHg  Resp  Min: 10  Max: 25  SpO2  Min: 99 %  Max: 100 %    07/21 0701 - 07/22 0700  In: 3055 [I.V.:1800]  Out: 1186 [Urine:1070; Drains:116]   Unmeasured Output  Stool Occurrence: 0       Physical Exam   Constitutional: He  appears well-developed and well-nourished. He appears lethargic.   HENT:   Head: Normocephalic.   Eyes: EOM are normal. Pupils are equal, round, and reactive to light.   Neck: Normal range of motion.   Cardiovascular: Normal rate and regular rhythm.    Pulmonary/Chest: Effort normal.   Abdominal: Soft.   Neurological: He appears lethargic. GCS eye subscore is 3. GCS verbal subscore is 3. GCS motor subscore is 5.   Difficult to arouse - patient's daughter states that patient is selectively interactive with family members since admission           Medications:  Continuous    sodium chloride 0.9% Last Rate: 75 mL/hr at 07/22/18 1105   Scheduled    amiodarone 200 mg Daily   amLODIPine 10 mg Daily   atorvastatin 40 mg Daily   heparin (porcine) 5,000 Units Q8H   hydrALAZINE 25 mg Q8H   insulin aspart U-100 4 Units Q4H   insulin detemir U-100 20 Units QHS   insulin detemir U-100 5 Units Daily   losartan 100 mg Daily   metoprolol tartrate 25 mg BID   senna-docusate 8.6-50 mg 1 tablet Daily   sodium chloride 0.9% 1,000 mL Once   PRN    acetaminophen 650 mg Q6H PRN   dextrose 50% 12.5 g PRN   dextrose 50% 12.5 g PRN   glucagon (human recombinant) 1 mg PRN   insulin aspart U-100 1-10 Units Q4H PRN   labetalol 10 mg Q4H PRN   magnesium oxide 800 mg PRN   magnesium oxide 800 mg PRN   phenylephrine HCl in 0.9% NaCl 100 mcg Q10 Min PRN   potassium chloride 10% 40 mEq PRN   potassium chloride 10% 40 mEq PRN   potassium chloride 10% 60 mEq PRN   potassium, sodium phosphates 2 packet PRN   potassium, sodium phosphates 2 packet PRN   potassium, sodium phosphates 2 packet PRN   sodium chloride 0.9% 5 mL PRN         Diet  Diet NPO  Diet NPO      Pulse: 65 (07/22/18 1105)  Resp: (!) 22 (07/22/18 1105)  BP: (!) 157/76 (07/22/18 1105)  SpO2: 100 % (07/22/18 1105)      ICP Mean (mmHg):  [0 mmHg-9 mmHg] 0 mmHg  CPP (mmHg calculated using NBP):  [] (P) 105  CPP:  [70 mmHg-105 mmHg] 71 mmHg         07/21 0701 - 07/22 0700  In: 3055  [I.V.:1800]  Out: 1186 [Urine:1070; Drains:116]       5  07/21 0701 - 07/22 0700  In: 3055 [I.V.:1800]  Out: 1186 [Urine:1070; Drains:116]   No results for input(s): PH, PCO2, PO2, BE, POCLAC, LACTATE in the last 24 hours.    Recent Labs  Lab 07/22/18  0157 07/22/18  1021     --    K 3.7 4.0     --    CO2 24  --    BUN 17  --    CREATININE 0.8  --    CALCIUM 8.6*  --    MG 1.8  --    PHOS 2.5*  --        Recent Labs  Lab 07/22/18  0157   WBC 7.38   HGB 10.6*      No results for input(s): PROT, ALBUMIN, AST, ALT, LDH, ALKPHOS, BILITOT in the last 24 hours.  Recent Labs      07/22/18   0133  07/22/18   0613  07/22/18   1017   POCTGLUCOSE  222*  227*  227*     Continuous    sodium chloride 0.9% Last Rate: 75 mL/hr at 07/22/18 1105   Scheduled    amiodarone 200 mg Daily   amLODIPine 10 mg Daily   atorvastatin 40 mg Daily   heparin (porcine) 5,000 Units Q8H   hydrALAZINE 25 mg Q8H   insulin aspart U-100 4 Units Q4H   insulin detemir U-100 20 Units QHS   insulin detemir U-100 5 Units Daily   losartan 100 mg Daily   metoprolol tartrate 25 mg BID   senna-docusate 8.6-50 mg 1 tablet Daily   sodium chloride 0.9% 1,000 mL Once   PRN    acetaminophen 650 mg Q6H PRN   dextrose 50% 12.5 g PRN   dextrose 50% 12.5 g PRN   glucagon (human recombinant) 1 mg PRN   insulin aspart U-100 1-10 Units Q4H PRN   labetalol 10 mg Q4H PRN   magnesium oxide 800 mg PRN   magnesium oxide 800 mg PRN   phenylephrine HCl in 0.9% NaCl 100 mcg Q10 Min PRN   potassium chloride 10% 40 mEq PRN   potassium chloride 10% 40 mEq PRN   potassium chloride 10% 60 mEq PRN   potassium, sodium phosphates 2 packet PRN   potassium, sodium phosphates 2 packet PRN   potassium, sodium phosphates 2 packet PRN   sodium chloride 0.9% 5 mL PRN           Lines/Drains/Airways     Drain                 NG/OG Tube 07/16/18 1200 Left nostril 5 days    Male External Urinary Catheter 07/18/18 1800 Medium 3 days         ICP/Ventriculostomy 07/21/18 1470  Ventricular drainage catheter with ICP microsensor Right Occipital region less than 1 day          Arterial Line                 Arterial Line 07/19/18 2245 Left Radial 2 days          Peripheral Intravenous Line                 Peripheral IV - Single Lumen 03/16/17 1732 Right Wrist 492 days         Peripheral IV - Single Lumen 07/19/18 0400 Left Hand 3 days         Peripheral IV - Single Lumen 07/21/18 0503 Right Forearm 1 day                        Assessment/Plan:     Neuro   * Nontraumatic intraventricular intracerebral hemorrhage    See ICH        Encephalopathy acute    Due to ICH and hydrocephalus        Brain compression    From hydrocephalus from IVH  neurochecks        Obstructive hydrocephalus    EVD at bedside by Yeimy yesterday        ICH (intracerebral hemorrhage)    EVD yesterday  hydrocephalus  SBP control  PT OT SLP          Dementia without behavioral disturbance    --holding home aricept          Cardiac/Vascular   Cardiomyopathy    Monitor  Low normal EF        Persistent atrial fibrillation    -continue home BB and amio  -rate controlled  -hold coumadin due to ICH        Dyslipidemia    Statin          Essential hypertension    monitor        Endocrine   Type 2 diabetes mellitus with diabetic polyneuropathy, with long-term current use of insulin    -increase detemir - add morning dose, adjusting SSI  Tube feeds  Add scheduled aspart        GI   Dysphagia    SLP  NGT            Prophylaxis:  Venous Thromboembolism: mechanical chemical (SQH cleared by YEIMY)  Stress Ulcer: NA  Ventilator Pneumonia: not applicable     Activity Orders          Straight Cath starting at 07/18 4145        DNR    Charles Hayes MD  Neurocritical Care  [facility]

## 2018-07-23 LAB
ANION GAP SERPL CALC-SCNC: 6 MMOL/L
BASOPHILS # BLD AUTO: 0.02 K/UL
BASOPHILS NFR BLD: 0.3 %
BUN SERPL-MCNC: 11 MG/DL
CALCIUM SERPL-MCNC: 8.5 MG/DL
CHLORIDE SERPL-SCNC: 107 MMOL/L
CO2 SERPL-SCNC: 27 MMOL/L
CREAT SERPL-MCNC: 0.7 MG/DL
DIFFERENTIAL METHOD: ABNORMAL
EOSINOPHIL # BLD AUTO: 0.1 K/UL
EOSINOPHIL NFR BLD: 1.8 %
ERYTHROCYTE [DISTWIDTH] IN BLOOD BY AUTOMATED COUNT: 12.4 %
EST. GFR  (AFRICAN AMERICAN): >60 ML/MIN/1.73 M^2
EST. GFR  (NON AFRICAN AMERICAN): >60 ML/MIN/1.73 M^2
GLUCOSE SERPL-MCNC: 168 MG/DL
HCT VFR BLD AUTO: 30.9 %
HGB BLD-MCNC: 10.5 G/DL
IMM GRANULOCYTES # BLD AUTO: 0.03 K/UL
IMM GRANULOCYTES NFR BLD AUTO: 0.4 %
LYMPHOCYTES # BLD AUTO: 0.9 K/UL
LYMPHOCYTES NFR BLD: 13.4 %
MAGNESIUM SERPL-MCNC: 2 MG/DL
MCH RBC QN AUTO: 29.2 PG
MCHC RBC AUTO-ENTMCNC: 34 G/DL
MCV RBC AUTO: 86 FL
MONOCYTES # BLD AUTO: 0.4 K/UL
MONOCYTES NFR BLD: 6.5 %
NEUTROPHILS # BLD AUTO: 5.3 K/UL
NEUTROPHILS NFR BLD: 77.6 %
NRBC BLD-RTO: 0 /100 WBC
PHOSPHATE SERPL-MCNC: 2.6 MG/DL
PHOSPHATE SERPL-MCNC: 2.8 MG/DL
PLATELET # BLD AUTO: 252 K/UL
PMV BLD AUTO: 11.1 FL
POCT GLUCOSE: 160 MG/DL (ref 70–110)
POCT GLUCOSE: 172 MG/DL (ref 70–110)
POCT GLUCOSE: 192 MG/DL (ref 70–110)
POCT GLUCOSE: 206 MG/DL (ref 70–110)
POCT GLUCOSE: 220 MG/DL (ref 70–110)
POCT GLUCOSE: 221 MG/DL (ref 70–110)
POCT GLUCOSE: 286 MG/DL (ref 70–110)
POTASSIUM SERPL-SCNC: 3.5 MMOL/L
POTASSIUM SERPL-SCNC: 4.3 MMOL/L
RBC # BLD AUTO: 3.59 M/UL
SODIUM SERPL-SCNC: 140 MMOL/L
WBC # BLD AUTO: 6.77 K/UL

## 2018-07-23 PROCEDURE — 80048 BASIC METABOLIC PNL TOTAL CA: CPT

## 2018-07-23 PROCEDURE — 99233 SBSQ HOSP IP/OBS HIGH 50: CPT | Mod: ,,, | Performed by: NURSE PRACTITIONER

## 2018-07-23 PROCEDURE — 25000003 PHARM REV CODE 250: Performed by: STUDENT IN AN ORGANIZED HEALTH CARE EDUCATION/TRAINING PROGRAM

## 2018-07-23 PROCEDURE — 63600175 PHARM REV CODE 636 W HCPCS: Performed by: NURSE PRACTITIONER

## 2018-07-23 PROCEDURE — 84100 ASSAY OF PHOSPHORUS: CPT | Mod: 91

## 2018-07-23 PROCEDURE — 009630Z DRAINAGE OF CEREBRAL VENTRICLE WITH DRAINAGE DEVICE, PERCUTANEOUS APPROACH: ICD-10-PCS | Performed by: NEUROLOGICAL SURGERY

## 2018-07-23 PROCEDURE — 20000000 HC ICU ROOM

## 2018-07-23 PROCEDURE — 97803 MED NUTRITION INDIV SUBSEQ: CPT

## 2018-07-23 PROCEDURE — 25000003 PHARM REV CODE 250: Performed by: NURSE PRACTITIONER

## 2018-07-23 PROCEDURE — 84100 ASSAY OF PHOSPHORUS: CPT

## 2018-07-23 PROCEDURE — 83735 ASSAY OF MAGNESIUM: CPT

## 2018-07-23 PROCEDURE — 85025 COMPLETE CBC W/AUTO DIFF WBC: CPT

## 2018-07-23 PROCEDURE — 84132 ASSAY OF SERUM POTASSIUM: CPT

## 2018-07-23 PROCEDURE — 25000003 PHARM REV CODE 250: Performed by: PSYCHIATRY & NEUROLOGY

## 2018-07-23 RX ORDER — HYDRALAZINE HYDROCHLORIDE 50 MG/1
50 TABLET, FILM COATED ORAL EVERY 8 HOURS
Status: DISCONTINUED | OUTPATIENT
Start: 2018-07-23 | End: 2018-07-24

## 2018-07-23 RX ADMIN — ATORVASTATIN CALCIUM 40 MG: 20 TABLET, FILM COATED ORAL at 08:07

## 2018-07-23 RX ADMIN — HEPARIN SODIUM 5000 UNITS: 5000 INJECTION, SOLUTION INTRAVENOUS; SUBCUTANEOUS at 05:07

## 2018-07-23 RX ADMIN — LABETALOL HYDROCHLORIDE 10 MG: 5 INJECTION, SOLUTION INTRAVENOUS at 01:07

## 2018-07-23 RX ADMIN — INSULIN ASPART 3 UNITS: 100 INJECTION, SOLUTION INTRAVENOUS; SUBCUTANEOUS at 11:07

## 2018-07-23 RX ADMIN — INSULIN ASPART 2 UNITS: 100 INJECTION, SOLUTION INTRAVENOUS; SUBCUTANEOUS at 05:07

## 2018-07-23 RX ADMIN — INSULIN ASPART 4 UNITS: 100 INJECTION, SOLUTION INTRAVENOUS; SUBCUTANEOUS at 02:07

## 2018-07-23 RX ADMIN — AMLODIPINE BESYLATE 10 MG: 10 TABLET ORAL at 08:07

## 2018-07-23 RX ADMIN — METOPROLOL TARTRATE 25 MG: 25 TABLET, FILM COATED ORAL at 08:07

## 2018-07-23 RX ADMIN — INSULIN ASPART 4 UNITS: 100 INJECTION, SOLUTION INTRAVENOUS; SUBCUTANEOUS at 06:07

## 2018-07-23 RX ADMIN — AMIODARONE HYDROCHLORIDE 200 MG: 200 TABLET ORAL at 08:07

## 2018-07-23 RX ADMIN — INSULIN ASPART 4 UNITS: 100 INJECTION, SOLUTION INTRAVENOUS; SUBCUTANEOUS at 10:07

## 2018-07-23 RX ADMIN — HYDRALAZINE HYDROCHLORIDE 50 MG: 50 TABLET ORAL at 09:07

## 2018-07-23 RX ADMIN — INSULIN ASPART 4 UNITS: 100 INJECTION, SOLUTION INTRAVENOUS; SUBCUTANEOUS at 11:07

## 2018-07-23 RX ADMIN — INSULIN ASPART 2 UNITS: 100 INJECTION, SOLUTION INTRAVENOUS; SUBCUTANEOUS at 02:07

## 2018-07-23 RX ADMIN — LOSARTAN POTASSIUM 100 MG: 50 TABLET, FILM COATED ORAL at 08:07

## 2018-07-23 RX ADMIN — HEPARIN SODIUM 5000 UNITS: 5000 INJECTION, SOLUTION INTRAVENOUS; SUBCUTANEOUS at 02:07

## 2018-07-23 RX ADMIN — POTASSIUM & SODIUM PHOSPHATES POWDER PACK 280-160-250 MG 2 PACKET: 280-160-250 PACK at 05:07

## 2018-07-23 RX ADMIN — HYDRALAZINE HYDROCHLORIDE 50 MG: 50 TABLET ORAL at 02:07

## 2018-07-23 RX ADMIN — INSULIN ASPART 1 UNITS: 100 INJECTION, SOLUTION INTRAVENOUS; SUBCUTANEOUS at 02:07

## 2018-07-23 RX ADMIN — LABETALOL HYDROCHLORIDE 10 MG: 5 INJECTION, SOLUTION INTRAVENOUS at 05:07

## 2018-07-23 RX ADMIN — METOPROLOL TARTRATE 25 MG: 25 TABLET, FILM COATED ORAL at 09:07

## 2018-07-23 RX ADMIN — INSULIN DETEMIR 5 UNITS: 100 INJECTION, SOLUTION SUBCUTANEOUS at 08:07

## 2018-07-23 RX ADMIN — HYDRALAZINE HYDROCHLORIDE 25 MG: 25 TABLET ORAL at 05:07

## 2018-07-23 RX ADMIN — INSULIN ASPART 4 UNITS: 100 INJECTION, SOLUTION INTRAVENOUS; SUBCUTANEOUS at 05:07

## 2018-07-23 RX ADMIN — POTASSIUM CHLORIDE 40 MEQ: 20 SOLUTION ORAL at 05:07

## 2018-07-23 RX ADMIN — INSULIN DETEMIR 10 UNITS: 100 INJECTION, SOLUTION SUBCUTANEOUS at 10:07

## 2018-07-23 RX ADMIN — HEPARIN SODIUM 5000 UNITS: 5000 INJECTION, SOLUTION INTRAVENOUS; SUBCUTANEOUS at 09:07

## 2018-07-23 NOTE — PROGRESS NOTES
[facility]  Neurocritical Care  Progress Note    Admit Date: 7/17/2018  Service Date: 07/23/2018  Length of Stay: 6    Subjective:     Chief Complaint: Nontraumatic intraventricular intracerebral hemorrhage    History of Present Illness: 69 yo M with PMHx of HTN, HLD, DM, dementia, A. Fib, on coumadin as outpatient who was transferred to [facility] from [facility] where he was reportedly admitted for R thalamic IPH w IVH. Per OSH records, patient admitted to ICU and sent to floor with little follow up imaging and no interval improvement in exam. Wife requested second opinion thus was transferred to [facility] however for unknown reason was admitted to hospital medicine service last night. [facility] called this morning after seen by Nsgy due to concern for airway protection. CT this morning with R thalamic IPH and lateral ventricle extension bilaterally and slight enlargement of ventricular system. Of note baseline enlargement of vents at previous CT a year ago. On exam patient with GCS of 8, however per wife and OSH records, this is baseline since initial admission in [place]. Will admit to [facility] for higher level of care.       Hospital Course: --admitted to [facility] 7/18.   7/21: EVD at bedside by NSGY  7/23 EVD in place lowered to 5cm h2o per nsgy.. Blood sugars running >200 adjusted detemir. Increased hydralazine for better BP control    Interval History: EVD in place lowered to 5cm h2o per nsgy.. Blood sugars running >200 adjusted detemir. Increased hydralazine for better BP control      Review of Systems: Unable to obtain a complete ROS due to level of consciousness.     Vitals:   Temp: 99.4 °F (37.4 °C)  Pulse: 67  Rhythm: normal sinus rhythm  BP: (!) 148/72  MAP (mmHg): 103  ICP Mean (mmHg): 1 mmHg  Resp: 17  SpO2: 100 %    Temp  Min: 98.7 °F (37.1 °C)  Max: 99.4 °F (37.4 °C)  Pulse  Min: 62  Max: 72  BP  Min: 134/64  Max: 218/108  MAP (mmHg)  Min: 92  Max: 155  ICP Mean (mmHg)  Min: 1 mmHg  Max: 17 mmHg  Resp   Min: 14  Max: 28  SpO2  Min: 100 %  Max: 100 %    07/22 0701 - 07/23 0700  In: 3270 [I.V.:1800]  Out: 1216 [Urine:1170; Drains:46]   Unmeasured Output  Urine Occurrence: 1  Stool Occurrence: 0     Examination:   Constitutional: Well-nourished and -developed. No apparent distress.   Eyes: Conjunctiva clear, anicteric. Lids no lesions.  Head/Ears/Nose/Mouth/Throat/Neck: Moist mucous membranes. External ears, nose atraumatic.   Cardiovascular: Regular rhythm. No murmurs. No leg edema.  Respiratory: Comfortable respirations. Clear to auscultation.  Gastrointestinal: No hernia. Soft, nondistended, nontender. + bowel sounds.    Neurologic:  -GCS [id number]  -difficult to arouse, lethargic. Occasionally makes one word statements. Intermittently follows commands  -Cranial nerves II-XII grossly intact PERRL 3+, + cough, gag, corneals  -Motor withdraws in all extremities does EDWARDS spontaneously  -Sensation bilat intact to all extremities  Unable to test orientation, memory, judgment, insight, fund of knowledge, coordination, gait due to level of consciousness.    Medications:   Continuous  sodium chloride 0.9% Last Rate: 75 mL/hr at 07/23/18 1405   Scheduled  amiodarone 200 mg Daily   amLODIPine 10 mg Daily   atorvastatin 40 mg Daily   heparin (porcine) 5,000 Units Q8H   hydrALAZINE 50 mg Q8H   insulin aspart U-100 4 Units Q4H   insulin detemir U-100 15 Units BID   losartan 100 mg Daily   metoprolol tartrate 25 mg BID   senna-docusate 8.6-50 mg 1 tablet Daily   sodium chloride 0.9% 1,000 mL Once   PRN  acetaminophen 650 mg Q6H PRN   dextrose 50% 12.5 g PRN   dextrose 50% 12.5 g PRN   glucagon (human recombinant) 1 mg PRN   insulin aspart U-100 1-10 Units Q4H PRN   labetalol 10 mg Q4H PRN   magnesium oxide 800 mg PRN   magnesium oxide 800 mg PRN   phenylephrine HCl in 0.9% NaCl 100 mcg Q10 Min PRN   potassium chloride 10% 40 mEq PRN   potassium chloride 10% 40 mEq PRN   potassium chloride 10% 60 mEq PRN   potassium, sodium  phosphates 2 packet PRN   potassium, sodium phosphates 2 packet PRN   potassium, sodium phosphates 2 packet PRN   sodium chloride 0.9% 5 mL PRN      Today I independently reviewed pertinent medications, lines/drains/airways, imaging, lab results, microbiology results, notably:     ISTAT: No results for input(s): PH, PCO2, PO2, POCSATURATED, HCO3, BE, POCNA, POCK, POCTCO2, POCGLU, POCICA, POCLAC, SAMPLE in the last 24 hours.   Chem:   Recent Labs  Lab 07/23/18  0202 07/23/18  1013     --    K 3.5 4.3     --    CO2 27  --    *  --    BUN 11  --    CREATININE 0.7  --    ESTGFRAFRICA >60.0  --    EGFRNONAA >60.0  --    CALCIUM 8.5*  --    MG 2.0  --    PHOS 2.6*  --    ANIONGAP 6*  --      Heme:   Recent Labs  Lab 07/23/18  0202   WBC 6.77   HGB 10.5*   HCT 30.9*        Endo:   Recent Labs  Lab 07/23/18  0525 07/23/18  1014 07/23/18  1408   POCTGLUCOSE 160* 220* 172*      Assessment/Plan:     Neuro   * Nontraumatic intraventricular intracerebral hemorrhage    See ICH        Encephalopathy acute    Due to ICH and hydrocephalus        Brain compression    From hydrocephalus from IVH  EVD @ 5cm h2o  neurochecks q1h        Obstructive hydrocephalus    7/21EVD at bedside by NSGy   For obstructive hydrocephalus.  7/23 EVD lowered to 5cm/h2o per NSGY Drained 46cc overnight. ICP 2-3 this am.        Dementia without behavioral disturbance    --holding home aricept          Cardiac/Vascular   Cardiomyopathy    Monitor  Low normal EF        Persistent atrial fibrillation    Amiodarone 200mg daily  Metoprolol 25mg bid  -rate controlled  -hold coumadin due to ICH        Dyslipidemia              Essential hypertension    SBP goal 100-160  Amlodipine 10mg daily  Hydralazine 50mg q8h  Losartan 100mg daily  Metoprolol 25mg bid  Prn labetalol    Echo: 1 - Low normal left ventricular systolic function.     2 - Indeterminate LV diastolic function.     3 - Normal right ventricular systolic function .     4 - There  is anterior pericardial fat pad..           Typical atrial flutter    Amiodarone 200mg daily for A-fib/flutter        Endocrine   Type 2 diabetes mellitus with diabetic polyneuropathy, with long-term current use of insulin    A1C 7.2  accuchecks q4h with mod SSI  - detemir - 15 units bid  aspart 4 units q4h  Tube feeds diabetasource 65cc/h          GI   Dysphagia    SLP following  NGT in place, and getting tube feeds            Prophylaxis:  Venous Thromboembolism: mechanical chemical  Stress Ulcer: None  Ventilator Pneumonia: not applicable     Activity Orders          Straight Cath starting at 07/18 0237        DNR     I have spent 35 min with this patient, with over 50% of this time spent coordinating care and speaking with the family    Fani Teague NP  Neurocritical Care  [facility]

## 2018-07-23 NOTE — PT/OT/SLP PROGRESS
Physical Therapy      Patient Name:  Ciro Chandler   MRN:  [id number]    The patient now has an EVD which is open at this time.  He is not appropriate for EOB activities.  PT came to room to instruct family in PROM exercises.  They were not present.  PT left illustrated handout with ROM exercises.  Writing PT will follow up tomorrow to instruct family in ROM exercises.      Suzanna Rey, PT   7/23/2018

## 2018-07-23 NOTE — PROGRESS NOTES
LIANE Phillips with neurosurgery notified of loss of waveform from EVD. EVD still draining as before, CSF color unchanged. Neuro exam as before. No new orders at present. Will continue to monitor closely.

## 2018-07-23 NOTE — ASSESSMENT & PLAN NOTE
69 yo male with right thalamic hemorrhage (ICH score 2) initially found on 7/9/18 at outside hospital in [place]  HCT shows acute right thalamic hemorrhage with intraventricular extension.  Ventricular system is enlarged on 7/18 scan, HCT on 11/13/17 shows baseline ventricular enlargement.  There are no films available from recent admission in [place] for comparison     - EVD open to drain at 10 cm H2O  - CT head post EVD satisfactory  - q1h neuro checks  - EEG without seizures  - Continue ICU care  - Patient is DNR.   - Will follow, please call with questions or any change in neurologic status

## 2018-07-23 NOTE — PROGRESS NOTES
"[facility]  Neurosurgery  Progress Note    Subjective:     History of Present Illness: 67 yo M with PMHx of HTN, HLD, DM, dementia, A. Fib, on coumadin, prior cerebral aneurysm treated 20 years ago at [facility]. Pt who presented [facility] in [place], [place] on 7/9/18 to with sudden-onset headache, right eye deviation, and L sided weakness. Head CT at that time revealed right thalamic hemorrhage.  Pt was treated conservatively without neurosurgical intervention.  Per wife, she was unhappy with the care in [place].  States patient has been very lethargic since he was initially admitted, without improvement,  and felt as though the "nothing was being done for him" She requested that patient be transferred to [facility] for further evaluation/treatment.  Pt was accepted to [facility] by Hospital medicine service, and currently admitted to the floor.  Pt has been lethargic and and unable to FC's since admission.    68 M with R thalamic ICH/IVH (ICH score 2) initially found on 7/9/18 admitted to OSH in [place] w/o intervention.     7/18: Pt has NG tube in place, as unable to swallow.  Pt is DNR.  Prior to his recent hospital admission, wife states patient was functioning well, ambulating normally, no previous weakness/speech difficulty.  He takes aspirin and coumadin at home (on hold now).  HCT was done this morning which shows acute blood with ventriculomegaly.          Post-Op Info:  * No surgery found *         Interval History: Neuro exam improved this morning s/p EVD    Medications:  Continuous Infusions:   sodium chloride 0.9% 75 mL/hr at 07/22/18 2000     Scheduled Meds:   amiodarone  200 mg Per NG tube Daily    amLODIPine  10 mg Per NG tube Daily    atorvastatin  40 mg Per NG tube Daily    heparin (porcine)  5,000 Units Subcutaneous Q8H    hydrALAZINE  25 mg Per NG tube Q8H    insulin aspart U-100  4 Units Subcutaneous Q4H    insulin detemir U-100  20 Units Subcutaneous QHS    insulin detemir U-100  5 Units " Subcutaneous Daily    losartan  100 mg Per NG tube Daily    metoprolol tartrate  25 mg Per NG tube BID    senna-docusate 8.6-50 mg  1 tablet Per NG tube Daily    sodium chloride 0.9%  1,000 mL Intravenous Once     PRN Meds:acetaminophen, dextrose 50%, dextrose 50%, glucagon (human recombinant), insulin aspart U-100, labetalol, magnesium oxide, magnesium oxide, phenylephrine HCl in 0.9% NaCl, potassium chloride 10%, potassium chloride 10%, potassium chloride 10%, potassium, sodium phosphates, potassium, sodium phosphates, potassium, sodium phosphates, sodium chloride 0.9%     Review of Systems    Objective:     Weight: 78.9 kg (173 lb 15.1 oz)  Body mass index is 24.96 kg/m².  Vital Signs (Most Recent):  Temp: 98.7 °F (37.1 °C) (07/22/18 1901)  Pulse: 65 (07/22/18 2005)  Resp: (!) 21 (07/22/18 2005)  BP: (!) 149/79 (07/22/18 2005)  SpO2: 100 % (07/22/18 2005) Vital Signs (24h Range):  Temp:  [98.6 °F (37 °C)-99.4 °F (37.4 °C)] 98.7 °F (37.1 °C)  Pulse:  [61-77] 65  Resp:  [10-28] 21  SpO2:  [99 %-100 %] 100 %  BP: (130-218)/() 149/79  Arterial Line BP: ()/(52-78) 146/58       Date 07/22/18 0700 - 07/23/18 0659   Shift 5592-3834 5530-9221 1626-1203 24 Hour Total   I  N  T  A  K  E   I.V.  (mL/kg)  1050  (13.3)  1050  (13.3)    NG/ 390  780    Shift Total  (mL/kg) 390  (4.9) 1440  (18.3)  1830  (23.2)   O  U  T  P  U  T   Urine  (mL/kg/hr) 400  (0.6) 415  815    Drains 3 32  35    Shift Total  (mL/kg) 403  (5.1) 447  (5.7)  850  (10.8)   Weight (kg) 78.9 78.9 78.9 78.9                        NG/OG Tube 07/16/18 1200 Left nostril (Active)   Placement Check placement verified by x-ray 7/20/2018  7:05 AM   Distal Tube Length (cm) 70 7/20/2018  7:05 AM   Tolerance no signs/symptoms of discomfort 7/20/2018  7:05 AM   Securement anchored to nostril center w/ adhesive device 7/20/2018  7:05 AM   Clamp Status/Tolerance clamped;no abdominal distention;no emesis;no nausea;no abdominal discomfort;no  residual;no restlessness 7/20/2018  7:05 AM   Insertion Site Appearance no redness, warmth, tenderness, skin breakdown, drainage 7/20/2018  7:05 AM   Flush/Irrigation flushed w/;water;no resistance met 7/20/2018  7:05 AM   Feeding Action feeding held 7/19/2018  3:02 PM   Intake (mL) 80 mL 7/20/2018  9:05 AM   Residual Amount (ml) 0 ml 7/19/2018  7:15 PM       Male External Urinary Catheter 07/18/18 1800 Medium (Active)   Collection Container Urimeter 7/20/2018  7:05 AM   Securement Method secured to top of thigh w/ adhesive device 7/20/2018  7:05 AM   Skin no redness;no breakdown 7/20/2018  7:05 AM   Tolerance no signs/symptoms of discomfort 7/20/2018  7:05 AM   Output (mL) 225 mL 7/20/2018  9:05 AM       Neurosurgery Physical Exam    E3V3M5  AOx1, repeats name  R gaze  Does not blink to threat in L eye  Purposeful movements in BUE  Moving RLE spontaneously  Withdraw LLE    Significant Labs:    Recent Labs  Lab 07/21/18  0249 07/22/18  0157 07/22/18  1021 07/22/18  1751   * 235*  --   --     142  --   --    K 3.9 3.7 4.0  --     110  --   --    CO2 21* 24  --   --    BUN 22 17  --   --    CREATININE 0.8 0.8  --   --    CALCIUM 9.0 8.6*  --   --    MG 1.6 1.8  --  1.8       Recent Labs  Lab 07/21/18  0249 07/22/18  0157   WBC 7.18 7.38   HGB 10.7* 10.6*   HCT 32.5* 32.1*    262     No results for input(s): LABPT, INR, APTT in the last 48 hours.  Microbiology Results (last 7 days)     ** No results found for the last 168 hours. **        Recent Lab Results       07/22/18  1757 07/22/18  1751 07/22/18  1458 07/22/18  1021 07/22/18  1017      Immature Granulocytes          Immature Grans (Abs)          Anion Gap          Baso #          Basophil%          BUN, Bld          Calcium          Chloride          CO2          Creatinine          Differential Method          eGFR if           eGFR if non           Eos #          Eosinophil%          Glucose           Gran # (ANC)          Gran%          Hematocrit          Hemoglobin          Lymph #          Lymph%          Magnesium  1.8        MCH          MCHC          MCV          Mono #          Mono%          MPV          nRBC          Phosphorus  2.3(L)        Platelets          POCT Glucose 218(H)  243(H)  227(H)     Potassium    4.0      RBC          RDW          Sodium          WBC                      07/22/18  0613 07/22/18  0157 07/22/18  0133 07/21/18  2154      Immature Granulocytes  0.5       Immature Grans (Abs)  0.04  Comment:  Mild elevation in immature granulocytes is non specific and   can be seen in a variety of conditions including stress response,   acute inflammation, trauma and pregnancy. Correlation with other   laboratory and clinical findings is essential.         Anion Gap  8       Baso #  0.04       Basophil%  0.5       BUN, Bld  17       Calcium  8.6(L)       Chloride  110       CO2  24       Creatinine  0.8       Differential Method  Automated       eGFR if   >60.0       eGFR if non   >60.0  Comment:  Calculation used to obtain the estimated glomerular filtration  rate (eGFR) is the CKD-EPI equation.          Eos #  0.1       Eosinophil%  1.9       Glucose  235(H)       Gran # (ANC)  5.8       Gran%  78.6(H)       Hematocrit  32.1(L)       Hemoglobin  10.6(L)       Lymph #  0.9(L)       Lymph%  11.7(L)       Magnesium  1.8       MCH  29.0       MCHC  33.0       MCV  88       Mono #  0.5       Mono%  6.8       MPV  11.7       nRBC  0       Phosphorus  2.5(L)       Platelets  262       POCT Glucose 227(H)  222(H) 196(H)     Potassium  3.7       RBC  3.65(L)       RDW  12.5       Sodium  142       WBC  7.38             Significant Diagnostics:  I have reviewed all pertinent imaging results/findings within the past 24 hours.    Assessment/Plan:     * Nontraumatic intraventricular intracerebral hemorrhage    69 yo male with right thalamic hemorrhage (ICH score 2)  initially found on 7/9/18 at outside hospital in [place]  HCT shows acute right thalamic hemorrhage with intraventricular extension.  Ventricular system is enlarged on 7/18 scan, HCT on 11/13/17 shows baseline ventricular enlargement.  There are no films available from recent admission in [place] for comparison     - EVD open to drain at 10 cm H2O  - CT head post EVD satisfactory  - q1h neuro checks  - EEG without seizures  - Continue ICU care  - Patient is DNR.   - Will follow, please call with questions or any change in neurologic status            Stevan Joseph MD  Neurosurgery  [facility]

## 2018-07-23 NOTE — SUBJECTIVE & OBJECTIVE
Interval History: Neuro exam improved this morning s/p EVD    Medications:  Continuous Infusions:   sodium chloride 0.9% 75 mL/hr at 07/22/18 2000     Scheduled Meds:   amiodarone  200 mg Per NG tube Daily    amLODIPine  10 mg Per NG tube Daily    atorvastatin  40 mg Per NG tube Daily    heparin (porcine)  5,000 Units Subcutaneous Q8H    hydrALAZINE  25 mg Per NG tube Q8H    insulin aspart U-100  4 Units Subcutaneous Q4H    insulin detemir U-100  20 Units Subcutaneous QHS    insulin detemir U-100  5 Units Subcutaneous Daily    losartan  100 mg Per NG tube Daily    metoprolol tartrate  25 mg Per NG tube BID    senna-docusate 8.6-50 mg  1 tablet Per NG tube Daily    sodium chloride 0.9%  1,000 mL Intravenous Once     PRN Meds:acetaminophen, dextrose 50%, dextrose 50%, glucagon (human recombinant), insulin aspart U-100, labetalol, magnesium oxide, magnesium oxide, phenylephrine HCl in 0.9% NaCl, potassium chloride 10%, potassium chloride 10%, potassium chloride 10%, potassium, sodium phosphates, potassium, sodium phosphates, potassium, sodium phosphates, sodium chloride 0.9%     Review of Systems    Objective:     Weight: 78.9 kg (173 lb 15.1 oz)  Body mass index is 24.96 kg/m².  Vital Signs (Most Recent):  Temp: 98.7 °F (37.1 °C) (07/22/18 1901)  Pulse: 65 (07/22/18 2005)  Resp: (!) 21 (07/22/18 2005)  BP: (!) 149/79 (07/22/18 2005)  SpO2: 100 % (07/22/18 2005) Vital Signs (24h Range):  Temp:  [98.6 °F (37 °C)-99.4 °F (37.4 °C)] 98.7 °F (37.1 °C)  Pulse:  [61-77] 65  Resp:  [10-28] 21  SpO2:  [99 %-100 %] 100 %  BP: (130-218)/() 149/79  Arterial Line BP: ()/(52-78) 146/58       Date 07/22/18 0700 - 07/23/18 0659   Shift 9359-5146 5220-9928 5331-0724 24 Hour Total   I  N  T  A  K  E   I.V.  (mL/kg)  1050  (13.3)  1050  (13.3)    NG/ 390  780    Shift Total  (mL/kg) 390  (4.9) 1440  (18.3)  1830  (23.2)   O  U  T  P  U  T   Urine  (mL/kg/hr) 400  (0.6) 415  815    Drains 3 32  35    Shift  Total  (mL/kg) 403  (5.1) 447  (5.7)  850  (10.8)   Weight (kg) 78.9 78.9 78.9 78.9                        NG/OG Tube 07/16/18 1200 Left nostril (Active)   Placement Check placement verified by x-ray 7/20/2018  7:05 AM   Distal Tube Length (cm) 70 7/20/2018  7:05 AM   Tolerance no signs/symptoms of discomfort 7/20/2018  7:05 AM   Securement anchored to nostril center w/ adhesive device 7/20/2018  7:05 AM   Clamp Status/Tolerance clamped;no abdominal distention;no emesis;no nausea;no abdominal discomfort;no residual;no restlessness 7/20/2018  7:05 AM   Insertion Site Appearance no redness, warmth, tenderness, skin breakdown, drainage 7/20/2018  7:05 AM   Flush/Irrigation flushed w/;water;no resistance met 7/20/2018  7:05 AM   Feeding Action feeding held 7/19/2018  3:02 PM   Intake (mL) 80 mL 7/20/2018  9:05 AM   Residual Amount (ml) 0 ml 7/19/2018  7:15 PM       Male External Urinary Catheter 07/18/18 1800 Medium (Active)   Collection Container Urimeter 7/20/2018  7:05 AM   Securement Method secured to top of thigh w/ adhesive device 7/20/2018  7:05 AM   Skin no redness;no breakdown 7/20/2018  7:05 AM   Tolerance no signs/symptoms of discomfort 7/20/2018  7:05 AM   Output (mL) 225 mL 7/20/2018  9:05 AM       Neurosurgery Physical Exam    E3V3M5  AOx1, repeats name  R gaze  Does not blink to threat in L eye  Purposeful movements in BUE  Moving RLE spontaneously  Withdraw LLE    Significant Labs:    Recent Labs  Lab 07/21/18  0249 07/22/18  0157 07/22/18  1021 07/22/18  1751   * 235*  --   --     142  --   --    K 3.9 3.7 4.0  --     110  --   --    CO2 21* 24  --   --    BUN 22 17  --   --    CREATININE 0.8 0.8  --   --    CALCIUM 9.0 8.6*  --   --    MG 1.6 1.8  --  1.8       Recent Labs  Lab 07/21/18  0249 07/22/18  0157   WBC 7.18 7.38   HGB 10.7* 10.6*   HCT 32.5* 32.1*    262     No results for input(s): LABPT, INR, APTT in the last 48 hours.  Microbiology Results (last 7 days)     ** No  results found for the last 168 hours. **        Recent Lab Results       07/22/18  1757 07/22/18  1751 07/22/18  1458 07/22/18  1021 07/22/18  1017      Immature Granulocytes          Immature Grans (Abs)          Anion Gap          Baso #          Basophil%          BUN, Bld          Calcium          Chloride          CO2          Creatinine          Differential Method          eGFR if           eGFR if non           Eos #          Eosinophil%          Glucose          Gran # (ANC)          Gran%          Hematocrit          Hemoglobin          Lymph #          Lymph%          Magnesium  1.8        MCH          MCHC          MCV          Mono #          Mono%          MPV          nRBC          Phosphorus  2.3(L)        Platelets          POCT Glucose 218(H)  243(H)  227(H)     Potassium    4.0      RBC          RDW          Sodium          WBC                      07/22/18  0613 07/22/18  0157 07/22/18  0133 07/21/18  2154      Immature Granulocytes  0.5       Immature Grans (Abs)  0.04  Comment:  Mild elevation in immature granulocytes is non specific and   can be seen in a variety of conditions including stress response,   acute inflammation, trauma and pregnancy. Correlation with other   laboratory and clinical findings is essential.         Anion Gap  8       Baso #  0.04       Basophil%  0.5       BUN, Bld  17       Calcium  8.6(L)       Chloride  110       CO2  24       Creatinine  0.8       Differential Method  Automated       eGFR if   >60.0       eGFR if non   >60.0  Comment:  Calculation used to obtain the estimated glomerular filtration  rate (eGFR) is the CKD-EPI equation.          Eos #  0.1       Eosinophil%  1.9       Glucose  235(H)       Gran # (ANC)  5.8       Gran%  78.6(H)       Hematocrit  32.1(L)       Hemoglobin  10.6(L)       Lymph #  0.9(L)       Lymph%  11.7(L)       Magnesium  1.8       MCH  29.0       MCHC  33.0       MCV   88       Mono #  0.5       Mono%  6.8       MPV  11.7       nRBC  0       Phosphorus  2.5(L)       Platelets  262       POCT Glucose 227(H)  222(H) 196(H)     Potassium  3.7       RBC  3.65(L)       RDW  12.5       Sodium  142       WBC  7.38             Significant Diagnostics:  I have reviewed all pertinent imaging results/findings within the past 24 hours.

## 2018-07-23 NOTE — ASSESSMENT & PLAN NOTE
67 yo male with right thalamic hemorrhage (ICH score 2) initially found on 7/9/18 at outside hospital in [place]  HCT shows acute right thalamic hemorrhage with intraventricular extension.  Ventricular system is enlarged on 7/18 scan, HCT on 11/13/17 shows baseline ventricular enlargement.  There are no films available from recent admission in [place] for comparison     - EVD open to drain at 5 cm H2O  - CT head post EVD satisfactory  - q1h neuro checks  - EEG without seizures  - Continue ICU care  - Patient is DNR.   - Will follow, please call with questions or any change in neurologic status

## 2018-07-23 NOTE — PROGRESS NOTES
"[facility]  Neurosurgery  Progress Note    Subjective:     History of Present Illness: 67 yo M with PMHx of HTN, HLD, DM, dementia, A. Fib, on coumadin, prior cerebral aneurysm treated 20 years ago at [facility]. Pt who presented [facility] in [place], [place] on 7/9/18 to with sudden-onset headache, right eye deviation, and L sided weakness. Head CT at that time revealed right thalamic hemorrhage.  Pt was treated conservatively without neurosurgical intervention.  Per wife, she was unhappy with the care in [place].  States patient has been very lethargic since he was initially admitted, without improvement,  and felt as though the "nothing was being done for him" She requested that patient be transferred to [facility] for further evaluation/treatment.  Pt was accepted to [facility] by Hospital medicine service, and currently admitted to the floor.  Pt has been lethargic and and unable to FC's since admission.    68 M with R thalamic ICH/IVH (ICH score 2) initially found on 7/9/18 admitted to OSH in [place] w/o intervention.     7/18: Pt has NG tube in place, as unable to swallow.  Pt is DNR.  Prior to his recent hospital admission, wife states patient was functioning well, ambulating normally, no previous weakness/speech difficulty.  He takes aspirin and coumadin at home (on hold now).  HCT was done this morning which shows acute blood with ventriculomegaly.          Post-Op Info:  * No surgery found *         Interval History: NAEON. ICP 1-16.    Medications:  Continuous Infusions:   sodium chloride 0.9% 75 mL/hr at 07/23/18 0705     Scheduled Meds:   amiodarone  200 mg Per NG tube Daily    amLODIPine  10 mg Per NG tube Daily    atorvastatin  40 mg Per NG tube Daily    heparin (porcine)  5,000 Units Subcutaneous Q8H    hydrALAZINE  25 mg Per NG tube Q8H    insulin aspart U-100  4 Units Subcutaneous Q4H    insulin detemir U-100  20 Units Subcutaneous QHS    insulin detemir U-100  5 Units Subcutaneous Daily    losartan  " 100 mg Per NG tube Daily    metoprolol tartrate  25 mg Per NG tube BID    senna-docusate 8.6-50 mg  1 tablet Per NG tube Daily    sodium chloride 0.9%  1,000 mL Intravenous Once     PRN Meds:acetaminophen, dextrose 50%, dextrose 50%, glucagon (human recombinant), insulin aspart U-100, labetalol, magnesium oxide, magnesium oxide, phenylephrine HCl in 0.9% NaCl, potassium chloride 10%, potassium chloride 10%, potassium chloride 10%, potassium, sodium phosphates, potassium, sodium phosphates, potassium, sodium phosphates, sodium chloride 0.9%     Review of Systems  Objective:     Weight: 78.9 kg (173 lb 15.1 oz)  Body mass index is 24.96 kg/m².  Vital Signs (Most Recent):  Temp: 99.2 °F (37.3 °C) (07/23/18 0705)  Pulse: 66 (07/23/18 0705)  Resp: 14 (07/23/18 0705)  BP: (!) 150/70 (07/23/18 0705)  SpO2: 100 % (07/23/18 0705) Vital Signs (24h Range):  Temp:  [98.7 °F (37.1 °C)-99.2 °F (37.3 °C)] 99.2 °F (37.3 °C)  Pulse:  [61-76] 66  Resp:  [13-28] 14  SpO2:  [99 %-100 %] 100 %  BP: (130-218)/() 150/70  Arterial Line BP: ()/(51-78) 126/52       Date 07/23/18 0700 - 07/24/18 0659   Shift 9493-6914 4934-6782 7417-7861 24 Hour Total   I  N  T  A  K  E   NG/GT 65   65    Shift Total  (mL/kg) 65  (0.8)   65  (0.8)   O  U  T  P  U  T   Urine  (mL/kg/hr) 75   75    Drains 0   0    Shift Total  (mL/kg) 75  (1)   75  (1)   Weight (kg) 78.9 78.9 78.9 78.9                        NG/OG Tube 07/16/18 1200 Left nostril (Active)   Placement Check placement verified by distal tube length measurement 7/23/2018  7:05 AM   Distal Tube Length (cm) 70 7/21/2018 11:05 PM   Tolerance no signs/symptoms of discomfort 7/23/2018  7:05 AM   Securement anchored to nostril center w/ adhesive device 7/23/2018  7:05 AM   Clamp Status/Tolerance unclamped 7/23/2018  7:05 AM   Insertion Site Appearance no redness, warmth, tenderness, skin breakdown, drainage 7/23/2018  7:05 AM   Flush/Irrigation flushed w/;water;no resistance met 7/23/2018   7:05 AM   Feeding Method continuous 7/23/2018  7:05 AM   Feeding Action feeding continued 7/23/2018  3:05 AM   Current Rate (mL/hr) 65 mL/hr 7/22/2018  7:01 PM   Goal Rate (mL/hr) 65 mL/hr 7/22/2018  7:01 PM   Intake (mL) 40 mL 7/22/2018  9:00 PM   Rate Formula Tube Feeding (mL/hr) 20 mL/hr 7/20/2018  3:05 PM   Formula Name Diabetasource 7/20/2018  3:05 PM   Intake (mL) - Formula Tube Feeding 65 7/23/2018  7:05 AM   Residual Amount (ml) 0 ml 7/22/2018  7:01 PM       Male External Urinary Catheter 07/18/18 1800 Medium (Active)   Collection Container Urimeter 7/23/2018  7:05 AM   Securement Method secured to top of thigh w/ adhesive device 7/23/2018  7:05 AM   Skin no redness;no breakdown 7/23/2018  7:05 AM   Tolerance no signs/symptoms of discomfort 7/23/2018  7:05 AM   Output (mL) 75 mL 7/23/2018  7:05 AM   Catheter Change Date 07/22/18 7/23/2018  3:05 AM   Catheter Change Time 1900 7/23/2018  3:05 AM            ICP/Ventriculostomy 07/21/18 1425 Ventricular drainage catheter with ICP microsensor Right Occipital region (Active)   Level of Ventriculostomy (cm above) 10 7/22/2018  7:01 PM   Status Open to drainage 7/23/2018  3:05 AM   Site Assessment Clean;Dry 7/23/2018  3:05 AM   Site Drainage No drainage 7/23/2018  3:05 AM   Waveform normal waveform 7/22/2018  7:01 PM   Output (mL) 0 mL 7/23/2018  7:05 AM   CSF Color orange 7/23/2018  3:05 AM   Dressing Status Biopatch in place;Clean;Dry 7/23/2018  3:05 AM   Interventions HOB degrees;bed controls locked;zeroed 7/23/2018  3:05 AM       Neurosurgery Physical Exam  E3V3M5  AOx1, intermittently to name.  R gaze  Does not blink to threat in L eye  Purposeful movements in BUE, localizing to stimulus  Moving RLE spontaneously  Withdraw LLE    Significant Labs:    Recent Labs  Lab 07/22/18  0157 07/22/18  1021 07/22/18  1751 07/23/18  0202   *  --   --  168*     --   --  140   K 3.7 4.0  --  3.5     --   --  107   CO2 24  --   --  27   BUN 17  --   --  11    CREATININE 0.8  --   --  0.7   CALCIUM 8.6*  --   --  8.5*   MG 1.8  --  1.8 2.0       Recent Labs  Lab 07/22/18  0157 07/23/18  0202   WBC 7.38 6.77   HGB 10.6* 10.5*   HCT 32.1* 30.9*    252     No results for input(s): LABPT, INR, APTT in the last 48 hours.  Microbiology Results (last 7 days)     ** No results found for the last 168 hours. **        Recent Lab Results       07/23/18  0525 07/23/18  0219 07/23/18  0202 07/22/18  2145 07/22/18  1757      Immature Granulocytes   0.4       Immature Grans (Abs)   0.03  Comment:  Mild elevation in immature granulocytes is non specific and   can be seen in a variety of conditions including stress response,   acute inflammation, trauma and pregnancy. Correlation with other   laboratory and clinical findings is essential.         Anion Gap   6(L)       Baso #   0.02       Basophil%   0.3       BUN, Bld   11       Calcium   8.5(L)       Chloride   107       CO2   27       Creatinine   0.7       Differential Method   Automated       eGFR if    >60.0       eGFR if non    >60.0  Comment:  Calculation used to obtain the estimated glomerular filtration  rate (eGFR) is the CKD-EPI equation.          Eos #   0.1       Eosinophil%   1.8       Glucose   168(H)       Gran # (ANC)   5.3       Gran%   77.6(H)       Hematocrit   30.9(L)       Hemoglobin   10.5(L)       Lymph #   0.9(L)       Lymph%   13.4(L)       Magnesium   2.0       MCH   29.2       MCHC   34.0       MCV   86       Mono #   0.4       Mono%   6.5       MPV   11.1       nRBC   0       Phosphorus   2.6(L)       Platelets   252       POCT Glucose 160(H) 192(H)  203(H) 218(H)     Potassium   3.5       RBC   3.59(L)       RDW   12.4       Sodium   140       WBC   6.77                   07/22/18  1751 07/22/18  1458 07/22/18  1021 07/22/18  1017      Immature Granulocytes         Immature Grans (Abs)         Anion Gap         Baso #         Basophil%         BUN, Bld         Calcium          Chloride         CO2         Creatinine         Differential Method         eGFR if          eGFR if non          Eos #         Eosinophil%         Glucose         Gran # (ANC)         Gran%         Hematocrit         Hemoglobin         Lymph #         Lymph%         Magnesium 1.8        MCH         MCHC         MCV         Mono #         Mono%         MPV         nRBC         Phosphorus 2.3(L)        Platelets         POCT Glucose  243(H)  227(H)     Potassium   4.0      RBC         RDW         Sodium         WBC               Significant Diagnostics:  I have reviewed all pertinent imaging results/findings within the past 24 hours.    Assessment/Plan:     * Nontraumatic intraventricular intracerebral hemorrhage    69 yo male with right thalamic hemorrhage (ICH score 2) initially found on 7/9/18 at outside hospital in [place]  HCT shows acute right thalamic hemorrhage with intraventricular extension.  Ventricular system is enlarged on 7/18 scan, HCT on 11/13/17 shows baseline ventricular enlargement.  There are no films available from recent admission in [place] for comparison     - EVD open to drain at 5 cm H2O  - CT head post EVD satisfactory  - q1h neuro checks  - EEG without seizures  - Continue ICU care  - Patient is DNR.   - Will follow, please call with questions or any change in neurologic status            Lin Valles MD  Neurosurgery  [facility]

## 2018-07-23 NOTE — ASSESSMENT & PLAN NOTE
7/21EVD at bedside by NSGy   For obstructive hydrocephalus.  7/23 EVD lowered to 5cm/h2o per NSGY Drained 46cc overnight. ICP 2-3 this am.

## 2018-07-23 NOTE — PLAN OF CARE
Problem: Patient Care Overview  Goal: Plan of Care Review  Outcome: Ongoing (interventions implemented as appropriate)  POC reviewed with pt and family at 1400. Pt family verbalized understanding. Questions and concerns addressed. No acute events today. Pt progressing toward goals. Will continue to monitor. See flowsheets for full assessment and VS info.

## 2018-07-23 NOTE — ASSESSMENT & PLAN NOTE
SBP goal 100-160  Amlodipine 10mg daily  Hydralazine 50mg q8h  Losartan 100mg daily  Metoprolol 25mg bid  Prn labetalol    Echo: 1 - Low normal left ventricular systolic function.     2 - Indeterminate LV diastolic function.     3 - Normal right ventricular systolic function .     4 - There is anterior pericardial fat pad..

## 2018-07-23 NOTE — ASSESSMENT & PLAN NOTE
A1C 7.2  accuchecks q4h with mod SSI  - detemir - 15 units bid  aspart 4 units q4h  Tube feeds diabetasource 65cc/h

## 2018-07-23 NOTE — PROGRESS NOTES
HAWA Mohr notified that patient appears more lethargic than prior two days. Came to bedside to assess patient. ICPs as documented. No new orders at present time. Will continue to monitor.

## 2018-07-23 NOTE — PROGRESS NOTES
" [facility]  Adult Nutrition  Progress Note    SUMMARY       Recommendations    Recommendation/Intervention:   1. Continue Diabetisource @ 65mL/hr.   - Provides 1872kcals, 94g protein and 1276mL free water.   - Hold for residuals >500mL.     2. If able to advance diet, recommend Diabetic with texture per SLP recommendations.     RD to monitor.    Goals: Meet % EEN, EPN  Nutrition Goal Status: goal met  Communication of RD Recs: reviewed with RN    Reason for Assessment    Reason for Assessment: RD follow-up  Diagnosis: other (see comments) (Intraventricular intracerebral hemorrhage)  Relevant Medical History: DM, HTN, HLD, Dementia  Interdisciplinary Rounds: did not attend  General Information Comments: Pt remains NPO. TF at goal, tolerating at goal rate without residuals.   Nutrition Discharge Planning: Unable to determine    Nutrition Risk Screen    Nutrition Risk Screen: no indicators present    Nutrition/Diet History    Patient Reported Diet/Restrictions/Preferences: other (see comments) ([place])  Do you have any cultural, spiritual, Zoroastrianism conflicts, given your current situation?: none stated  Factors Affecting Nutritional Intake: NPO, difficulty/impaired swallowing    Anthropometrics    Temp: 99.2 °F (37.3 °C)  Height Method: Stated (per family)  Height: 5' 10" (177.8 cm)  Height (inches): 70 in  Weight Method: Bed Scale  Weight: 78.9 kg (173 lb 15.1 oz)  Weight (lb): 173.94 lb  Ideal Body Weight (IBW), Male: 166 lb  % Ideal Body Weight, Male (lb): 104.78 lb  BMI (Calculated): 25  BMI Grade: 25 - 29.9 - overweight  Usual Body Weight (UBW), kg:  (CHESTER)       Lab/Procedures/Meds    Pertinent Labs Reviewed: reviewed  Pertinent Labs Comments: POCT Glu 160-243  Pertinent Medications Reviewed: reviewed  Pertinent Medications Comments: IVF, losartan, insulin    Physical Findings/Assessment    Overall Physical Appearance: nourished, lethargic  Tubes: nasogastric tube  Oral/Mouth Cavity: " WDL  Skin: intact    Estimated/Assessed Needs    Weight Used For Calorie Calculations: 79 kg (174 lb 2.6 oz)  Energy Calorie Requirements (kcal): 1951 kcal/d  Energy Need Method: [place]-St Jeor (1.25 PAL)  Protein Requirements: 79-95 g/d (1-1.2 g/kg)  Weight Used For Protein Calculations: 79 kg (174 lb 2.6 oz)     Fluid Need Method: other (see comments) (Per MD or 1 mL/kcal)  RDA Method (mL): 1951  CHO Requirement: 50% total kcals      Nutrition Prescription Ordered    Current Diet Order: NPO  Nutrition Order Comments: TF at goal  Current Nutrition Support Formula Ordered: Diabetisource AC  Current Nutrition Support Rate Ordered: 65 (ml)  Current Nutrition Support Frequency Ordered: mL/hr    Evaluation of Received Nutrient/Fluid Intake    Enteral Calories (kcal): 1872  Enteral Protein (gm): 94  Enteral (Free Water) Fluid (mL): 1276    % Kcal Needs: 96  % Protein Needs: 100    IV Fluid (mL): 1800  I/O: +I/O, good UOP, no BM documented    Energy Calories Required: meeting needs  Protein Required: meeting needs  Fluid Required: other (see comments) (per MD)    Comments: 0mL residuals 7/22  Tolerance: tolerating    % Intake of Estimated Energy Needs: 75 - 100 %  % Meal Intake: NPO    Nutrition Risk    Level of Risk/Frequency of Follow-up:  (f/u 1x/week)     Assessment and Plan    Nutrition Problem  Inadequate energy intake     Related to (etiology):   Inability to consume sufficient energy     Signs and Symptoms (as evidenced by):   NPO with no alternate means of nutrition     Nutrition Diagnosis Status:   Resolved       Monitor and Evaluation    Food and Nutrient Intake: energy intake, food and beverage intake, enteral nutrition intake  Food and Nutrient Adminstration: diet order, enteral and parenteral nutrition administration  Physical Activity and Function: nutrition-related ADLs and IADLs  Anthropometric Measurements: weight, weight change  Biochemical Data, Medical Tests and Procedures: lipid profile,  inflammatory profile, electrolyte and renal panel, gastrointestinal profile, glucose/endocrine profile  Nutrition-Focused Physical Findings: overall appearance     Nutrition Follow-Up    RD Follow-up?: Yes

## 2018-07-23 NOTE — SUBJECTIVE & OBJECTIVE
Interval History: NAEON. ICP 1-16.    Medications:  Continuous Infusions:   sodium chloride 0.9% 75 mL/hr at 07/23/18 0705     Scheduled Meds:   amiodarone  200 mg Per NG tube Daily    amLODIPine  10 mg Per NG tube Daily    atorvastatin  40 mg Per NG tube Daily    heparin (porcine)  5,000 Units Subcutaneous Q8H    hydrALAZINE  25 mg Per NG tube Q8H    insulin aspart U-100  4 Units Subcutaneous Q4H    insulin detemir U-100  20 Units Subcutaneous QHS    insulin detemir U-100  5 Units Subcutaneous Daily    losartan  100 mg Per NG tube Daily    metoprolol tartrate  25 mg Per NG tube BID    senna-docusate 8.6-50 mg  1 tablet Per NG tube Daily    sodium chloride 0.9%  1,000 mL Intravenous Once     PRN Meds:acetaminophen, dextrose 50%, dextrose 50%, glucagon (human recombinant), insulin aspart U-100, labetalol, magnesium oxide, magnesium oxide, phenylephrine HCl in 0.9% NaCl, potassium chloride 10%, potassium chloride 10%, potassium chloride 10%, potassium, sodium phosphates, potassium, sodium phosphates, potassium, sodium phosphates, sodium chloride 0.9%     Review of Systems  Objective:     Weight: 78.9 kg (173 lb 15.1 oz)  Body mass index is 24.96 kg/m².  Vital Signs (Most Recent):  Temp: 99.2 °F (37.3 °C) (07/23/18 0705)  Pulse: 66 (07/23/18 0705)  Resp: 14 (07/23/18 0705)  BP: (!) 150/70 (07/23/18 0705)  SpO2: 100 % (07/23/18 0705) Vital Signs (24h Range):  Temp:  [98.7 °F (37.1 °C)-99.2 °F (37.3 °C)] 99.2 °F (37.3 °C)  Pulse:  [61-76] 66  Resp:  [13-28] 14  SpO2:  [99 %-100 %] 100 %  BP: (130-218)/() 150/70  Arterial Line BP: ()/(51-78) 126/52       Date 07/23/18 0700 - 07/24/18 0659   Shift 6833-6779 2267-4489 9454-6050 24 Hour Total   I  N  T  A  K  E   NG/GT 65   65    Shift Total  (mL/kg) 65  (0.8)   65  (0.8)   O  U  T  P  U  T   Urine  (mL/kg/hr) 75   75    Drains 0   0    Shift Total  (mL/kg) 75  (1)   75  (1)   Weight (kg) 78.9 78.9 78.9 78.9                        NG/OG Tube 07/16/18  1200 Left nostril (Active)   Placement Check placement verified by distal tube length measurement 7/23/2018  7:05 AM   Distal Tube Length (cm) 70 7/21/2018 11:05 PM   Tolerance no signs/symptoms of discomfort 7/23/2018  7:05 AM   Securement anchored to nostril center w/ adhesive device 7/23/2018  7:05 AM   Clamp Status/Tolerance unclamped 7/23/2018  7:05 AM   Insertion Site Appearance no redness, warmth, tenderness, skin breakdown, drainage 7/23/2018  7:05 AM   Flush/Irrigation flushed w/;water;no resistance met 7/23/2018  7:05 AM   Feeding Method continuous 7/23/2018  7:05 AM   Feeding Action feeding continued 7/23/2018  3:05 AM   Current Rate (mL/hr) 65 mL/hr 7/22/2018  7:01 PM   Goal Rate (mL/hr) 65 mL/hr 7/22/2018  7:01 PM   Intake (mL) 40 mL 7/22/2018  9:00 PM   Rate Formula Tube Feeding (mL/hr) 20 mL/hr 7/20/2018  3:05 PM   Formula Name Diabetasource 7/20/2018  3:05 PM   Intake (mL) - Formula Tube Feeding 65 7/23/2018  7:05 AM   Residual Amount (ml) 0 ml 7/22/2018  7:01 PM       Male External Urinary Catheter 07/18/18 1800 Medium (Active)   Collection Container Urimeter 7/23/2018  7:05 AM   Securement Method secured to top of thigh w/ adhesive device 7/23/2018  7:05 AM   Skin no redness;no breakdown 7/23/2018  7:05 AM   Tolerance no signs/symptoms of discomfort 7/23/2018  7:05 AM   Output (mL) 75 mL 7/23/2018  7:05 AM   Catheter Change Date 07/22/18 7/23/2018  3:05 AM   Catheter Change Time 1900 7/23/2018  3:05 AM            ICP/Ventriculostomy 07/21/18 1425 Ventricular drainage catheter with ICP microsensor Right Occipital region (Active)   Level of Ventriculostomy (cm above) 10 7/22/2018  7:01 PM   Status Open to drainage 7/23/2018  3:05 AM   Site Assessment Clean;Dry 7/23/2018  3:05 AM   Site Drainage No drainage 7/23/2018  3:05 AM   Waveform normal waveform 7/22/2018  7:01 PM   Output (mL) 0 mL 7/23/2018  7:05 AM   CSF Color orange 7/23/2018  3:05 AM   Dressing Status Biopatch in place;Clean;Dry 7/23/2018   3:05 AM   Interventions HOB degrees;bed controls locked;zeroed 7/23/2018  3:05 AM       Neurosurgery Physical Exam  E3V3M5  AOx1, intermittently to name.  R gaze  Does not blink to threat in L eye  Purposeful movements in BUE, localizing to stimulus  Moving RLE spontaneously  Withdraw LLE    Significant Labs:    Recent Labs  Lab 07/22/18  0157 07/22/18  1021 07/22/18  1751 07/23/18  0202   *  --   --  168*     --   --  140   K 3.7 4.0  --  3.5     --   --  107   CO2 24  --   --  27   BUN 17  --   --  11   CREATININE 0.8  --   --  0.7   CALCIUM 8.6*  --   --  8.5*   MG 1.8  --  1.8 2.0       Recent Labs  Lab 07/22/18  0157 07/23/18  0202   WBC 7.38 6.77   HGB 10.6* 10.5*   HCT 32.1* 30.9*    252     No results for input(s): LABPT, INR, APTT in the last 48 hours.  Microbiology Results (last 7 days)     ** No results found for the last 168 hours. **        Recent Lab Results       07/23/18  0525 07/23/18  0219 07/23/18  0202 07/22/18  2145 07/22/18  1757      Immature Granulocytes   0.4       Immature Grans (Abs)   0.03  Comment:  Mild elevation in immature granulocytes is non specific and   can be seen in a variety of conditions including stress response,   acute inflammation, trauma and pregnancy. Correlation with other   laboratory and clinical findings is essential.         Anion Gap   6(L)       Baso #   0.02       Basophil%   0.3       BUN, Bld   11       Calcium   8.5(L)       Chloride   107       CO2   27       Creatinine   0.7       Differential Method   Automated       eGFR if    >60.0       eGFR if non    >60.0  Comment:  Calculation used to obtain the estimated glomerular filtration  rate (eGFR) is the CKD-EPI equation.          Eos #   0.1       Eosinophil%   1.8       Glucose   168(H)       Gran # (ANC)   5.3       Gran%   77.6(H)       Hematocrit   30.9(L)       Hemoglobin   10.5(L)       Lymph #   0.9(L)       Lymph%   13.4(L)       Magnesium    2.0       MCH   29.2       MCHC   34.0       MCV   86       Mono #   0.4       Mono%   6.5       MPV   11.1       nRBC   0       Phosphorus   2.6(L)       Platelets   252       POCT Glucose 160(H) 192(H)  203(H) 218(H)     Potassium   3.5       RBC   3.59(L)       RDW   12.4       Sodium   140       WBC   6.77                   07/22/18  1751 07/22/18  1458 07/22/18  1021 07/22/18  1017      Immature Granulocytes         Immature Grans (Abs)         Anion Gap         Baso #         Basophil%         BUN, Bld         Calcium         Chloride         CO2         Creatinine         Differential Method         eGFR if          eGFR if non          Eos #         Eosinophil%         Glucose         Gran # (ANC)         Gran%         Hematocrit         Hemoglobin         Lymph #         Lymph%         Magnesium 1.8        MCH         MCHC         MCV         Mono #         Mono%         MPV         nRBC         Phosphorus 2.3(L)        Platelets         POCT Glucose  243(H)  227(H)     Potassium   4.0      RBC         RDW         Sodium         WBC               Significant Diagnostics:  I have reviewed all pertinent imaging results/findings within the past 24 hours.

## 2018-07-23 NOTE — PROGRESS NOTES
Late entry:   7/22/18 0930 - Dr. Yan notified that arterial line is positional with dampened waveform, but still draws back. States to go off cuff pressures. Will continue to monitor.

## 2018-07-24 LAB
ANION GAP SERPL CALC-SCNC: 5 MMOL/L
BASOPHILS # BLD AUTO: 0.02 K/UL
BASOPHILS NFR BLD: 0.3 %
BUN SERPL-MCNC: 12 MG/DL
CALCIUM SERPL-MCNC: 8.6 MG/DL
CHLORIDE SERPL-SCNC: 105 MMOL/L
CO2 SERPL-SCNC: 28 MMOL/L
CREAT SERPL-MCNC: 0.7 MG/DL
DIFFERENTIAL METHOD: ABNORMAL
EOSINOPHIL # BLD AUTO: 0.2 K/UL
EOSINOPHIL NFR BLD: 2.1 %
ERYTHROCYTE [DISTWIDTH] IN BLOOD BY AUTOMATED COUNT: 12.6 %
EST. GFR  (AFRICAN AMERICAN): >60 ML/MIN/1.73 M^2
EST. GFR  (NON AFRICAN AMERICAN): >60 ML/MIN/1.73 M^2
GLUCOSE SERPL-MCNC: 231 MG/DL
HCT VFR BLD AUTO: 32.6 %
HGB BLD-MCNC: 10.4 G/DL
IMM GRANULOCYTES # BLD AUTO: 0.04 K/UL
IMM GRANULOCYTES NFR BLD AUTO: 0.6 %
LYMPHOCYTES # BLD AUTO: 0.8 K/UL
LYMPHOCYTES NFR BLD: 11.4 %
MAGNESIUM SERPL-MCNC: 1.9 MG/DL
MCH RBC QN AUTO: 28.7 PG
MCHC RBC AUTO-ENTMCNC: 31.9 G/DL
MCV RBC AUTO: 90 FL
MONOCYTES # BLD AUTO: 0.5 K/UL
MONOCYTES NFR BLD: 7.3 %
NEUTROPHILS # BLD AUTO: 5.7 K/UL
NEUTROPHILS NFR BLD: 78.3 %
NRBC BLD-RTO: 0 /100 WBC
PHOSPHATE SERPL-MCNC: 2.7 MG/DL
PHOSPHATE SERPL-MCNC: 3.3 MG/DL
PLATELET # BLD AUTO: 235 K/UL
PMV BLD AUTO: 11.1 FL
POCT GLUCOSE: 147 MG/DL (ref 70–110)
POCT GLUCOSE: 167 MG/DL (ref 70–110)
POCT GLUCOSE: 185 MG/DL (ref 70–110)
POCT GLUCOSE: 211 MG/DL (ref 70–110)
POCT GLUCOSE: 241 MG/DL (ref 70–110)
POCT GLUCOSE: 263 MG/DL (ref 70–110)
POCT GLUCOSE: 78 MG/DL (ref 70–110)
POTASSIUM SERPL-SCNC: 4 MMOL/L
RBC # BLD AUTO: 3.62 M/UL
SODIUM SERPL-SCNC: 138 MMOL/L
WBC # BLD AUTO: 7.25 K/UL

## 2018-07-24 PROCEDURE — 25000003 PHARM REV CODE 250: Performed by: PSYCHIATRY & NEUROLOGY

## 2018-07-24 PROCEDURE — 99233 SBSQ HOSP IP/OBS HIGH 50: CPT | Mod: GC,,, | Performed by: PSYCHIATRY & NEUROLOGY

## 2018-07-24 PROCEDURE — 25000003 PHARM REV CODE 250: Performed by: NURSE PRACTITIONER

## 2018-07-24 PROCEDURE — 99232 SBSQ HOSP IP/OBS MODERATE 35: CPT | Mod: ,,, | Performed by: NURSE PRACTITIONER

## 2018-07-24 PROCEDURE — 25000003 PHARM REV CODE 250: Performed by: STUDENT IN AN ORGANIZED HEALTH CARE EDUCATION/TRAINING PROGRAM

## 2018-07-24 PROCEDURE — 80048 BASIC METABOLIC PNL TOTAL CA: CPT

## 2018-07-24 PROCEDURE — 84100 ASSAY OF PHOSPHORUS: CPT | Mod: 91

## 2018-07-24 PROCEDURE — 85025 COMPLETE CBC W/AUTO DIFF WBC: CPT

## 2018-07-24 PROCEDURE — 83735 ASSAY OF MAGNESIUM: CPT

## 2018-07-24 PROCEDURE — 84100 ASSAY OF PHOSPHORUS: CPT

## 2018-07-24 PROCEDURE — 27000221 HC OXYGEN, UP TO 24 HOURS

## 2018-07-24 PROCEDURE — 63600175 PHARM REV CODE 636 W HCPCS: Performed by: NURSE PRACTITIONER

## 2018-07-24 PROCEDURE — 20000000 HC ICU ROOM

## 2018-07-24 RX ORDER — LABETALOL HYDROCHLORIDE 5 MG/ML
10 INJECTION, SOLUTION INTRAVENOUS EVERY 4 HOURS PRN
Status: DISCONTINUED | OUTPATIENT
Start: 2018-07-24 | End: 2018-08-01

## 2018-07-24 RX ORDER — INSULIN ASPART 100 [IU]/ML
5 INJECTION, SOLUTION INTRAVENOUS; SUBCUTANEOUS
Status: DISCONTINUED | OUTPATIENT
Start: 2018-07-24 | End: 2018-07-26

## 2018-07-24 RX ORDER — INSULIN ASPART 100 [IU]/ML
4 INJECTION, SOLUTION INTRAVENOUS; SUBCUTANEOUS
Status: DISCONTINUED | OUTPATIENT
Start: 2018-07-24 | End: 2018-07-24

## 2018-07-24 RX ORDER — MODAFINIL 100 MG/1
200 TABLET ORAL
Status: DISCONTINUED | OUTPATIENT
Start: 2018-07-25 | End: 2018-07-24

## 2018-07-24 RX ORDER — MODAFINIL 100 MG/1
100 TABLET ORAL
Status: DISCONTINUED | OUTPATIENT
Start: 2018-07-24 | End: 2018-08-02

## 2018-07-24 RX ORDER — MODAFINIL 100 MG/1
200 TABLET ORAL DAILY
Status: DISCONTINUED | OUTPATIENT
Start: 2018-07-24 | End: 2018-08-02

## 2018-07-24 RX ORDER — LABETALOL HYDROCHLORIDE 5 MG/ML
10 INJECTION, SOLUTION INTRAVENOUS EVERY 4 HOURS PRN
Status: DISCONTINUED | OUTPATIENT
Start: 2018-07-24 | End: 2018-07-24

## 2018-07-24 RX ADMIN — HYDRALAZINE HYDROCHLORIDE 75 MG: 50 TABLET ORAL at 09:07

## 2018-07-24 RX ADMIN — POTASSIUM & SODIUM PHOSPHATES POWDER PACK 280-160-250 MG 2 PACKET: 280-160-250 PACK at 05:07

## 2018-07-24 RX ADMIN — INSULIN ASPART 2 UNITS: 100 INJECTION, SOLUTION INTRAVENOUS; SUBCUTANEOUS at 07:07

## 2018-07-24 RX ADMIN — INSULIN ASPART 3 UNITS: 100 INJECTION, SOLUTION INTRAVENOUS; SUBCUTANEOUS at 03:07

## 2018-07-24 RX ADMIN — HYDRALAZINE HYDROCHLORIDE 50 MG: 50 TABLET ORAL at 05:07

## 2018-07-24 RX ADMIN — LABETALOL HYDROCHLORIDE 10 MG: 5 INJECTION, SOLUTION INTRAVENOUS at 02:07

## 2018-07-24 RX ADMIN — HYDRALAZINE HYDROCHLORIDE 75 MG: 50 TABLET ORAL at 04:07

## 2018-07-24 RX ADMIN — AMLODIPINE BESYLATE 10 MG: 10 TABLET ORAL at 09:07

## 2018-07-24 RX ADMIN — HEPARIN SODIUM 5000 UNITS: 5000 INJECTION, SOLUTION INTRAVENOUS; SUBCUTANEOUS at 03:07

## 2018-07-24 RX ADMIN — SODIUM CHLORIDE: 0.9 INJECTION, SOLUTION INTRAVENOUS at 07:07

## 2018-07-24 RX ADMIN — METOPROLOL TARTRATE 25 MG: 25 TABLET, FILM COATED ORAL at 09:07

## 2018-07-24 RX ADMIN — INSULIN ASPART 4 UNITS: 100 INJECTION, SOLUTION INTRAVENOUS; SUBCUTANEOUS at 08:07

## 2018-07-24 RX ADMIN — AMIODARONE HYDROCHLORIDE 200 MG: 200 TABLET ORAL at 09:07

## 2018-07-24 RX ADMIN — SENNOSIDES AND DOCUSATE SODIUM 1 TABLET: 8.6; 5 TABLET ORAL at 09:07

## 2018-07-24 RX ADMIN — MODAFINIL 100 MG: 100 TABLET ORAL at 04:07

## 2018-07-24 RX ADMIN — HEPARIN SODIUM 5000 UNITS: 5000 INJECTION, SOLUTION INTRAVENOUS; SUBCUTANEOUS at 09:07

## 2018-07-24 RX ADMIN — INSULIN ASPART 4 UNITS: 100 INJECTION, SOLUTION INTRAVENOUS; SUBCUTANEOUS at 03:07

## 2018-07-24 RX ADMIN — ATORVASTATIN CALCIUM 40 MG: 20 TABLET, FILM COATED ORAL at 09:07

## 2018-07-24 RX ADMIN — INSULIN ASPART 5 UNITS: 100 INJECTION, SOLUTION INTRAVENOUS; SUBCUTANEOUS at 11:07

## 2018-07-24 RX ADMIN — LABETALOL HYDROCHLORIDE 10 MG: 5 INJECTION, SOLUTION INTRAVENOUS at 07:07

## 2018-07-24 RX ADMIN — LOSARTAN POTASSIUM 100 MG: 50 TABLET, FILM COATED ORAL at 09:07

## 2018-07-24 RX ADMIN — INSULIN ASPART 5 UNITS: 100 INJECTION, SOLUTION INTRAVENOUS; SUBCUTANEOUS at 07:07

## 2018-07-24 RX ADMIN — INSULIN ASPART 5 UNITS: 100 INJECTION, SOLUTION INTRAVENOUS; SUBCUTANEOUS at 12:07

## 2018-07-24 RX ADMIN — INSULIN ASPART 2 UNITS: 100 INJECTION, SOLUTION INTRAVENOUS; SUBCUTANEOUS at 11:07

## 2018-07-24 RX ADMIN — HEPARIN SODIUM 5000 UNITS: 5000 INJECTION, SOLUTION INTRAVENOUS; SUBCUTANEOUS at 05:07

## 2018-07-24 RX ADMIN — MODAFINIL 200 MG: 100 TABLET ORAL at 09:07

## 2018-07-24 RX ADMIN — POTASSIUM & SODIUM PHOSPHATES POWDER PACK 280-160-250 MG 2 PACKET: 280-160-250 PACK at 09:07

## 2018-07-24 NOTE — ASSESSMENT & PLAN NOTE
SBP goal less than 180  Amlodipine 10mg daily  Hydralazine 75mg q8h  Losartan 100mg daily  Metoprolol 25mg bid  Prn labetalol    Echo: 1 - Low normal left ventricular systolic function.     2 - Indeterminate LV diastolic function.     3 - Normal right ventricular systolic function .     4 - There is anterior pericardial fat pad..

## 2018-07-24 NOTE — PHYSICIAN QUERY
PT Name: Ciro Chandler  MR #: [id number]     Physician Query Form - Documentation Clarification      CDS/: Rashaun Ordonez               Contact information: [phone]    This form is a permanent document in the medical record.     Query Date: July 24, 2018    By submitting this query, we are merely seeking further clarification of documentation. Please utilize your independent clinical judgment when addressing the question(s) below.    The Medical record reflects the following:    Supporting Clinical Findings Location in Medical Record     Cardiac/Vascular   Cardiomyopathy     Monitor  Low normal EF     Neuro   * ICH (intracerebral hemorrhage)     EVD at 5  hydrocephalus  SBP less than 180  PT OT SLP     Persistent atrial fibrillation     Amiodarone 200mg daily  Metoprolol 25mg bid  -rate controlled  -hold coumadin due to ICH          Essential hypertension     SBP goal less than 180  Amlodipine 10mg daily  Hydralazine 75mg q8h  Losartan 100mg daily  Metoprolol 25mg bid  Prn labetalol     Echo: 1 - Low normal left ventricular systolic function.     2 - Indeterminate LV diastolic function.     3 - Normal right ventricular systolic function .     4 - There is anterior pericardial fat pad..             Progress note Neuro CC 7/24 ([facility])   CONCLUSIONS     1 - Low normal left ventricular systolic function.     2 - Indeterminate LV diastolic function.     3 - Normal right ventricular systolic function .     4 - There is anterior pericardial fat pad..        Echo 7/19                                                                            Doctor, Please specify diagnosis or diagnoses associated with above clinical findings.    Provider Use Only    (   x  )  Non-Ischemic Cardiomyopathy    (     )  Ischemic Cardiomyopathy    (     )  Other____________________                                                                                                                           [  ] Clinically  undetermined

## 2018-07-24 NOTE — SUBJECTIVE & OBJECTIVE
Review of Systems  Unable to obtain a complete ROS due to level of consciousness.  Objective:     Vitals:  Temp: 98.5 °F (36.9 °C)  Pulse: 64  Rhythm: normal sinus rhythm  BP: 133/69  MAP (mmHg): 95  ICP Mean (mmHg): 8 mmHg  Resp: (!) 22  SpO2: 100 %  O2 Device (Oxygen Therapy): room air    Temp  Min: 98.5 °F (36.9 °C)  Max: 99.5 °F (37.5 °C)  Pulse  Min: 61  Max: 78  BP  Min: 129/63  Max: 196/101  MAP (mmHg)  Min: 91  Max: 160  ICP Mean (mmHg)  Min: 0 mmHg  Max: 9 mmHg  Resp  Min: 11  Max: 28  SpO2  Min: 100 %  Max: 100 %    07/23 0701 - 07/24 0700  In: 3491.3 [I.V.:1806.3]  Out: 2282 [Urine:2062; Drains:220]   Unmeasured Output  Urine Occurrence: 1  Stool Occurrence: 0       Physical Exam  GA: Alert, comfortable, no acute distress.   HEENT: No scleral icterus or JVD.   Pulmonary: Clear to auscultation A/L. No wheezing, crackles, or rhonchi.  Cardiac: RRR S1 & S2 w/o rubs/murmurs/gallops.   Abdominal: Bowel sounds present x 4. No appreciable hepatosplenomegaly.  Skin: No jaundice, rashes, or visible lesions.  Neuro:  --GCS: E4 V4 M5  --Mental Status:  AAO x 1, not following commands but moves all exts spontaneously weaker in LLE   --CN II-XII grossly intact.   --Pupils 3 mm, PERRL.   --Corneal reflex, gag, cough intact.  --LUE strength: 3/5  --LLE strength: 2/5  --RUE strength: 3/5  --RLE strength: 3/5      Medications:  Continuous Scheduled  amiodarone 200 mg Daily   amLODIPine 10 mg Daily   atorvastatin 40 mg Daily   heparin (porcine) 5,000 Units Q8H   hydrALAZINE 75 mg Q8H   insulin aspart U-100 5 Units Q4H   insulin detemir U-100 18 Units BID   losartan 100 mg Daily   metoprolol tartrate 25 mg BID   modafinil 100 mg After lunch   modafinil 200 mg Daily   senna-docusate 8.6-50 mg 1 tablet Daily   sodium chloride 0.9% 1,000 mL Once   PRN  acetaminophen 650 mg Q6H PRN   dextrose 50% 12.5 g PRN   dextrose 50% 12.5 g PRN   glucagon (human recombinant) 1 mg PRN   insulin aspart U-100 1-10 Units Q4H PRN   labetalol  10 mg Q4H PRN   magnesium oxide 800 mg PRN   magnesium oxide 800 mg PRN   phenylephrine HCl in 0.9% NaCl 100 mcg Q10 Min PRN   potassium chloride 10% 40 mEq PRN   potassium chloride 10% 40 mEq PRN   potassium chloride 10% 60 mEq PRN   potassium, sodium phosphates 2 packet PRN   potassium, sodium phosphates 2 packet PRN   potassium, sodium phosphates 2 packet PRN   sodium chloride 0.9% 5 mL PRN     Today I personally reviewed pertinent medications, lines/drains/airways, imaging, cardiology, lab results, microbiology results, notably:    Diet  Diet NPO  Diet NPO

## 2018-07-24 NOTE — PROGRESS NOTES
Patient's chart was reviewed by a stroke team provider and patient was seen with VN staff. Patient with EVD placed this morning. Minimal change today on exam.  There is no new imaging to review.  Pending diagnostics to follow up on include: none  For other recommendations please see our previous note completed on: 7/21/18    There are no new recommendations at this time. Will continue to follow. Discussed patient with staff. Please contact stroke team for any questions or concerns.

## 2018-07-24 NOTE — PLAN OF CARE
Problem: Patient Care Overview  Goal: Plan of Care Review  Outcome: Ongoing (interventions implemented as appropriate)  POC reviewed with Ciro Chandler and family at 1700. Family verbalized understanding; pt remains nonverbal. Questions and concerns addressed. No acute events today. Pt progressing toward goals. NGT restarted at 65 ml/hr. NS discontinued. Bilateral wrist restraints initiated in addition to mitts d/t removing lines. Pt more alert following modafinil administration; will occassionally state name. Will continue to monitor. See flowsheets for full assessment and VS info.

## 2018-07-24 NOTE — PROGRESS NOTES
"[facility]  Neurosurgery  Progress Note    Subjective:     History of Present Illness: 69 yo M with PMHx of HTN, HLD, DM, dementia, A. Fib, on coumadin, prior cerebral aneurysm treated 20 years ago at [facility]. Pt who presented [facility] in [place], [place] on 7/9/18 to with sudden-onset headache, right eye deviation, and L sided weakness. Head CT at that time revealed right thalamic hemorrhage.  Pt was treated conservatively without neurosurgical intervention.  Per wife, she was unhappy with the care in [place].  States patient has been very lethargic since he was initially admitted, without improvement,  and felt as though the "nothing was being done for him" She requested that patient be transferred to [facility] for further evaluation/treatment.  Pt was accepted to [facility] by Hospital medicine service, and currently admitted to the floor.  Pt has been lethargic and and unable to FC's since admission.    68 M with R thalamic ICH/IVH (ICH score 2) initially found on 7/9/18 admitted to OSH in [place] w/o intervention.     7/18: Pt has NG tube in place, as unable to swallow.  Pt is DNR.  Prior to his recent hospital admission, wife states patient was functioning well, ambulating normally, no previous weakness/speech difficulty.  He takes aspirin and coumadin at home (on hold now).  HCT was done this morning which shows acute blood with ventriculomegaly.          Post-Op Info:  * No surgery found *         Interval History: NAEON. ICP 1-4.    Medications:  Continuous Infusions:  Scheduled Meds:   amiodarone  200 mg Per NG tube Daily    amLODIPine  10 mg Per NG tube Daily    atorvastatin  40 mg Per NG tube Daily    heparin (porcine)  5,000 Units Subcutaneous Q8H    hydrALAZINE  75 mg Per NG tube Q8H    insulin aspart U-100  5 Units Subcutaneous Q4H    insulin detemir U-100  18 Units Subcutaneous BID    losartan  100 mg Per NG tube Daily    metoprolol tartrate  25 mg Per NG tube BID    modafinil  100 mg Per NG tube " After lunch    modafinil  200 mg Per NG tube Daily    senna-docusate 8.6-50 mg  1 tablet Per NG tube Daily    sodium chloride 0.9%  1,000 mL Intravenous Once     PRN Meds:acetaminophen, dextrose 50%, dextrose 50%, glucagon (human recombinant), insulin aspart U-100, labetalol, magnesium oxide, magnesium oxide, phenylephrine HCl in 0.9% NaCl, potassium chloride 10%, potassium chloride 10%, potassium chloride 10%, potassium, sodium phosphates, potassium, sodium phosphates, potassium, sodium phosphates, sodium chloride 0.9%     Review of Systems  Objective:     Weight: 82.8 kg (182 lb 8.7 oz)  Body mass index is 26.19 kg/m².  Vital Signs (Most Recent):  Temp: 98.5 °F (36.9 °C) (07/24/18 0705)  Pulse: 72 (07/24/18 1001)  Resp: 20 (07/24/18 1001)  BP: (!) 152/74 (07/24/18 1001)  SpO2: 100 % (07/24/18 1001) Vital Signs (24h Range):  Temp:  [98.5 °F (36.9 °C)-99.5 °F (37.5 °C)] 98.5 °F (36.9 °C)  Pulse:  [61-78] 72  Resp:  [11-28] 20  SpO2:  [100 %] 100 %  BP: (129-196)/() 152/74  Arterial Line BP: ()/() 227/227       Date 07/24/18 0700 - 07/25/18 0659   Shift 5049-2935 3679-7203 9336-5522 24 Hour Total   I  N  T  A  K  E   I.V.  (mL/kg) 230  (2.8)   230  (2.8)    NG/   195    Shift Total  (mL/kg) 425  (5.1)   425  (5.1)   O  U  T  P  U  T   Urine  (mL/kg/hr) 285   285    Drains 35   35    Shift Total  (mL/kg) 320  (3.9)   320  (3.9)   Weight (kg) 82.8 82.8 82.8 82.8                        NG/OG Tube 07/16/18 1200 Left nostril (Active)   Placement Check placement verified by distal tube length measurement 7/24/2018  3:05 AM   Distal Tube Length (cm) 70 7/24/2018  3:05 AM   Tolerance no signs/symptoms of discomfort 7/24/2018  3:05 AM   Securement anchored to nostril center w/ adhesive device 7/24/2018  3:05 AM   Clamp Status/Tolerance unclamped;no residual 7/24/2018  3:05 AM   Insertion Site Appearance no redness, warmth, tenderness, skin breakdown, drainage 7/24/2018  3:05 AM   Flush/Irrigation  flushed w/;water 7/24/2018  3:05 AM   Feeding Method continuous 7/24/2018  3:05 AM   Feeding Action feeding continued 7/24/2018  3:05 AM   Current Rate (mL/hr) 65 mL/hr 7/24/2018  3:05 AM   Goal Rate (mL/hr) 65 mL/hr 7/24/2018  3:05 AM   Intake (mL) 75 mL 7/24/2018  5:20 AM   Rate Formula Tube Feeding (mL/hr) 20 mL/hr 7/20/2018  3:05 PM   Formula Name Diabetasource 7/20/2018  3:05 PM   Intake (mL) - Formula Tube Feeding 0 7/24/2018 10:01 AM   Residual Amount (ml) 0 ml 7/22/2018  7:01 PM       Male External Urinary Catheter 07/18/18 1800 Medium (Active)   Collection Container Urimeter 7/24/2018  3:05 AM   Securement Method secured to lower ABD w/ adhesive device 7/24/2018  3:05 AM   Skin no redness;no breakdown 7/24/2018  3:05 AM   Tolerance no signs/symptoms of discomfort 7/24/2018  3:05 AM   Output (mL) 0 mL 7/24/2018  9:01 AM   Catheter Change Date 07/22/18 7/24/2018  3:05 AM   Catheter Change Time 1900 7/23/2018  3:05 AM            ICP/Ventriculostomy 07/21/18 1425 Ventricular drainage catheter with ICP microsensor Right Occipital region (Active)   Level of Ventriculostomy (cm above) 10 7/24/2018  3:05 AM   Status Open to drainage 7/24/2018  3:05 AM   Site Assessment Clean;Dry 7/24/2018  3:05 AM   Site Drainage No drainage 7/24/2018  3:05 AM   Waveform dampened 7/24/2018  3:05 AM   Output (mL) 11 mL 7/24/2018 10:01 AM   CSF Color clear;orange 7/24/2018  3:05 AM   Dressing Status Biopatch in place;Clean;Dry;Intact 7/24/2018  3:05 AM   Interventions HOB degrees;bed controls locked;zeroed 7/24/2018  3:05 AM       Neurosurgery Physical Exam  E3V3M5  AOx1, intermittently to name.  R gaze  Does not blink to threat in L eye  Purposeful movements in BUE, localizing to stimulus  Moving RLE spontaneously  Withdraw LLE    Significant Labs:    Recent Labs  Lab 07/22/18  1751 07/23/18  0202 07/23/18  1013 07/24/18  0319   GLU  --  168*  --  231*   NA  --  140  --  138   K  --  3.5 4.3 4.0   CL  --  107  --  105   CO2  --  27   --  28   BUN  --  11  --  12   CREATININE  --  0.7  --  0.7   CALCIUM  --  8.5*  --  8.6*   MG 1.8 2.0  --  1.9       Recent Labs  Lab 07/23/18  0202 07/24/18  0319   WBC 6.77 7.25   HGB 10.5* 10.4*   HCT 30.9* 32.6*    235     No results for input(s): LABPT, INR, APTT in the last 48 hours.  Microbiology Results (last 7 days)     ** No results found for the last 168 hours. **        Recent Lab Results       07/24/18  0827 07/24/18  0320 07/24/18  0319 07/24/18  0229 07/23/18  2307      Immature Granulocytes   0.6(H)       Immature Grans (Abs)   0.04  Comment:  Mild elevation in immature granulocytes is non specific and   can be seen in a variety of conditions including stress response,   acute inflammation, trauma and pregnancy. Correlation with other   laboratory and clinical findings is essential.         Anion Gap   5(L)       Baso #   0.02       Basophil%   0.3       BUN, Bld   12       Calcium   8.6(L)       Chloride   105       CO2   28       Creatinine   0.7       Differential Method   Automated       eGFR if    >60.0       eGFR if non    >60.0  Comment:  Calculation used to obtain the estimated glomerular filtration  rate (eGFR) is the CKD-EPI equation.          Eos #   0.2       Eosinophil%   2.1       Glucose   231(H)       Gran # (ANC)   5.7       Gran%   78.3(H)       Hematocrit   32.6(L)       Hemoglobin   10.4(L)       Lymph #   0.8(L)       Lymph%   11.4(L)       Magnesium   1.9       MCH   28.7       MCHC   31.9(L)       MCV   90       Mono #   0.5       Mono%   7.3       MPV   11.1       nRBC   0       Phosphorus   2.7       Platelets   235       POCT Glucose 211(H) 263(H)  241(H) 286(H)     Potassium   4.0       RBC   3.62(L)       RDW   12.6       Sodium   138       WBC   7.25                   07/23/18  2130 07/23/18  1843 07/23/18  1839 07/23/18  1408      Immature Granulocytes         Immature Grans (Abs)         Anion Gap         Baso #         Basophil%          BUN, Bld         Calcium         Chloride         CO2         Creatinine         Differential Method         eGFR if          eGFR if non          Eos #         Eosinophil%         Glucose         Gran # (ANC)         Gran%         Hematocrit         Hemoglobin         Lymph #         Lymph%         Magnesium         MCH         MCHC         MCV         Mono #         Mono%         MPV         nRBC         Phosphorus  2.8       Platelets         POCT Glucose 206(H)  221(H) 172(H)     Potassium         RBC         RDW         Sodium         WBC               Significant Diagnostics:  I have reviewed all pertinent imaging results/findings within the past 24 hours.    Assessment/Plan:     * Nontraumatic intraventricular intracerebral hemorrhage    69 yo male with right thalamic hemorrhage (ICH score 2) initially found on 7/9/18 at outside hospital in [place]  HCT shows acute right thalamic hemorrhage with intraventricular extension.  Ventricular system is enlarged on 7/18 scan, HCT on 11/13/17 shows baseline ventricular enlargement.  There are no films available from recent admission in [place] for comparison     - EVD open to drain at 5 cm H2O  - CT head post EVD satisfactory  - q1h neuro checks  - EEG without seizures  - Continue ICU care  - Patient is DNR.   - Will follow, please call with questions or any change in neurologic status            Lin Valles MD  Neurosurgery  [facility]

## 2018-07-24 NOTE — ASSESSMENT & PLAN NOTE
69 yo male with right thalamic hemorrhage (ICH score 2) initially found on 7/9/18 at outside hospital in [place]  HCT shows acute right thalamic hemorrhage with intraventricular extension.  Ventricular system is enlarged on 7/18 scan, HCT on 11/13/17 shows baseline ventricular enlargement.  There are no films available from recent admission in [place] for comparison     - EVD open to drain at 5 cm H2O  - CT head post EVD satisfactory  - q1h neuro checks  - EEG without seizures  - Continue ICU care  - Patient is DNR.   - Will follow, please call with questions or any change in neurologic status

## 2018-07-24 NOTE — PHYSICIAN QUERY
PT Name: Ciro Chandler  MR #: [id number]     CDS/: Rashaun Ordonez               Contact information: [phone]  This form is a permanent document in the medical record.     Query Date: July 24, 2018    By submitting this query, we are merely seeking further clarification of documentation to reflect the severity of illness of your patient. Please utilize your independent clinical judgment when addressing the question(s) below.    The Medical record reflects the following:     Indicators   Supporting Clinical Findings Location in Medical Record   x AMS, Confusion,  LOC, etc.  Encephalopathy acute     Due to ICH and hydrocephalus    Progress note 7/24 Neuro CC (Andras)   x Acute / Chronic Illness PMHx of HTN, HLD, DM, dementia, A. Fib, on coumadin as outpatient who was [place] [facility] where he was reportedly admitted for R thalamic IPH w IVH Progress note 7/24 Neuro CC (Andras)   x Radiology Findings Impression       1. Interval placement of right frontal coursing ventriculostomy with unchanged size and configuration of the shunted ventricular system, which remains prominent.  2. Evolution of right thalamic intraparenchymal hemorrhage with stable mass effect.  3. Chronic microvascular ischemic change and remote infarcts as above.      C T Head 7/22    Electrolyte Imbalance      Medication     x Treatment         Obstructive hydrocephalus     7/21EVD at bedside by NSGy   For obstructive hydrocephalus.  7/23 EVD lowered to 5cm/h2o per NSGY Drained 46cc overnight. ICP 2-3 this am.  7/24 EVD remains at 5cm ICP 4-8     Dementia without behavioral disturbance     --holding home aricept      Progress note 7/24 Neuro CC (Andras)    Other                Encephalopathy- is a general term for any diffuse disease of the brain that alters brain function or structure. Treatment of the cognitive dysfunction varies but is ultimately dependent on the treatment of the underlying condition.    Major  Symptoms of Encephalopathy - Decreased level of consciousness, fluctuating alertness/concentration, confusion, agitation, lethargy, somnolence, drowsiness, obtundation, stupor, or coma.         References: National Institutes of Healths (NIH) National [place] of Neurological Disorders and Strokes;  HCPro 2016; Advisory Board     Clinical Guidelines:   These guidelines will set system standards to assist providers in managing, documentation, and coding of encephalopathy. The intent of this document is to serve as a system guideline, not replace the providers clinical judgment:  Provider, please specify the diagnosis or diagnoses associated with above clinical findings.      [   ] Metabolic Encephalopathy - Due to electrolye imbalance, metabolic derangements, or infections processes, includes Septic Encephalopthy  [   ] Other Encephalopathy - Includes uremic encephalopathy  [   ] Unspecified Encephalopathy     [ x  ] Other Neurological Condition-  Includes Post-ictal altered mental status. (please specify condition): _________intraventricular hemorrhage  [   ] Clinically Undetermined  Please document in your progress notes daily for the duration of treatment until resolved, and include in your discharge summary.

## 2018-07-24 NOTE — PROGRESS NOTES
[facility]  Neurocritical Care  Progress Note    Admit Date: 7/17/2018  Service Date: 07/24/2018  Length of Stay: 7    Subjective:     Chief Complaint: ICH (intracerebral hemorrhage)    History of Present Illness: 67 yo M with PMHx of HTN, HLD, DM, dementia, A. Fib, on coumadin as outpatient who was transferred to [facility] from [facility] where he was reportedly admitted for R thalamic IPH w IVH. Per OSH records, patient admitted to ICU and sent to floor with little follow up imaging and no interval improvement in exam. Wife requested second opinion thus was transferred to [facility] however for unknown reason was admitted to hospital medicine service last night. [facility] called this morning after seen by Nsgy due to concern for airway protection. CT this morning with R thalamic IPH and lateral ventricle extension bilaterally and slight enlargement of ventricular system. Of note baseline enlargement of vents at previous CT a year ago. On exam patient with GCS of 8, however per wife and OSH records, this is baseline since initial admission in [place]. Will admit to [facility] for higher level of care.       Hospital Course: --admitted to [facility] 7/18.   7/21: EVD at bedside by NSGY  7/23 EVD in place lowered to 5cm h2o per nsgy.. Blood sugars running >200 adjusted detemir. Increased hydralazine for better BP control  7/24: EVD at 5, insulin adjustments       Review of Systems  Unable to obtain a complete ROS due to level of consciousness.  Objective:     Vitals:  Temp: 98.5 °F (36.9 °C)  Pulse: 64  Rhythm: normal sinus rhythm  BP: 133/69  MAP (mmHg): 95  ICP Mean (mmHg): 8 mmHg  Resp: (!) 22  SpO2: 100 %  O2 Device (Oxygen Therapy): room air    Temp  Min: 98.5 °F (36.9 °C)  Max: 99.5 °F (37.5 °C)  Pulse  Min: 61  Max: 78  BP  Min: 129/63  Max: 196/101  MAP (mmHg)  Min: 91  Max: 160  ICP Mean (mmHg)  Min: 0 mmHg  Max: 9 mmHg  Resp  Min: 11  Max: 28  SpO2  Min: 100 %  Max: 100 %    07/23 0701 - 07/24 0700  In: 3491.3  [I.V.:1806.3]  Out: 2282 [Urine:2062; Drains:220]   Unmeasured Output  Urine Occurrence: 1  Stool Occurrence: 0       Physical Exam  GA: Alert, comfortable, no acute distress.   HEENT: No scleral icterus or JVD.   Pulmonary: Clear to auscultation A/L. No wheezing, crackles, or rhonchi.  Cardiac: RRR S1 & S2 w/o rubs/murmurs/gallops.   Abdominal: Bowel sounds present x 4. No appreciable hepatosplenomegaly.  Skin: No jaundice, rashes, or visible lesions.  Neuro:  --GCS: E4 V4 M5  --Mental Status:  AAO x 1, not following commands but moves all exts spontaneously weaker in LLE   --CN II-XII grossly intact.   --Pupils 3 mm, PERRL.   --Corneal reflex, gag, cough intact.  --LUE strength: 3/5  --LLE strength: 2/5  --RUE strength: 3/5  --RLE strength: 3/5      Medications:  Continuous Scheduled  amiodarone 200 mg Daily   amLODIPine 10 mg Daily   atorvastatin 40 mg Daily   heparin (porcine) 5,000 Units Q8H   hydrALAZINE 75 mg Q8H   insulin aspart U-100 5 Units Q4H   insulin detemir U-100 18 Units BID   losartan 100 mg Daily   metoprolol tartrate 25 mg BID   modafinil 100 mg After lunch   modafinil 200 mg Daily   senna-docusate 8.6-50 mg 1 tablet Daily   sodium chloride 0.9% 1,000 mL Once   PRN  acetaminophen 650 mg Q6H PRN   dextrose 50% 12.5 g PRN   dextrose 50% 12.5 g PRN   glucagon (human recombinant) 1 mg PRN   insulin aspart U-100 1-10 Units Q4H PRN   labetalol 10 mg Q4H PRN   magnesium oxide 800 mg PRN   magnesium oxide 800 mg PRN   phenylephrine HCl in 0.9% NaCl 100 mcg Q10 Min PRN   potassium chloride 10% 40 mEq PRN   potassium chloride 10% 40 mEq PRN   potassium chloride 10% 60 mEq PRN   potassium, sodium phosphates 2 packet PRN   potassium, sodium phosphates 2 packet PRN   potassium, sodium phosphates 2 packet PRN   sodium chloride 0.9% 5 mL PRN     Today I personally reviewed pertinent medications, lines/drains/airways, imaging, cardiology, lab results, microbiology results, notably:    Diet  Diet NPO  Diet  NPO        Assessment/Plan:     Neuro   * ICH (intracerebral hemorrhage)    EVD at 5  hydrocephalus  SBP less than 180  PT OT SLP          Encephalopathy acute    Due to ICH and hydrocephalus        Brain compression    From hydrocephalus from IVH  EVD @ 5cm h2o  neurochecks q1h        Obstructive hydrocephalus    7/21EVD at bedside by NSGy   For obstructive hydrocephalus.  7/23 EVD lowered to 5cm/h2o per NSGY Drained 46cc overnight. ICP 2-3 this am.  7/24 EVD remains at 5cm ICP 4-8        Nontraumatic intraventricular intracerebral hemorrhage    See ICH        Dementia without behavioral disturbance    --holding home aricept          Cardiac/Vascular   Cardiomyopathy    Monitor  Low normal EF        Persistent atrial fibrillation    Amiodarone 200mg daily  Metoprolol 25mg bid  -rate controlled  -hold coumadin due to ICH        Essential hypertension    SBP goal less than 180  Amlodipine 10mg daily  Hydralazine 75mg q8h  Losartan 100mg daily  Metoprolol 25mg bid  Prn labetalol    Echo: 1 - Low normal left ventricular systolic function.     2 - Indeterminate LV diastolic function.     3 - Normal right ventricular systolic function .     4 - There is anterior pericardial fat pad..           Typical atrial flutter    Amiodarone 200mg daily for A-fib/flutter        Endocrine   Type 2 diabetes mellitus with diabetic polyneuropathy, with long-term current use of insulin    A1C 7.2  accuchecks q4h with mod SSI  - detemir - 18 units bid  aspart 5 units q4h  Tube feeds diabetasource 65cc/h          GI   Dysphagia    SLP following  NGT in place, and getting tube feeds              Activity Orders          Straight Cath starting at 07/18 0231        DNR    Maico Gray NP  Neurocritical Care  [facility]

## 2018-07-24 NOTE — PROGRESS NOTES
Dr. Hayes with [facility] notified of pt's due 4 units of scheduled insulin aspart this AM but administering now would put pt at risk for insulin stacking since last dose was administered at 2300 on 7/23 (d/t RNs at CT with other pt). MD ordered okay to change insulin scheduled for next hour and continue to administer next scheduled 4 units every 4 hours after its administration. Readback performed. RN to adjust insulin schedule and administer at appropriate time.

## 2018-07-24 NOTE — SUBJECTIVE & OBJECTIVE
Interval History: NAEON. ICP 1-4.    Medications:  Continuous Infusions:  Scheduled Meds:   amiodarone  200 mg Per NG tube Daily    amLODIPine  10 mg Per NG tube Daily    atorvastatin  40 mg Per NG tube Daily    heparin (porcine)  5,000 Units Subcutaneous Q8H    hydrALAZINE  75 mg Per NG tube Q8H    insulin aspart U-100  5 Units Subcutaneous Q4H    insulin detemir U-100  18 Units Subcutaneous BID    losartan  100 mg Per NG tube Daily    metoprolol tartrate  25 mg Per NG tube BID    modafinil  100 mg Per NG tube After lunch    modafinil  200 mg Per NG tube Daily    senna-docusate 8.6-50 mg  1 tablet Per NG tube Daily    sodium chloride 0.9%  1,000 mL Intravenous Once     PRN Meds:acetaminophen, dextrose 50%, dextrose 50%, glucagon (human recombinant), insulin aspart U-100, labetalol, magnesium oxide, magnesium oxide, phenylephrine HCl in 0.9% NaCl, potassium chloride 10%, potassium chloride 10%, potassium chloride 10%, potassium, sodium phosphates, potassium, sodium phosphates, potassium, sodium phosphates, sodium chloride 0.9%     Review of Systems  Objective:     Weight: 82.8 kg (182 lb 8.7 oz)  Body mass index is 26.19 kg/m².  Vital Signs (Most Recent):  Temp: 98.5 °F (36.9 °C) (07/24/18 0705)  Pulse: 72 (07/24/18 1001)  Resp: 20 (07/24/18 1001)  BP: (!) 152/74 (07/24/18 1001)  SpO2: 100 % (07/24/18 1001) Vital Signs (24h Range):  Temp:  [98.5 °F (36.9 °C)-99.5 °F (37.5 °C)] 98.5 °F (36.9 °C)  Pulse:  [61-78] 72  Resp:  [11-28] 20  SpO2:  [100 %] 100 %  BP: (129-196)/() 152/74  Arterial Line BP: ()/() 227/227       Date 07/24/18 0700 - 07/25/18 0659   Shift 1158-6538 5504-9148 1533-7513 24 Hour Total   I  N  T  A  K  E   I.V.  (mL/kg) 230  (2.8)   230  (2.8)    NG/   195    Shift Total  (mL/kg) 425  (5.1)   425  (5.1)   O  U  T  P  U  T   Urine  (mL/kg/hr) 285   285    Drains 35   35    Shift Total  (mL/kg) 320  (3.9)   320  (3.9)   Weight (kg) 82.8 82.8 82.8 82.8                         NG/OG Tube 07/16/18 1200 Left nostril (Active)   Placement Check placement verified by distal tube length measurement 7/24/2018  3:05 AM   Distal Tube Length (cm) 70 7/24/2018  3:05 AM   Tolerance no signs/symptoms of discomfort 7/24/2018  3:05 AM   Securement anchored to nostril center w/ adhesive device 7/24/2018  3:05 AM   Clamp Status/Tolerance unclamped;no residual 7/24/2018  3:05 AM   Insertion Site Appearance no redness, warmth, tenderness, skin breakdown, drainage 7/24/2018  3:05 AM   Flush/Irrigation flushed w/;water 7/24/2018  3:05 AM   Feeding Method continuous 7/24/2018  3:05 AM   Feeding Action feeding continued 7/24/2018  3:05 AM   Current Rate (mL/hr) 65 mL/hr 7/24/2018  3:05 AM   Goal Rate (mL/hr) 65 mL/hr 7/24/2018  3:05 AM   Intake (mL) 75 mL 7/24/2018  5:20 AM   Rate Formula Tube Feeding (mL/hr) 20 mL/hr 7/20/2018  3:05 PM   Formula Name Diabetasource 7/20/2018  3:05 PM   Intake (mL) - Formula Tube Feeding 0 7/24/2018 10:01 AM   Residual Amount (ml) 0 ml 7/22/2018  7:01 PM       Male External Urinary Catheter 07/18/18 1800 Medium (Active)   Collection Container Urimeter 7/24/2018  3:05 AM   Securement Method secured to lower ABD w/ adhesive device 7/24/2018  3:05 AM   Skin no redness;no breakdown 7/24/2018  3:05 AM   Tolerance no signs/symptoms of discomfort 7/24/2018  3:05 AM   Output (mL) 0 mL 7/24/2018  9:01 AM   Catheter Change Date 07/22/18 7/24/2018  3:05 AM   Catheter Change Time 1900 7/23/2018  3:05 AM            ICP/Ventriculostomy 07/21/18 1425 Ventricular drainage catheter with ICP microsensor Right Occipital region (Active)   Level of Ventriculostomy (cm above) 10 7/24/2018  3:05 AM   Status Open to drainage 7/24/2018  3:05 AM   Site Assessment Clean;Dry 7/24/2018  3:05 AM   Site Drainage No drainage 7/24/2018  3:05 AM   Waveform dampened 7/24/2018  3:05 AM   Output (mL) 11 mL 7/24/2018 10:01 AM   CSF Color clear;orange 7/24/2018  3:05 AM   Dressing Status Biopatch in  place;Clean;Dry;Intact 7/24/2018  3:05 AM   Interventions HOB degrees;bed controls locked;zeroed 7/24/2018  3:05 AM       Neurosurgery Physical Exam  E3V3M5  AOx1, intermittently to name.  R gaze  Does not blink to threat in L eye  Purposeful movements in BUE, localizing to stimulus  Moving RLE spontaneously  Withdraw LLE    Significant Labs:    Recent Labs  Lab 07/22/18  1751 07/23/18  0202 07/23/18  1013 07/24/18  0319   GLU  --  168*  --  231*   NA  --  140  --  138   K  --  3.5 4.3 4.0   CL  --  107  --  105   CO2  --  27  --  28   BUN  --  11  --  12   CREATININE  --  0.7  --  0.7   CALCIUM  --  8.5*  --  8.6*   MG 1.8 2.0  --  1.9       Recent Labs  Lab 07/23/18  0202 07/24/18  0319   WBC 6.77 7.25   HGB 10.5* 10.4*   HCT 30.9* 32.6*    235     No results for input(s): LABPT, INR, APTT in the last 48 hours.  Microbiology Results (last 7 days)     ** No results found for the last 168 hours. **        Recent Lab Results       07/24/18  0827 07/24/18  0320 07/24/18  0319 07/24/18  0229 07/23/18  2307      Immature Granulocytes   0.6(H)       Immature Grans (Abs)   0.04  Comment:  Mild elevation in immature granulocytes is non specific and   can be seen in a variety of conditions including stress response,   acute inflammation, trauma and pregnancy. Correlation with other   laboratory and clinical findings is essential.         Anion Gap   5(L)       Baso #   0.02       Basophil%   0.3       BUN, Bld   12       Calcium   8.6(L)       Chloride   105       CO2   28       Creatinine   0.7       Differential Method   Automated       eGFR if    >60.0       eGFR if non    >60.0  Comment:  Calculation used to obtain the estimated glomerular filtration  rate (eGFR) is the CKD-EPI equation.          Eos #   0.2       Eosinophil%   2.1       Glucose   231(H)       Gran # (ANC)   5.7       Gran%   78.3(H)       Hematocrit   32.6(L)       Hemoglobin   10.4(L)       Lymph #   0.8(L)        Lymph%   11.4(L)       Magnesium   1.9       MCH   28.7       MCHC   31.9(L)       MCV   90       Mono #   0.5       Mono%   7.3       MPV   11.1       nRBC   0       Phosphorus   2.7       Platelets   235       POCT Glucose 211(H) 263(H)  241(H) 286(H)     Potassium   4.0       RBC   3.62(L)       RDW   12.6       Sodium   138       WBC   7.25                   07/23/18  2130 07/23/18  1843 07/23/18  1839 07/23/18  1408      Immature Granulocytes         Immature Grans (Abs)         Anion Gap         Baso #         Basophil%         BUN, Bld         Calcium         Chloride         CO2         Creatinine         Differential Method         eGFR if          eGFR if non          Eos #         Eosinophil%         Glucose         Gran # (ANC)         Gran%         Hematocrit         Hemoglobin         Lymph #         Lymph%         Magnesium         MCH         MCHC         MCV         Mono #         Mono%         MPV         nRBC         Phosphorus  2.8       Platelets         POCT Glucose 206(H)  221(H) 172(H)     Potassium         RBC         RDW         Sodium         WBC               Significant Diagnostics:  I have reviewed all pertinent imaging results/findings within the past 24 hours.

## 2018-07-24 NOTE — PLAN OF CARE
Problem: Patient Care Overview  Goal: Plan of Care Review  POC reviewed with pt at 0500. Pt did not verbalized understanding. Questions and concerns were addressed with shavon prior to her depature last night. Insulin schedule adjusted w/ MD order d/t risk for insulin stacking. PRN labetalol administered once during shift. PM bath given. Phos replaced per standing PRN order. No acute events overnight. Pt progressing toward goals. On coming RN will continue to monitor. See flowsheets for full assessment and VS info

## 2018-07-24 NOTE — NURSING
Neurosurgery paged to confirm level of EVD. Drain order states keep EVD open at 5 cmH20 but pt has been at 10 cmH20 throughout the night. Per LIANE Sandoval from neurosurgery, RN to lower drain to 5 cmH20.

## 2018-07-24 NOTE — ASSESSMENT & PLAN NOTE
7/21EVD at bedside by NSGy   For obstructive hydrocephalus.  7/23 EVD lowered to 5cm/h2o per NSGY Drained 46cc overnight. ICP 2-3 this am.  7/24 EVD remains at 5cm ICP 4-8

## 2018-07-24 NOTE — ASSESSMENT & PLAN NOTE
A1C 7.2  accuchecks q4h with mod SSI  - detemir - 18 units bid  aspart 5 units q4h  Tube feeds diabetasource 65cc/h

## 2018-07-25 LAB
ANION GAP SERPL CALC-SCNC: 8 MMOL/L
BASOPHILS # BLD AUTO: 0.02 K/UL
BASOPHILS NFR BLD: 0.3 %
BUN SERPL-MCNC: 12 MG/DL
CALCIUM SERPL-MCNC: 9.4 MG/DL
CHLORIDE SERPL-SCNC: 102 MMOL/L
CO2 SERPL-SCNC: 27 MMOL/L
CREAT SERPL-MCNC: 0.7 MG/DL
DIFFERENTIAL METHOD: ABNORMAL
EOSINOPHIL # BLD AUTO: 0.1 K/UL
EOSINOPHIL NFR BLD: 1.5 %
ERYTHROCYTE [DISTWIDTH] IN BLOOD BY AUTOMATED COUNT: 12.4 %
EST. GFR  (AFRICAN AMERICAN): >60 ML/MIN/1.73 M^2
EST. GFR  (NON AFRICAN AMERICAN): >60 ML/MIN/1.73 M^2
GLUCOSE SERPL-MCNC: 150 MG/DL
HCT VFR BLD AUTO: 31.8 %
HGB BLD-MCNC: 10.8 G/DL
IMM GRANULOCYTES # BLD AUTO: 0.05 K/UL
IMM GRANULOCYTES NFR BLD AUTO: 0.7 %
LYMPHOCYTES # BLD AUTO: 0.9 K/UL
LYMPHOCYTES NFR BLD: 11.9 %
MAGNESIUM SERPL-MCNC: 1.8 MG/DL
MCH RBC QN AUTO: 29.5 PG
MCHC RBC AUTO-ENTMCNC: 34 G/DL
MCV RBC AUTO: 87 FL
MONOCYTES # BLD AUTO: 0.6 K/UL
MONOCYTES NFR BLD: 8.1 %
NEUTROPHILS # BLD AUTO: 5.9 K/UL
NEUTROPHILS NFR BLD: 77.5 %
NRBC BLD-RTO: 0 /100 WBC
PHOSPHATE SERPL-MCNC: 3 MG/DL
PLATELET # BLD AUTO: 257 K/UL
PMV BLD AUTO: 11.8 FL
POCT GLUCOSE: 131 MG/DL (ref 70–110)
POCT GLUCOSE: 140 MG/DL (ref 70–110)
POCT GLUCOSE: 146 MG/DL (ref 70–110)
POCT GLUCOSE: 167 MG/DL (ref 70–110)
POCT GLUCOSE: 179 MG/DL (ref 70–110)
POCT GLUCOSE: 209 MG/DL (ref 70–110)
POCT GLUCOSE: 226 MG/DL (ref 70–110)
POTASSIUM SERPL-SCNC: 3.7 MMOL/L
RBC # BLD AUTO: 3.66 M/UL
SODIUM SERPL-SCNC: 137 MMOL/L
WBC # BLD AUTO: 7.57 K/UL

## 2018-07-25 PROCEDURE — 80048 BASIC METABOLIC PNL TOTAL CA: CPT

## 2018-07-25 PROCEDURE — 99232 SBSQ HOSP IP/OBS MODERATE 35: CPT | Mod: ,,, | Performed by: NURSE PRACTITIONER

## 2018-07-25 PROCEDURE — 20000000 HC ICU ROOM

## 2018-07-25 PROCEDURE — 92610 EVALUATE SWALLOWING FUNCTION: CPT

## 2018-07-25 PROCEDURE — 84100 ASSAY OF PHOSPHORUS: CPT

## 2018-07-25 PROCEDURE — 97110 THERAPEUTIC EXERCISES: CPT

## 2018-07-25 PROCEDURE — 99232 SBSQ HOSP IP/OBS MODERATE 35: CPT | Mod: GC,,, | Performed by: NEUROLOGICAL SURGERY

## 2018-07-25 PROCEDURE — 25000003 PHARM REV CODE 250: Performed by: NURSE PRACTITIONER

## 2018-07-25 PROCEDURE — G8988 SELF CARE GOAL STATUS: HCPCS | Mod: CL

## 2018-07-25 PROCEDURE — 99233 SBSQ HOSP IP/OBS HIGH 50: CPT | Mod: GC,,, | Performed by: PSYCHIATRY & NEUROLOGY

## 2018-07-25 PROCEDURE — 85025 COMPLETE CBC W/AUTO DIFF WBC: CPT

## 2018-07-25 PROCEDURE — 25000003 PHARM REV CODE 250: Performed by: STUDENT IN AN ORGANIZED HEALTH CARE EDUCATION/TRAINING PROGRAM

## 2018-07-25 PROCEDURE — G8987 SELF CARE CURRENT STATUS: HCPCS | Mod: CM

## 2018-07-25 PROCEDURE — 97166 OT EVAL MOD COMPLEX 45 MIN: CPT

## 2018-07-25 PROCEDURE — G8996 SWALLOW CURRENT STATUS: HCPCS | Mod: CJ

## 2018-07-25 PROCEDURE — G8997 SWALLOW GOAL STATUS: HCPCS | Mod: CJ

## 2018-07-25 PROCEDURE — 83735 ASSAY OF MAGNESIUM: CPT

## 2018-07-25 PROCEDURE — 63600175 PHARM REV CODE 636 W HCPCS: Performed by: NURSE PRACTITIONER

## 2018-07-25 PROCEDURE — 25000003 PHARM REV CODE 250: Performed by: PSYCHIATRY & NEUROLOGY

## 2018-07-25 PROCEDURE — 92523 SPEECH SOUND LANG COMPREHEN: CPT

## 2018-07-25 RX ADMIN — HYDRALAZINE HYDROCHLORIDE 75 MG: 50 TABLET ORAL at 06:07

## 2018-07-25 RX ADMIN — ATORVASTATIN CALCIUM 40 MG: 20 TABLET, FILM COATED ORAL at 09:07

## 2018-07-25 RX ADMIN — ACETAMINOPHEN 650 MG: 325 TABLET, FILM COATED ORAL at 09:07

## 2018-07-25 RX ADMIN — HEPARIN SODIUM 5000 UNITS: 5000 INJECTION, SOLUTION INTRAVENOUS; SUBCUTANEOUS at 06:07

## 2018-07-25 RX ADMIN — HYDRALAZINE HYDROCHLORIDE 75 MG: 50 TABLET ORAL at 03:07

## 2018-07-25 RX ADMIN — MODAFINIL 200 MG: 100 TABLET ORAL at 06:07

## 2018-07-25 RX ADMIN — METOPROLOL TARTRATE 25 MG: 25 TABLET, FILM COATED ORAL at 09:07

## 2018-07-25 RX ADMIN — HEPARIN SODIUM 5000 UNITS: 5000 INJECTION, SOLUTION INTRAVENOUS; SUBCUTANEOUS at 09:07

## 2018-07-25 RX ADMIN — SENNOSIDES AND DOCUSATE SODIUM 1 TABLET: 8.6; 5 TABLET ORAL at 09:07

## 2018-07-25 RX ADMIN — LOSARTAN POTASSIUM 100 MG: 50 TABLET, FILM COATED ORAL at 09:07

## 2018-07-25 RX ADMIN — MODAFINIL 100 MG: 100 TABLET ORAL at 12:07

## 2018-07-25 RX ADMIN — INSULIN ASPART 1 UNITS: 100 INJECTION, SOLUTION INTRAVENOUS; SUBCUTANEOUS at 12:07

## 2018-07-25 RX ADMIN — INSULIN ASPART 5 UNITS: 100 INJECTION, SOLUTION INTRAVENOUS; SUBCUTANEOUS at 08:07

## 2018-07-25 RX ADMIN — HEPARIN SODIUM 5000 UNITS: 5000 INJECTION, SOLUTION INTRAVENOUS; SUBCUTANEOUS at 03:07

## 2018-07-25 RX ADMIN — INSULIN ASPART 1 UNITS: 100 INJECTION, SOLUTION INTRAVENOUS; SUBCUTANEOUS at 03:07

## 2018-07-25 RX ADMIN — AMLODIPINE BESYLATE 10 MG: 10 TABLET ORAL at 09:07

## 2018-07-25 RX ADMIN — INSULIN ASPART 5 UNITS: 100 INJECTION, SOLUTION INTRAVENOUS; SUBCUTANEOUS at 12:07

## 2018-07-25 RX ADMIN — INSULIN ASPART 5 UNITS: 100 INJECTION, SOLUTION INTRAVENOUS; SUBCUTANEOUS at 11:07

## 2018-07-25 RX ADMIN — AMIODARONE HYDROCHLORIDE 200 MG: 200 TABLET ORAL at 09:07

## 2018-07-25 RX ADMIN — INSULIN ASPART 5 UNITS: 100 INJECTION, SOLUTION INTRAVENOUS; SUBCUTANEOUS at 07:07

## 2018-07-25 RX ADMIN — HYDRALAZINE HYDROCHLORIDE 75 MG: 50 TABLET ORAL at 09:07

## 2018-07-25 RX ADMIN — INSULIN ASPART 4 UNITS: 100 INJECTION, SOLUTION INTRAVENOUS; SUBCUTANEOUS at 08:07

## 2018-07-25 RX ADMIN — POTASSIUM CHLORIDE 40 MEQ: 20 SOLUTION ORAL at 06:07

## 2018-07-25 RX ADMIN — INSULIN ASPART 5 UNITS: 100 INJECTION, SOLUTION INTRAVENOUS; SUBCUTANEOUS at 03:07

## 2018-07-25 RX ADMIN — MAGNESIUM OXIDE TAB 400 MG (241.3 MG ELEMENTAL MG) 800 MG: 400 (241.3 MG) TAB at 09:07

## 2018-07-25 RX ADMIN — MAGNESIUM OXIDE TAB 400 MG (241.3 MG ELEMENTAL MG) 800 MG: 400 (241.3 MG) TAB at 06:07

## 2018-07-25 NOTE — SUBJECTIVE & OBJECTIVE
Review of Systems  Unable to obtain a complete ROS due to level of consciousness.  Objective:     Vitals:  Temp: 99.1 °F (37.3 °C)  Pulse: 87  Rhythm: normal sinus rhythm  BP: 132/75  MAP (mmHg): 97  ICP Mean (mmHg): 1 mmHg  Resp: (!) 31  SpO2: 100 %  O2 Device (Oxygen Therapy): room air    Temp  Min: 99.1 °F (37.3 °C)  Max: 100 °F (37.8 °C)  Pulse  Min: 64  Max: 90  BP  Min: 117/62  Max: 197/110  MAP (mmHg)  Min: 86  Max: 143  ICP Mean (mmHg)  Min: 0 mmHg  Max: 11 mmHg  Resp  Min: 18  Max: 35  SpO2  Min: 96 %  Max: 100 %    07/24 0701 - 07/25 0700  In: 1855 [I.V.:315]  Out: 1672 [Urine:1489; Drains:183]   Unmeasured Output  Urine Occurrence: 1  Stool Occurrence: 1  Pad Count: 1       Physical Exam    GA: Alert, comfortable, no acute distress.   HEENT: No scleral icterus or JVD.   Pulmonary: Clear to auscultation A/L. No wheezing, crackles, or rhonchi.  Cardiac: RRR S1 & S2 w/o rubs/murmurs/gallops.   Abdominal: Bowel sounds present x 4. No appreciable hepatosplenomegaly.  Skin: No jaundice, rashes, or visible lesions.  Neuro:  --GCS: E4 V4 M5  --Mental Status:  AAO x 1, not following commands but moves all exts spontaneously weaker in LLE   --CN II-XII grossly intact.   --Pupils 3 mm, PERRL.   --Corneal reflex, gag, cough intact.  --LUE strength: 3/5  --LLE strength: 2/5  --RUE strength: 3/5  --RLE strength: 3/5      Medications:  Continuous Scheduled    amiodarone 200 mg Daily   amLODIPine 10 mg Daily   atorvastatin 40 mg Daily   heparin (porcine) 5,000 Units Q8H   hydrALAZINE 75 mg Q8H   insulin aspart U-100 5 Units Q4H   insulin detemir U-100 18 Units BID   losartan 100 mg Daily   metoprolol tartrate 25 mg BID   modafinil 100 mg After lunch   modafinil 200 mg Daily   senna-docusate 8.6-50 mg 1 tablet Daily   sodium chloride 0.9% 1,000 mL Once   PRN    acetaminophen 650 mg Q6H PRN   dextrose 50% 12.5 g PRN   dextrose 50% 12.5 g PRN   glucagon (human recombinant) 1 mg PRN   insulin aspart U-100 1-10 Units Q4H  PRN   labetalol 10 mg Q4H PRN   magnesium oxide 800 mg PRN   magnesium oxide 800 mg PRN   phenylephrine HCl in 0.9% NaCl 100 mcg Q10 Min PRN   potassium chloride 10% 40 mEq PRN   potassium chloride 10% 40 mEq PRN   potassium chloride 10% 60 mEq PRN   potassium, sodium phosphates 2 packet PRN   potassium, sodium phosphates 2 packet PRN   potassium, sodium phosphates 2 packet PRN   sodium chloride 0.9% 5 mL PRN     Today I personally reviewed pertinent medications, lines/drains/airways, imaging, cardiology, lab results, microbiology results, notably:    Diet  Diet NPO  Diet NPO

## 2018-07-25 NOTE — PLAN OF CARE
Problem: Physical Therapy Goal  Goal: Physical Therapy Goal  Goals to be met by: 8/3/18    1. Pt will perform rolling to the R and L with moderate assistance.   2. Pt will perform supine to sit from both sides of the bed with max assistance.  3. Pt will perform sit to supine with max assistance.  4. Pt will sit EOB x 5 minutes with min assistance to prepare for functional tasks in sitting.   5. Pt will perform sit to stand transfers with moderate assistance.    6. Pt will perform bed <> chair transfers with max assistance.  7. Pt will perform gait x 10 feet with max assistance  8. Family will be independent with ROM using handout.       Outcome: Ongoing (interventions implemented as appropriate)  Patient participated well in bed level activity.  POC and goals remain appropriate.  Please refer to progress note for functional mobility.       Suzanna Rey, PT  7/25/2018  [phone] (pager)

## 2018-07-25 NOTE — PT/OT/SLP EVAL
Occupational Therapy   Evaluation    Name: Ciro Chandler  MRN: [id number]  Admitting Diagnosis:  ICH (intracerebral hemorrhage)      Recommendations:     Discharge Recommendations: nursing facility, skilled  Discharge Equipment Recommendations:   (TBD)  Barriers to discharge:    unknown    History:     Occupational Profile:  Living Environment & PLOF : Per PT note and partially from pt report, pt resides with spouse in 1 story house with 1 step to enter.  Pt reported that he is an active  & performs ADL's without (A).  Per PT note pt did not require supervision and was able to be left alone during the day (able to perform mobility for himself). Pt reported that he uses both hands to write.  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  Pt was unable to state with whom he lives or what his hobbies are.  Required increased time & repetition of questions to obtain social history from pt & no family present to confirm responses from pt.  Equipment Owned:   (shower chair, RW (per PT note))  Assistance upon Discharge: unknown    Past Medical History:   Diagnosis Date    Aneurysm     Clotting disorder     blood clot takes coumadin    Diabetes mellitus     Diabetes mellitus type II     Hyperlipidemia     Hypertension     PAF (paroxysmal atrial fibrillation)     Stroke     (2) in the past    Urinary tract infection        History reviewed. No pertinent surgical history.    Subjective   Pt reported that he uses both hands to write.  Chief Complaint: none stated  Patient/Family stated goals: none stated  Communicated with: RN prior to session.  Pain/Comfort:  · Pain Rating 1: 0/10  · Pain Rating Post-Intervention 1: 0/10 (no indication of pain during session)    Patients cultural, spiritual, [employer]al conflicts given the current situation: none stated    Objective:     Patient found with: blood pressure cuff, peripheral IV, pulse ox (continuous), restraints, telemetry, external ventricular drain, Condom  Catheter    General Precautions: Standard, fall, NPO, aspiration (cardiac, EVD must be clamped for mobility)     Occupational Performance:    RN clamped EVD for mobility for OT session.    Bed Mobility:    · Patient completed Rolling/Turning to Left with  maximal assistance  · Patient completed Scooting/Bridging with total assistance forward on EOB; dependent drawsheet transfer up HOB while supine  · Patient completed Supine to Sit with total assistance  · Patient completed Sit to Supine with maximal assistance    Activities of Daily Living:  · Grooming: maximal assistance seated EOB    Cognitive/Visual Perceptual:  Cognitive/Psychosocial Skills:     -       Oriented to: Person   -       Follows Commands/attention:Inattentive  -       Communication: dysarthria and slow responses with max cues  -       Memory: Impaired STM  -       Safety awareness/insight to disability: impaired   -       Mood/Affect/Coping skills/emotional control: Appropriate to situation  Visual/Perceptual:      -right gaze preference, unable to track to the left past midline    Physical Exam:  Postural examination/scapula alignment:    -       Rounded shoulders  -       Forward head  -       Posterior pelvic tilt  Sensation: pt reported intact however this is questionable for LUE  Dominant hand: per pt ambidextrous  Upper Extremity Range of Motion:  AAROM BUE WFL (decreased spontaneous movement with LUE  Upper Extremity Strength: CHESTER fully however RUE appears WFL    Patient left supine with all lines intact, call button in reach, bed alarm on, restraints reapplied at end of session, RN notified and white board updated.    [facility] 6 Click:  AMPAC Total Score: 7    Treatment & Education:  Provided education regarding role of OT & POC with pt verbalizing understanding. Pt required Min-Max (A) with postural control while seated EOB. Pt had no further questions & when asked whether there were any concerns pt reported  "none.      Education:    Assessment:     Ciro Chandler is a 68 y.o. male with a medical diagnosis of ICH (intracerebral hemorrhage).  He presents with decreased following of commands, slowed responses, & visual deficits affecting all ADL's.  Performance deficits affecting function are weakness, impaired endurance, impaired self care skills, impaired functional mobilty, decreased coordination, impaired cognition, visual deficits, impaired balance, decreased upper extremity function, decreased lower extremity function, decreased safety awareness.      Rehab Prognosis:  fair; patient would benefit from acute skilled OT services to address these deficits and reach maximum level of function.         Clinical Decision Makin.  OT Mod:  "Pt evaluation falls under moderate complexity for evaluation coding due to identification of 3-5 performance deficits noted as stated above. Eval required Min/Mod assistance to complete on this date and detailed assessment(s) were utilized. Moreover, an expanded review of history and occupational profile obtained with additional review of cognitive, physical and psychosocial hx."     Plan:     Patient to be seen 3 x/week to address the above listed problems via self-care/home management, therapeutic activities, therapeutic exercises, neuromuscular re-education, cognitive retraining, sensory integration  · Plan of Care Expires: 18  · Plan of Care Reviewed with: patient    This Plan of care has been discussed with the patient who was involved in its development and understands and is in agreement with the identified goals and treatment plan    GOALS:    Occupational Therapy Goals        Problem: Occupational Therapy Goal    Goal Priority Disciplines Outcome Interventions   Occupational Therapy Goal     OT, PT/OT Ongoing (interventions implemented as appropriate)    Description:  Goals to be met by: 18     Patient will increase functional independence with ADLs by " performing:    UE Dressing with Moderate Assistance.  LE Dressing with Max Assistance.  Grooming while seated with Min Assistance.  Toileting from bedside commode with Moderate Assistance for hygiene and clothing management.   Rolling to Bilateral with Moderate Assistance.   Supine to sit with Moderate Assistance.  Stand pivot transfers with Moderate Assistance.                      Time Tracking:     OT Date of Treatment: 07/25/18  OT Start Time: 0835  OT Stop Time: 0857  OT Total Time (min): 22 min    Billable Minutes:Evaluation 22    ZOE Hernandez  7/25/2018

## 2018-07-25 NOTE — ASSESSMENT & PLAN NOTE
7/21EVD at bedside by NSGy   For obstructive hydrocephalus.  7/23 EVD lowered to 5cm/h2o per NSGY Drained 46cc overnight. ICP 2-3 this am.  7/24 EVD remains at 5cm ICP 4-8  7/25 EVD clamped per Nsgy

## 2018-07-25 NOTE — PROGRESS NOTES
Patient's chart was reviewed by a stroke team provider, with  staff. Mental status with some improvement per charts  There is no new imaging to review.    Pending diagnostics to follow up on include: CTH   For other recommendations please see our previous note completed on: 7/21/18    There are no new recommendations at this time. Will continue to follow. Discussed patient with staff. Please contact stroke team for any questions or concerns.

## 2018-07-25 NOTE — PLAN OF CARE
Problem: Patient Care Overview  Goal: Plan of Care Review  Outcome: Ongoing (interventions implemented as appropriate)  POC reviewed with pt and family at 1642. Pt needs continuous reinforecment. Questions and concerns addressed. No acute events today. EVD is clamped. Follow up CT is scheduled for the morning. TF remains at 65 mL/hr. Pt still requires bilateral soft wrist restraints and mitts. Pt progressing toward goals. Wife is at bedside. Will continue to monitor. See flowsheets for full assessment and VS info.

## 2018-07-25 NOTE — PROGRESS NOTES
[facility]  Neurocritical Care  Progress Note    Admit Date: 7/17/2018  Service Date: 07/25/2018  Length of Stay: 8    Subjective:     Chief Complaint: ICH (intracerebral hemorrhage)    History of Present Illness: 69 yo M with PMHx of HTN, HLD, DM, dementia, A. Fib, on coumadin as outpatient who was transferred to [facility] from [facility] where he was reportedly admitted for R thalamic IPH w IVH. Per OSH records, patient admitted to ICU and sent to floor with little follow up imaging and no interval improvement in exam. Wife requested second opinion thus was transferred to [facility] however for unknown reason was admitted to hospital medicine service last night. [facility] called this morning after seen by Nsgy due to concern for airway protection. CT this morning with R thalamic IPH and lateral ventricle extension bilaterally and slight enlargement of ventricular system. Of note baseline enlargement of vents at previous CT a year ago. On exam patient with GCS of 8, however per wife and OSH records, this is baseline since initial admission in [place]. Will admit to [facility] for higher level of care.       Hospital Course: --admitted to [facility] 7/18.   7/21: EVD at bedside by NSGY  7/23 EVD in place lowered to 5cm h2o per nsgy.. Blood sugars running >200 adjusted detemir. Increased hydralazine for better BP control  7/24: EVD at 5, insulin adjustments   7/25: EVD clamped per Nsgy, CT tonight      Review of Systems  Unable to obtain a complete ROS due to level of consciousness.  Objective:     Vitals:  Temp: 99.1 °F (37.3 °C)  Pulse: 87  Rhythm: normal sinus rhythm  BP: 132/75  MAP (mmHg): 97  ICP Mean (mmHg): 1 mmHg  Resp: (!) 31  SpO2: 100 %  O2 Device (Oxygen Therapy): room air    Temp  Min: 99.1 °F (37.3 °C)  Max: 100 °F (37.8 °C)  Pulse  Min: 64  Max: 90  BP  Min: 117/62  Max: 197/110  MAP (mmHg)  Min: 86  Max: 143  ICP Mean (mmHg)  Min: 0 mmHg  Max: 11 mmHg  Resp  Min: 18  Max: 35  SpO2  Min: 96 %  Max: 100  %    07/24 0701 - 07/25 0700  In: 1855 [I.V.:315]  Out: 1672 [Urine:1489; Drains:183]   Unmeasured Output  Urine Occurrence: 1  Stool Occurrence: 1  Pad Count: 1       Physical Exam    GA: Alert, comfortable, no acute distress.   HEENT: No scleral icterus or JVD.   Pulmonary: Clear to auscultation A/L. No wheezing, crackles, or rhonchi.  Cardiac: RRR S1 & S2 w/o rubs/murmurs/gallops.   Abdominal: Bowel sounds present x 4. No appreciable hepatosplenomegaly.  Skin: No jaundice, rashes, or visible lesions.  Neuro:  --GCS: E4 V4 M5  --Mental Status:  AAO x 1, not following commands but moves all exts spontaneously weaker in LLE   --CN II-XII grossly intact.   --Pupils 3 mm, PERRL.   --Corneal reflex, gag, cough intact.  --LUE strength: 3/5  --LLE strength: 2/5  --RUE strength: 3/5  --RLE strength: 3/5      Medications:  Continuous Scheduled    amiodarone 200 mg Daily   amLODIPine 10 mg Daily   atorvastatin 40 mg Daily   heparin (porcine) 5,000 Units Q8H   hydrALAZINE 75 mg Q8H   insulin aspart U-100 5 Units Q4H   insulin detemir U-100 18 Units BID   losartan 100 mg Daily   metoprolol tartrate 25 mg BID   modafinil 100 mg After lunch   modafinil 200 mg Daily   senna-docusate 8.6-50 mg 1 tablet Daily   sodium chloride 0.9% 1,000 mL Once   PRN    acetaminophen 650 mg Q6H PRN   dextrose 50% 12.5 g PRN   dextrose 50% 12.5 g PRN   glucagon (human recombinant) 1 mg PRN   insulin aspart U-100 1-10 Units Q4H PRN   labetalol 10 mg Q4H PRN   magnesium oxide 800 mg PRN   magnesium oxide 800 mg PRN   phenylephrine HCl in 0.9% NaCl 100 mcg Q10 Min PRN   potassium chloride 10% 40 mEq PRN   potassium chloride 10% 40 mEq PRN   potassium chloride 10% 60 mEq PRN   potassium, sodium phosphates 2 packet PRN   potassium, sodium phosphates 2 packet PRN   potassium, sodium phosphates 2 packet PRN   sodium chloride 0.9% 5 mL PRN     Today I personally reviewed pertinent medications, lines/drains/airways, imaging, cardiology, lab results,  microbiology results, notably:    Diet  Diet NPO  Diet NPO        Assessment/Plan:     Neuro   * ICH (intracerebral hemorrhage)    EVD clamped per Nsgy  hydrocephalus  SBP less than 180  PT OT SLP          Encephalopathy acute    Due to ICH and hydrocephalus  Modafinil started yesterday        Brain compression    From hydrocephalus from IVH  EVD @ clamped per Nsgy  neurochecks q1h        Obstructive hydrocephalus    7/21EVD at bedside by NSGy   For obstructive hydrocephalus.  7/23 EVD lowered to 5cm/h2o per NSGY Drained 46cc overnight. ICP 2-3 this am.  7/24 EVD remains at 5cm ICP 4-8  7/25 EVD clamped per Nsgy        Nontraumatic intraventricular intracerebral hemorrhage    See ICH        Dementia without behavioral disturbance    --holding home aricept          Cardiac/Vascular   Cardiomyopathy    Monitor  Low normal EF        Persistent atrial fibrillation    Amiodarone 200mg daily  Metoprolol 25mg bid  -rate controlled  -hold coumadin due to ICH        Essential hypertension    SBP goal less than 180  Amlodipine 10mg daily  Hydralazine 75mg q8h  Losartan 100mg daily  Metoprolol 25mg bid  Prn labetalol    Echo: 1 - Low normal left ventricular systolic function.     2 - Indeterminate LV diastolic function.     3 - Normal right ventricular systolic function .     4 - There is anterior pericardial fat pad..           Typical atrial flutter    Amiodarone 200mg daily for A-fib/flutter        Endocrine   Type 2 diabetes mellitus with diabetic polyneuropathy, with long-term current use of insulin    A1C 7.2  accuchecks q4h with mod SSI  - detemir - 18 units bid  aspart 5 units q4h  Tube feeds diabetasource 65cc/h          GI   Dysphagia    SLP following  NGT in place, and getting tube feeds             Activity Orders          Straight Cath starting at 07/18 9284        DNR    Maico Gray NP  Neurocritical Care  [facility]

## 2018-07-25 NOTE — PLAN OF CARE
Problem: SLP Goal  Goal: SLP Goal  Outcome: Ongoing (interventions implemented as appropriate)  Dental soft diet with thin liquids recommended.  Crush meds in pureed bolus.  ONLY feed pt. When fully alert.    Chanel Matias MA/[facility]-SLP  Speech Language Pathologist  Pager [phone]  7/25/2018

## 2018-07-25 NOTE — ASSESSMENT & PLAN NOTE
69 yo male with right thalamic hemorrhage (ICH score 2) initially found on 7/9/18 at outside hospital in [place]  HCT shows acute right thalamic hemorrhage with intraventricular extension.  Ventricular system is enlarged on 7/18 scan, HCT on 11/13/17 shows baseline ventricular enlargement.  There are no films available from recent admission in [place] for comparison     - clamp EVD; CTH ordered for tmrw with plan to remove EVD tmrw   - q1h neuro checks  - EEG without seizures  - Continue ICU care  - Patient is DNR.   - Will follow, please call with questions or any change in neurologic status

## 2018-07-25 NOTE — SUBJECTIVE & OBJECTIVE
Interval History: NAEON.   Medications:  Continuous Infusions:  Scheduled Meds:   amiodarone  200 mg Per NG tube Daily    amLODIPine  10 mg Per NG tube Daily    atorvastatin  40 mg Per NG tube Daily    heparin (porcine)  5,000 Units Subcutaneous Q8H    hydrALAZINE  75 mg Per NG tube Q8H    insulin aspart U-100  5 Units Subcutaneous Q4H    insulin detemir U-100  18 Units Subcutaneous BID    losartan  100 mg Per NG tube Daily    metoprolol tartrate  25 mg Per NG tube BID    modafinil  100 mg Per NG tube After lunch    modafinil  200 mg Per NG tube Daily    senna-docusate 8.6-50 mg  1 tablet Per NG tube Daily    sodium chloride 0.9%  1,000 mL Intravenous Once     PRN Meds:acetaminophen, dextrose 50%, dextrose 50%, glucagon (human recombinant), insulin aspart U-100, labetalol, magnesium oxide, magnesium oxide, phenylephrine HCl in 0.9% NaCl, potassium chloride 10%, potassium chloride 10%, potassium chloride 10%, potassium, sodium phosphates, potassium, sodium phosphates, potassium, sodium phosphates, sodium chloride 0.9%     Review of Systems    Objective:     Weight: 82.8 kg (182 lb 8.7 oz)  Body mass index is 26.19 kg/m².  Vital Signs (Most Recent):  Temp: 99.1 °F (37.3 °C) (07/25/18 0701)  Pulse: 87 (07/25/18 0900)  Resp: (!) 31 (07/25/18 0900)  BP: 132/75 (07/25/18 0900)  SpO2: 100 % (07/25/18 0900) Vital Signs (24h Range):  Temp:  [98.8 °F (37.1 °C)-100 °F (37.8 °C)] 99.1 °F (37.3 °C)  Pulse:  [64-90] 87  Resp:  [18-35] 31  SpO2:  [96 %-100 %] 100 %  BP: (117-197)/() 132/75  Arterial Line BP: (204-207)/(204-206) 204/204       Date 07/25/18 0700 - 07/26/18 0659   Shift 5656-5092 5267-5249 8442-2980 24 Hour Total   I  N  T  A  K  E   I.V.  (mL/kg) 15  (0.2)   15  (0.2)    NG/   195    Shift Total  (mL/kg) 210  (2.5)   210  (2.5)   O  U  T  P  U  T   Urine  (mL/kg/hr) 145   145    Drains 17   17    Shift Total  (mL/kg) 162  (2)   162  (2)   Weight (kg) 82.8 82.8 82.8 82.8                         NG/OG Tube 07/16/18 1200 Left nostril (Active)   Placement Check placement verified by distal tube length measurement 7/24/2018  3:05 AM   Distal Tube Length (cm) 70 7/24/2018  3:05 AM   Tolerance no signs/symptoms of discomfort 7/24/2018  3:05 AM   Securement anchored to nostril center w/ adhesive device 7/24/2018  3:05 AM   Clamp Status/Tolerance unclamped;no residual 7/24/2018  3:05 AM   Insertion Site Appearance no redness, warmth, tenderness, skin breakdown, drainage 7/24/2018  3:05 AM   Flush/Irrigation flushed w/;water 7/24/2018  3:05 AM   Feeding Method continuous 7/24/2018  3:05 AM   Feeding Action feeding continued 7/24/2018  3:05 AM   Current Rate (mL/hr) 65 mL/hr 7/24/2018  3:05 AM   Goal Rate (mL/hr) 65 mL/hr 7/24/2018  3:05 AM   Intake (mL) 75 mL 7/24/2018  5:20 AM   Rate Formula Tube Feeding (mL/hr) 20 mL/hr 7/20/2018  3:05 PM   Formula Name Diabetasource 7/20/2018  3:05 PM   Intake (mL) - Formula Tube Feeding 0 7/24/2018 10:01 AM   Residual Amount (ml) 0 ml 7/22/2018  7:01 PM       Male External Urinary Catheter 07/18/18 1800 Medium (Active)   Collection Container Urimeter 7/24/2018  3:05 AM   Securement Method secured to lower ABD w/ adhesive device 7/24/2018  3:05 AM   Skin no redness;no breakdown 7/24/2018  3:05 AM   Tolerance no signs/symptoms of discomfort 7/24/2018  3:05 AM   Output (mL) 0 mL 7/24/2018  9:01 AM   Catheter Change Date 07/22/18 7/24/2018  3:05 AM   Catheter Change Time 1900 7/23/2018  3:05 AM            ICP/Ventriculostomy 07/21/18 1425 Ventricular drainage catheter with ICP microsensor Right Occipital region (Active)   Level of Ventriculostomy (cm above) 10 7/24/2018  3:05 AM   Status Open to drainage 7/24/2018  3:05 AM   Site Assessment Clean;Dry 7/24/2018  3:05 AM   Site Drainage No drainage 7/24/2018  3:05 AM   Waveform dampened 7/24/2018  3:05 AM   Output (mL) 11 mL 7/24/2018 10:01 AM   CSF Color clear;orange 7/24/2018  3:05 AM   Dressing Status Biopatch in  place;Clean;Dry;Intact 7/24/2018  3:05 AM   Interventions HOB degrees;bed controls locked;zeroed 7/24/2018  3:05 AM       Neurosurgery Physical Exam    E3V3M5  AOx1, intermittently to name.  R gaze  Does not blink to threat in L eye  Localize on RUE, w/d LUE  Moving RLE spontaneously  Withdraw LLE    Significant Labs:    Recent Labs  Lab 07/24/18  0319 07/25/18  0503   * 150*    137   K 4.0 3.7    102   CO2 28 27   BUN 12 12   CREATININE 0.7 0.7   CALCIUM 8.6* 9.4   MG 1.9 1.8       Recent Labs  Lab 07/24/18  0319 07/25/18  0503   WBC 7.25 7.57   HGB 10.4* 10.8*   HCT 32.6* 31.8*    257     No results for input(s): LABPT, INR, APTT in the last 48 hours.  Microbiology Results (last 7 days)     Procedure Component Value Units Date/Time    Gram stain [[id number]]     Order Status:  No result Specimen:  CSF (Spinal Fluid) from CSF Tap, Tube 3     CSF culture [[id number]]     Order Status:  No result Specimen:  CSF (Spinal Fluid) from CSF Tap, Tube 3         Recent Lab Results       07/25/18  0759 07/25/18  0503 07/25/18  0346 07/24/18  2335 07/24/18  2131      Immature Granulocytes  0.7(H)        Immature Grans (Abs)  0.05  Comment:  Mild elevation in immature granulocytes is non specific and   can be seen in a variety of conditions including stress response,   acute inflammation, trauma and pregnancy. Correlation with other   laboratory and clinical findings is essential.  (H)        Anion Gap  8        Baso #  0.02        Basophil%  0.3        BUN, Bld  12        Calcium  9.4        Chloride  102        CO2  27        Creatinine  0.7        Differential Method  Automated        eGFR if   >60.0        eGFR if non   >60.0  Comment:  Calculation used to obtain the estimated glomerular filtration  rate (eGFR) is the CKD-EPI equation.           Eos #  0.1        Eosinophil%  1.5        Glucose  150(H)        Gran # (ANC)  5.9        Gran%  77.5(H)        Hematocrit   31.8(L)        Hemoglobin  10.8(L)        Lymph #  0.9(L)        Lymph%  11.9(L)        Magnesium  1.8        MCH  29.5        MCHC  34.0        MCV  87        Mono #  0.6        Mono%  8.1        MPV  11.8        nRBC  0        Phosphorus  3.0        Platelets  257        POCT Glucose 226(H)  167(H) 209(H) 185(H)     Potassium  3.7        RBC  3.66(L)        RDW  12.4        Sodium  137        WBC  7.57                    07/24/18  1943 07/24/18  1940 07/24/18  1602 07/24/18  1215      Immature Granulocytes         Immature Grans (Abs)         Anion Gap         Baso #         Basophil%         BUN, Bld         Calcium         Chloride         CO2         Creatinine         Differential Method         eGFR if          eGFR if non          Eos #         Eosinophil%         Glucose         Gran # (ANC)         Gran%         Hematocrit         Hemoglobin         Lymph #         Lymph%         Magnesium         MCH         MCHC         MCV         Mono #         Mono%         MPV         nRBC         Phosphorus 3.3        Platelets         POCT Glucose  167(H) 78 147(H)     Potassium         RBC         RDW         Sodium         WBC               Significant Diagnostics:  I have reviewed all pertinent imaging results/findings within the past 24 hours.

## 2018-07-25 NOTE — PLAN OF CARE
Problem: Occupational Therapy Goal  Goal: Occupational Therapy Goal  Goals to be met by: 8/1/18     Patient will increase functional independence with ADLs by performing:    UE Dressing with Moderate Assistance.  LE Dressing with Max Assistance.  Grooming while seated with Min Assistance.  Toileting from bedside commode with Moderate Assistance for hygiene and clothing management.   Rolling to Bilateral with Moderate Assistance.   Supine to sit with Moderate Assistance.  Stand pivot transfers with Moderate Assistance.    Outcome: Ongoing (interventions implemented as appropriate)  OT eval completed.

## 2018-07-25 NOTE — PT/OT/SLP PROGRESS
"Physical Therapy Treatment    Patient Name:  Ciro Chandler   MRN:  [id number]    Recommendations:     Discharge Recommendations:  nursing facility, skilled   Discharge Equipment Recommendations: hospital bed, wheelchair   Barriers to discharge: Decreased caregiver support    Plan:     During this hospitalization, patient to be seen 4 x/week to address the above listed problems via gait training, therapeutic activities, therapeutic exercises, neuromuscular re-education  · Plan of Care Expires:  08/20/18   Plan of Care Reviewed with: patient    Subjective     Communicated with RN prior to session.  Patient found with bilateral wrist restraints upon PT entry to room, agreeable to treatment.      Patient comments/goals: "I like [place]."  " I don't know where I am."  Pain/Comfort:  · Pain Rating 1: 0/10  · Pain Rating Post-Intervention 1: 0/10    Objective:     Patient found with: restraints, telemetry, SCD, peripheral IV, pressure relief boots, Condom Catheter, NG tube, pulse ox (continuous), blood pressure cuff, external ventricular drain     General Precautions: Standard, aphasia, aspiration, fall, seizure     Patient was found supine in bed after receiving nursing care. Bilateral wrist restraints in place. Patient very restless in bed moving bilateral UEs against gravity and RLE.  He was awake throughout therapy, significant R gaze preference with no tracking to the L.  He participated in counting during ROM exercise but inconsistent following commands with a very poor attention span and very distractible.  AAROM bilateral UEs and RLE with inconsistent participation.  LLE PROM with very little active participation in ROM.      Functional Outcome Measures:  AM-PAC 6 CLICK MOBILITY  Turning over in bed (including adjusting bedclothes, sheets and blankets)?: 2  Sitting down on and standing up from a chair with arms (e.g., wheelchair, bedside commode, etc.): 1  Moving from lying on back to sitting on the side of the bed?: " 1  Moving to and from a bed to a chair (including a wheelchair)?: 1  Need to walk in hospital room?: 1  Climbing 3-5 steps with a railing?: 1  Basic Mobility Total Score: 7     Patient left supine with all lines intact, call button in reach, bed alarm on, restraints reapplied at end of session and RN notified..    GOALS:    Physical Therapy Goals        Problem: Physical Therapy Goal    Goal Priority Disciplines Outcome Goal Variances Interventions   Physical Therapy Goal     PT/OT, PT Ongoing (interventions implemented as appropriate)     Description:  Goals to be met by: 8/3/18    1. Pt will perform rolling to the R and L with moderate assistance.   2. Pt will perform supine to sit from both sides of the bed with max assistance.  3. Pt will perform sit to supine with max assistance.  4. Pt will sit EOB x 5 minutes with min assistance to prepare for functional tasks in sitting.   5. Pt will perform sit to stand transfers with moderate assistance.    6. Pt will perform bed <> chair transfers with max assistance.  7. Pt will perform gait x 10 feet with max assistance  8. Family will be independent with ROM using handout.                        Assessment:     Ciro Chandler is a 68 y.o. male admitted with a medical diagnosis of ICH (intracerebral hemorrhage).  The patient is more alert and active, but with limited command following and a poor attention span.  His EVD is now clamped.  Pt expected to be appropriate for EOB activities tomorrow if EVD remains clamped.  Pt not safe to perform EOB activity today d/t additional rehab tech not available to assist at bedside for safety and line management.      He presents with the following impairments/functional limitations:  weakness, impaired functional mobilty, impaired cognition, decreased safety awareness, impaired coordination, impaired endurance, gait instability, decreased coordination, impaired balance, decreased upper extremity function, decreased lower extremity  function, visual deficits, impaired self care skills, abnormal tone.    Rehab Prognosis:  Limited to fair; patient would benefit from acute skilled PT services to address these deficits and reach maximum level of function.      Recent Surgery: * No surgery found *        Time Tracking:     PT Received On: 07/25/18  PT Start Time: 1330     PT Stop Time: 1345  PT Total Time (min): 15 min     Billable Minutes: Therapeutic Exercise 15    Treatment Type: Treatment  PT/PTA: PT         Suzanna Rey, PT  7/25/2018  [phone] (pager)

## 2018-07-25 NOTE — PLAN OF CARE
Problem: Patient Care Overview  Goal: Plan of Care Review  Outcome: Ongoing (interventions implemented as appropriate)  POC reviewed with pt at 0600. Pt minimally verbal/unable to verbalize understanding. Questions and concerns addressed. No acute events overnight. Pt progressing toward goals. Neuro status remains unchanged with VSS. Mag and K replaced at 0600. See labs. Oncoming RN will continue to monitor. See flowsheets for full assessment and VS info

## 2018-07-25 NOTE — PT/OT/SLP EVAL
"Speech Language Pathology Evaluation  Cognitive/Bedside Swallow    Patient Name:  Ciro Chandler   MRN:  [id number]  Admitting Diagnosis: ICH (intracerebral hemorrhage)    Recommendations:                  General Recommendations:  Dysphagia therapy and Cognitive-linguistic therapy  Diet recommendations:  Dental Soft, Thin   Aspiration Precautions: Assistance with meals, Feed only when awake/alert and Meds crushed in puree   General Precautions: Standard, aspiration, fall  Communication strategies:  none    History:     Past Medical History:   Diagnosis Date    Aneurysm     Clotting disorder     blood clot takes coumadin    Diabetes mellitus     Diabetes mellitus type II     Hyperlipidemia     Hypertension     PAF (paroxysmal atrial fibrillation)     Stroke     (2) in the past    Urinary tract infection        History reviewed. No pertinent surgical history.    Social History: Patient lives with family.      Prior diet: regular with thin      Subjective     "Not really"pt response when asked where he was  Patient goals: did not state    Pain/Comfort:  · Pain Rating 1: 0/10  · Pain Rating Post-Intervention 1: 0/10    Objective:     Cognitive Status:    Pt. Was alert though poorly responsive to questions/commands.  Responses elicited were delayed with poor eye contact noted.  No initiation of communication noted.        Receptive Language:   Comprehension:    Pt. Responded appropriately to 3 spontaneous conversation questions though on structured tasks he did not follow or model any simple commands.  He did not respond to simple yes/no questions.      Pragmatics:    Poor eye contact noted    Expressive Language:  Verbal:    Limited verbalizations elicited in conversation.  Pt. Did not count to 10 and named 1/5 common objects with errored responses being no response.  He did not complete automatic speech tasks.      Motor Speech:  WFL    Voice:   WFL         Oral Musculature Evaluation  · Oral Musculature: other " (see comments) (appeared wfl based on observation; Pt. tee dnot follow comomands)  · Dentition: present and adequate  · Mucosal Quality: good  · Mandibular Strength and Mobility: WFL  · Oral Labial Strength and Mobility: WFL  · Lingual Strength and Mobility: WFL  · Velar Elevation: WFL  · Buccal Strength and Mobility: WFL  · Volitional Cough: not elicited  · Volitional Swallow: not elicited  · Voice Prior to PO Intake: wfl    Bedside Swallow Eval:   Consistencies Assessed:  · Thin liquids 3 teaspoons then 6 sips via cup  · Puree 3 teaspoons  · Solids 1/2 cracker     Oral Phase:   · WFL  · Slow oral transit time    Pharyngeal Phase:   · no overt clinical signs/symptoms of aspiration  · no overt clinical signs/symptoms of pharyngeal dysphagia    Compensatory Strategies  · None    Treatment:     Assessment:     Ciro Chandler is a 68 y.o. male with an SLP diagnosis of Dysphagia and Cognitive-Linguistic Impairment.  Goals:    SLP Goals        Problem: SLP Goal    Goal Priority Disciplines Outcome   SLP Goal     SLP Ongoing (interventions implemented as appropriate)   Description:  1.  Tolerate dental soft diet with thin liquids with no s/s of aspiration  2.  Follow simple commands with 90% accuracy  3.  Respond to simple yes/no questions with 90% accuracy  4,  Respond to simple word finding tasks with 80% accuracy  5.  Assess visual spatial skills                    Plan:     · Patient to be seen:  4 x/week   · Plan of Care expires:  08/23/18  · Plan of Care reviewed with:  patient   · SLP Follow-Up:  Yes       Discharge recommendations:  Discharge Facility/Level Of Care Needs: nursing facility, skilled     Time Tracking:     SLP Treatment Date:   07/25/18  Speech Start Time:  0820  Speech Stop Time:  0838     Speech Total Time (min):  18 min    Billable Minutes: Eval 10  and Eval Swallow and Oral Function 8    Chanel Matias MA, CCC-SLP  07/25/2018

## 2018-07-25 NOTE — PROGRESS NOTES
"[facility]  Neurosurgery  Progress Note    Subjective:     History of Present Illness: 69 yo M with PMHx of HTN, HLD, DM, dementia, A. Fib, on coumadin, prior cerebral aneurysm treated 20 years ago at [facility]. Pt who presented [facility] in [place], [place] on 7/9/18 to with sudden-onset headache, right eye deviation, and L sided weakness. Head CT at that time revealed right thalamic hemorrhage.  Pt was treated conservatively without neurosurgical intervention.  Per wife, she was unhappy with the care in [place].  States patient has been very lethargic since he was initially admitted, without improvement,  and felt as though the "nothing was being done for him" She requested that patient be transferred to [facility] for further evaluation/treatment.  Pt was accepted to [facility] by Hospital medicine service, and currently admitted to the floor.  Pt has been lethargic and and unable to FC's since admission.    68 M with R thalamic ICH/IVH (ICH score 2) initially found on 7/9/18 admitted to OSH in [place] w/o intervention.     7/18: Pt has NG tube in place, as unable to swallow.  Pt is DNR.  Prior to his recent hospital admission, wife states patient was functioning well, ambulating normally, no previous weakness/speech difficulty.  He takes aspirin and coumadin at home (on hold now).  HCT was done this morning which shows acute blood with ventriculomegaly.          Post-Op Info:  * No surgery found *         Interval History: NAEON.   Medications:  Continuous Infusions:  Scheduled Meds:   amiodarone  200 mg Per NG tube Daily    amLODIPine  10 mg Per NG tube Daily    atorvastatin  40 mg Per NG tube Daily    heparin (porcine)  5,000 Units Subcutaneous Q8H    hydrALAZINE  75 mg Per NG tube Q8H    insulin aspart U-100  5 Units Subcutaneous Q4H    insulin detemir U-100  18 Units Subcutaneous BID    losartan  100 mg Per NG tube Daily    metoprolol tartrate  25 mg Per NG tube BID    modafinil  100 mg Per NG tube After lunch "    modafinil  200 mg Per NG tube Daily    senna-docusate 8.6-50 mg  1 tablet Per NG tube Daily    sodium chloride 0.9%  1,000 mL Intravenous Once     PRN Meds:acetaminophen, dextrose 50%, dextrose 50%, glucagon (human recombinant), insulin aspart U-100, labetalol, magnesium oxide, magnesium oxide, phenylephrine HCl in 0.9% NaCl, potassium chloride 10%, potassium chloride 10%, potassium chloride 10%, potassium, sodium phosphates, potassium, sodium phosphates, potassium, sodium phosphates, sodium chloride 0.9%     Review of Systems    Objective:     Weight: 82.8 kg (182 lb 8.7 oz)  Body mass index is 26.19 kg/m².  Vital Signs (Most Recent):  Temp: 99.1 °F (37.3 °C) (07/25/18 0701)  Pulse: 87 (07/25/18 0900)  Resp: (!) 31 (07/25/18 0900)  BP: 132/75 (07/25/18 0900)  SpO2: 100 % (07/25/18 0900) Vital Signs (24h Range):  Temp:  [98.8 °F (37.1 °C)-100 °F (37.8 °C)] 99.1 °F (37.3 °C)  Pulse:  [64-90] 87  Resp:  [18-35] 31  SpO2:  [96 %-100 %] 100 %  BP: (117-197)/() 132/75  Arterial Line BP: (204-207)/(204-206) 204/204       Date 07/25/18 0700 - 07/26/18 0659   Shift 9518-7325 8460-9095 0126-5852 24 Hour Total   I  N  T  A  K  E   I.V.  (mL/kg) 15  (0.2)   15  (0.2)    NG/   195    Shift Total  (mL/kg) 210  (2.5)   210  (2.5)   O  U  T  P  U  T   Urine  (mL/kg/hr) 145   145    Drains 17   17    Shift Total  (mL/kg) 162  (2)   162  (2)   Weight (kg) 82.8 82.8 82.8 82.8                        NG/OG Tube 07/16/18 1200 Left nostril (Active)   Placement Check placement verified by distal tube length measurement 7/24/2018  3:05 AM   Distal Tube Length (cm) 70 7/24/2018  3:05 AM   Tolerance no signs/symptoms of discomfort 7/24/2018  3:05 AM   Securement anchored to nostril center w/ adhesive device 7/24/2018  3:05 AM   Clamp Status/Tolerance unclamped;no residual 7/24/2018  3:05 AM   Insertion Site Appearance no redness, warmth, tenderness, skin breakdown, drainage 7/24/2018  3:05 AM   Flush/Irrigation flushed  w/;water 7/24/2018  3:05 AM   Feeding Method continuous 7/24/2018  3:05 AM   Feeding Action feeding continued 7/24/2018  3:05 AM   Current Rate (mL/hr) 65 mL/hr 7/24/2018  3:05 AM   Goal Rate (mL/hr) 65 mL/hr 7/24/2018  3:05 AM   Intake (mL) 75 mL 7/24/2018  5:20 AM   Rate Formula Tube Feeding (mL/hr) 20 mL/hr 7/20/2018  3:05 PM   Formula Name Diabetasource 7/20/2018  3:05 PM   Intake (mL) - Formula Tube Feeding 0 7/24/2018 10:01 AM   Residual Amount (ml) 0 ml 7/22/2018  7:01 PM       Male External Urinary Catheter 07/18/18 1800 Medium (Active)   Collection Container Urimeter 7/24/2018  3:05 AM   Securement Method secured to lower ABD w/ adhesive device 7/24/2018  3:05 AM   Skin no redness;no breakdown 7/24/2018  3:05 AM   Tolerance no signs/symptoms of discomfort 7/24/2018  3:05 AM   Output (mL) 0 mL 7/24/2018  9:01 AM   Catheter Change Date 07/22/18 7/24/2018  3:05 AM   Catheter Change Time 1900 7/23/2018  3:05 AM            ICP/Ventriculostomy 07/21/18 1425 Ventricular drainage catheter with ICP microsensor Right Occipital region (Active)   Level of Ventriculostomy (cm above) 10 7/24/2018  3:05 AM   Status Open to drainage 7/24/2018  3:05 AM   Site Assessment Clean;Dry 7/24/2018  3:05 AM   Site Drainage No drainage 7/24/2018  3:05 AM   Waveform dampened 7/24/2018  3:05 AM   Output (mL) 11 mL 7/24/2018 10:01 AM   CSF Color clear;orange 7/24/2018  3:05 AM   Dressing Status Biopatch in place;Clean;Dry;Intact 7/24/2018  3:05 AM   Interventions HOB degrees;bed controls locked;zeroed 7/24/2018  3:05 AM       Neurosurgery Physical Exam    E3V3M5  AOx1, intermittently to name.  R gaze  Does not blink to threat in L eye  Localize on RUE, w/d LUE  Moving RLE spontaneously  Withdraw LLE    Significant Labs:    Recent Labs  Lab 07/24/18  0319 07/25/18  0503   * 150*    137   K 4.0 3.7    102   CO2 28 27   BUN 12 12   CREATININE 0.7 0.7   CALCIUM 8.6* 9.4   MG 1.9 1.8       Recent Labs  Lab 07/24/18  0319  07/25/18  0503   WBC 7.25 7.57   HGB 10.4* 10.8*   HCT 32.6* 31.8*    257     No results for input(s): LABPT, INR, APTT in the last 48 hours.  Microbiology Results (last 7 days)     Procedure Component Value Units Date/Time    Gram stain [[id number]]     Order Status:  No result Specimen:  CSF (Spinal Fluid) from CSF Tap, Tube 3     CSF culture [[id number]]     Order Status:  No result Specimen:  CSF (Spinal Fluid) from CSF Tap, Tube 3         Recent Lab Results       07/25/18  0759 07/25/18  0503 07/25/18  0346 07/24/18  2335 07/24/18  2131      Immature Granulocytes  0.7(H)        Immature Grans (Abs)  0.05  Comment:  Mild elevation in immature granulocytes is non specific and   can be seen in a variety of conditions including stress response,   acute inflammation, trauma and pregnancy. Correlation with other   laboratory and clinical findings is essential.  (H)        Anion Gap  8        Baso #  0.02        Basophil%  0.3        BUN, Bld  12        Calcium  9.4        Chloride  102        CO2  27        Creatinine  0.7        Differential Method  Automated        eGFR if   >60.0        eGFR if non   >60.0  Comment:  Calculation used to obtain the estimated glomerular filtration  rate (eGFR) is the CKD-EPI equation.           Eos #  0.1        Eosinophil%  1.5        Glucose  150(H)        Gran # (ANC)  5.9        Gran%  77.5(H)        Hematocrit  31.8(L)        Hemoglobin  10.8(L)        Lymph #  0.9(L)        Lymph%  11.9(L)        Magnesium  1.8        MCH  29.5        MCHC  34.0        MCV  87        Mono #  0.6        Mono%  8.1        MPV  11.8        nRBC  0        Phosphorus  3.0        Platelets  257        POCT Glucose 226(H)  167(H) 209(H) 185(H)     Potassium  3.7        RBC  3.66(L)        RDW  12.4        Sodium  137        WBC  7.57                    07/24/18  1943 07/24/18  1940 07/24/18  1602 07/24/18  1215      Immature Granulocytes         Immature Grans  (Abs)         Anion Gap         Baso #         Basophil%         BUN, Bld         Calcium         Chloride         CO2         Creatinine         Differential Method         eGFR if          eGFR if non          Eos #         Eosinophil%         Glucose         Gran # (ANC)         Gran%         Hematocrit         Hemoglobin         Lymph #         Lymph%         Magnesium         MCH         MCHC         MCV         Mono #         Mono%         MPV         nRBC         Phosphorus 3.3        Platelets         POCT Glucose  167(H) 78 147(H)     Potassium         RBC         RDW         Sodium         WBC               Significant Diagnostics:  I have reviewed all pertinent imaging results/findings within the past 24 hours.    Assessment/Plan:     Nontraumatic intraventricular intracerebral hemorrhage    69 yo male with right thalamic hemorrhage (ICH score 2) initially found on 7/9/18 at outside hospital in [place]  HCT shows acute right thalamic hemorrhage with intraventricular extension.  Ventricular system is enlarged on 7/18 scan, HCT on 11/13/17 shows baseline ventricular enlargement.  There are no films available from recent admission in [place] for comparison     - clamp EVD; CTH ordered for tmrw with plan to remove EVD tmrw   - q1h neuro checks  - EEG without seizures  - Continue ICU care  - Patient is DNR.   - Will follow, please call with questions or any change in neurologic status            Brenton Romo MD  Neurosurgery  [facility]

## 2018-07-26 LAB
ANION GAP SERPL CALC-SCNC: 10 MMOL/L
ANION GAP SERPL CALC-SCNC: 8 MMOL/L
BASOPHILS # BLD AUTO: 0.05 K/UL
BASOPHILS NFR BLD: 0.6 %
BUN SERPL-MCNC: 14 MG/DL
BUN SERPL-MCNC: 17 MG/DL
CALCIUM SERPL-MCNC: 9.7 MG/DL
CALCIUM SERPL-MCNC: 9.8 MG/DL
CHLORIDE SERPL-SCNC: 101 MMOL/L
CHLORIDE SERPL-SCNC: 98 MMOL/L
CLARITY CSF: CLEAR
CO2 SERPL-SCNC: 26 MMOL/L
CO2 SERPL-SCNC: 27 MMOL/L
COLOR CSF: ABNORMAL
CREAT SERPL-MCNC: 0.8 MG/DL
CREAT SERPL-MCNC: 0.9 MG/DL
DIFFERENTIAL METHOD: ABNORMAL
EOSINOPHIL # BLD AUTO: 0.1 K/UL
EOSINOPHIL NFR BLD: 1.5 %
ERYTHROCYTE [DISTWIDTH] IN BLOOD BY AUTOMATED COUNT: 12.6 %
EST. GFR  (AFRICAN AMERICAN): >60 ML/MIN/1.73 M^2
EST. GFR  (AFRICAN AMERICAN): >60 ML/MIN/1.73 M^2
EST. GFR  (NON AFRICAN AMERICAN): >60 ML/MIN/1.73 M^2
EST. GFR  (NON AFRICAN AMERICAN): >60 ML/MIN/1.73 M^2
GLUCOSE CSF-MCNC: 76 MG/DL
GLUCOSE SERPL-MCNC: 116 MG/DL
GLUCOSE SERPL-MCNC: 186 MG/DL
HCT VFR BLD AUTO: 36.5 %
HGB BLD-MCNC: 12 G/DL
IMM GRANULOCYTES # BLD AUTO: 0.05 K/UL
IMM GRANULOCYTES NFR BLD AUTO: 0.6 %
LYMPHOCYTES # BLD AUTO: 0.8 K/UL
LYMPHOCYTES NFR BLD: 9.3 %
LYMPHOCYTES NFR CSF MANUAL: 50 %
MAGNESIUM SERPL-MCNC: 2.2 MG/DL
MCH RBC QN AUTO: 29.1 PG
MCHC RBC AUTO-ENTMCNC: 32.9 G/DL
MCV RBC AUTO: 88 FL
MONOCYTES # BLD AUTO: 0.6 K/UL
MONOCYTES NFR BLD: 7.5 %
MONOS+MACROS NFR CSF MANUAL: 30 %
NEUTROPHILS # BLD AUTO: 6.5 K/UL
NEUTROPHILS NFR BLD: 80.5 %
NEUTROPHILS NFR CSF MANUAL: 20 %
NRBC BLD-RTO: 0 /100 WBC
PHOSPHATE SERPL-MCNC: 4.1 MG/DL
PLATELET # BLD AUTO: 268 K/UL
PMV BLD AUTO: 11.7 FL
POCT GLUCOSE: 130 MG/DL (ref 70–110)
POCT GLUCOSE: 159 MG/DL (ref 70–110)
POCT GLUCOSE: 162 MG/DL (ref 70–110)
POCT GLUCOSE: 193 MG/DL (ref 70–110)
POCT GLUCOSE: 196 MG/DL (ref 70–110)
POCT GLUCOSE: 205 MG/DL (ref 70–110)
POCT GLUCOSE: 240 MG/DL (ref 70–110)
POTASSIUM SERPL-SCNC: 4.2 MMOL/L
POTASSIUM SERPL-SCNC: 4.3 MMOL/L
PROT CSF-MCNC: 35 MG/DL
RBC # BLD AUTO: 4.13 M/UL
RBC # CSF: 2000 /CU MM
SODIUM SERPL-SCNC: 134 MMOL/L
SODIUM SERPL-SCNC: 136 MMOL/L
SPECIMEN VOL CSF: 2 ML
WBC # BLD AUTO: 8.13 K/UL
WBC # CSF: 3 /CU MM

## 2018-07-26 PROCEDURE — 25000003 PHARM REV CODE 250: Performed by: NURSE PRACTITIONER

## 2018-07-26 PROCEDURE — 89051 BODY FLUID CELL COUNT: CPT

## 2018-07-26 PROCEDURE — 80048 BASIC METABOLIC PNL TOTAL CA: CPT

## 2018-07-26 PROCEDURE — 99232 SBSQ HOSP IP/OBS MODERATE 35: CPT | Mod: ,,, | Performed by: NURSE PRACTITIONER

## 2018-07-26 PROCEDURE — 99232 SBSQ HOSP IP/OBS MODERATE 35: CPT | Mod: GC,,, | Performed by: NEUROLOGICAL SURGERY

## 2018-07-26 PROCEDURE — 85025 COMPLETE CBC W/AUTO DIFF WBC: CPT

## 2018-07-26 PROCEDURE — 83735 ASSAY OF MAGNESIUM: CPT

## 2018-07-26 PROCEDURE — 63600175 PHARM REV CODE 636 W HCPCS: Performed by: NURSE PRACTITIONER

## 2018-07-26 PROCEDURE — 97530 THERAPEUTIC ACTIVITIES: CPT

## 2018-07-26 PROCEDURE — 92526 ORAL FUNCTION THERAPY: CPT

## 2018-07-26 PROCEDURE — 99233 SBSQ HOSP IP/OBS HIGH 50: CPT | Mod: GC,,, | Performed by: PSYCHIATRY & NEUROLOGY

## 2018-07-26 PROCEDURE — 25000003 PHARM REV CODE 250: Performed by: PSYCHIATRY & NEUROLOGY

## 2018-07-26 PROCEDURE — 84100 ASSAY OF PHOSPHORUS: CPT

## 2018-07-26 PROCEDURE — 84157 ASSAY OF PROTEIN OTHER: CPT

## 2018-07-26 PROCEDURE — 82945 GLUCOSE OTHER FLUID: CPT

## 2018-07-26 PROCEDURE — 80048 BASIC METABOLIC PNL TOTAL CA: CPT | Mod: 91

## 2018-07-26 PROCEDURE — 20000000 HC ICU ROOM

## 2018-07-26 PROCEDURE — 87070 CULTURE OTHR SPECIMN AEROBIC: CPT

## 2018-07-26 PROCEDURE — 87205 SMEAR GRAM STAIN: CPT

## 2018-07-26 PROCEDURE — 25000003 PHARM REV CODE 250: Performed by: STUDENT IN AN ORGANIZED HEALTH CARE EDUCATION/TRAINING PROGRAM

## 2018-07-26 RX ORDER — SODIUM CHLORIDE 9 MG/ML
INJECTION, SOLUTION INTRAVENOUS CONTINUOUS
Status: DISCONTINUED | OUTPATIENT
Start: 2018-07-26 | End: 2018-07-28

## 2018-07-26 RX ORDER — INSULIN ASPART 100 [IU]/ML
5 INJECTION, SOLUTION INTRAVENOUS; SUBCUTANEOUS
Status: DISCONTINUED | OUTPATIENT
Start: 2018-07-26 | End: 2018-07-28

## 2018-07-26 RX ADMIN — HEPARIN SODIUM 5000 UNITS: 5000 INJECTION, SOLUTION INTRAVENOUS; SUBCUTANEOUS at 05:07

## 2018-07-26 RX ADMIN — HEPARIN SODIUM 5000 UNITS: 5000 INJECTION, SOLUTION INTRAVENOUS; SUBCUTANEOUS at 01:07

## 2018-07-26 RX ADMIN — AMLODIPINE BESYLATE 10 MG: 10 TABLET ORAL at 08:07

## 2018-07-26 RX ADMIN — MODAFINIL 200 MG: 100 TABLET ORAL at 05:07

## 2018-07-26 RX ADMIN — INSULIN ASPART 5 UNITS: 100 INJECTION, SOLUTION INTRAVENOUS; SUBCUTANEOUS at 02:07

## 2018-07-26 RX ADMIN — METOPROLOL TARTRATE 25 MG: 25 TABLET, FILM COATED ORAL at 08:07

## 2018-07-26 RX ADMIN — SENNOSIDES AND DOCUSATE SODIUM 1 TABLET: 8.6; 5 TABLET ORAL at 08:07

## 2018-07-26 RX ADMIN — HYDRALAZINE HYDROCHLORIDE 75 MG: 50 TABLET ORAL at 05:07

## 2018-07-26 RX ADMIN — INSULIN ASPART 5 UNITS: 100 INJECTION, SOLUTION INTRAVENOUS; SUBCUTANEOUS at 06:07

## 2018-07-26 RX ADMIN — INSULIN ASPART 1 UNITS: 100 INJECTION, SOLUTION INTRAVENOUS; SUBCUTANEOUS at 02:07

## 2018-07-26 RX ADMIN — INSULIN ASPART 4 UNITS: 100 INJECTION, SOLUTION INTRAVENOUS; SUBCUTANEOUS at 05:07

## 2018-07-26 RX ADMIN — LOSARTAN POTASSIUM 100 MG: 50 TABLET, FILM COATED ORAL at 08:07

## 2018-07-26 RX ADMIN — INSULIN ASPART 2 UNITS: 100 INJECTION, SOLUTION INTRAVENOUS; SUBCUTANEOUS at 09:07

## 2018-07-26 RX ADMIN — MODAFINIL 100 MG: 100 TABLET ORAL at 01:07

## 2018-07-26 RX ADMIN — METOPROLOL TARTRATE 25 MG: 25 TABLET, FILM COATED ORAL at 09:07

## 2018-07-26 RX ADMIN — HEPARIN SODIUM 5000 UNITS: 5000 INJECTION, SOLUTION INTRAVENOUS; SUBCUTANEOUS at 09:07

## 2018-07-26 RX ADMIN — HYDRALAZINE HYDROCHLORIDE 75 MG: 50 TABLET ORAL at 01:07

## 2018-07-26 RX ADMIN — SODIUM CHLORIDE: 0.9 INJECTION, SOLUTION INTRAVENOUS at 01:07

## 2018-07-26 RX ADMIN — AMIODARONE HYDROCHLORIDE 200 MG: 200 TABLET ORAL at 08:07

## 2018-07-26 RX ADMIN — INSULIN DETEMIR 18 UNITS: 100 INJECTION, SOLUTION SUBCUTANEOUS at 08:07

## 2018-07-26 RX ADMIN — INSULIN ASPART 2 UNITS: 100 INJECTION, SOLUTION INTRAVENOUS; SUBCUTANEOUS at 06:07

## 2018-07-26 RX ADMIN — INSULIN ASPART 5 UNITS: 100 INJECTION, SOLUTION INTRAVENOUS; SUBCUTANEOUS at 10:07

## 2018-07-26 RX ADMIN — INSULIN ASPART 2 UNITS: 100 INJECTION, SOLUTION INTRAVENOUS; SUBCUTANEOUS at 08:07

## 2018-07-26 RX ADMIN — ATORVASTATIN CALCIUM 40 MG: 20 TABLET, FILM COATED ORAL at 08:07

## 2018-07-26 RX ADMIN — INSULIN ASPART 5 UNITS: 100 INJECTION, SOLUTION INTRAVENOUS; SUBCUTANEOUS at 08:07

## 2018-07-26 RX ADMIN — INSULIN ASPART 5 UNITS: 100 INJECTION, SOLUTION INTRAVENOUS; SUBCUTANEOUS at 03:07

## 2018-07-26 RX ADMIN — HYDRALAZINE HYDROCHLORIDE 75 MG: 50 TABLET ORAL at 09:07

## 2018-07-26 NOTE — PT/OT/SLP PROGRESS
Physical Therapy Treatment    Patient Name:  Ciro Chandler   MRN:  [id number]    Recommendations:     Discharge Recommendations:  nursing facility, skilled   Discharge Equipment Recommendations: hospital bed, lift device   Barriers to discharge: Decreased caregiver support    Plan:     During this hospitalization, patient to be seen 4 x/week to address the above listed problems via therapeutic activities, therapeutic exercises, neuromuscular re-education, wheelchair management/training, gait training  · Plan of Care Expires:  08/20/18   Plan of Care Reviewed with: patient    Subjective     Communicated with RN prior to session.  EVD clamped.   Patient comments/goals: no verbalizations  Pain/Comfort:  · Pain Rating 1: 0/10  · Pain Rating Post-Intervention 1: 0/10    Objective:     Patient found with: pulse ox (continuous), restraints, SCD, telemetry, peripheral IV, pressure relief boots, NG tube, blood pressure cuff, external ventricular drain     General Precautions: Standard, aphasia, aspiration, fall     Patient was found supine in bed in NAD, sleeping.  Pt did not open eyes or respond to any commands throughout the therapy session.  Much more lethargic than yesterday.  PT and Rehab tech sat patient EOB to attempt to increase arousal.  Pt remained sitting EOB x 8 minutes with total assistance.  Eyelid flutter once, otherwise the patient did not open his eyes, no verbalizations, no following commands.  He made guttural noises in response to questions/commands.  PT tried sternal rub and wet washcloth on his face to improve arousal with no effect.  He was returned to supine. Rn at bedside.  PT discussed decreased level of alertness with RN. NCC resident at bedside.     Of note, when the PT was standing outside the room and speaking with the nurse, the patient opened both eyes and looked at therapist, then immediately closed them again.  He repeated this behavior 2x while therapist was viewing patient through ICU window.   He appears more lethargic overall, but he may also have a volitional component to presentation.     Functional Mobility:  Bed Mobility:   · Rolling Right: total assistance   · Rolling Left: total assistance  · Supine to Sit: total assistance  · Sit to Supine: total assistance      Sitting Balance at Edge of Bed:  · Assistance Level Required: total assistance x 2   · Time: 8 minutes    Functional Outcome Measures:  AM-PAC 6 CLICK MOBILITY  Turning over in bed (including adjusting bedclothes, sheets and blankets)?: 1  Sitting down on and standing up from a chair with arms (e.g., wheelchair, bedside commode, etc.): 1  Moving from lying on back to sitting on the side of the bed?: 1  Moving to and from a bed to a chair (including a wheelchair)?: 1  Need to walk in hospital room?: 1  Climbing 3-5 steps with a railing?: 1  Basic Mobility Total Score: 6     Patient left R sidelying on wedge with all lines intact, restraints reapplied, and RN at bedside.    GOALS:    Physical Therapy Goals        Problem: Physical Therapy Goal    Goal Priority Disciplines Outcome Goal Variances Interventions   Physical Therapy Goal     PT/OT, PT Ongoing (interventions implemented as appropriate)     Description:  Goals to be met by: 8/3/18    1. Pt will perform rolling to the R and L with moderate assistance.   2. Pt will perform supine to sit from both sides of the bed with max assistance.  3. Pt will perform sit to supine with max assistance.  4. Pt will sit EOB x 5 minutes with min assistance to prepare for functional tasks in sitting.   5. Pt will perform sit to stand transfers with moderate assistance.    6. Pt will perform bed <> chair transfers with max assistance.  7. Pt will perform gait x 10 feet with max assistance  8. Family will be independent with ROM using handout.                      Assessment:     Ciro Chandler is a 68 y.o. male admitted with a medical diagnosis of ICH (intracerebral hemorrhage).  He was much more  lethargic during therapy today, no eye opening, and no command following.  Change in position, sitting EOB and wet wash cloth were ineffective at increasing arousal.  Rn and NCC resident informed and at bedside. Will reassess on next treatment session if patient is appropriate for EOB.       He presents with the following impairments/functional limitations:  weakness, impaired functional mobilty, impaired cognition, decreased safety awareness, impaired endurance, impaired sensation, impaired balance, decreased upper extremity function, decreased lower extremity function, visual deficits, impaired self care skills, abnormal tone.    Rehab Prognosis:  Limited to fair; patient would benefit from acute skilled PT services to address these deficits and reach maximum level of function.      Recent Surgery: * No surgery found *        Time Tracking:     PT Received On: 07/26/18  PT Start Time: 1318     PT Stop Time: 1338  PT Total Time (min): 20 min     Billable Minutes: Therapeutic Activity 20    Treatment Type: Treatment  PT/PTA: PT         Suzanna Rey, PT  7/26/2018  [phone] (pager)

## 2018-07-26 NOTE — PROGRESS NOTES
[facility]  Neurocritical Care  Progress Note    Admit Date: 7/17/2018  Service Date: 07/26/2018  Length of Stay: 9    Subjective:     Chief Complaint: ICH (intracerebral hemorrhage)    History of Present Illness: 69 yo M with PMHx of HTN, HLD, DM, dementia, A. Fib, on coumadin as outpatient who was transferred to [facility] from [facility] where he was reportedly admitted for R thalamic IPH w IVH. Per OSH records, patient admitted to ICU and sent to floor with little follow up imaging and no interval improvement in exam. Wife requested second opinion thus was transferred to [facility] however for unknown reason was admitted to hospital medicine service last night. [facility] called this morning after seen by Nsgy due to concern for airway protection. CT this morning with R thalamic IPH and lateral ventricle extension bilaterally and slight enlargement of ventricular system. Of note baseline enlargement of vents at previous CT a year ago. On exam patient with GCS of 8, however per wife and OSH records, this is baseline since initial admission in [place]. Will admit to [facility] for higher level of care.       Hospital Course: --admitted to [facility] 7/18.   7/21: EVD at bedside by NSGY  7/23 EVD in place lowered to 5cm h2o per nsgy.. Blood sugars running >200 adjusted detemir. Increased hydralazine for better BP control  7/24: EVD at 5, insulin adjustments   7/25: EVD clamped per Nsgy, [facility] tonight  7/26: Held tube feeds, insulin adjustments, Nsgy contacted about MSC and EVD.       Review of Systems  Unable to obtain a complete ROS due to level of consciousness.  Objective:     Vitals:  Temp: 98 °F (36.7 °C)  Pulse: 66  Rhythm: normal sinus rhythm  BP: 113/62  MAP (mmHg): 82  ICP Mean (mmHg): 5 mmHg  Resp: 16  SpO2: 98 %  O2 Device (Oxygen Therapy): room air    Temp  Min: 97.8 °F (36.6 °C)  Max: 98.4 °F (36.9 °C)  Pulse  Min: 64  Max: 90  BP  Min: 99/58  Max: 168/81  MAP (mmHg)  Min: 73  Max: 118  ICP Mean (mmHg)  Min: 1  mmHg  Max: 9 mmHg  Resp  Min: 9  Max: 36  SpO2  Min: 96 %  Max: 100 %    07/25 0701 - 07/26 0700  In: 2000 [I.V.:125]  Out: 662 [Urine:645; Drains:17]   Unmeasured Output  Urine Occurrence: 1  Stool Occurrence: 1  Pad Count: 1       Physical Exam    GA: Lethargic, comfortable, no acute distress.   HEENT: No scleral icterus or JVD.   Pulmonary: Clear to auscultation A/L. No wheezing, crackles, or rhonchi.  Cardiac: RRR S1 & S2 w/o rubs/murmurs/gallops.   Abdominal: Bowel sounds present x 4. No appreciable hepatosplenomegaly.  Skin: No jaundice, rashes, or visible lesions.   Neuro:  --GCS: E1 V2 M4  --Mental Status:  Lethargic, not following commands but moves all exts to pain only, weaker in LLE   --CN II-XII grossly intact.   --Pupils 3 mm, PERRL.   --Corneal reflex, gag, cough intact.  --LUE strength: 2/5  --LLE strength: 1/5  --RUE strength: 2/5  --RLE strength: 2/5      Medications:  Continuous    sodium chloride 0.9%   Scheduled    amiodarone 200 mg Daily   amLODIPine 10 mg Daily   atorvastatin 40 mg Daily   heparin (porcine) 5,000 Units Q8H   hydrALAZINE 75 mg Q8H   insulin detemir U-100 10 Units BID   losartan 100 mg Daily   metoprolol tartrate 25 mg BID   modafinil 100 mg After lunch   modafinil 200 mg Daily   senna-docusate 8.6-50 mg 1 tablet Daily   sodium chloride 0.9% 1,000 mL Once   PRN    acetaminophen 650 mg Q6H PRN   dextrose 50% 12.5 g PRN   dextrose 50% 12.5 g PRN   glucagon (human recombinant) 1 mg PRN   insulin aspart U-100 1-10 Units Q4H PRN   labetalol 10 mg Q4H PRN   magnesium oxide 800 mg PRN   magnesium oxide 800 mg PRN   phenylephrine HCl in 0.9% NaCl 100 mcg Q10 Min PRN   potassium chloride 10% 40 mEq PRN   potassium chloride 10% 40 mEq PRN   potassium chloride 10% 60 mEq PRN   potassium, sodium phosphates 2 packet PRN   potassium, sodium phosphates 2 packet PRN   potassium, sodium phosphates 2 packet PRN   sodium chloride 0.9% 5 mL PRN     Today I personally reviewed pertinent medications,  lines/drains/airways, imaging, cardiology, lab results, microbiology results, notably:    Diet  Diet NPO  Diet NPO        Assessment/Plan:     Neuro   * ICH (intracerebral hemorrhage)    EVD clamped per Nsgy yesterday, possible adjustment at 5pm  hydrocephalus  SBP less than 180  PT OT SLP          Encephalopathy acute    Due to ICH and hydrocephalus  Modafinil        Brain compression    From hydrocephalus from IVH  EVD @ clamped per Nsgy  MS changed today, Nsgy to reeval patient at 5pm and possibly reopen EVD  neurochecks q1h        Obstructive hydrocephalus    7/21EVD at bedside by NSGy   For obstructive hydrocephalus.  7/23 EVD lowered to 5cm/h2o per NSGY Drained 46cc overnight. ICP 2-3 this am.  7/24 EVD remains at 5cm ICP 4-8  7/25 EVD clamped per Nsgy  7/26 Nsgy to eval EVD and patient at 5pm        Nontraumatic intraventricular intracerebral hemorrhage    See ICH        Dementia without behavioral disturbance    --holding home aricept          Cardiac/Vascular   Cardiomyopathy    Monitor  Low normal EF        Persistent atrial fibrillation    Amiodarone 200mg daily  Metoprolol 25mg bid  -rate controlled  -hold coumadin due to ICH        Essential hypertension    SBP goal less than 180  Amlodipine 10mg daily  Hydralazine 75mg q8h  Losartan 100mg daily  Metoprolol 25mg bid  Prn labetalol    Echo: 1 - Low normal left ventricular systolic function.     2 - Indeterminate LV diastolic function.     3 - Normal right ventricular systolic function .     4 - There is anterior pericardial fat pad..           Typical atrial flutter    Amiodarone 200mg daily for A-fib/flutter        Endocrine   Type 2 diabetes mellitus with diabetic polyneuropathy, with long-term current use of insulin    A1C 7.2  accuchecks q4h with mod SSI  - detemir - 18 units bid  aspart 5 units q4h  Tube feeds diabetasource 65cc/h          GI   Dysphagia    SLP following  NGT in place, and getting tube feeds              Activity Orders           Straight Cath starting at 07/18 3866        DNR    Maico Gray NP  Neurocritical Care  [facility]

## 2018-07-26 NOTE — PLAN OF CARE
Problem: Patient Care Overview  Goal: Plan of Care Review  Outcome: Ongoing (interventions implemented as appropriate)  POC reviewed with pt and pts spouse at 0500. Pts spouse verbalized understanding. Questions and concerns addressed. No acute events overnight. Pt taken to CT overnight. Pt progressing toward goals. Will continue to monitor. See flowsheets for full assessment and VS info

## 2018-07-26 NOTE — PT/OT/SLP PROGRESS
"Speech Language Pathology Treatment    Patient Name:  Ciro Chandler   MRN:  [id number]  Admitting Diagnosis: ICH (intracerebral hemorrhage)    Recommendations:                 General Recommendations:  Dysphagia therapy and Cognitive-linguistic therapy  Diet recommendations:  NPO, Liquid Diet Level: NPO   Aspiration Precautions: Continue alternate means of nutrition and Frequent oral care   General Precautions: Standard, fall, NPO, aspiration  Communication strategies:  go to room if call light pushed    Subjective     Pt overall somnolent this day. Spouse reported "I just can't get him up today"    Pain/Comfort:  · Pain Rating 1: 0/10  · Pain Rating Post-Intervention 2: 0/10    Objective:     Has the patient been evaluated by SLP for swallowing?   Yes  Keep patient NPO? No   Current Respiratory Status: room air      Pt seen for ongoing dysphagia / speech therapy.  HOB raised for all PO trials - EVD clamped previously by RN. Eyes closed for entire session.  Pt given ice chip x1 - movements for oral manipulation and mastication of ice chip noted, though no pharyngeal swallow noted. At this time Pt with O2 desat from 96% to 87%.  No further PO trials administered secondary to current cognitive state and risk for aspiration. Education provided to Pt and spouse regarding ongoing ST POC, aspiration precautions, signs of airway compromise, rationale for continued NPO. Pt with no evidence of learning, spouse verbalized understanding.   Cont ST POC.     Assessment:     Ciro Chandler is a 68 y.o. male with an SLP diagnosis of Dysphagia and Cognitive-Linguistic Impairment    Goals:    SLP Goals        Problem: SLP Goal    Goal Priority Disciplines Outcome   SLP Goal     SLP Ongoing (interventions implemented as appropriate)   Description:  1.  Tolerate dental soft diet with thin liquids with no s/s of aspiration  2.  Follow simple commands with 90% accuracy  3.  Respond to simple yes/no questions with 90% accuracy  4,  Respond to " simple word finding tasks with 80% accuracy  5.  Assess visual spatial skills                    Plan:     · Patient to be seen:  4 x/week   · Plan of Care expires:  08/23/18  · Plan of Care reviewed with:  patient, spouse   · SLP Follow-Up:  Yes       Discharge recommendations:  nursing facility, skilled     Time Tracking:     SLP Treatment Date:   07/26/18  Speech Start Time:  1120  Speech Stop Time:  1133     Speech Total Time (min):  13 min    Billable Minutes: Treatment Swallowing Dysfunction 13    Rabia Casanova M.S., [facility]-SLP  Speech Language Pathologist  [phone]  07/26/2018

## 2018-07-26 NOTE — ASSESSMENT & PLAN NOTE
7/21EVD at bedside by NSGy   For obstructive hydrocephalus.  7/23 EVD lowered to 5cm/h2o per NSGY Drained 46cc overnight. ICP 2-3 this am.  7/24 EVD remains at 5cm ICP 4-8  7/25 EVD clamped per Nsgy  7/26 Nsgy to eval EVD and patient at 5pm

## 2018-07-26 NOTE — ASSESSMENT & PLAN NOTE
67 yo male with right thalamic hemorrhage (ICH score 2) initially found on 7/9/18 at outside hospital in [place]  HCT shows acute right thalamic hemorrhage with intraventricular extension.  Ventricular system is enlarged on 7/18 scan, HCT on 11/13/17 shows baseline ventricular enlargement.  There are no films available from recent admission in [place] for comparison     -Removed EVD today   -no further neurosurgical intervention; will sign off at this time

## 2018-07-26 NOTE — SUBJECTIVE & OBJECTIVE
Review of Systems  Unable to obtain a complete ROS due to level of consciousness.  Objective:     Vitals:  Temp: 98 °F (36.7 °C)  Pulse: 66  Rhythm: normal sinus rhythm  BP: 113/62  MAP (mmHg): 82  ICP Mean (mmHg): 5 mmHg  Resp: 16  SpO2: 98 %  O2 Device (Oxygen Therapy): room air    Temp  Min: 97.8 °F (36.6 °C)  Max: 98.4 °F (36.9 °C)  Pulse  Min: 64  Max: 90  BP  Min: 99/58  Max: 168/81  MAP (mmHg)  Min: 73  Max: 118  ICP Mean (mmHg)  Min: 1 mmHg  Max: 9 mmHg  Resp  Min: 9  Max: 36  SpO2  Min: 96 %  Max: 100 %    07/25 0701 - 07/26 0700  In: 2000 [I.V.:125]  Out: 662 [Urine:645; Drains:17]   Unmeasured Output  Urine Occurrence: 1  Stool Occurrence: 1  Pad Count: 1       Physical Exam    GA: Lethargic, comfortable, no acute distress.   HEENT: No scleral icterus or JVD.   Pulmonary: Clear to auscultation A/L. No wheezing, crackles, or rhonchi.  Cardiac: RRR S1 & S2 w/o rubs/murmurs/gallops.   Abdominal: Bowel sounds present x 4. No appreciable hepatosplenomegaly.  Skin: No jaundice, rashes, or visible lesions.   Neuro:  --GCS: E1 V2 M4  --Mental Status:  Lethargic, not following commands but moves all exts to pain only, weaker in LLE   --CN II-XII grossly intact.   --Pupils 3 mm, PERRL.   --Corneal reflex, gag, cough intact.  --LUE strength: 2/5  --LLE strength: 1/5  --RUE strength: 2/5  --RLE strength: 2/5      Medications:  Continuous    sodium chloride 0.9%   Scheduled    amiodarone 200 mg Daily   amLODIPine 10 mg Daily   atorvastatin 40 mg Daily   heparin (porcine) 5,000 Units Q8H   hydrALAZINE 75 mg Q8H   insulin detemir U-100 10 Units BID   losartan 100 mg Daily   metoprolol tartrate 25 mg BID   modafinil 100 mg After lunch   modafinil 200 mg Daily   senna-docusate 8.6-50 mg 1 tablet Daily   sodium chloride 0.9% 1,000 mL Once   PRN    acetaminophen 650 mg Q6H PRN   dextrose 50% 12.5 g PRN   dextrose 50% 12.5 g PRN   glucagon (human recombinant) 1 mg PRN   insulin aspart U-100 1-10 Units Q4H PRN   labetalol  10 mg Q4H PRN   magnesium oxide 800 mg PRN   magnesium oxide 800 mg PRN   phenylephrine HCl in 0.9% NaCl 100 mcg Q10 Min PRN   potassium chloride 10% 40 mEq PRN   potassium chloride 10% 40 mEq PRN   potassium chloride 10% 60 mEq PRN   potassium, sodium phosphates 2 packet PRN   potassium, sodium phosphates 2 packet PRN   potassium, sodium phosphates 2 packet PRN   sodium chloride 0.9% 5 mL PRN     Today I personally reviewed pertinent medications, lines/drains/airways, imaging, cardiology, lab results, microbiology results, notably:    Diet  Diet NPO  Diet NPO

## 2018-07-26 NOTE — ASSESSMENT & PLAN NOTE
From hydrocephalus from IVH  EVD @ clamped per Nsgy  MS changed today, Nsgy to reeval patient at 5pm and possibly reopen EVD  neurochecks q1h

## 2018-07-26 NOTE — PLAN OF CARE
Problem: Patient Care Overview  Goal: Plan of Care Review  Outcome: Ongoing (interventions implemented as appropriate)  POC reviewed with pt and family at 1400. Spouse verbalized understanding. Questions and concerns addressed. No acute events today. Pt progressing toward goals. Will continue to monitor. See flowsheets for full assessment and VS info.     Much less responsive today than yesterday. Team will reassess at 1700 and determine need for further EVD drainage.

## 2018-07-26 NOTE — PLAN OF CARE
Problem: Physical Therapy Goal  Goal: Physical Therapy Goal  Goals to be met by: 8/3/18    1. Pt will perform rolling to the R and L with moderate assistance.   2. Pt will perform supine to sit from both sides of the bed with max assistance.  3. Pt will perform sit to supine with max assistance.  4. Pt will sit EOB x 5 minutes with min assistance to prepare for functional tasks in sitting.   5. Pt will perform sit to stand transfers with moderate assistance.    6. Pt will perform bed <> chair transfers with max assistance.  7. Pt will perform gait x 10 feet with max assistance  8. Family will be independent with ROM using handout.       Outcome: Ongoing (interventions implemented as appropriate)  Patient participated well in therapy.  POC and goals remain appropriate.  Please refer to progress note for functional mobility.     Pt more lethargic and less responsive today.  Rn and NCC team informed.      Suzanna Rey, PT  7/26/2018  [phone] (pager)

## 2018-07-26 NOTE — PLAN OF CARE
07/26/18 1227   Discharge Reassessment   Assessment Type Discharge Planning Reassessment   Provided patient/caregiver education on the expected discharge date and the discharge plan No   Do you have any problems affording any of your prescribed medications? No   Discharge Plan A Skilled Nursing Facility   Discharge Plan B Rehab   Patient choice form signed by patient/caregiver N/A   Can the patient answer the patient profile reliably? No, cognitively impaired   How does the patient rate their overall health at the present time? (nigel)   Describe the patient's ability to walk at the present time. Major restrictions/daily assistance from another person   How often would a person be available to care for the patient? Whenever needed   Number of comorbid conditions (as recorded on the chart) Five or more   During the past month, has the patient often been bothered by feeling down, depressed or hopeless? (nigel)   During the past month, has the patient often been bothered by little interest or pleasure in doing things? (nigel)       EVD removed today per Neurosurgery/     Marisabel Paige RN, CCRN-K, [facility]  Neuro-Critical Care   [phone]

## 2018-07-26 NOTE — PROGRESS NOTES
"[facility]  Neurosurgery  Progress Note    Subjective:     History of Present Illness: 67 yo M with PMHx of HTN, HLD, DM, dementia, A. Fib, on coumadin, prior cerebral aneurysm treated 20 years ago at [facility]. Pt who presented [facility] in [place], [place] on 7/9/18 to with sudden-onset headache, right eye deviation, and L sided weakness. Head CT at that time revealed right thalamic hemorrhage.  Pt was treated conservatively without neurosurgical intervention.  Per wife, she was unhappy with the care in [place].  States patient has been very lethargic since he was initially admitted, without improvement,  and felt as though the "nothing was being done for him" She requested that patient be transferred to [facility] for further evaluation/treatment.  Pt was accepted to [facility] by Hospital medicine service, and currently admitted to the floor.  Pt has been lethargic and and unable to FC's since admission.    68 M with R thalamic ICH/IVH (ICH score 2) initially found on 7/9/18 admitted to OSH in [place] w/o intervention.     7/18: Pt has NG tube in place, as unable to swallow.  Pt is DNR.  Prior to his recent hospital admission, wife states patient was functioning well, ambulating normally, no previous weakness/speech difficulty.  He takes aspirin and coumadin at home (on hold now).  HCT was done this morning which shows acute blood with ventriculomegaly.          Post-Op Info:  * No surgery found *         Interval History: NAEON.   Medications:  Continuous Infusions:  Scheduled Meds:   amiodarone  200 mg Per NG tube Daily    amLODIPine  10 mg Per NG tube Daily    atorvastatin  40 mg Per NG tube Daily    heparin (porcine)  5,000 Units Subcutaneous Q8H    hydrALAZINE  75 mg Per NG tube Q8H    insulin detemir U-100  10 Units Subcutaneous BID    losartan  100 mg Per NG tube Daily    metoprolol tartrate  25 mg Per NG tube BID    modafinil  100 mg Per NG tube After lunch    modafinil  200 mg Per NG tube Daily    " senna-docusate 8.6-50 mg  1 tablet Per NG tube Daily    sodium chloride 0.9%  1,000 mL Intravenous Once     PRN Meds:acetaminophen, dextrose 50%, dextrose 50%, glucagon (human recombinant), insulin aspart U-100, labetalol, magnesium oxide, magnesium oxide, phenylephrine HCl in 0.9% NaCl, potassium chloride 10%, potassium chloride 10%, potassium chloride 10%, potassium, sodium phosphates, potassium, sodium phosphates, potassium, sodium phosphates, sodium chloride 0.9%     Review of Systems    Objective:     Weight: 82.8 kg (182 lb 8.7 oz)  Body mass index is 26.19 kg/m².  Vital Signs (Most Recent):  Temp: 98 °F (36.7 °C) (07/26/18 1100)  Pulse: 65 (07/26/18 1100)  Resp: 13 (07/26/18 1100)  BP: (!) 109/55 (07/26/18 1100)  SpO2: 97 % (07/26/18 1100) Vital Signs (24h Range):  Temp:  [97.8 °F (36.6 °C)-98.4 °F (36.9 °C)] 98 °F (36.7 °C)  Pulse:  [64-90] 65  Resp:  [9-36] 13  SpO2:  [96 %-100 %] 97 %  BP: ()/(55-96) 109/55       Date 07/26/18 0700 - 07/27/18 0659   Shift 0268-5002 8476-6894 0475-1881 24 Hour Total   I  N  T  A  K  E   I.V.  (mL/kg) 25  (0.3)   25  (0.3)    NG/   370    Shift Total  (mL/kg) 395  (4.8)   395  (4.8)   O  U  T  P  U  T   Shift Total  (mL/kg)       Weight (kg) 82.8 82.8 82.8 82.8                        NG/OG Tube 07/16/18 1200 Left nostril (Active)   Placement Check placement verified by distal tube length measurement 7/24/2018  3:05 AM   Distal Tube Length (cm) 70 7/24/2018  3:05 AM   Tolerance no signs/symptoms of discomfort 7/24/2018  3:05 AM   Securement anchored to nostril center w/ adhesive device 7/24/2018  3:05 AM   Clamp Status/Tolerance unclamped;no residual 7/24/2018  3:05 AM   Insertion Site Appearance no redness, warmth, tenderness, skin breakdown, drainage 7/24/2018  3:05 AM   Flush/Irrigation flushed w/;water 7/24/2018  3:05 AM   Feeding Method continuous 7/24/2018  3:05 AM   Feeding Action feeding continued 7/24/2018  3:05 AM   Current Rate (mL/hr) 65 mL/hr  7/24/2018  3:05 AM   Goal Rate (mL/hr) 65 mL/hr 7/24/2018  3:05 AM   Intake (mL) 75 mL 7/24/2018  5:20 AM   Rate Formula Tube Feeding (mL/hr) 20 mL/hr 7/20/2018  3:05 PM   Formula Name Diabetasource 7/20/2018  3:05 PM   Intake (mL) - Formula Tube Feeding 0 7/24/2018 10:01 AM   Residual Amount (ml) 0 ml 7/22/2018  7:01 PM       Male External Urinary Catheter 07/18/18 1800 Medium (Active)   Collection Container Urimeter 7/24/2018  3:05 AM   Securement Method secured to lower ABD w/ adhesive device 7/24/2018  3:05 AM   Skin no redness;no breakdown 7/24/2018  3:05 AM   Tolerance no signs/symptoms of discomfort 7/24/2018  3:05 AM   Output (mL) 0 mL 7/24/2018  9:01 AM   Catheter Change Date 07/22/18 7/24/2018  3:05 AM   Catheter Change Time 1900 7/23/2018  3:05 AM            ICP/Ventriculostomy 07/21/18 1425 Ventricular drainage catheter with ICP microsensor Right Occipital region (Active)   Level of Ventriculostomy (cm above) 10 7/24/2018  3:05 AM   Status Open to drainage 7/24/2018  3:05 AM   Site Assessment Clean;Dry 7/24/2018  3:05 AM   Site Drainage No drainage 7/24/2018  3:05 AM   Waveform dampened 7/24/2018  3:05 AM   Output (mL) 11 mL 7/24/2018 10:01 AM   CSF Color clear;orange 7/24/2018  3:05 AM   Dressing Status Biopatch in place;Clean;Dry;Intact 7/24/2018  3:05 AM   Interventions HOB degrees;bed controls locked;zeroed 7/24/2018  3:05 AM       Neurosurgery Physical Exam    E3V3M5  AOx1, intermittently to name.  R gaze  Does not blink to threat in L eye  Localize on RUE, w/d LUE  Moving RLE spontaneously  Withdraw LLE    Significant Labs:    Recent Labs  Lab 07/25/18  0503 07/26/18  0237   * 186*    134*   K 3.7 4.2    98   CO2 27 26   BUN 12 14   CREATININE 0.7 0.8   CALCIUM 9.4 9.8   MG 1.8 2.2       Recent Labs  Lab 07/25/18  0503 07/26/18  0237   WBC 7.57 8.13   HGB 10.8* 12.0*   HCT 31.8* 36.5*    268     No results for input(s): LABPT, INR, APTT in the last 48 hours.  Microbiology  Results (last 7 days)     Procedure Component Value Units Date/Time    Gram stain [[id number]]     Order Status:  No result Specimen:  CSF (Spinal Fluid) from CSF Tap, Tube 3     CSF culture [[id number]]     Order Status:  No result Specimen:  CSF (Spinal Fluid) from CSF Tap, Tube 3         Recent Lab Results       07/26/18  0956 07/26/18  0540 07/26/18  0246 07/26/18  0237 07/25/18  2138      Immature Granulocytes    0.6(H)      Immature Grans (Abs)    0.05  Comment:  Mild elevation in immature granulocytes is non specific and   can be seen in a variety of conditions including stress response,   acute inflammation, trauma and pregnancy. Correlation with other   laboratory and clinical findings is essential.  (H)      Anion Gap    10      Baso #    0.05      Basophil%    0.6      BUN, Bld    14      Calcium    9.8      Chloride    98      CO2    26      Creatinine    0.8      Differential Method    Automated      eGFR if     >60.0      eGFR if non     >60.0  Comment:  Calculation used to obtain the estimated glomerular filtration  rate (eGFR) is the CKD-EPI equation.         Eos #    0.1      Eosinophil%    1.5      Glucose    186(H)      Gran # (ANC)    6.5      Gran%    80.5(H)      Hematocrit    36.5(L)      Hemoglobin    12.0(L)      Lymph #    0.8(L)      Lymph%    9.3(L)      Magnesium    2.2      MCH    29.1      MCHC    32.9      MCV    88      Mono #    0.6      Mono%    7.5      MPV    11.7      nRBC    0      Phosphorus    4.1      Platelets    268      POCT Glucose 162(H) 159(H) 196(H)  146(H)     Potassium    4.2      RBC    4.13(L)      RDW    12.6      Sodium    134(L)      WBC    8.13                  07/25/18 2000 07/25/18  1514 07/25/18  1225      Immature Granulocytes        Immature Grans (Abs)        Anion Gap        Baso #        Basophil%        BUN, Bld        Calcium        Chloride        CO2        Creatinine        Differential Method        eGFR if   American        eGFR if non         Eos #        Eosinophil%        Glucose        Gran # (ANC)        Gran%        Hematocrit        Hemoglobin        Lymph #        Lymph%        Magnesium        MCH        MCHC        MCV        Mono #        Mono%        MPV        nRBC        Phosphorus        Platelets        POCT Glucose 140(H) 131(H) 179(H)     Potassium        RBC        RDW        Sodium        WBC              Significant Diagnostics:  I have reviewed all pertinent imaging results/findings within the past 24 hours.    Assessment/Plan:     Nontraumatic intraventricular intracerebral hemorrhage    69 yo male with right thalamic hemorrhage (ICH score 2) initially found on 7/9/18 at outside hospital in [place]  HCT shows acute right thalamic hemorrhage with intraventricular extension.  Ventricular system is enlarged on 7/18 scan, HCT on 11/13/17 shows baseline ventricular enlargement.  There are no films available from recent admission in [place] for comparison     -Removed EVD today   -no further neurosurgical intervention; will sign off at this time            Brenton Romo MD  Neurosurgery  [facility]

## 2018-07-26 NOTE — ASSESSMENT & PLAN NOTE
EVD clamped per Nsgy yesterday, possible adjustment at 5pm  hydrocephalus  SBP less than 180  PT OT SLP

## 2018-07-26 NOTE — SUBJECTIVE & OBJECTIVE
Interval History: NAEON.   Medications:  Continuous Infusions:  Scheduled Meds:   amiodarone  200 mg Per NG tube Daily    amLODIPine  10 mg Per NG tube Daily    atorvastatin  40 mg Per NG tube Daily    heparin (porcine)  5,000 Units Subcutaneous Q8H    hydrALAZINE  75 mg Per NG tube Q8H    insulin detemir U-100  10 Units Subcutaneous BID    losartan  100 mg Per NG tube Daily    metoprolol tartrate  25 mg Per NG tube BID    modafinil  100 mg Per NG tube After lunch    modafinil  200 mg Per NG tube Daily    senna-docusate 8.6-50 mg  1 tablet Per NG tube Daily    sodium chloride 0.9%  1,000 mL Intravenous Once     PRN Meds:acetaminophen, dextrose 50%, dextrose 50%, glucagon (human recombinant), insulin aspart U-100, labetalol, magnesium oxide, magnesium oxide, phenylephrine HCl in 0.9% NaCl, potassium chloride 10%, potassium chloride 10%, potassium chloride 10%, potassium, sodium phosphates, potassium, sodium phosphates, potassium, sodium phosphates, sodium chloride 0.9%     Review of Systems    Objective:     Weight: 82.8 kg (182 lb 8.7 oz)  Body mass index is 26.19 kg/m².  Vital Signs (Most Recent):  Temp: 98 °F (36.7 °C) (07/26/18 1100)  Pulse: 65 (07/26/18 1100)  Resp: 13 (07/26/18 1100)  BP: (!) 109/55 (07/26/18 1100)  SpO2: 97 % (07/26/18 1100) Vital Signs (24h Range):  Temp:  [97.8 °F (36.6 °C)-98.4 °F (36.9 °C)] 98 °F (36.7 °C)  Pulse:  [64-90] 65  Resp:  [9-36] 13  SpO2:  [96 %-100 %] 97 %  BP: ()/(55-96) 109/55       Date 07/26/18 0700 - 07/27/18 0659   Shift 4223-5111 5510-5044 5206-3598 24 Hour Total   I  N  T  A  K  E   I.V.  (mL/kg) 25  (0.3)   25  (0.3)    NG/   370    Shift Total  (mL/kg) 395  (4.8)   395  (4.8)   O  U  T  P  U  T   Shift Total  (mL/kg)       Weight (kg) 82.8 82.8 82.8 82.8                        NG/OG Tube 07/16/18 1200 Left nostril (Active)   Placement Check placement verified by distal tube length measurement 7/24/2018  3:05 AM   Distal Tube Length (cm) 70  7/24/2018  3:05 AM   Tolerance no signs/symptoms of discomfort 7/24/2018  3:05 AM   Securement anchored to nostril center w/ adhesive device 7/24/2018  3:05 AM   Clamp Status/Tolerance unclamped;no residual 7/24/2018  3:05 AM   Insertion Site Appearance no redness, warmth, tenderness, skin breakdown, drainage 7/24/2018  3:05 AM   Flush/Irrigation flushed w/;water 7/24/2018  3:05 AM   Feeding Method continuous 7/24/2018  3:05 AM   Feeding Action feeding continued 7/24/2018  3:05 AM   Current Rate (mL/hr) 65 mL/hr 7/24/2018  3:05 AM   Goal Rate (mL/hr) 65 mL/hr 7/24/2018  3:05 AM   Intake (mL) 75 mL 7/24/2018  5:20 AM   Rate Formula Tube Feeding (mL/hr) 20 mL/hr 7/20/2018  3:05 PM   Formula Name Diabetasource 7/20/2018  3:05 PM   Intake (mL) - Formula Tube Feeding 0 7/24/2018 10:01 AM   Residual Amount (ml) 0 ml 7/22/2018  7:01 PM       Male External Urinary Catheter 07/18/18 1800 Medium (Active)   Collection Container Urimeter 7/24/2018  3:05 AM   Securement Method secured to lower ABD w/ adhesive device 7/24/2018  3:05 AM   Skin no redness;no breakdown 7/24/2018  3:05 AM   Tolerance no signs/symptoms of discomfort 7/24/2018  3:05 AM   Output (mL) 0 mL 7/24/2018  9:01 AM   Catheter Change Date 07/22/18 7/24/2018  3:05 AM   Catheter Change Time 1900 7/23/2018  3:05 AM            ICP/Ventriculostomy 07/21/18 1425 Ventricular drainage catheter with ICP microsensor Right Occipital region (Active)   Level of Ventriculostomy (cm above) 10 7/24/2018  3:05 AM   Status Open to drainage 7/24/2018  3:05 AM   Site Assessment Clean;Dry 7/24/2018  3:05 AM   Site Drainage No drainage 7/24/2018  3:05 AM   Waveform dampened 7/24/2018  3:05 AM   Output (mL) 11 mL 7/24/2018 10:01 AM   CSF Color clear;orange 7/24/2018  3:05 AM   Dressing Status Biopatch in place;Clean;Dry;Intact 7/24/2018  3:05 AM   Interventions HOB degrees;bed controls locked;zeroed 7/24/2018  3:05 AM       Neurosurgery Physical Exam    E3V3M5  AOx1, intermittently to  name.  R gaze  Does not blink to threat in L eye  Localize on RUE, w/d LUE  Moving RLE spontaneously  Withdraw LLE    Significant Labs:    Recent Labs  Lab 07/25/18  0503 07/26/18  0237   * 186*    134*   K 3.7 4.2    98   CO2 27 26   BUN 12 14   CREATININE 0.7 0.8   CALCIUM 9.4 9.8   MG 1.8 2.2       Recent Labs  Lab 07/25/18  0503 07/26/18  0237   WBC 7.57 8.13   HGB 10.8* 12.0*   HCT 31.8* 36.5*    268     No results for input(s): LABPT, INR, APTT in the last 48 hours.  Microbiology Results (last 7 days)     Procedure Component Value Units Date/Time    Gram stain [[id number]]     Order Status:  No result Specimen:  CSF (Spinal Fluid) from CSF Tap, Tube 3     CSF culture [[id number]]     Order Status:  No result Specimen:  CSF (Spinal Fluid) from CSF Tap, Tube 3         Recent Lab Results       07/26/18  0956 07/26/18  0540 07/26/18  0246 07/26/18  0237 07/25/18  2138      Immature Granulocytes    0.6(H)      Immature Grans (Abs)    0.05  Comment:  Mild elevation in immature granulocytes is non specific and   can be seen in a variety of conditions including stress response,   acute inflammation, trauma and pregnancy. Correlation with other   laboratory and clinical findings is essential.  (H)      Anion Gap    10      Baso #    0.05      Basophil%    0.6      BUN, Bld    14      Calcium    9.8      Chloride    98      CO2    26      Creatinine    0.8      Differential Method    Automated      eGFR if     >60.0      eGFR if non     >60.0  Comment:  Calculation used to obtain the estimated glomerular filtration  rate (eGFR) is the CKD-EPI equation.         Eos #    0.1      Eosinophil%    1.5      Glucose    186(H)      Gran # (ANC)    6.5      Gran%    80.5(H)      Hematocrit    36.5(L)      Hemoglobin    12.0(L)      Lymph #    0.8(L)      Lymph%    9.3(L)      Magnesium    2.2      MCH    29.1      MCHC    32.9      MCV    88      Mono #    0.6      Mono%     7.5      MPV    11.7      nRBC    0      Phosphorus    4.1      Platelets    268      POCT Glucose 162(H) 159(H) 196(H)  146(H)     Potassium    4.2      RBC    4.13(L)      RDW    12.6      Sodium    134(L)      WBC    8.13                  07/25/18 2000 07/25/18  1514 07/25/18  1225      Immature Granulocytes        Immature Grans (Abs)        Anion Gap        Baso #        Basophil%        BUN, Bld        Calcium        Chloride        CO2        Creatinine        Differential Method        eGFR if         eGFR if non         Eos #        Eosinophil%        Glucose        Gran # (ANC)        Gran%        Hematocrit        Hemoglobin        Lymph #        Lymph%        Magnesium        MCH        MCHC        MCV        Mono #        Mono%        MPV        nRBC        Phosphorus        Platelets        POCT Glucose 140(H) 131(H) 179(H)     Potassium        RBC        RDW        Sodium        WBC              Significant Diagnostics:  I have reviewed all pertinent imaging results/findings within the past 24 hours.

## 2018-07-26 NOTE — PLAN OF CARE
Problem: SLP Goal  Goal: SLP Goal  1.  Tolerate dental soft diet with thin liquids with no s/s of aspiration  2.  Follow simple commands with 90% accuracy  3.  Respond to simple yes/no questions with 90% accuracy  4,  Respond to simple word finding tasks with 80% accuracy  5.  Assess visual spatial skills   Pt overall somnolent this day. Continue NPO and ST POC.   Rabia Casanova M.S., [facility]-SLP  Speech Language Pathologist  [phone]  07/26/2018

## 2018-07-27 LAB
ANION GAP SERPL CALC-SCNC: 8 MMOL/L
BASOPHILS # BLD AUTO: 0.03 K/UL
BASOPHILS NFR BLD: 0.5 %
BUN SERPL-MCNC: 19 MG/DL
CALCIUM SERPL-MCNC: 8.9 MG/DL
CHLORIDE SERPL-SCNC: 101 MMOL/L
CO2 SERPL-SCNC: 27 MMOL/L
CREAT SERPL-MCNC: 0.8 MG/DL
DIFFERENTIAL METHOD: ABNORMAL
EOSINOPHIL # BLD AUTO: 0.1 K/UL
EOSINOPHIL NFR BLD: 1.9 %
ERYTHROCYTE [DISTWIDTH] IN BLOOD BY AUTOMATED COUNT: 12.7 %
EST. GFR  (AFRICAN AMERICAN): >60 ML/MIN/1.73 M^2
EST. GFR  (NON AFRICAN AMERICAN): >60 ML/MIN/1.73 M^2
GLUCOSE SERPL-MCNC: 152 MG/DL
HCT VFR BLD AUTO: 29.9 %
HGB BLD-MCNC: 10.1 G/DL
IMM GRANULOCYTES # BLD AUTO: 0.02 K/UL
IMM GRANULOCYTES NFR BLD AUTO: 0.3 %
LYMPHOCYTES # BLD AUTO: 0.7 K/UL
LYMPHOCYTES NFR BLD: 12 %
MAGNESIUM SERPL-MCNC: 2.1 MG/DL
MCH RBC QN AUTO: 29.8 PG
MCHC RBC AUTO-ENTMCNC: 33.8 G/DL
MCV RBC AUTO: 88 FL
MONOCYTES # BLD AUTO: 0.5 K/UL
MONOCYTES NFR BLD: 9.1 %
NEUTROPHILS # BLD AUTO: 4.5 K/UL
NEUTROPHILS NFR BLD: 76.2 %
NRBC BLD-RTO: 0 /100 WBC
PHOSPHATE SERPL-MCNC: 4.2 MG/DL
PLATELET # BLD AUTO: 241 K/UL
PMV BLD AUTO: 12.1 FL
POCT GLUCOSE: 127 MG/DL (ref 70–110)
POCT GLUCOSE: 143 MG/DL (ref 70–110)
POCT GLUCOSE: 151 MG/DL (ref 70–110)
POCT GLUCOSE: 200 MG/DL (ref 70–110)
POCT GLUCOSE: 209 MG/DL (ref 70–110)
POCT GLUCOSE: 217 MG/DL (ref 70–110)
POCT GLUCOSE: 218 MG/DL (ref 70–110)
POTASSIUM SERPL-SCNC: 4 MMOL/L
RBC # BLD AUTO: 3.39 M/UL
SODIUM SERPL-SCNC: 136 MMOL/L
WBC # BLD AUTO: 5.94 K/UL

## 2018-07-27 PROCEDURE — 25000003 PHARM REV CODE 250: Performed by: NURSE PRACTITIONER

## 2018-07-27 PROCEDURE — 92507 TX SP LANG VOICE COMM INDIV: CPT

## 2018-07-27 PROCEDURE — 25000003 PHARM REV CODE 250: Performed by: PSYCHIATRY & NEUROLOGY

## 2018-07-27 PROCEDURE — 92526 ORAL FUNCTION THERAPY: CPT

## 2018-07-27 PROCEDURE — 25000003 PHARM REV CODE 250: Performed by: STUDENT IN AN ORGANIZED HEALTH CARE EDUCATION/TRAINING PROGRAM

## 2018-07-27 PROCEDURE — 85025 COMPLETE CBC W/AUTO DIFF WBC: CPT

## 2018-07-27 PROCEDURE — 99232 SBSQ HOSP IP/OBS MODERATE 35: CPT | Mod: ,,, | Performed by: NURSE PRACTITIONER

## 2018-07-27 PROCEDURE — 20000000 HC ICU ROOM

## 2018-07-27 PROCEDURE — 63600175 PHARM REV CODE 636 W HCPCS: Performed by: NURSE PRACTITIONER

## 2018-07-27 PROCEDURE — 83735 ASSAY OF MAGNESIUM: CPT

## 2018-07-27 PROCEDURE — 97530 THERAPEUTIC ACTIVITIES: CPT

## 2018-07-27 PROCEDURE — 84100 ASSAY OF PHOSPHORUS: CPT

## 2018-07-27 PROCEDURE — 80048 BASIC METABOLIC PNL TOTAL CA: CPT

## 2018-07-27 PROCEDURE — 99233 SBSQ HOSP IP/OBS HIGH 50: CPT | Mod: GC,,, | Performed by: PSYCHIATRY & NEUROLOGY

## 2018-07-27 PROCEDURE — 97110 THERAPEUTIC EXERCISES: CPT

## 2018-07-27 RX ADMIN — HYDRALAZINE HYDROCHLORIDE 75 MG: 50 TABLET ORAL at 05:07

## 2018-07-27 RX ADMIN — METOPROLOL TARTRATE 25 MG: 25 TABLET, FILM COATED ORAL at 08:07

## 2018-07-27 RX ADMIN — SENNOSIDES AND DOCUSATE SODIUM 1 TABLET: 8.6; 5 TABLET ORAL at 09:07

## 2018-07-27 RX ADMIN — HYDRALAZINE HYDROCHLORIDE 75 MG: 50 TABLET ORAL at 09:07

## 2018-07-27 RX ADMIN — INSULIN DETEMIR 9 UNITS: 100 INJECTION, SOLUTION SUBCUTANEOUS at 09:07

## 2018-07-27 RX ADMIN — INSULIN ASPART 5 UNITS: 100 INJECTION, SOLUTION INTRAVENOUS; SUBCUTANEOUS at 02:07

## 2018-07-27 RX ADMIN — INSULIN DETEMIR 18 UNITS: 100 INJECTION, SOLUTION SUBCUTANEOUS at 08:07

## 2018-07-27 RX ADMIN — INSULIN ASPART 1 UNITS: 100 INJECTION, SOLUTION INTRAVENOUS; SUBCUTANEOUS at 02:07

## 2018-07-27 RX ADMIN — MODAFINIL 100 MG: 100 TABLET ORAL at 01:07

## 2018-07-27 RX ADMIN — HYDRALAZINE HYDROCHLORIDE 75 MG: 50 TABLET ORAL at 01:07

## 2018-07-27 RX ADMIN — LOSARTAN POTASSIUM 100 MG: 50 TABLET, FILM COATED ORAL at 09:07

## 2018-07-27 RX ADMIN — MODAFINIL 200 MG: 100 TABLET ORAL at 05:07

## 2018-07-27 RX ADMIN — INSULIN ASPART 5 UNITS: 100 INJECTION, SOLUTION INTRAVENOUS; SUBCUTANEOUS at 03:07

## 2018-07-27 RX ADMIN — HEPARIN SODIUM 5000 UNITS: 5000 INJECTION, SOLUTION INTRAVENOUS; SUBCUTANEOUS at 01:07

## 2018-07-27 RX ADMIN — INSULIN ASPART 5 UNITS: 100 INJECTION, SOLUTION INTRAVENOUS; SUBCUTANEOUS at 07:07

## 2018-07-27 RX ADMIN — AMIODARONE HYDROCHLORIDE 200 MG: 200 TABLET ORAL at 09:07

## 2018-07-27 RX ADMIN — HEPARIN SODIUM 5000 UNITS: 5000 INJECTION, SOLUTION INTRAVENOUS; SUBCUTANEOUS at 05:07

## 2018-07-27 RX ADMIN — AMLODIPINE BESYLATE 10 MG: 10 TABLET ORAL at 09:07

## 2018-07-27 RX ADMIN — ATORVASTATIN CALCIUM 40 MG: 20 TABLET, FILM COATED ORAL at 09:07

## 2018-07-27 RX ADMIN — INSULIN ASPART 4 UNITS: 100 INJECTION, SOLUTION INTRAVENOUS; SUBCUTANEOUS at 02:07

## 2018-07-27 RX ADMIN — INSULIN ASPART 2 UNITS: 100 INJECTION, SOLUTION INTRAVENOUS; SUBCUTANEOUS at 06:07

## 2018-07-27 RX ADMIN — INSULIN ASPART 5 UNITS: 100 INJECTION, SOLUTION INTRAVENOUS; SUBCUTANEOUS at 10:07

## 2018-07-27 RX ADMIN — HEPARIN SODIUM 5000 UNITS: 5000 INJECTION, SOLUTION INTRAVENOUS; SUBCUTANEOUS at 09:07

## 2018-07-27 RX ADMIN — METOPROLOL TARTRATE 25 MG: 25 TABLET, FILM COATED ORAL at 09:07

## 2018-07-27 NOTE — PLAN OF CARE
Problem: SLP Goal  Goal: SLP Goal  1.  Tolerate dental soft diet with thin liquids with no s/s of aspiration  2.  Follow simple commands with 90% accuracy  3.  Respond to simple yes/no questions with 90% accuracy  4,  Respond to simple word finding tasks with 80% accuracy  5.  Assess visual spatial skills   Outcome: Ongoing (interventions implemented as appropriate)  Continue POC; goals remain appropriate     Isaiah Murcia, KEL-SLP

## 2018-07-27 NOTE — PLAN OF CARE
Problem: Patient Care Overview  Goal: Plan of Care Review  Outcome: Ongoing (interventions implemented as appropriate)  POC reviewed with pt at 0500. Pt unable to verbalize understanding.  Pt removed NGT at 0621.  New NGT placed in the left nare, KUB ordered.  Pt progressing toward goals. Will continue to monitor. See flowsheets for full assessment and VS info

## 2018-07-27 NOTE — ASSESSMENT & PLAN NOTE
69 yo male with right thalamic hemorrhage (ICH score 2) initially found on 7/9/18 at outside hospital in [place]  HCT shows acute right thalamic hemorrhage with intraventricular extension.  Ventricular system is enlarged on 7/18 scan, HCT on 11/13/17 shows baseline ventricular enlargement.  There are no films available from recent admission in [place] for comparison     -plan to remove EVD today if ok with NCC  -no further neurosurgical intervention; will sign off at this time

## 2018-07-27 NOTE — PLAN OF CARE
Problem: Occupational Therapy Goal  Goal: Occupational Therapy Goal  Goals to be met by: 8/1/18     Patient will increase functional independence with ADLs by performing:    UE Dressing with Moderate Assistance.  LE Dressing with Max Assistance.  Grooming while seated with Min Assistance.  Toileting from bedside commode with Moderate Assistance for hygiene and clothing management.   Rolling to Bilateral with Moderate Assistance.   Supine to sit with Moderate Assistance.  Stand pivot transfers with Moderate Assistance.     Outcome: Ongoing (interventions implemented as appropriate)  Goals remain appropriate

## 2018-07-27 NOTE — PT/OT/SLP PROGRESS
"Speech Language Pathology Treatment    Patient Name:  Ciro Chandler   MRN:  [id number]  Admitting Diagnosis: ICH (intracerebral hemorrhage)    Recommendations:                 General Recommendations:  Dysphagia therapy and Cognitive-linguistic therapy  Diet recommendations:  NPO, Liquid Diet Level: NPO (allow ice chips for comfort); meds crushed in puree   Aspiration Precautions: Continue alternate means of nutrition and Frequent oral care   General Precautions: Standard, fall, NPO, aspiration  Communication strategies:  go to room if call light pushed    Subjective     "like shit": Pt in response to "how are you feeling?'  Communicated w/ RN prior to session; Pt able to tolerate brief HOB elevation for PO trials   Patient goals: None stated     Pain/Comfort:  · Pain Rating 1: 0/10    Objective:     Has the patient been evaluated by SLP for swallowing?   Yes  Keep patient NPO? No   Current Respiratory Status: room air       Pt awake and alert upon arrival with daughter present at bedside. Initially, Pt engaged and interactive, responding w/ 1 to 2 word utterances appropriately. However, following HOB elevation and introduction of PO trials consisting of icechipx2, thin liquid via tspx2 & open cupx2, and puree via 1/2 tspx3 Pt with no attempts to initiate or respond across unstructured and structured speech acts limiting ability to adequately assess for changes in vocal quality. While Pt tolerated icechip, tsp amounts of thin liquid, & puree with no overt s/s of aspiration, he presented with strong delayed cough response t/o larger bolus amounts of thin liquid. Discussed with RN that secondary to Pt's cough response, inability to assess vocal quality t/o trials, & HOB elevation restrictions, Pt appears safe to initiate ice chips for pleasure with meds crushed in puree given ongoing assessment of swallow function.   Pt with no attempts to vocalize t/o automatic speech tasks x3, answer questions given binary choices, or " "participate in convergent naming activity. T/o attempts to engage Pt, he did not establish eye contact w/ either SLP or daughter at bedside. Daughter stating, "he's just stubborn". Educated daughter on recommendations w/ daughter confirming agreement w/ plan via verbalization.     Assessment:     Ciro Chandler is a 68 y.o. male with an SLP diagnosis of Dysphagia and Cognitive-Linguistic Impairment.       Goals:    SLP Goals        Problem: SLP Goal    Goal Priority Disciplines Outcome   SLP Goal     SLP Ongoing (interventions implemented as appropriate)   Description:  1.  Tolerate dental soft diet with thin liquids with no s/s of aspiration  2.  Follow simple commands with 90% accuracy  3.  Respond to simple yes/no questions with 90% accuracy  4,  Respond to simple word finding tasks with 80% accuracy  5.  Assess visual spatial skills                    Plan:     · Patient to be seen:  4 x/week   · Plan of Care expires:  08/23/18  · Plan of Care reviewed with:  patient   · SLP Follow-Up:  Yes       Discharge recommendations:  nursing facility, skilled   Time Tracking:     SLP Treatment Date:   07/27/18  Speech Start Time:  1027  Speech Stop Time:  1047     Speech Total Time (min):  20 min    Billable Minutes: Speech Therapy Individual 10, Eval Swallow and Oral Function 10 and Total Time 20    Isaiah Murcia CCC-SLP  07/27/2018  "

## 2018-07-27 NOTE — ASSESSMENT & PLAN NOTE
69 yo M with PMHx of HTN, HLD, DM, dementia, A. Fib, on coumadin as outpatient who was transferred to [facility] from [facility] where he was reportedly admitted for R thalamic IPH w IVH on 7/9/2018. S/P EVD, Removed 7/27/18.     Antithrombotics for secondary stroke prevention: Anticoagulants: holding a/c due to bleed    Statins for secondary stroke prevention and hyperlipidemia, if present: Statins: Atorvastatin- 40 mg daily    Aggressive risk factor modification: HTN, DM, HLD, Diet, Exercise, A-Fib     Rehab efforts: Occupational Therapy, PT/OT/SLP to evaluate and treat, PM&R consult     Diagnostics ordered/pending: None     VTE prophylaxis: Heparin and SCD    BP parameters: ICH: SBP <140

## 2018-07-27 NOTE — PROGRESS NOTES
Patient's chart was reviewed by a stroke team provider, with VN staff. Mental status with some improvement per charts: Patient is DNR, NSGY removed EVD & signed off, NGT replaced.    CTH 7/26: Stable right thalamic intraparenchymal hemorrhage with associated interventricular hemorrhage layering in the occipital horns of the lateral ventricles.    There is no new imaging to review.    For other recommendations please see our previous note completed on: 7/21/18    There are no new recommendations at this time. Will continue to follow. Discussed patient with staff. Please contact stroke team for any questions or concerns.     Paul Hart MD  [facility]  Department of Vascular Neurology  [facility]

## 2018-07-27 NOTE — SUBJECTIVE & OBJECTIVE
Review of Systems  Unable to obtain a complete ROS due to level of consciousness.  Objective:     Vitals:  Temp: 99 °F (37.2 °C)  Pulse: 91  Rhythm: normal sinus rhythm  BP: 134/67  MAP (mmHg): 94  ICP Mean (mmHg): 9 mmHg  Resp: (!) 27  SpO2: 98 %  O2 Device (Oxygen Therapy): room air    Temp  Min: 98 °F (36.7 °C)  Max: 99.2 °F (37.3 °C)  Pulse  Min: 66  Max: 99  BP  Min: 96/58  Max: 166/92  MAP (mmHg)  Min: 71  Max: 123  ICP Mean (mmHg)  Min: 4 mmHg  Max: 14 mmHg  Resp  Min: 14  Max: 31  SpO2  Min: 95 %  Max: 100 %    07/26 0701 - 07/27 0700  In: 3151.7 [I.V.:1756.7]  Out: 350 [Urine:350]   Unmeasured Output  Urine Occurrence: 1  Stool Occurrence: 1  Pad Count: 1       Physical Exam    GA: Lethargic, comfortable, no acute distress.   HEENT: No scleral icterus or JVD.   Pulmonary: Clear to auscultation A/L. No wheezing, crackles, or rhonchi.  Cardiac: RRR S1 & S2 w/o rubs/murmurs/gallops.   Abdominal: Bowel sounds present x 4. No appreciable hepatosplenomegaly.  Skin: No jaundice, rashes, or visible lesions.   Neuro:  --GCS: E3 V3 M5  --Mental Status:  Lethargic, not following commands but moves all exts spontaneously, weaker in LLE   --CN II-XII grossly intact.   --Pupils 3 mm, PERRL.   --Corneal reflex, gag, cough intact.  --LUE strength: 3/5  --LLE strength: 2/5  --RUE strength: 3/5  --RLE strength: 3/5      Medications:  Continuous    sodium chloride 0.9% Last Rate: 100 mL/hr at 07/27/18 1000   Scheduled    amiodarone 200 mg Daily   amLODIPine 10 mg Daily   atorvastatin 40 mg Daily   heparin (porcine) 5,000 Units Q8H   hydrALAZINE 75 mg Q8H   insulin aspart U-100 5 Units Q4H   insulin detemir U-100 18 Units BID   losartan 100 mg Daily   metoprolol tartrate 25 mg BID   modafinil 100 mg After lunch   modafinil 200 mg Daily   senna-docusate 8.6-50 mg 1 tablet Daily   sodium chloride 0.9% 1,000 mL Once   PRN    acetaminophen 650 mg Q6H PRN   dextrose 50% 12.5 g PRN   dextrose 50% 12.5 g PRN   glucagon (human  recombinant) 1 mg PRN   insulin aspart U-100 1-10 Units Q4H PRN   labetalol 10 mg Q4H PRN   magnesium oxide 800 mg PRN   magnesium oxide 800 mg PRN   phenylephrine HCl in 0.9% NaCl 100 mcg Q10 Min PRN   potassium chloride 10% 40 mEq PRN   potassium chloride 10% 40 mEq PRN   potassium chloride 10% 60 mEq PRN   potassium, sodium phosphates 2 packet PRN   potassium, sodium phosphates 2 packet PRN   potassium, sodium phosphates 2 packet PRN   sodium chloride 0.9% 5 mL PRN     Today I personally reviewed pertinent medications, lines/drains/airways, imaging, cardiology, lab results, microbiology results, notably:    Diet  Diet NPO  Diet NPO

## 2018-07-27 NOTE — SUBJECTIVE & OBJECTIVE
Neurologic Chief Complaint:  ICH (intracerebral hemorrhage)    Subjective:     Interval History: Patient is seen for follow-up neurological assessment and treatment recommendations: EVD removed today, Patient stepping down to vascular neurology floor, confused and agitated, in restraints. Feeds through NG tube.     HPI, Past Medical, Family, and Social History remains the same as documented in the initial encounter.     Review of Systems   Unable to perform ROS: Mental status change     Scheduled Meds:   amiodarone  200 mg Per NG tube Daily    amLODIPine  10 mg Per NG tube Daily    atorvastatin  40 mg Per NG tube Daily    heparin (porcine)  5,000 Units Subcutaneous Q8H    hydrALAZINE  75 mg Per NG tube Q8H    insulin aspart U-100  5 Units Subcutaneous Q4H    insulin detemir U-100  18 Units Subcutaneous BID    losartan  100 mg Per NG tube Daily    metoprolol tartrate  25 mg Per NG tube BID    modafinil  100 mg Per NG tube After lunch    modafinil  200 mg Per NG tube Daily    senna-docusate 8.6-50 mg  1 tablet Per NG tube Daily    sodium chloride 0.9%  1,000 mL Intravenous Once     Continuous Infusions:   sodium chloride 0.9% Stopped (07/27/18 1500)     PRN Meds:acetaminophen, dextrose 50%, dextrose 50%, glucagon (human recombinant), insulin aspart U-100, labetalol, magnesium oxide, magnesium oxide, phenylephrine HCl in 0.9% NaCl, potassium chloride 10%, potassium chloride 10%, potassium chloride 10%, potassium, sodium phosphates, potassium, sodium phosphates, potassium, sodium phosphates, sodium chloride 0.9%    Objective:     Vital Signs (Most Recent):  Temp: 98.8 °F (37.1 °C) (07/27/18 1500)  Pulse: 79 (07/27/18 1600)  Resp: (!) 34 (07/27/18 1600)  BP: (!) 149/64 (07/27/18 1600)  SpO2: 98 % (07/27/18 1600)  BP Location: Left arm    Vital Signs Range (Last 24H):  Temp:  [98.8 °F (37.1 °C)-99.2 °F (37.3 °C)]   Pulse:  [68-99]   Resp:  [14-34]   BP: ()/(58-92)   SpO2:  [95 %-100 %]   BP Location:  Left arm    Physical Exam   Constitutional: He appears well-developed and well-nourished. He appears lethargic.   HENT:   Head: Normocephalic.   Eyes: EOM are normal. Pupils are equal, round, and reactive to light.   Neck: Normal range of motion.   Cardiovascular: Normal rate and regular rhythm.    Pulmonary/Chest: Effort normal.   Abdominal: Soft.   Neurological: He appears lethargic. GCS eye subscore is 3. GCS verbal subscore is 3. GCS motor subscore is 5.   Patient alert, but not oriented, no following commands, speaks but not meaningfull.         Neurological Exam:   LOC: alert  Attention Span: poor  Language: No aphasia, Global aphasia  Articulation: Dysarthria  Orientation: Person, Place, Time , Not oriented to person, place, and time  Visual Fields: Full  EOM (CN III, IV, VI): Gaze preference  right  Pupils (CN II, III): PERRL  Facial Sensation (CN V): Normal  Facial Movement (CN VII): Symmetric facial expression    Motor: Arm left  Paresis: 3/5  Leg left  Plegia 0/5  Arm right  Paresis: 4/5  Leg right Paresis: 4/5  Cebellar: No evidence of appendicular or axial ataxia  Sensation: Intact to light touch, temperature and vibration    Laboratory:  CMP:   Recent Labs  Lab 07/27/18 0214   CALCIUM 8.9      K 4.0   CO2 27      BUN 19   CREATININE 0.8     BMP:   Recent Labs  Lab 07/27/18 0214      K 4.0      CO2 27   BUN 19   CREATININE 0.8   CALCIUM 8.9     CBC:   Recent Labs  Lab 07/27/18 0214   WBC 5.94   RBC 3.39*   HGB 10.1*   HCT 29.9*      MCV 88   MCH 29.8   MCHC 33.8     Lipid Panel: No results for input(s): CHOL, LDLCALC, HDL, TRIG in the last 168 hours.  Coagulation: No results for input(s): PT, INR, APTT in the last 168 hours.  Platelet Aggregation Study: No results for input(s): PLTAGG, PLTAGINTERP, PLTAGREGLACO, ADPPLTAGGREG in the last 168 hours.  Hgb A1C: No results for input(s): HGBA1C in the last 168 hours.  TSH: No results for input(s): TSH in the last 168  hours.    Diagnostic Results     Brain imaging:  CTH 7/25/18  Unchanged position of right frontal coursing ventriculostomy catheter with unchanged size and configuration of prominent ventricular system.    Stable right thalamic intraparenchymal hemorrhage with associated interventricular hemorrhage layering in the occipital horns of the lateral ventricles.    Chronic microvascular ischemic changes above with associated remote infarcts.    CTH 7/21/18  1. Interval placement of right frontal coursing ventriculostomy with unchanged size and configuration of the shunted ventricular system, which remains prominent.  2. Evolution of right thalamic intraparenchymal hemorrhage with stable mass effect.  3. Chronic microvascular ischemic change and remote infarcts as above.      CTH: 1. Redemonstration of evolving right thalamic hemorrhage with mild adjacent edema and slight localized mass effect.  Subtle, subcentimeter focus of hyperdensity within the left thalamus, concerning for additional focus of evolving hemorrhage.  2. Persistent interventricular extension of hemorrhage with unchanged dilatation of the ventricular system concerning for component of hydrocephalus.  3. Generalized cerebral volume loss, chronic microvascular ischemic change and remote infarcts as above.     Vessel Imaging:  none     Cardiac Evaluation:   ECHO- CONCLUSIONS     1 - Low normal left ventricular systolic function.     2 - Indeterminate LV diastolic function.     3 - Normal right ventricular systolic function .     4 - There is anterior pericardial fat pad..

## 2018-07-27 NOTE — PROGRESS NOTES
"[facility]  Neurosurgery  Progress Note    Subjective:     History of Present Illness: 69 yo M with PMHx of HTN, HLD, DM, dementia, A. Fib, on coumadin, prior cerebral aneurysm treated 20 years ago at [facility]. Pt who presented [facility] in [place], [place] on 7/9/18 to with sudden-onset headache, right eye deviation, and L sided weakness. Head CT at that time revealed right thalamic hemorrhage.  Pt was treated conservatively without neurosurgical intervention.  Per wife, she was unhappy with the care in [place].  States patient has been very lethargic since he was initially admitted, without improvement,  and felt as though the "nothing was being done for him" She requested that patient be transferred to [facility] for further evaluation/treatment.  Pt was accepted to [facility] by Hospital medicine service, and currently admitted to the floor.  Pt has been lethargic and and unable to FC's since admission.    68 M with R thalamic ICH/IVH (ICH score 2) initially found on 7/9/18 admitted to OSH in [place] w/o intervention.     7/18: Pt has NG tube in place, as unable to swallow.  Pt is DNR.  Prior to his recent hospital admission, wife states patient was functioning well, ambulating normally, no previous weakness/speech difficulty.  He takes aspirin and coumadin at home (on hold now).  HCT was done this morning which shows acute blood with ventriculomegaly.          Post-Op Info:  * No surgery found *         Interval History: NAEON.   Medications:  Continuous Infusions:   sodium chloride 0.9% 100 mL/hr at 07/27/18 0800     Scheduled Meds:   amiodarone  200 mg Per NG tube Daily    amLODIPine  10 mg Per NG tube Daily    atorvastatin  40 mg Per NG tube Daily    heparin (porcine)  5,000 Units Subcutaneous Q8H    hydrALAZINE  75 mg Per NG tube Q8H    insulin aspart U-100  5 Units Subcutaneous Q4H    insulin detemir U-100  18 Units Subcutaneous BID    losartan  100 mg Per NG tube Daily    metoprolol tartrate  25 mg Per NG " tube BID    modafinil  100 mg Per NG tube After lunch    modafinil  200 mg Per NG tube Daily    senna-docusate 8.6-50 mg  1 tablet Per NG tube Daily    sodium chloride 0.9%  1,000 mL Intravenous Once     PRN Meds:acetaminophen, dextrose 50%, dextrose 50%, glucagon (human recombinant), insulin aspart U-100, labetalol, magnesium oxide, magnesium oxide, phenylephrine HCl in 0.9% NaCl, potassium chloride 10%, potassium chloride 10%, potassium chloride 10%, potassium, sodium phosphates, potassium, sodium phosphates, potassium, sodium phosphates, sodium chloride 0.9%     Review of Systems    Objective:     Weight: 82.8 kg (182 lb 8.7 oz)  Body mass index is 26.19 kg/m².  Vital Signs (Most Recent):  Temp: 99 °F (37.2 °C) (07/27/18 0700)  Pulse: 85 (07/27/18 0800)  Resp: (!) 22 (07/27/18 0800)  BP: (!) 158/77 (07/27/18 0800)  SpO2: 98 % (07/27/18 0800) Vital Signs (24h Range):  Temp:  [98 °F (36.7 °C)-99.2 °F (37.3 °C)] 99 °F (37.2 °C)  Pulse:  [64-99] 85  Resp:  [13-31] 22  SpO2:  [95 %-100 %] 98 %  BP: ()/(55-92) 158/77       Date 07/27/18 0700 - 07/28/18 0659   Shift 1267-8487 5845-0736 9414-4141 24 Hour Total   I  N  T  A  K  E   I.V.  (mL/kg) 200  (2.4)   200  (2.4)    NG/GT 0   0    Shift Total  (mL/kg) 200  (2.4)   200  (2.4)   O  U  T  P  U  T   Shift Total  (mL/kg)       Weight (kg) 82.8 82.8 82.8 82.8                        NG/OG Tube 07/16/18 1200 Left nostril (Active)   Placement Check placement verified by distal tube length measurement 7/24/2018  3:05 AM   Distal Tube Length (cm) 70 7/24/2018  3:05 AM   Tolerance no signs/symptoms of discomfort 7/24/2018  3:05 AM   Securement anchored to nostril center w/ adhesive device 7/24/2018  3:05 AM   Clamp Status/Tolerance unclamped;no residual 7/24/2018  3:05 AM   Insertion Site Appearance no redness, warmth, tenderness, skin breakdown, drainage 7/24/2018  3:05 AM   Flush/Irrigation flushed w/;water 7/24/2018  3:05 AM   Feeding Method continuous 7/24/2018   3:05 AM   Feeding Action feeding continued 7/24/2018  3:05 AM   Current Rate (mL/hr) 65 mL/hr 7/24/2018  3:05 AM   Goal Rate (mL/hr) 65 mL/hr 7/24/2018  3:05 AM   Intake (mL) 75 mL 7/24/2018  5:20 AM   Rate Formula Tube Feeding (mL/hr) 20 mL/hr 7/20/2018  3:05 PM   Formula Name Diabetasource 7/20/2018  3:05 PM   Intake (mL) - Formula Tube Feeding 0 7/24/2018 10:01 AM   Residual Amount (ml) 0 ml 7/22/2018  7:01 PM       Male External Urinary Catheter 07/18/18 1800 Medium (Active)   Collection Container Urimeter 7/24/2018  3:05 AM   Securement Method secured to lower ABD w/ adhesive device 7/24/2018  3:05 AM   Skin no redness;no breakdown 7/24/2018  3:05 AM   Tolerance no signs/symptoms of discomfort 7/24/2018  3:05 AM   Output (mL) 0 mL 7/24/2018  9:01 AM   Catheter Change Date 07/22/18 7/24/2018  3:05 AM   Catheter Change Time 1900 7/23/2018  3:05 AM            ICP/Ventriculostomy 07/21/18 1425 Ventricular drainage catheter with ICP microsensor Right Occipital region (Active)   Level of Ventriculostomy (cm above) 10 7/24/2018  3:05 AM   Status Open to drainage 7/24/2018  3:05 AM   Site Assessment Clean;Dry 7/24/2018  3:05 AM   Site Drainage No drainage 7/24/2018  3:05 AM   Waveform dampened 7/24/2018  3:05 AM   Output (mL) 11 mL 7/24/2018 10:01 AM   CSF Color clear;orange 7/24/2018  3:05 AM   Dressing Status Biopatch in place;Clean;Dry;Intact 7/24/2018  3:05 AM   Interventions HOB degrees;bed controls locked;zeroed 7/24/2018  3:05 AM       Neurosurgery Physical Exam    E3V3M5  AOx1, intermittently to name.  R gaze  Does not blink to threat in L eye  Localize on RUE, w/d LUE  Moving RLE spontaneously  Withdraw LLE    Significant Labs:    Recent Labs  Lab 07/26/18  0237 07/26/18  1417 07/27/18  0214   * 116* 152*   * 136 136   K 4.2 4.3 4.0   CL 98 101 101   CO2 26 27 27   BUN 14 17 19   CREATININE 0.8 0.9 0.8   CALCIUM 9.8 9.7 8.9   MG 2.2  --  2.1       Recent Labs  Lab 07/26/18 0237 07/27/18 0214    WBC 8.13 5.94   HGB 12.0* 10.1*   HCT 36.5* 29.9*    241     No results for input(s): LABPT, INR, APTT in the last 48 hours.  Microbiology Results (last 7 days)     Procedure Component Value Units Date/Time    CSF culture [[id number]] Collected:  07/26/18 1624    Order Status:  Completed Specimen:  CSF (Spinal Fluid) from CSF Tap, Tube 3 Updated:  07/27/18 0726     CSF CULTURE No Growth to date     Gram Stain Result Rare WBC's      No organisms seen    Gram stain [[id number]] Collected:  07/26/18 1624    Order Status:  Canceled Specimen:  CSF (Spinal Fluid) from CSF Tap, Tube 3 Updated:  07/26/18 1815        Recent Lab Results       07/27/18  0539 07/27/18  0223 07/27/18  0214 07/26/18  2255 07/26/18 2028      Appearance, CSF          Mono/Macrophage, CSF          Segmented Neutrophils, CSF          Heme Aliquot          WBC, CSF          RBC, CSF          Lymphs, CSF          Immature Granulocytes   0.3       Immature Grans (Abs)   0.02  Comment:  Mild elevation in immature granulocytes is non specific and   can be seen in a variety of conditions including stress response,   acute inflammation, trauma and pregnancy. Correlation with other   laboratory and clinical findings is essential.         Anion Gap   8       Baso #   0.03       Basophil%   0.5       BUN, Bld   19       Calcium   8.9       Chloride   101       CO2   27       Color, CSF          Creatinine   0.8       CSF CULTURE          Differential Method   Automated       eGFR if    >60.0       eGFR if non    >60.0  Comment:  Calculation used to obtain the estimated glomerular filtration  rate (eGFR) is the CKD-EPI equation.          Eos #   0.1       Eosinophil%   1.9       Glucose   152(H)       Glucose, CSF          Gram Stain Result          Gran # (ANC)   4.5       Gran%   76.2(H)       Hematocrit   29.9(L)       Hemoglobin   10.1(L)       Lymph #   0.7(L)       Lymph%   12.0(L)       Magnesium   2.1       MCH    29.8       MCHC   33.8       MCV   88       Mono #   0.5       Mono%   9.1       MPV   12.1       nRBC   0       Phosphorus   4.2       Platelets   241       POCT Glucose 127(H) 151(H)  193(H) 240(H)     Potassium   4.0       Protein, CSF          RBC   3.39(L)       RDW   12.7       Sodium   136       WBC   5.94                   07/26/18  1751 07/26/18  1624 07/26/18  1417 07/26/18  1358 07/26/18  0956      Appearance, CSF  Clear        Mono/Macrophage, CSF  30        Segmented Neutrophils, CSF  20(H)        Heme Aliquot  2.0        WBC, CSF  3        RBC, CSF  2000(A)        Lymphs, CSF  50        Immature Granulocytes          Immature Grans (Abs)          Anion Gap   8       Baso #          Basophil%          BUN, Bld   17       Calcium   9.7       Chloride   101       CO2   27       Color, CSF  Luz(A)        Creatinine   0.9       CSF CULTURE  No Growth to date[P]        Differential Method          eGFR if    >60.0       eGFR if non    >60.0  Comment:  Calculation used to obtain the estimated glomerular filtration  rate (eGFR) is the CKD-EPI equation.          Eos #          Eosinophil%          Glucose   116(H)       Glucose, CSF  76  Comment:  Infants: 60 to 80 mg/dL(H)        Gram Stain Result  Rare WBC's          No organisms seen        Gran # (ANC)          Gran%          Hematocrit          Hemoglobin          Lymph #          Lymph%          Magnesium          MCH          MCHC          MCV          Mono #          Mono%          MPV          nRBC          Phosphorus          Platelets          POCT Glucose 205(H)   130(H) 162(H)     Potassium   4.3       Protein, CSF  35  Comment:  Infants can have higher CSF protein results due to increased  permeability of the blood-brain barrier.          RBC          RDW          Sodium   136       WBC                          Significant Diagnostics:  I have reviewed all pertinent imaging results/findings within the past 24  hours.    Assessment/Plan:     Nontraumatic intraventricular intracerebral hemorrhage    69 yo male with right thalamic hemorrhage (ICH score 2) initially found on 7/9/18 at outside hospital in [place]  HCT shows acute right thalamic hemorrhage with intraventricular extension.  Ventricular system is enlarged on 7/18 scan, HCT on 11/13/17 shows baseline ventricular enlargement.  There are no films available from recent admission in [place] for comparison     -plan to remove EVD today if ok with NCC  -no further neurosurgical intervention; will sign off at this time            Brenton Romo MD  Neurosurgery  [facility]

## 2018-07-27 NOTE — ASSESSMENT & PLAN NOTE
From hydrocephalus from IVH  MS changed improved today, Nsgy to reeval patient and possibly pull EVD  neurochecks q1h

## 2018-07-27 NOTE — PT/OT/SLP PROGRESS
Occupational Therapy   Treatment    Name: Ciro Chandler  MRN: [id number]  Admitting Diagnosis:  ICH (intracerebral hemorrhage)       Recommendations:     Discharge Recommendations: nursing facility, skilled  Discharge Equipment Recommendations:   (TBD)      Subjective   Pt's daughter reported that pt had not been talking today.  Communicated with: RN prior to session.  Pain/Comfort:  · Pain Rating 1: 0/10  · Pain Rating Post-Intervention 1: 0/10 (no indication of pain during session)    Patients cultural, spiritual, Confucianism conflicts given the current situation: none stated    Objective:     Patient found with: blood pressure cuff, pulse ox (continuous), telemetry, restraints, PEG Tube, peripheral IV, Condom Catheter    General Precautions: Standard, fall, NPO, aspiration (EVD must be clamped for mobility, DNR, cardiac)     Occupational Performance:  EVD was clamped prior to session & throughout session.  Bed Mobility:    · Patient completed Rolling/Turning to Left with  total assistance x 2-3 trials  · Patient completed Rolling/Turning to Right with total assistance x 2-3 trials  · Patient completed Scooting/Bridging with total assistance forward on EOB; up HOB while supine x 2 trials  · Patient completed Supine to Sit with total assistance  · Patient completed Sit to Supine with total assistance     Activities of Daily Living:  · Grooming: total assistance seated EOB    Patient left supine with all lines intact, call button in reach, restraints reapplied at end of session, RN notified, daughter present and white board updated.    [facility] 6 Click:  [facility] Total Score: 6    Treatment & Education:  Pt required Mod-Total (A) with postural control while seated EOB. Provided verbal & physical cues to facilitate postural control while seated EOB.  Pt with eyes open 90% of session.  Pt followed no commands.  Provided verbal & physical demonstration for commands.  Pt with right gaze therefore provided cues to facilitate  attempts at leftward gaze past midline.  Provided PROM to BUE in all available planes x 10 reps each while supine.  Provided cues to facilitate decreased pushing with RUE while seated EOB.  Provided education to pt & daughter regarding POC & role of OT with daughter verbalizing understanding.    Social history provided by daughter: Pt resides with spouse in 1 story house with 1 step to enter.  Pt was requiring (A) with parts of dressing & bathing however was independent with toileting, self feeding, grooming, supine to sit, transfers, & ambulation.  Pt is right handed, retired from welding, & used to love travelling & horseback riding.  Pt does not drive.  Pt resides in [place], [place].  Pt is Yaz[employer]ism in [place].  Education:    Assessment:     Ciro Chandler is a 68 y.o. male with a medical diagnosis of ICH (intracerebral hemorrhage).  He presents with limited participation and motivation.  Performance deficits affecting function are weakness, impaired endurance, impaired self care skills, impaired functional mobilty, impaired sensation, impaired balance, visual deficits, impaired cognition, decreased coordination, decreased safety awareness, decreased lower extremity function, decreased upper extremity function.      Rehab Prognosis:  Currently limited; patient would benefit from acute skilled OT services to address these deficits and reach maximum level of function.       Plan:     Patient to be seen 3 x/week to address the above listed problems via self-care/home management, therapeutic activities, therapeutic exercises, neuromuscular re-education, sensory integration, cognitive retraining  · Plan of Care Expires: 08/24/18  · Plan of Care Reviewed with: patient, daughter    This Plan of care has been discussed with the patient who was involved in its development and understands and is in agreement with the identified goals and treatment plan    GOALS:    Occupational Therapy Goals        Problem: Occupational Therapy Goal     Goal Priority Disciplines Outcome Interventions   Occupational Therapy Goal     OT, PT/OT Ongoing (interventions implemented as appropriate)    Description:  Goals to be met by: 8/1/18     Patient will increase functional independence with ADLs by performing:    UE Dressing with Moderate Assistance.  LE Dressing with Max Assistance.  Grooming while seated with Min Assistance.  Toileting from bedside commode with Moderate Assistance for hygiene and clothing management.   Rolling to Bilateral with Moderate Assistance.   Supine to sit with Moderate Assistance.  Stand pivot transfers with Moderate Assistance.                      Time Tracking:     OT Date of Treatment: 07/27/18  OT Start Time: 0904  OT Stop Time: 0942  OT Total Time (min): 38 min    Billable Minutes:Therapeutic Activity 23  Therapeutic Exercise 15    ZOE Hernandez  7/27/2018

## 2018-07-27 NOTE — SUBJECTIVE & OBJECTIVE
Interval History: NAEON.   Medications:  Continuous Infusions:   sodium chloride 0.9% 100 mL/hr at 07/27/18 0800     Scheduled Meds:   amiodarone  200 mg Per NG tube Daily    amLODIPine  10 mg Per NG tube Daily    atorvastatin  40 mg Per NG tube Daily    heparin (porcine)  5,000 Units Subcutaneous Q8H    hydrALAZINE  75 mg Per NG tube Q8H    insulin aspart U-100  5 Units Subcutaneous Q4H    insulin detemir U-100  18 Units Subcutaneous BID    losartan  100 mg Per NG tube Daily    metoprolol tartrate  25 mg Per NG tube BID    modafinil  100 mg Per NG tube After lunch    modafinil  200 mg Per NG tube Daily    senna-docusate 8.6-50 mg  1 tablet Per NG tube Daily    sodium chloride 0.9%  1,000 mL Intravenous Once     PRN Meds:acetaminophen, dextrose 50%, dextrose 50%, glucagon (human recombinant), insulin aspart U-100, labetalol, magnesium oxide, magnesium oxide, phenylephrine HCl in 0.9% NaCl, potassium chloride 10%, potassium chloride 10%, potassium chloride 10%, potassium, sodium phosphates, potassium, sodium phosphates, potassium, sodium phosphates, sodium chloride 0.9%     Review of Systems    Objective:     Weight: 82.8 kg (182 lb 8.7 oz)  Body mass index is 26.19 kg/m².  Vital Signs (Most Recent):  Temp: 99 °F (37.2 °C) (07/27/18 0700)  Pulse: 85 (07/27/18 0800)  Resp: (!) 22 (07/27/18 0800)  BP: (!) 158/77 (07/27/18 0800)  SpO2: 98 % (07/27/18 0800) Vital Signs (24h Range):  Temp:  [98 °F (36.7 °C)-99.2 °F (37.3 °C)] 99 °F (37.2 °C)  Pulse:  [64-99] 85  Resp:  [13-31] 22  SpO2:  [95 %-100 %] 98 %  BP: ()/(55-92) 158/77       Date 07/27/18 0700 - 07/28/18 0659   Shift 7849-2870 7987-7758 6821-0026 24 Hour Total   I  N  T  A  K  E   I.V.  (mL/kg) 200  (2.4)   200  (2.4)    NG/GT 0   0    Shift Total  (mL/kg) 200  (2.4)   200  (2.4)   O  U  T  P  U  T   Shift Total  (mL/kg)       Weight (kg) 82.8 82.8 82.8 82.8                        NG/OG Tube 07/16/18 1200 Left nostril (Active)   Placement  Check placement verified by distal tube length measurement 7/24/2018  3:05 AM   Distal Tube Length (cm) 70 7/24/2018  3:05 AM   Tolerance no signs/symptoms of discomfort 7/24/2018  3:05 AM   Securement anchored to nostril center w/ adhesive device 7/24/2018  3:05 AM   Clamp Status/Tolerance unclamped;no residual 7/24/2018  3:05 AM   Insertion Site Appearance no redness, warmth, tenderness, skin breakdown, drainage 7/24/2018  3:05 AM   Flush/Irrigation flushed w/;water 7/24/2018  3:05 AM   Feeding Method continuous 7/24/2018  3:05 AM   Feeding Action feeding continued 7/24/2018  3:05 AM   Current Rate (mL/hr) 65 mL/hr 7/24/2018  3:05 AM   Goal Rate (mL/hr) 65 mL/hr 7/24/2018  3:05 AM   Intake (mL) 75 mL 7/24/2018  5:20 AM   Rate Formula Tube Feeding (mL/hr) 20 mL/hr 7/20/2018  3:05 PM   Formula Name Diabetasource 7/20/2018  3:05 PM   Intake (mL) - Formula Tube Feeding 0 7/24/2018 10:01 AM   Residual Amount (ml) 0 ml 7/22/2018  7:01 PM       Male External Urinary Catheter 07/18/18 1800 Medium (Active)   Collection Container Urimeter 7/24/2018  3:05 AM   Securement Method secured to lower ABD w/ adhesive device 7/24/2018  3:05 AM   Skin no redness;no breakdown 7/24/2018  3:05 AM   Tolerance no signs/symptoms of discomfort 7/24/2018  3:05 AM   Output (mL) 0 mL 7/24/2018  9:01 AM   Catheter Change Date 07/22/18 7/24/2018  3:05 AM   Catheter Change Time 1900 7/23/2018  3:05 AM            ICP/Ventriculostomy 07/21/18 1425 Ventricular drainage catheter with ICP microsensor Right Occipital region (Active)   Level of Ventriculostomy (cm above) 10 7/24/2018  3:05 AM   Status Open to drainage 7/24/2018  3:05 AM   Site Assessment Clean;Dry 7/24/2018  3:05 AM   Site Drainage No drainage 7/24/2018  3:05 AM   Waveform dampened 7/24/2018  3:05 AM   Output (mL) 11 mL 7/24/2018 10:01 AM   CSF Color clear;orange 7/24/2018  3:05 AM   Dressing Status Biopatch in place;Clean;Dry;Intact 7/24/2018  3:05 AM   Interventions HOB degrees;bed  controls locked;zeroed 7/24/2018  3:05 AM       Neurosurgery Physical Exam    E3V3M5  AOx1, intermittently to name.  R gaze  Does not blink to threat in L eye  Localize on RUE, w/d LUE  Moving RLE spontaneously  Withdraw LLE    Significant Labs:    Recent Labs  Lab 07/26/18  0237 07/26/18  1417 07/27/18  0214   * 116* 152*   * 136 136   K 4.2 4.3 4.0   CL 98 101 101   CO2 26 27 27   BUN 14 17 19   CREATININE 0.8 0.9 0.8   CALCIUM 9.8 9.7 8.9   MG 2.2  --  2.1       Recent Labs  Lab 07/26/18  0237 07/27/18  0214   WBC 8.13 5.94   HGB 12.0* 10.1*   HCT 36.5* 29.9*    241     No results for input(s): LABPT, INR, APTT in the last 48 hours.  Microbiology Results (last 7 days)     Procedure Component Value Units Date/Time    CSF culture [[id number]] Collected:  07/26/18 1624    Order Status:  Completed Specimen:  CSF (Spinal Fluid) from CSF Tap, Tube 3 Updated:  07/27/18 0726     CSF CULTURE No Growth to date     Gram Stain Result Rare WBC's      No organisms seen    Gram stain [[id number]] Collected:  07/26/18 1624    Order Status:  Canceled Specimen:  CSF (Spinal Fluid) from CSF Tap, Tube 3 Updated:  07/26/18 1815        Recent Lab Results       07/27/18  0539 07/27/18  0223 07/27/18  0214 07/26/18  2255 07/26/18 2028      Appearance, CSF          Mono/Macrophage, CSF          Segmented Neutrophils, CSF          Heme Aliquot          WBC, CSF          RBC, CSF          Lymphs, CSF          Immature Granulocytes   0.3       Immature Grans (Abs)   0.02  Comment:  Mild elevation in immature granulocytes is non specific and   can be seen in a variety of conditions including stress response,   acute inflammation, trauma and pregnancy. Correlation with other   laboratory and clinical findings is essential.         Anion Gap   8       Baso #   0.03       Basophil%   0.5       BUN, Bld   19       Calcium   8.9       Chloride   101       CO2   27       Color, CSF          Creatinine   0.8       CSF CULTURE           Differential Method   Automated       eGFR if    >60.0       eGFR if non    >60.0  Comment:  Calculation used to obtain the estimated glomerular filtration  rate (eGFR) is the CKD-EPI equation.          Eos #   0.1       Eosinophil%   1.9       Glucose   152(H)       Glucose, CSF          Gram Stain Result          Gran # (ANC)   4.5       Gran%   76.2(H)       Hematocrit   29.9(L)       Hemoglobin   10.1(L)       Lymph #   0.7(L)       Lymph%   12.0(L)       Magnesium   2.1       MCH   29.8       MCHC   33.8       MCV   88       Mono #   0.5       Mono%   9.1       MPV   12.1       nRBC   0       Phosphorus   4.2       Platelets   241       POCT Glucose 127(H) 151(H)  193(H) 240(H)     Potassium   4.0       Protein, CSF          RBC   3.39(L)       RDW   12.7       Sodium   136       WBC   5.94                   07/26/18  1751 07/26/18  1624 07/26/18  1417 07/26/18  1358 07/26/18  0956      Appearance, CSF  Clear        Mono/Macrophage, CSF  30        Segmented Neutrophils, CSF  20(H)        Heme Aliquot  2.0        WBC, CSF  3        RBC, CSF  2000(A)        Lymphs, CSF  50        Immature Granulocytes          Immature Grans (Abs)          Anion Gap   8       Baso #          Basophil%          BUN, Bld   17       Calcium   9.7       Chloride   101       CO2   27       Color, CSF  Luz(A)        Creatinine   0.9       CSF CULTURE  No Growth to date[P]        Differential Method          eGFR if    >60.0       eGFR if non    >60.0  Comment:  Calculation used to obtain the estimated glomerular filtration  rate (eGFR) is the CKD-EPI equation.          Eos #          Eosinophil%          Glucose   116(H)       Glucose, CSF  76  Comment:  Infants: 60 to 80 mg/dL(H)        Gram Stain Result  Rare WBC's          No organisms seen        Gran # (ANC)          Gran%          Hematocrit          Hemoglobin          Lymph #          Lymph%           Magnesium          MCH          MCHC          MCV          Mono #          Mono%          MPV          nRBC          Phosphorus          Platelets          POCT Glucose 205(H)   130(H) 162(H)     Potassium   4.3       Protein, CSF  35  Comment:  Infants can have higher CSF protein results due to increased  permeability of the blood-brain barrier.          RBC          RDW          Sodium   136       WBC                          Significant Diagnostics:  I have reviewed all pertinent imaging results/findings within the past 24 hours.

## 2018-07-27 NOTE — PLAN OF CARE
SW attempted to meet with the Pt family at bedside. No one was present. Left SNF list in chart and advised RN it is there.     SW spoke with Pt wife regarding SNF recs and list. Discussed options and addressed questions. She reported she wants to start with [facility] as that would be the preference. She will review the list she has already for more choices.    FOZIA sent referral via .    Kelley Bonilla LMSW  Neurocritical Care   [facility]  [id number]

## 2018-07-27 NOTE — PROGRESS NOTES
[facility]  Vascular Neurology  Comprehensive Stroke Center  Progress Note    Assessment/Plan:     * ICH (intracerebral hemorrhage)    67 yo M with PMHx of HTN, HLD, DM, dementia, A. Fib, on coumadin as outpatient who was transferred to [facility] from [facility] where he was reportedly admitted for R thalamic IPH w IVH on 7/9/2018. S/P EVD, Removed 7/27/18.     Antithrombotics for secondary stroke prevention: Anticoagulants: holding a/c due to bleed    Statins for secondary stroke prevention and hyperlipidemia, if present: Statins: Atorvastatin- 40 mg daily    Aggressive risk factor modification: HTN, DM, HLD, Diet, Exercise, A-Fib     Rehab efforts: Occupational Therapy, PT/OT/SLP to evaluate and treat, PM&R consult     Diagnostics ordered/pending: None     VTE prophylaxis: Heparin and SCD    BP parameters: ICH: SBP <140            Obstructive hydrocephalus    S/P EVD        Dysphagia    Tube feeds        Cardiomyopathy    Latest Echo with Low normal left ventricular systolic function.         Persistent atrial fibrillation    Stroke risk factor  Holding anticoagulation due to bleed  Continue rate control and amio        Dyslipidemia    Stroke risk factor. Continue Statin        Essential hypertension    Continue on antihypertensives. BP goal <140         Type 2 diabetes mellitus with diabetic polyneuropathy, with long-term current use of insulin    Stroke risk factor. A1C 7.2  Continue Insulin             7/21: EVD at bedside by NSGY  7/23 EVD in place lowered to 5cm h2o per nsgy.. Blood sugars running >200 adjusted detemir. Increased hydralazine for better BP control  7/24: EVD at 5, insulin adjustments   7/25: EVD clamped per Nsgy, [facility] tonight  7/26: Held tube feeds, insulin adjustments, Nsgy contacted about MSC and EVD.   7/27: Evd pulled per Nsgy, Ngt replace, transfer to stroke service    STROKE DOCUMENTATION        NIH Scale:  1a. Level Of Consciousness: 0-->Alert: keenly  responsive  1b. LOC Questions: 2-->Answers neither question correctly  1c. LOC Commands: 2-->Performs neither task correctly  2. Best Gaze: 1-->Partial gaze palsy: gaze is abnormal in one or both eyes, but forced deviation or total gaze paresis is not present  3. Visual: 0-->No visual loss  4. Facial Palsy: 0-->Normal symmetrical movements  5a. Motor Arm, Left: 1-->Drift: limb holds 90 (or 45) degrees, but drifts down before full 10 seconds: does not hit bed or other support  5b. Motor Arm, Right: 1-->Drift: limb holds 90 (or 45) degrees, but drifts down before full 10 secs: does not hit bed or other support  6a. Motor Leg, Left: 4-->No movement  6b. Motor Leg, Right: 1-->Drift: leg falls by the end of the 5-sec period but does not hit bed  7. Limb Ataxia: 0-->Absent  8. Sensory: 0-->Normal: no sensory loss  9. Best Language: 2-->Severe aphasia: all communication is through fragmentary expression: great need for inference, questioning, and guessing by the listener. Range of information that can be exchanged is limited: listener carries burden of. . . (see row details)  10. Dysarthria: 2-->Severe dysarthria: patients speech is so slurred as to be unintelligible in the absence of or out of proportion to any dysphasia, or is mute/anarthric  11. Extinction and Inattention (formerly Neglect): 0-->No abnormality  Total (NIH Stroke Scale): 16       Modified Pratt    Willard Coma Scale:12   ABCD2 Score:    [id number] Score:6  HAS -BLED Score:2  ICH Score:3  Hunt & Fox Classification:      Hemorrhagic change of an Ischemic Stroke: Does this patient have an ischemic stroke with hemorrhagic changes? No     Neurologic Chief Complaint:  ICH (intracerebral hemorrhage)    Subjective:     Interval History: Patient is seen for follow-up neurological assessment and treatment recommendations: EVD removed today, Patient stepping down to vascular neurology floor, confused and agitated, in restraints. Feeds through NG tube.     HPI,  Past Medical, Family, and Social History remains the same as documented in the initial encounter.     Review of Systems   Unable to perform ROS: Mental status change     Scheduled Meds:   amiodarone  200 mg Per NG tube Daily    amLODIPine  10 mg Per NG tube Daily    atorvastatin  40 mg Per NG tube Daily    heparin (porcine)  5,000 Units Subcutaneous Q8H    hydrALAZINE  75 mg Per NG tube Q8H    insulin aspart U-100  5 Units Subcutaneous Q4H    insulin detemir U-100  18 Units Subcutaneous BID    losartan  100 mg Per NG tube Daily    metoprolol tartrate  25 mg Per NG tube BID    modafinil  100 mg Per NG tube After lunch    modafinil  200 mg Per NG tube Daily    senna-docusate 8.6-50 mg  1 tablet Per NG tube Daily    sodium chloride 0.9%  1,000 mL Intravenous Once     Continuous Infusions:   sodium chloride 0.9% Stopped (07/27/18 1500)     PRN Meds:acetaminophen, dextrose 50%, dextrose 50%, glucagon (human recombinant), insulin aspart U-100, labetalol, magnesium oxide, magnesium oxide, phenylephrine HCl in 0.9% NaCl, potassium chloride 10%, potassium chloride 10%, potassium chloride 10%, potassium, sodium phosphates, potassium, sodium phosphates, potassium, sodium phosphates, sodium chloride 0.9%    Objective:     Vital Signs (Most Recent):  Temp: 98.8 °F (37.1 °C) (07/27/18 1500)  Pulse: 79 (07/27/18 1600)  Resp: (!) 34 (07/27/18 1600)  BP: (!) 149/64 (07/27/18 1600)  SpO2: 98 % (07/27/18 1600)  BP Location: Left arm    Vital Signs Range (Last 24H):  Temp:  [98.8 °F (37.1 °C)-99.2 °F (37.3 °C)]   Pulse:  [68-99]   Resp:  [14-34]   BP: ()/(58-92)   SpO2:  [95 %-100 %]   BP Location: Left arm    Physical Exam   Constitutional: He appears well-developed and well-nourished. He appears lethargic.   HENT:   Head: Normocephalic.   Eyes: EOM are normal. Pupils are equal, round, and reactive to light.   Neck: Normal range of motion.   Cardiovascular: Normal rate and regular rhythm.    Pulmonary/Chest: Effort  normal.   Abdominal: Soft.   Neurological: He appears lethargic. GCS eye subscore is 3. GCS verbal subscore is 3. GCS motor subscore is 5.   Patient alert, but not oriented, no following commands, speaks but not meaningfull.         Neurological Exam:   LOC: alert  Attention Span: poor  Language: No aphasia, Global aphasia  Articulation: Dysarthria  Orientation: Person, Place, Time , Not oriented to person, place, and time  Visual Fields: Full  EOM (CN III, IV, VI): Gaze preference  right  Pupils (CN II, III): PERRL  Facial Sensation (CN V): Normal  Facial Movement (CN VII): Symmetric facial expression    Motor: Arm left  Paresis: 3/5  Leg left  Plegia 0/5  Arm right  Paresis: 4/5  Leg right Paresis: 4/5  Cebellar: No evidence of appendicular or axial ataxia  Sensation: Intact to light touch, temperature and vibration    Laboratory:  CMP:   Recent Labs  Lab 07/27/18  0214   CALCIUM 8.9      K 4.0   CO2 27      BUN 19   CREATININE 0.8     BMP:   Recent Labs  Lab 07/27/18  0214      K 4.0      CO2 27   BUN 19   CREATININE 0.8   CALCIUM 8.9     CBC:   Recent Labs  Lab 07/27/18  0214   WBC 5.94   RBC 3.39*   HGB 10.1*   HCT 29.9*      MCV 88   MCH 29.8   MCHC 33.8     Lipid Panel: No results for input(s): CHOL, LDLCALC, HDL, TRIG in the last 168 hours.  Coagulation: No results for input(s): PT, INR, APTT in the last 168 hours.  Platelet Aggregation Study: No results for input(s): PLTAGG, PLTAGINTERP, PLTAGREGLACO, ADPPLTAGGREG in the last 168 hours.  Hgb A1C: No results for input(s): HGBA1C in the last 168 hours.  TSH: No results for input(s): TSH in the last 168 hours.    Diagnostic Results     Brain imaging:  CTH 7/25/18  Unchanged position of right frontal coursing ventriculostomy catheter with unchanged size and configuration of prominent ventricular system.    Stable right thalamic intraparenchymal hemorrhage with associated interventricular hemorrhage layering in the occipital horns  of the lateral ventricles.    Chronic microvascular ischemic changes above with associated remote infarcts.    CTH 7/21/18  1. Interval placement of right frontal coursing ventriculostomy with unchanged size and configuration of the shunted ventricular system, which remains prominent.  2. Evolution of right thalamic intraparenchymal hemorrhage with stable mass effect.  3. Chronic microvascular ischemic change and remote infarcts as above.      CTH: 1. Redemonstration of evolving right thalamic hemorrhage with mild adjacent edema and slight localized mass effect.  Subtle, subcentimeter focus of hyperdensity within the left thalamus, concerning for additional focus of evolving hemorrhage.  2. Persistent interventricular extension of hemorrhage with unchanged dilatation of the ventricular system concerning for component of hydrocephalus.  3. Generalized cerebral volume loss, chronic microvascular ischemic change and remote infarcts as above.     Vessel Imaging:  none     Cardiac Evaluation:   ECHO- CONCLUSIONS     1 - Low normal left ventricular systolic function.     2 - Indeterminate LV diastolic function.     3 - Normal right ventricular systolic function .     4 - There is anterior pericardial fat pad..       Paul Hart MD  Comprehensive Stroke Center  Department of Vascular Neurology   [facility]

## 2018-07-27 NOTE — ASSESSMENT & PLAN NOTE
7/21EVD at bedside by NSGy   For obstructive hydrocephalus.  7/23 EVD lowered to 5cm/h2o per NSGY Drained 46cc overnight. ICP 2-3 this am.  7/24 EVD remains at 5cm ICP 4-8  7/25 EVD clamped per Nsgy  7/26 Nsgy to eval EVD and patient at 5pm  7/27 Evd puller per Nsgy

## 2018-07-27 NOTE — PLAN OF CARE
Problem: Patient Care Overview  Goal: Plan of Care Review  Outcome: Ongoing (interventions implemented as appropriate)  POC reviewed with pt and family at 1400. Family verbalized understanding. Questions and concerns addressed. No acute events today. Pt progressing toward goals. Will continue to monitor. See flowsheets for full assessment and VS info.     Patient more alert and intermittently verbal today.  EVD removed.  NG tube replaced and tube feedings resumed.

## 2018-07-27 NOTE — PROGRESS NOTES
[facility]  Neurocritical Care  Progress Note    Admit Date: 7/17/2018  Service Date: 07/27/2018  Length of Stay: 10    Subjective:     Chief Complaint: ICH (intracerebral hemorrhage)    History of Present Illness: 69 yo M with PMHx of HTN, HLD, DM, dementia, A. Fib, on coumadin as outpatient who was transferred to [facility] from [facility] where he was reportedly admitted for R thalamic IPH w IVH. Per OSH records, patient admitted to ICU and sent to floor with little follow up imaging and no interval improvement in exam. Wife requested second opinion thus was transferred to [facility] however for unknown reason was admitted to hospital medicine service last night. [facility] called this morning after seen by Nsgy due to concern for airway protection. CT this morning with R thalamic IPH and lateral ventricle extension bilaterally and slight enlargement of ventricular system. Of note baseline enlargement of vents at previous CT a year ago. On exam patient with GCS of 8, however per wife and OSH records, this is baseline since initial admission in [place]. Will admit to [facility] for higher level of care.       Hospital Course: --admitted to [facility] 7/18.   7/21: EVD at bedside by NSGY  7/23 EVD in place lowered to 5cm h2o per nsgy.. Blood sugars running >200 adjusted detemir. Increased hydralazine for better BP control  7/24: EVD at 5, insulin adjustments   7/25: EVD clamped per Nsgy, [facility] tonight  7/26: Held tube feeds, insulin adjustments, Nsgy contacted about MSC and EVD.   7/27: Evd pulled per Nsgy, Ngt replace, transfer to stroke service      Review of Systems  Unable to obtain a complete ROS due to level of consciousness.  Objective:     Vitals:  Temp: 99 °F (37.2 °C)  Pulse: 91  Rhythm: normal sinus rhythm  BP: 134/67  MAP (mmHg): 94  ICP Mean (mmHg): 9 mmHg  Resp: (!) 27  SpO2: 98 %  O2 Device (Oxygen Therapy): room air    Temp  Min: 98 °F (36.7 °C)  Max: 99.2 °F (37.3 °C)  Pulse  Min: 66  Max: 99  BP  Min: 96/58   Max: 166/92  MAP (mmHg)  Min: 71  Max: 123  ICP Mean (mmHg)  Min: 4 mmHg  Max: 14 mmHg  Resp  Min: 14  Max: 31  SpO2  Min: 95 %  Max: 100 %    07/26 0701 - 07/27 0700  In: 3151.7 [I.V.:1756.7]  Out: 350 [Urine:350]   Unmeasured Output  Urine Occurrence: 1  Stool Occurrence: 1  Pad Count: 1       Physical Exam    GA: Lethargic, comfortable, no acute distress.   HEENT: No scleral icterus or JVD.   Pulmonary: Clear to auscultation A/L. No wheezing, crackles, or rhonchi.  Cardiac: RRR S1 & S2 w/o rubs/murmurs/gallops.   Abdominal: Bowel sounds present x 4. No appreciable hepatosplenomegaly.  Skin: No jaundice, rashes, or visible lesions.   Neuro:  --GCS: E4 V3 M5  --Mental Status:  Awake and Alert, not following commands but moves all exts spontaneously, weaker in LLE   --CN II-XII grossly intact.   --Pupils 3 mm, PERRL.   --Corneal reflex, gag, cough intact.  --LUE strength: 4/5  --LLE strength: 3/5  --RUE strength: 4/5  --RLE strength: 4/5      Medications:  Continuous    sodium chloride 0.9% Last Rate: 100 mL/hr at 07/27/18 1000   Scheduled    amiodarone 200 mg Daily   amLODIPine 10 mg Daily   atorvastatin 40 mg Daily   heparin (porcine) 5,000 Units Q8H   hydrALAZINE 75 mg Q8H   insulin aspart U-100 5 Units Q4H   insulin detemir U-100 18 Units BID   losartan 100 mg Daily   metoprolol tartrate 25 mg BID   modafinil 100 mg After lunch   modafinil 200 mg Daily   senna-docusate 8.6-50 mg 1 tablet Daily   sodium chloride 0.9% 1,000 mL Once   PRN    acetaminophen 650 mg Q6H PRN   dextrose 50% 12.5 g PRN   dextrose 50% 12.5 g PRN   glucagon (human recombinant) 1 mg PRN   insulin aspart U-100 1-10 Units Q4H PRN   labetalol 10 mg Q4H PRN   magnesium oxide 800 mg PRN   magnesium oxide 800 mg PRN   phenylephrine HCl in 0.9% NaCl 100 mcg Q10 Min PRN   potassium chloride 10% 40 mEq PRN   potassium chloride 10% 40 mEq PRN   potassium chloride 10% 60 mEq PRN   potassium, sodium phosphates 2 packet PRN   potassium, sodium phosphates  2 packet PRN   potassium, sodium phosphates 2 packet PRN   sodium chloride 0.9% 5 mL PRN     Today I personally reviewed pertinent medications, lines/drains/airways, imaging, cardiology, lab results, microbiology results, notably:    Diet  Diet NPO  Diet NPO        Assessment/Plan:     Neuro   * ICH (intracerebral hemorrhage)    EVD pulled today per Nsgy  hydrocephalus  SBP less than 180  PT OT SLP  Transfer to stroke service          Encephalopathy acute    Due to ICH and hydrocephalus  Modafinil        Brain compression    From hydrocephalus from IVH  MS changed improved today, Nsgy to reeval patient and possibly pull EVD  neurochecks q1h        Obstructive hydrocephalus    7/21EVD at bedside by NSGy   For obstructive hydrocephalus.  7/23 EVD lowered to 5cm/h2o per NSGY Drained 46cc overnight. ICP 2-3 this am.  7/24 EVD remains at 5cm ICP 4-8  7/25 EVD clamped per Nsgy  7/26 Nsgy to eval EVD and patient at 5pm  7/27 Evd puller per Nsgy        Nontraumatic intraventricular intracerebral hemorrhage    See ICH        Dementia without behavioral disturbance    --holding home aricept          Cardiac/Vascular   Cardiomyopathy    Monitor  Low normal EF        Persistent atrial fibrillation    Amiodarone 200mg daily  Metoprolol 25mg bid  -rate controlled  -hold coumadin due to ICH        Essential hypertension    SBP goal less than 180  Amlodipine 10mg daily  Hydralazine 75mg q8h  Losartan 100mg daily  Metoprolol 25mg bid  Prn labetalol    Echo: 1 - Low normal left ventricular systolic function.     2 - Indeterminate LV diastolic function.     3 - Normal right ventricular systolic function .     4 - There is anterior pericardial fat pad..           Typical atrial flutter    Amiodarone 200mg daily for A-fib/flutter        Endocrine   Type 2 diabetes mellitus with diabetic polyneuropathy, with long-term current use of insulin    A1C 7.2  accuchecks q4h with mod SSI  - detemir - 18 units bid  aspart 5 units q4h  Tube feeds  diabetasource 65cc/h          GI   Dysphagia    SLP following  NGT in place, and getting tube feeds              Activity Orders          Straight Cath starting at 07/18 0377        DNR    Maico Gray NP  Neurocritical Care  [facility]

## 2018-07-28 LAB
ALLENS TEST: ABNORMAL
ANION GAP SERPL CALC-SCNC: 6 MMOL/L
BASOPHILS # BLD AUTO: 0.02 K/UL
BASOPHILS NFR BLD: 0.3 %
BUN SERPL-MCNC: 15 MG/DL
CALCIUM SERPL-MCNC: 9.7 MG/DL
CHLORIDE SERPL-SCNC: 103 MMOL/L
CO2 SERPL-SCNC: 28 MMOL/L
CREAT SERPL-MCNC: 0.8 MG/DL
DELSYS: ABNORMAL
DIFFERENTIAL METHOD: ABNORMAL
EOSINOPHIL # BLD AUTO: 0.2 K/UL
EOSINOPHIL NFR BLD: 2 %
ERYTHROCYTE [DISTWIDTH] IN BLOOD BY AUTOMATED COUNT: 12.6 %
EST. GFR  (AFRICAN AMERICAN): >60 ML/MIN/1.73 M^2
EST. GFR  (NON AFRICAN AMERICAN): >60 ML/MIN/1.73 M^2
GLUCOSE SERPL-MCNC: 168 MG/DL
HCO3 UR-SCNC: 29 MMOL/L (ref 24–28)
HCT VFR BLD AUTO: 33 %
HGB BLD-MCNC: 11 G/DL
IMM GRANULOCYTES # BLD AUTO: 0.04 K/UL
IMM GRANULOCYTES NFR BLD AUTO: 0.5 %
LYMPHOCYTES # BLD AUTO: 0.8 K/UL
LYMPHOCYTES NFR BLD: 10.5 %
MAGNESIUM SERPL-MCNC: 1.9 MG/DL
MCH RBC QN AUTO: 29.3 PG
MCHC RBC AUTO-ENTMCNC: 33.3 G/DL
MCV RBC AUTO: 88 FL
MODE: ABNORMAL
MONOCYTES # BLD AUTO: 0.7 K/UL
MONOCYTES NFR BLD: 8.9 %
NEUTROPHILS # BLD AUTO: 5.8 K/UL
NEUTROPHILS NFR BLD: 77.8 %
NRBC BLD-RTO: 0 /100 WBC
PCO2 BLDA: 35.1 MMHG (ref 35–45)
PH SMN: 7.53 [PH] (ref 7.35–7.45)
PHOSPHATE SERPL-MCNC: 3 MG/DL
PLATELET # BLD AUTO: 265 K/UL
PMV BLD AUTO: 12 FL
PO2 BLDA: 82 MMHG (ref 80–100)
POC BE: 6 MMOL/L
POC SATURATED O2: 97 % (ref 95–100)
POC TCO2: 30 MMOL/L (ref 23–27)
POCT GLUCOSE: 173 MG/DL (ref 70–110)
POCT GLUCOSE: 198 MG/DL (ref 70–110)
POCT GLUCOSE: 209 MG/DL (ref 70–110)
POCT GLUCOSE: 215 MG/DL (ref 70–110)
POCT GLUCOSE: 231 MG/DL (ref 70–110)
POCT GLUCOSE: 232 MG/DL (ref 70–110)
POCT GLUCOSE: 248 MG/DL (ref 70–110)
POTASSIUM SERPL-SCNC: 4.1 MMOL/L
RBC # BLD AUTO: 3.76 M/UL
SAMPLE: ABNORMAL
SITE: ABNORMAL
SODIUM SERPL-SCNC: 137 MMOL/L
SP02: 100
WBC # BLD AUTO: 7.49 K/UL

## 2018-07-28 PROCEDURE — 84100 ASSAY OF PHOSPHORUS: CPT

## 2018-07-28 PROCEDURE — 36600 WITHDRAWAL OF ARTERIAL BLOOD: CPT

## 2018-07-28 PROCEDURE — 31720 CLEARANCE OF AIRWAYS: CPT

## 2018-07-28 PROCEDURE — 25000003 PHARM REV CODE 250: Performed by: STUDENT IN AN ORGANIZED HEALTH CARE EDUCATION/TRAINING PROGRAM

## 2018-07-28 PROCEDURE — 25000003 PHARM REV CODE 250: Performed by: NURSE PRACTITIONER

## 2018-07-28 PROCEDURE — 83735 ASSAY OF MAGNESIUM: CPT

## 2018-07-28 PROCEDURE — 25000003 PHARM REV CODE 250: Performed by: PSYCHIATRY & NEUROLOGY

## 2018-07-28 PROCEDURE — 94640 AIRWAY INHALATION TREATMENT: CPT

## 2018-07-28 PROCEDURE — 25000242 PHARM REV CODE 250 ALT 637 W/ HCPCS: Performed by: PHYSICIAN ASSISTANT

## 2018-07-28 PROCEDURE — 63600175 PHARM REV CODE 636 W HCPCS: Performed by: NURSE PRACTITIONER

## 2018-07-28 PROCEDURE — 99900035 HC TECH TIME PER 15 MIN (STAT)

## 2018-07-28 PROCEDURE — 99233 SBSQ HOSP IP/OBS HIGH 50: CPT | Mod: GC,,, | Performed by: PSYCHIATRY & NEUROLOGY

## 2018-07-28 PROCEDURE — 82803 BLOOD GASES ANY COMBINATION: CPT

## 2018-07-28 PROCEDURE — 80048 BASIC METABOLIC PNL TOTAL CA: CPT

## 2018-07-28 PROCEDURE — 94667 MNPJ CHEST WALL 1ST: CPT

## 2018-07-28 PROCEDURE — 20000000 HC ICU ROOM

## 2018-07-28 PROCEDURE — 94668 MNPJ CHEST WALL SBSQ: CPT

## 2018-07-28 PROCEDURE — 85025 COMPLETE CBC W/AUTO DIFF WBC: CPT

## 2018-07-28 PROCEDURE — 94761 N-INVAS EAR/PLS OXIMETRY MLT: CPT

## 2018-07-28 RX ORDER — INSULIN ASPART 100 [IU]/ML
5 INJECTION, SOLUTION INTRAVENOUS; SUBCUTANEOUS
Status: DISCONTINUED | OUTPATIENT
Start: 2018-07-28 | End: 2018-08-02

## 2018-07-28 RX ORDER — IPRATROPIUM BROMIDE AND ALBUTEROL SULFATE 2.5; .5 MG/3ML; MG/3ML
3 SOLUTION RESPIRATORY (INHALATION) EVERY 4 HOURS
Status: DISCONTINUED | OUTPATIENT
Start: 2018-07-28 | End: 2018-08-09 | Stop reason: HOSPADM

## 2018-07-28 RX ADMIN — HYDRALAZINE HYDROCHLORIDE 75 MG: 50 TABLET ORAL at 09:07

## 2018-07-28 RX ADMIN — METOPROLOL TARTRATE 25 MG: 25 TABLET, FILM COATED ORAL at 08:07

## 2018-07-28 RX ADMIN — IPRATROPIUM BROMIDE AND ALBUTEROL SULFATE 3 ML: .5; 3 SOLUTION RESPIRATORY (INHALATION) at 03:07

## 2018-07-28 RX ADMIN — INSULIN ASPART 4 UNITS: 100 INJECTION, SOLUTION INTRAVENOUS; SUBCUTANEOUS at 10:07

## 2018-07-28 RX ADMIN — INSULIN ASPART 4 UNITS: 100 INJECTION, SOLUTION INTRAVENOUS; SUBCUTANEOUS at 06:07

## 2018-07-28 RX ADMIN — INSULIN ASPART 5 UNITS: 100 INJECTION, SOLUTION INTRAVENOUS; SUBCUTANEOUS at 06:07

## 2018-07-28 RX ADMIN — HEPARIN SODIUM 5000 UNITS: 5000 INJECTION, SOLUTION INTRAVENOUS; SUBCUTANEOUS at 05:07

## 2018-07-28 RX ADMIN — AMIODARONE HYDROCHLORIDE 200 MG: 200 TABLET ORAL at 08:07

## 2018-07-28 RX ADMIN — ACETAMINOPHEN 650 MG: 325 TABLET, FILM COATED ORAL at 08:07

## 2018-07-28 RX ADMIN — SENNOSIDES AND DOCUSATE SODIUM 1 TABLET: 8.6; 5 TABLET ORAL at 08:07

## 2018-07-28 RX ADMIN — AMLODIPINE BESYLATE 10 MG: 10 TABLET ORAL at 08:07

## 2018-07-28 RX ADMIN — HEPARIN SODIUM 5000 UNITS: 5000 INJECTION, SOLUTION INTRAVENOUS; SUBCUTANEOUS at 09:07

## 2018-07-28 RX ADMIN — HEPARIN SODIUM 5000 UNITS: 5000 INJECTION, SOLUTION INTRAVENOUS; SUBCUTANEOUS at 01:07

## 2018-07-28 RX ADMIN — INSULIN DETEMIR 18 UNITS: 100 INJECTION, SOLUTION SUBCUTANEOUS at 08:07

## 2018-07-28 RX ADMIN — INSULIN ASPART 5 UNITS: 100 INJECTION, SOLUTION INTRAVENOUS; SUBCUTANEOUS at 07:07

## 2018-07-28 RX ADMIN — IPRATROPIUM BROMIDE AND ALBUTEROL SULFATE 3 ML: .5; 3 SOLUTION RESPIRATORY (INHALATION) at 07:07

## 2018-07-28 RX ADMIN — INSULIN ASPART 4 UNITS: 100 INJECTION, SOLUTION INTRAVENOUS; SUBCUTANEOUS at 02:07

## 2018-07-28 RX ADMIN — HYDRALAZINE HYDROCHLORIDE 75 MG: 50 TABLET ORAL at 01:07

## 2018-07-28 RX ADMIN — LABETALOL HYDROCHLORIDE 10 MG: 5 INJECTION, SOLUTION INTRAVENOUS at 02:07

## 2018-07-28 RX ADMIN — INSULIN ASPART 5 UNITS: 100 INJECTION, SOLUTION INTRAVENOUS; SUBCUTANEOUS at 02:07

## 2018-07-28 RX ADMIN — IPRATROPIUM BROMIDE AND ALBUTEROL SULFATE 3 ML: .5; 3 SOLUTION RESPIRATORY (INHALATION) at 11:07

## 2018-07-28 RX ADMIN — MODAFINIL 100 MG: 100 TABLET ORAL at 01:07

## 2018-07-28 RX ADMIN — LOSARTAN POTASSIUM 100 MG: 50 TABLET, FILM COATED ORAL at 08:07

## 2018-07-28 RX ADMIN — HYDRALAZINE HYDROCHLORIDE 75 MG: 50 TABLET ORAL at 05:07

## 2018-07-28 RX ADMIN — MODAFINIL 200 MG: 100 TABLET ORAL at 05:07

## 2018-07-28 RX ADMIN — ATORVASTATIN CALCIUM 40 MG: 20 TABLET, FILM COATED ORAL at 08:07

## 2018-07-28 RX ADMIN — INSULIN ASPART 5 UNITS: 100 INJECTION, SOLUTION INTRAVENOUS; SUBCUTANEOUS at 11:07

## 2018-07-28 RX ADMIN — INSULIN ASPART 5 UNITS: 100 INJECTION, SOLUTION INTRAVENOUS; SUBCUTANEOUS at 09:07

## 2018-07-28 NOTE — SUBJECTIVE & OBJECTIVE
Neurologic Chief Complaint:  ICH (intracerebral hemorrhage)    Subjective:     Interval History: Patient is seen for follow-up neurological assessment and treatment recommendations: Pending step down bed availability. Examination largely unchanged. SNF at discharge. Able to answer w/ one word answers, but only occasionally, then drifts off. Does not follow commands.     HPI, Past Medical, Family, and Social History remains the same as documented in the initial encounter.     Review of Systems   Unable to perform ROS: Mental status change     Scheduled Meds:   albuterol-ipratropium  3 mL Nebulization Q4H    amiodarone  200 mg Per NG tube Daily    amLODIPine  10 mg Per NG tube Daily    atorvastatin  40 mg Per NG tube Daily    heparin (porcine)  5,000 Units Subcutaneous Q8H    hydrALAZINE  75 mg Per NG tube Q8H    insulin aspart U-100  5 Units Subcutaneous Q4H    insulin detemir U-100  18 Units Subcutaneous BID    losartan  100 mg Per NG tube Daily    metoprolol tartrate  25 mg Per NG tube BID    modafinil  100 mg Per NG tube After lunch    modafinil  200 mg Per NG tube Daily    senna-docusate 8.6-50 mg  1 tablet Per NG tube Daily    sodium chloride 0.9%  1,000 mL Intravenous Once     Continuous Infusions:    PRN Meds:acetaminophen, dextrose 50%, dextrose 50%, glucagon (human recombinant), insulin aspart U-100, labetalol, magnesium oxide, magnesium oxide, phenylephrine HCl in 0.9% NaCl, potassium chloride 10%, potassium chloride 10%, potassium chloride 10%, potassium, sodium phosphates, potassium, sodium phosphates, potassium, sodium phosphates, sodium chloride 0.9%    Objective:     Vital Signs (Most Recent):  Temp: 98.4 °F (36.9 °C) (07/28/18 1500)  Pulse: 76 (07/28/18 1500)  Resp: (!) 21 (07/28/18 1500)  BP: 130/63 (07/28/18 1500)  SpO2: 100 % (07/28/18 1500)  BP Location: Right arm    Vital Signs Range (Last 24H):  Temp:  [97.9 °F (36.6 °C)-98.5 °F (36.9 °C)]   Pulse:  [72-93]   Resp:  [20-36]   BP:  (130-160)/(63-95)   SpO2:  [97 %-100 %]   BP Location: Right arm    Physical Exam   Constitutional: He appears well-developed and well-nourished. He appears lethargic.   HENT:   Head: Normocephalic.   Eyes: EOM are normal. Pupils are equal, round, and reactive to light.   Neck: Normal range of motion.   Cardiovascular: Normal rate and regular rhythm.    Pulmonary/Chest: Effort normal.   Abdominal: Soft.   Neurological: He appears lethargic. GCS eye subscore is 3. GCS verbal subscore is 3. GCS motor subscore is 5.   Patient alert, but not oriented, no following commands, speaks but not meaningfull.         Neurological Exam:   LOC: alert  Attention Span: poor  Language: No aphasia, Global aphasia  Articulation: Dysarthria  Orientation: Person, Place, Time , Not oriented to person, place, and time  Visual Fields: Full  EOM (CN III, IV, VI): Gaze preference  right  Pupils (CN II, III): PERRL  Facial Sensation (CN V): Normal  Facial Movement (CN VII): Symmetric facial expression    Motor: Arm left  Paresis: 3/5  Leg left  Plegia 0/5  Arm right  Paresis: 4/5  Leg right Paresis: 4/5  Cebellar: No evidence of appendicular or axial ataxia  Sensation: Intact to light touch, temperature and vibration    Laboratory:  CMP:     Recent Labs  Lab 07/28/18 0119   CALCIUM 9.7      K 4.1   CO2 28      BUN 15   CREATININE 0.8     BMP:     Recent Labs  Lab 07/28/18 0119      K 4.1      CO2 28   BUN 15   CREATININE 0.8   CALCIUM 9.7     CBC:     Recent Labs  Lab 07/28/18 0119   WBC 7.49   RBC 3.76*   HGB 11.0*   HCT 33.0*      MCV 88   MCH 29.3   MCHC 33.3     Lipid Panel: No results for input(s): CHOL, LDLCALC, HDL, TRIG in the last 168 hours.  Coagulation: No results for input(s): PT, INR, APTT in the last 168 hours.  Platelet Aggregation Study: No results for input(s): PLTAGG, PLTAGINTERP, PLTAGREGLACO, ADPPLTAGGREG in the last 168 hours.  Hgb A1C: No results for input(s): HGBA1C in the last 168  hours.  TSH: No results for input(s): TSH in the last 168 hours.    Diagnostic Results     Brain imaging:  CTH 7/25/18  Unchanged position of right frontal coursing ventriculostomy catheter with unchanged size and configuration of prominent ventricular system.    Stable right thalamic intraparenchymal hemorrhage with associated interventricular hemorrhage layering in the occipital horns of the lateral ventricles.    Chronic microvascular ischemic changes above with associated remote infarcts.    CTH 7/21/18  1. Interval placement of right frontal coursing ventriculostomy with unchanged size and configuration of the shunted ventricular system, which remains prominent.  2. Evolution of right thalamic intraparenchymal hemorrhage with stable mass effect.  3. Chronic microvascular ischemic change and remote infarcts as above.      CTH: 1. Redemonstration of evolving right thalamic hemorrhage with mild adjacent edema and slight localized mass effect.  Subtle, subcentimeter focus of hyperdensity within the left thalamus, concerning for additional focus of evolving hemorrhage.  2. Persistent interventricular extension of hemorrhage with unchanged dilatation of the ventricular system concerning for component of hydrocephalus.  3. Generalized cerebral volume loss, chronic microvascular ischemic change and remote infarcts as above.     Vessel Imaging:  none     Cardiac Evaluation:   ECHO- CONCLUSIONS     1 - Low normal left ventricular systolic function.     2 - Indeterminate LV diastolic function.     3 - Normal right ventricular systolic function .     4 - There is anterior pericardial fat pad..

## 2018-07-28 NOTE — ASSESSMENT & PLAN NOTE
67 yo M with PMHx of HTN, HLD, DM, dementia, A. Fib, on coumadin as outpatient who was transferred to [facility] where he was reportedly admitted for R thalamic IPH w IVH on 7/9/2018. S/P EVD, Removed 7/27/18.   Patient w/o any neurological changes; awaiting step down bed  Plan for SNF at discharge      Antithrombotics for secondary stroke prevention: Anticoagulants: holding a/c due to bleed    Statins for secondary stroke prevention and hyperlipidemia, if present: Statins: Atorvastatin- 40 mg daily    Aggressive risk factor modification: HTN, DM, HLD, Diet, Exercise, A-Fib     Rehab efforts: Occupational Therapy, PT/OT/SLP to evaluate and treat, PM&R consult     Diagnostics ordered/pending: None     VTE prophylaxis: Heparin and SCD    BP parameters: ICH: SBP <140

## 2018-07-28 NOTE — PLAN OF CARE
Problem: Patient Care Overview  Goal: Plan of Care Review  Outcome: Ongoing (interventions implemented as appropriate)  POC reviewed with pt at 1400. Pt cannot indicate understanding. Questions and concerns addressed. No acute events today. Pt progressing toward goals. Will continue to monitor. See flowsheets for full assessment and VS info.     Mental status stable today.  Pending transfer to stroke.

## 2018-07-28 NOTE — PLAN OF CARE
Problem: Patient Care Overview  Goal: Plan of Care Review  POC reviewed with pt at 0500. Pt unable verbalized understanding. No acute events overnight. Expected to be transferred to step down unit today. Will continue to monitor. See flowsheets for full assessment and VS info

## 2018-07-28 NOTE — ASSESSMENT & PLAN NOTE
- Stroke risk factor  - Holding anticoagulation due to bleed  - Continue rate control and amiodarone and metoprolol

## 2018-07-28 NOTE — PROGRESS NOTES
[facility]  Vascular Neurology  Comprehensive Stroke Center  Progress Note    Assessment/Plan:     * ICH (intracerebral hemorrhage)    67 yo M with PMHx of HTN, HLD, DM, dementia, A. Fib, on coumadin as outpatient who was transferred to [facility] from [facility] where he was reportedly admitted for R thalamic IPH w IVH on 7/9/2018. S/P EVD, Removed 7/27/18.   Patient w/o any neurological changes; awaiting step down bed  Plan for SNF at discharge      Antithrombotics for secondary stroke prevention: Anticoagulants: holding a/c due to bleed    Statins for secondary stroke prevention and hyperlipidemia, if present: Statins: Atorvastatin- 40 mg daily    Aggressive risk factor modification: HTN, DM, HLD, Diet, Exercise, A-Fib     Rehab efforts: Occupational Therapy, PT/OT/SLP to evaluate and treat, PM&R consult     Diagnostics ordered/pending: None     VTE prophylaxis: Heparin and SCD    BP parameters: ICH: SBP <140        Obstructive hydrocephalus    - S/P EVD, removed on 07/27        Dysphagia    - Tube feeds        Cardiomyopathy    - Latest Echo with Low normal left ventricular systolic function.         Persistent atrial fibrillation    - Stroke risk factor  - Holding anticoagulation due to bleed  - Continue rate control and amiodarone and metoprolol         Dyslipidemia    - Stroke risk factor  - Continue Atorvastatin 40mg QHS        Essential hypertension    - Continue on antihypertensives. BP goal <140         Type 2 diabetes mellitus with diabetic polyneuropathy, with long-term current use of insulin    - Stroke risk factor. A1C 7.2  - Continue Insulin regimen              7/21: EVD at bedside by NSGY  7/23 EVD in place lowered to 5cm h2o per nsgy.. Blood sugars running >200 adjusted detemir. Increased hydralazine for better BP control  7/24: EVD at 5, insulin adjustments   7/25: EVD clamped per Nsgy, [facility] tonight  7/26: Held tube feeds, insulin adjustments, Nsgy contacted about MSC and  EVD.   7/27: Evd pulled per Nsgy, Ngt replace, transfer to stroke service  07/28/2018  Pending bed availability, largely stable neuro exam. SNF placement on discharge     STROKE DOCUMENTATION        NIH Scale:  1a. Level Of Consciousness: 2-->Not alert: requires repeated stimulation to attend, or is obtunded and requires strong or painful stimulation to make movements (not stereotyped)  1b. LOC Questions: 2-->Answers neither question correctly  1c. LOC Commands: 2-->Performs neither task correctly  2. Best Gaze: 1-->Partial gaze palsy: gaze is abnormal in one or both eyes, but forced deviation or total gaze paresis is not present  3. Visual: 0-->No visual loss  4. Facial Palsy: 0-->Normal symmetrical movements  5a. Motor Arm, Left: 1-->Drift: limb holds 90 (or 45) degrees, but drifts down before full 10 seconds: does not hit bed or other support  5b. Motor Arm, Right: 1-->Drift: limb holds 90 (or 45) degrees, but drifts down before full 10 secs: does not hit bed or other support  6a. Motor Leg, Left: 4-->No movement  6b. Motor Leg, Right: 1-->Drift: leg falls by the end of the 5-sec period but does not hit bed  7. Limb Ataxia: 0-->Absent  8. Sensory: 0-->Normal: no sensory loss  9. Best Language: 2-->Severe aphasia: all communication is through fragmentary expression: great need for inference, questioning, and guessing by the listener. Range of information that can be exchanged is limited: listener carries burden of. . . (see row details)  10. Dysarthria: 2-->Severe dysarthria: patients speech is so slurred as to be unintelligible in the absence of or out of proportion to any dysphasia, or is mute/anarthric  11. Extinction and Inattention (formerly Neglect): 0-->No abnormality  Total (NIH Stroke Scale): 18       Modified [place]    [place] Coma Scale:12   ABCD2 Score:    [id number] Score:6  HAS -BLED Score:2  ICH Score:3  Hunt & Fox Classification:      Hemorrhagic change of an Ischemic Stroke: Does this patient  have an ischemic stroke with hemorrhagic changes? No     Neurologic Chief Complaint:  ICH (intracerebral hemorrhage)    Subjective:     Interval History: Patient is seen for follow-up neurological assessment and treatment recommendations: Pending step down bed availability. Examination largely unchanged. SNF at discharge. Able to answer w/ one word answers, but only occasionally, then drifts off. Does not follow commands.     HPI, Past Medical, Family, and Social History remains the same as documented in the initial encounter.     Review of Systems   Unable to perform ROS: Mental status change     Scheduled Meds:   albuterol-ipratropium  3 mL Nebulization Q4H    amiodarone  200 mg Per NG tube Daily    amLODIPine  10 mg Per NG tube Daily    atorvastatin  40 mg Per NG tube Daily    heparin (porcine)  5,000 Units Subcutaneous Q8H    hydrALAZINE  75 mg Per NG tube Q8H    insulin aspart U-100  5 Units Subcutaneous Q4H    insulin detemir U-100  18 Units Subcutaneous BID    losartan  100 mg Per NG tube Daily    metoprolol tartrate  25 mg Per NG tube BID    modafinil  100 mg Per NG tube After lunch    modafinil  200 mg Per NG tube Daily    senna-docusate 8.6-50 mg  1 tablet Per NG tube Daily    sodium chloride 0.9%  1,000 mL Intravenous Once     Continuous Infusions:    PRN Meds:acetaminophen, dextrose 50%, dextrose 50%, glucagon (human recombinant), insulin aspart U-100, labetalol, magnesium oxide, magnesium oxide, phenylephrine HCl in 0.9% NaCl, potassium chloride 10%, potassium chloride 10%, potassium chloride 10%, potassium, sodium phosphates, potassium, sodium phosphates, potassium, sodium phosphates, sodium chloride 0.9%    Objective:     Vital Signs (Most Recent):  Temp: 98.4 °F (36.9 °C) (07/28/18 1500)  Pulse: 76 (07/28/18 1500)  Resp: (!) 21 (07/28/18 1500)  BP: 130/63 (07/28/18 1500)  SpO2: 100 % (07/28/18 1500)  BP Location: Right arm    Vital Signs Range (Last 24H):  Temp:  [97.9 °F (36.6 °C)-98.5  °F (36.9 °C)]   Pulse:  [72-93]   Resp:  [20-36]   BP: (130-160)/(63-95)   SpO2:  [97 %-100 %]   BP Location: Right arm    Physical Exam   Constitutional: He appears well-developed and well-nourished. He appears lethargic.   HENT:   Head: Normocephalic.   Eyes: EOM are normal. Pupils are equal, round, and reactive to light.   Neck: Normal range of motion.   Cardiovascular: Normal rate and regular rhythm.    Pulmonary/Chest: Effort normal.   Abdominal: Soft.   Neurological: He appears lethargic. GCS eye subscore is 3. GCS verbal subscore is 3. GCS motor subscore is 5.   Patient alert, but not oriented, no following commands, speaks but not meaningfull.         Neurological Exam:   LOC: alert  Attention Span: poor  Language: No aphasia, Global aphasia  Articulation: Dysarthria  Orientation: Person, Place, Time , Not oriented to person, place, and time  Visual Fields: Full  EOM (CN III, IV, VI): Gaze preference  right  Pupils (CN II, III): PERRL  Facial Sensation (CN V): Normal  Facial Movement (CN VII): Symmetric facial expression    Motor: Arm left  Paresis: 3/5  Leg left  Plegia 0/5  Arm right  Paresis: 4/5  Leg right Paresis: 4/5  Cebellar: No evidence of appendicular or axial ataxia  Sensation: Intact to light touch, temperature and vibration    Laboratory:  CMP:     Recent Labs  Lab 07/28/18 0119   CALCIUM 9.7      K 4.1   CO2 28      BUN 15   CREATININE 0.8     BMP:     Recent Labs  Lab 07/28/18 0119      K 4.1      CO2 28   BUN 15   CREATININE 0.8   CALCIUM 9.7     CBC:     Recent Labs  Lab 07/28/18 0119   WBC 7.49   RBC 3.76*   HGB 11.0*   HCT 33.0*      MCV 88   MCH 29.3   MCHC 33.3     Lipid Panel: No results for input(s): CHOL, LDLCALC, HDL, TRIG in the last 168 hours.  Coagulation: No results for input(s): PT, INR, APTT in the last 168 hours.  Platelet Aggregation Study: No results for input(s): PLTAGG, PLTAGINTERP, PLTAGREGLACO, ADPPLTAGGREG in the last 168 hours.  Hgb  A1C: No results for input(s): HGBA1C in the last 168 hours.  TSH: No results for input(s): TSH in the last 168 hours.    Diagnostic Results     Brain imaging:  CTH 7/25/18  Unchanged position of right frontal coursing ventriculostomy catheter with unchanged size and configuration of prominent ventricular system.    Stable right thalamic intraparenchymal hemorrhage with associated interventricular hemorrhage layering in the occipital horns of the lateral ventricles.    Chronic microvascular ischemic changes above with associated remote infarcts.    CTH 7/21/18  1. Interval placement of right frontal coursing ventriculostomy with unchanged size and configuration of the shunted ventricular system, which remains prominent.  2. Evolution of right thalamic intraparenchymal hemorrhage with stable mass effect.  3. Chronic microvascular ischemic change and remote infarcts as above.      CTH: 1. Redemonstration of evolving right thalamic hemorrhage with mild adjacent edema and slight localized mass effect.  Subtle, subcentimeter focus of hyperdensity within the left thalamus, concerning for additional focus of evolving hemorrhage.  2. Persistent interventricular extension of hemorrhage with unchanged dilatation of the ventricular system concerning for component of hydrocephalus.  3. Generalized cerebral volume loss, chronic microvascular ischemic change and remote infarcts as above.     Vessel Imaging:  none     Cardiac Evaluation:   ECHO- CONCLUSIONS     1 - Low normal left ventricular systolic function.     2 - Indeterminate LV diastolic function.     3 - Normal right ventricular systolic function .     4 - There is anterior pericardial fat pad..       Giulia Pulido MD  Comprehensive Stroke Center  Department of Vascular Neurology   [facility]

## 2018-07-29 LAB
ALLENS TEST: ABNORMAL
AMMONIA PLAS-SCNC: 30 UMOL/L
AMYLASE SERPL-CCNC: 35 U/L
ANION GAP SERPL CALC-SCNC: 8 MMOL/L
BACTERIA #/AREA URNS AUTO: ABNORMAL /HPF
BASOPHILS # BLD AUTO: 0.04 K/UL
BASOPHILS NFR BLD: 0.4 %
BILIRUB UR QL STRIP: NEGATIVE
BUN SERPL-MCNC: 19 MG/DL
CALCIUM SERPL-MCNC: 9.7 MG/DL
CHLORIDE SERPL-SCNC: 102 MMOL/L
CLARITY UR REFRACT.AUTO: ABNORMAL
CO2 SERPL-SCNC: 27 MMOL/L
COLOR UR AUTO: ABNORMAL
CREAT SERPL-MCNC: 0.9 MG/DL
DELSYS: ABNORMAL
DIFFERENTIAL METHOD: ABNORMAL
EOSINOPHIL # BLD AUTO: 0.1 K/UL
EOSINOPHIL NFR BLD: 0.9 %
ERYTHROCYTE [DISTWIDTH] IN BLOOD BY AUTOMATED COUNT: 12.7 %
EST. GFR  (AFRICAN AMERICAN): >60 ML/MIN/1.73 M^2
EST. GFR  (NON AFRICAN AMERICAN): >60 ML/MIN/1.73 M^2
GLUCOSE SERPL-MCNC: 119 MG/DL
GLUCOSE UR QL STRIP: ABNORMAL
HCO3 UR-SCNC: 30.4 MMOL/L (ref 24–28)
HCT VFR BLD AUTO: 30.7 %
HGB BLD-MCNC: 10.2 G/DL
HGB UR QL STRIP: NEGATIVE
HYALINE CASTS UR QL AUTO: 0 /LPF
IMM GRANULOCYTES # BLD AUTO: 0.03 K/UL
IMM GRANULOCYTES NFR BLD AUTO: 0.3 %
INR PPP: 1
KETONES UR QL STRIP: ABNORMAL
LEUKOCYTE ESTERASE UR QL STRIP: ABNORMAL
LIPASE SERPL-CCNC: 19 U/L
LYMPHOCYTES # BLD AUTO: 0.7 K/UL
LYMPHOCYTES NFR BLD: 7.5 %
MAGNESIUM SERPL-MCNC: 2 MG/DL
MCH RBC QN AUTO: 29.1 PG
MCHC RBC AUTO-ENTMCNC: 33.2 G/DL
MCV RBC AUTO: 88 FL
MICROSCOPIC COMMENT: ABNORMAL
MONOCYTES # BLD AUTO: 0.9 K/UL
MONOCYTES NFR BLD: 9.1 %
NEUTROPHILS # BLD AUTO: 7.8 K/UL
NEUTROPHILS NFR BLD: 81.8 %
NITRITE UR QL STRIP: NEGATIVE
NRBC BLD-RTO: 0 /100 WBC
PCO2 BLDA: 41.1 MMHG (ref 35–45)
PH SMN: 7.48 [PH] (ref 7.35–7.45)
PH UR STRIP: 6 [PH] (ref 5–8)
PHOSPHATE SERPL-MCNC: 3.5 MG/DL
PLATELET # BLD AUTO: 297 K/UL
PMV BLD AUTO: 11.7 FL
PO2 BLDA: 66 MMHG (ref 80–100)
POC BE: 7 MMOL/L
POC SATURATED O2: 94 % (ref 95–100)
POC TCO2: 32 MMOL/L (ref 23–27)
POCT GLUCOSE: 110 MG/DL (ref 70–110)
POCT GLUCOSE: 125 MG/DL (ref 70–110)
POCT GLUCOSE: 203 MG/DL (ref 70–110)
POCT GLUCOSE: 227 MG/DL (ref 70–110)
POCT GLUCOSE: 236 MG/DL (ref 70–110)
POCT GLUCOSE: 288 MG/DL (ref 70–110)
POTASSIUM SERPL-SCNC: 4 MMOL/L
PROT UR QL STRIP: ABNORMAL
PROTHROMBIN TIME: 10.9 SEC
RBC # BLD AUTO: 3.51 M/UL
RBC #/AREA URNS AUTO: 27 /HPF (ref 0–4)
SAMPLE: ABNORMAL
SITE: ABNORMAL
SODIUM SERPL-SCNC: 137 MMOL/L
SP GR UR STRIP: 1.02 (ref 1–1.03)
SQUAMOUS #/AREA URNS AUTO: 1 /HPF
TSH SERPL DL<=0.005 MIU/L-ACNC: 3.59 UIU/ML
URN SPEC COLLECT METH UR: ABNORMAL
UROBILINOGEN UR STRIP-ACNC: NEGATIVE EU/DL
WBC # BLD AUTO: 9.48 K/UL
WBC #/AREA URNS AUTO: >100 /HPF (ref 0–5)
WBC CLUMPS UR QL AUTO: ABNORMAL
YEAST UR QL AUTO: ABNORMAL

## 2018-07-29 PROCEDURE — 81001 URINALYSIS AUTO W/SCOPE: CPT

## 2018-07-29 PROCEDURE — S0030 INJECTION, METRONIDAZOLE: HCPCS | Performed by: STUDENT IN AN ORGANIZED HEALTH CARE EDUCATION/TRAINING PROGRAM

## 2018-07-29 PROCEDURE — 94761 N-INVAS EAR/PLS OXIMETRY MLT: CPT

## 2018-07-29 PROCEDURE — 82140 ASSAY OF AMMONIA: CPT

## 2018-07-29 PROCEDURE — 84100 ASSAY OF PHOSPHORUS: CPT

## 2018-07-29 PROCEDURE — 25000003 PHARM REV CODE 250: Performed by: STUDENT IN AN ORGANIZED HEALTH CARE EDUCATION/TRAINING PROGRAM

## 2018-07-29 PROCEDURE — 99233 SBSQ HOSP IP/OBS HIGH 50: CPT | Mod: GC,,, | Performed by: PSYCHIATRY & NEUROLOGY

## 2018-07-29 PROCEDURE — 25000242 PHARM REV CODE 250 ALT 637 W/ HCPCS: Performed by: PHYSICIAN ASSISTANT

## 2018-07-29 PROCEDURE — 87040 BLOOD CULTURE FOR BACTERIA: CPT | Mod: 59

## 2018-07-29 PROCEDURE — 82150 ASSAY OF AMYLASE: CPT

## 2018-07-29 PROCEDURE — 82803 BLOOD GASES ANY COMBINATION: CPT

## 2018-07-29 PROCEDURE — 94668 MNPJ CHEST WALL SBSQ: CPT

## 2018-07-29 PROCEDURE — 25000003 PHARM REV CODE 250: Performed by: NURSE PRACTITIONER

## 2018-07-29 PROCEDURE — 80048 BASIC METABOLIC PNL TOTAL CA: CPT

## 2018-07-29 PROCEDURE — 85610 PROTHROMBIN TIME: CPT

## 2018-07-29 PROCEDURE — 36600 WITHDRAWAL OF ARTERIAL BLOOD: CPT

## 2018-07-29 PROCEDURE — 99900035 HC TECH TIME PER 15 MIN (STAT)

## 2018-07-29 PROCEDURE — 63600175 PHARM REV CODE 636 W HCPCS: Performed by: PSYCHIATRY & NEUROLOGY

## 2018-07-29 PROCEDURE — 27000221 HC OXYGEN, UP TO 24 HOURS

## 2018-07-29 PROCEDURE — 83735 ASSAY OF MAGNESIUM: CPT

## 2018-07-29 PROCEDURE — 63600175 PHARM REV CODE 636 W HCPCS: Performed by: NURSE PRACTITIONER

## 2018-07-29 PROCEDURE — 25000003 PHARM REV CODE 250: Performed by: PSYCHIATRY & NEUROLOGY

## 2018-07-29 PROCEDURE — 94640 AIRWAY INHALATION TREATMENT: CPT

## 2018-07-29 PROCEDURE — 20000000 HC ICU ROOM

## 2018-07-29 PROCEDURE — 63600175 PHARM REV CODE 636 W HCPCS: Performed by: STUDENT IN AN ORGANIZED HEALTH CARE EDUCATION/TRAINING PROGRAM

## 2018-07-29 PROCEDURE — 31720 CLEARANCE OF AIRWAYS: CPT

## 2018-07-29 PROCEDURE — 99291 CRITICAL CARE FIRST HOUR: CPT | Mod: ,,, | Performed by: ANESTHESIOLOGY

## 2018-07-29 PROCEDURE — 84443 ASSAY THYROID STIM HORMONE: CPT

## 2018-07-29 PROCEDURE — 85025 COMPLETE CBC W/AUTO DIFF WBC: CPT

## 2018-07-29 PROCEDURE — 83690 ASSAY OF LIPASE: CPT

## 2018-07-29 RX ORDER — SODIUM CHLORIDE 9 MG/ML
INJECTION, SOLUTION INTRAVENOUS CONTINUOUS
Status: DISCONTINUED | OUTPATIENT
Start: 2018-07-29 | End: 2018-07-31

## 2018-07-29 RX ORDER — CEFEPIME HYDROCHLORIDE 1 G/1
1 INJECTION, POWDER, FOR SOLUTION INTRAMUSCULAR; INTRAVENOUS
Status: DISCONTINUED | OUTPATIENT
Start: 2018-07-29 | End: 2018-07-30

## 2018-07-29 RX ORDER — PHENYLEPHRINE HCL IN 0.9% NACL 1 MG/10 ML
SYRINGE (ML) INTRAVENOUS
Status: DISPENSED
Start: 2018-07-29 | End: 2018-07-30

## 2018-07-29 RX ORDER — METRONIDAZOLE 500 MG/100ML
500 INJECTION, SOLUTION INTRAVENOUS
Status: DISCONTINUED | OUTPATIENT
Start: 2018-07-29 | End: 2018-07-30

## 2018-07-29 RX ORDER — VANCOMYCIN 1.75 GRAM/500 ML IN 0.9 % SODIUM CHLORIDE INTRAVENOUS
20 ONCE
Status: COMPLETED | OUTPATIENT
Start: 2018-07-29 | End: 2018-07-29

## 2018-07-29 RX ADMIN — INSULIN ASPART 4 UNITS: 100 INJECTION, SOLUTION INTRAVENOUS; SUBCUTANEOUS at 05:07

## 2018-07-29 RX ADMIN — HEPARIN SODIUM 5000 UNITS: 5000 INJECTION, SOLUTION INTRAVENOUS; SUBCUTANEOUS at 09:07

## 2018-07-29 RX ADMIN — INSULIN DETEMIR 18 UNITS: 100 INJECTION, SOLUTION SUBCUTANEOUS at 09:07

## 2018-07-29 RX ADMIN — SODIUM CHLORIDE 1000 ML: 0.9 INJECTION, SOLUTION INTRAVENOUS at 06:07

## 2018-07-29 RX ADMIN — CEFEPIME 1 G: 1 INJECTION, POWDER, FOR SOLUTION INTRAMUSCULAR; INTRAVENOUS at 02:07

## 2018-07-29 RX ADMIN — ATORVASTATIN CALCIUM 40 MG: 20 TABLET, FILM COATED ORAL at 08:07

## 2018-07-29 RX ADMIN — ACETAMINOPHEN 650 MG: 325 TABLET, FILM COATED ORAL at 12:07

## 2018-07-29 RX ADMIN — INSULIN ASPART 5 UNITS: 100 INJECTION, SOLUTION INTRAVENOUS; SUBCUTANEOUS at 02:07

## 2018-07-29 RX ADMIN — MODAFINIL 200 MG: 100 TABLET ORAL at 05:07

## 2018-07-29 RX ADMIN — METOPROLOL TARTRATE 25 MG: 25 TABLET, FILM COATED ORAL at 09:07

## 2018-07-29 RX ADMIN — IPRATROPIUM BROMIDE AND ALBUTEROL SULFATE 3 ML: .5; 3 SOLUTION RESPIRATORY (INHALATION) at 11:07

## 2018-07-29 RX ADMIN — AMLODIPINE BESYLATE 10 MG: 10 TABLET ORAL at 08:07

## 2018-07-29 RX ADMIN — INSULIN ASPART 4 UNITS: 100 INJECTION, SOLUTION INTRAVENOUS; SUBCUTANEOUS at 09:07

## 2018-07-29 RX ADMIN — INSULIN ASPART 5 UNITS: 100 INJECTION, SOLUTION INTRAVENOUS; SUBCUTANEOUS at 06:07

## 2018-07-29 RX ADMIN — INSULIN ASPART 4 UNITS: 100 INJECTION, SOLUTION INTRAVENOUS; SUBCUTANEOUS at 06:07

## 2018-07-29 RX ADMIN — SENNOSIDES AND DOCUSATE SODIUM 1 TABLET: 8.6; 5 TABLET ORAL at 08:07

## 2018-07-29 RX ADMIN — IPRATROPIUM BROMIDE AND ALBUTEROL SULFATE 3 ML: .5; 3 SOLUTION RESPIRATORY (INHALATION) at 03:07

## 2018-07-29 RX ADMIN — METRONIDAZOLE 500 MG: 500 INJECTION, SOLUTION INTRAVENOUS at 02:07

## 2018-07-29 RX ADMIN — INSULIN ASPART 5 UNITS: 100 INJECTION, SOLUTION INTRAVENOUS; SUBCUTANEOUS at 09:07

## 2018-07-29 RX ADMIN — INSULIN ASPART 6 UNITS: 100 INJECTION, SOLUTION INTRAVENOUS; SUBCUTANEOUS at 02:07

## 2018-07-29 RX ADMIN — INSULIN ASPART 5 UNITS: 100 INJECTION, SOLUTION INTRAVENOUS; SUBCUTANEOUS at 05:07

## 2018-07-29 RX ADMIN — IPRATROPIUM BROMIDE AND ALBUTEROL SULFATE 3 ML: .5; 3 SOLUTION RESPIRATORY (INHALATION) at 08:07

## 2018-07-29 RX ADMIN — AMIODARONE HYDROCHLORIDE 200 MG: 200 TABLET ORAL at 08:07

## 2018-07-29 RX ADMIN — SODIUM CHLORIDE: 0.9 INJECTION, SOLUTION INTRAVENOUS at 06:07

## 2018-07-29 RX ADMIN — METRONIDAZOLE 500 MG: 500 INJECTION, SOLUTION INTRAVENOUS at 10:07

## 2018-07-29 RX ADMIN — METOPROLOL TARTRATE 25 MG: 25 TABLET, FILM COATED ORAL at 08:07

## 2018-07-29 RX ADMIN — MODAFINIL 100 MG: 100 TABLET ORAL at 12:07

## 2018-07-29 RX ADMIN — HYDRALAZINE HYDROCHLORIDE 75 MG: 50 TABLET ORAL at 05:07

## 2018-07-29 RX ADMIN — LOSARTAN POTASSIUM 100 MG: 50 TABLET, FILM COATED ORAL at 08:07

## 2018-07-29 RX ADMIN — Medication 1750 MG: at 02:07

## 2018-07-29 RX ADMIN — SODIUM CHLORIDE 500 ML: 0.9 INJECTION, SOLUTION INTRAVENOUS at 05:07

## 2018-07-29 RX ADMIN — CEFEPIME 1 G: 1 INJECTION, POWDER, FOR SOLUTION INTRAMUSCULAR; INTRAVENOUS at 10:07

## 2018-07-29 RX ADMIN — HEPARIN SODIUM 5000 UNITS: 5000 INJECTION, SOLUTION INTRAVENOUS; SUBCUTANEOUS at 01:07

## 2018-07-29 RX ADMIN — IPRATROPIUM BROMIDE AND ALBUTEROL SULFATE 3 ML: .5; 3 SOLUTION RESPIRATORY (INHALATION) at 07:07

## 2018-07-29 RX ADMIN — HEPARIN SODIUM 5000 UNITS: 5000 INJECTION, SOLUTION INTRAVENOUS; SUBCUTANEOUS at 05:07

## 2018-07-29 RX ADMIN — LABETALOL HYDROCHLORIDE 10 MG: 5 INJECTION, SOLUTION INTRAVENOUS at 01:07

## 2018-07-29 NOTE — ASSESSMENT & PLAN NOTE
7/21EVD at bedside by NSGy   For obstructive hydrocephalus.  7/23 EVD lowered to 5cm/h2o per NSGY Drained 46cc overnight. ICP 2-3 this am.  7/24 EVD remains at 5cm ICP 4-8  7/25 EVD clamped per Nsgy  7/26 Nsgy to eval EVD and patient at 5pm  7/27 Evd puller per Nsgy  7/29 repeat scans stable

## 2018-07-29 NOTE — PROGRESS NOTES
[facility]  Vascular Neurology  Comprehensive Stroke Center  Progress Note    Assessment/Plan:     * ICH (intracerebral hemorrhage)    69 yo M with PMHx of HTN, HLD, DM, dementia, A. Fib, on coumadin as outpatient who was transferred to [facility] [place] [facility] where he was reportedly admitted for R thalamic IPH w IVH on 7/9/2018. S/P EVD, Removed 7/27/18.   Patient more somnolent this AM; less responsive to external stimuli. Febrile. Infectious w/u per primary team.   CTH w/o significant changes. Alkalotic on AM ABG.  Plan for SNF at discharge      Antithrombotics for secondary stroke prevention: Anticoagulants: holding a/c due to bleed    Statins for secondary stroke prevention and hyperlipidemia, if present: Statins: Atorvastatin- 40 mg daily    Aggressive risk factor modification: HTN, DM, HLD, Diet, Exercise, A-Fib     Rehab efforts: Occupational Therapy, PT/OT/SLP to evaluate and treat, PM&R consult     Diagnostics ordered/pending: None     VTE prophylaxis: Heparin and SCD    BP parameters: ICH: SBP <140        Obstructive hydrocephalus    - S/P EVD, removed on 07/27        Dysphagia    - Tube feeds        Cardiomyopathy    - Latest Echo with Low normal left ventricular systolic function.         Persistent atrial fibrillation    - Stroke risk factor  - Holding anticoagulation due to bleed  - Continue rate control and amiodarone and metoprolol         Dyslipidemia    - Stroke risk factor  - Continue Atorvastatin 40mg QHS        Essential hypertension    - Continue on antihypertensives. BP goal <140         Type 2 diabetes mellitus with diabetic polyneuropathy, with long-term current use of insulin    - Stroke risk factor. A1C 7.2  - Continue Insulin regimen              7/21: EVD at bedside by NSGY  7/23 EVD in place lowered to 5cm h2o per nsgy.. Blood sugars running >200 adjusted detemir. Increased hydralazine for better BP control  7/24: EVD at 5, insulin adjustments   7/25: EVD  clamped per Nsgy, CTH tonight  7/26: Held tube feeds, insulin adjustments, Nsgy contacted about MSC and EVD.   7/27: Evd pulled per Nsgy, Ngt replace, transfer to stroke service  07/28/2018  Pending bed availability, largely stable neuro exam. SNF placement on discharge   07/29/2018 More somnolent and less responsive this AM. CTH stable but patient w/ fevers now.       STROKE DOCUMENTATION        NIH Scale:  1a. Level Of Consciousness: 2-->Not alert: requires repeated stimulation to attend, or is obtunded and requires strong or painful stimulation to make movements (not stereotyped)  1b. LOC Questions: 2-->Answers neither question correctly  1c. LOC Commands: 2-->Performs neither task correctly  2. Best Gaze: 0-->Normal  3. Visual: 0-->No visual loss  4. Facial Palsy: 0-->Normal symmetrical movements  5a. Motor Arm, Left: 4-->No movement  5b. Motor Arm, Right: 1-->Drift: limb holds 90 (or 45) degrees, but drifts down before full 10 secs: does not hit bed or other support  6a. Motor Leg, Left: 4-->No movement  6b. Motor Leg, Right: 0-->No drift: leg holds 30 degree position for full 5 secs  7. Limb Ataxia: 0-->Absent  8. Sensory: 0-->Normal: no sensory loss  9. Best Language: 3-->Mute, global aphasia: no usable speech or auditory comprehension  10. Dysarthria: 2-->Severe dysarthria: patients speech is so slurred as to be unintelligible in the absence of or out of proportion to any dysphasia, or is mute/anarthric  11. Extinction and Inattention (formerly Neglect): 0-->No abnormality  Total (NIH Stroke Scale): 20       Modified [place]    Lucian Coma Scale:    ABCD2 Score:    [id number] Score:6  HAS -BLED Score:2  ICH Score:3  Hunt & Fox Classification:      Hemorrhagic change of an Ischemic Stroke: Does this patient have an ischemic stroke with hemorrhagic changes? No     Neurologic Chief Complaint:  ICH (intracerebral hemorrhage)    Subjective:     Interval History: Patient is seen for follow-up neurological  assessment and treatment recommendations: Patient more somnolent and less responsive today. Family at bedside agrees. Patient w.o changes on the CTH done overnight, w/ evolving R thalamic hemorrhage w/ intraventricular extension and ventricular system remains enlarged, similar to prior study although upon personal evaluation the ventricles appear to have a slight interval increase.      HPI, Past Medical, Family, and Social History remains the same as documented in the initial encounter.     Review of Systems   Unable to perform ROS: Mental status change     Scheduled Meds:   albuterol-ipratropium  3 mL Nebulization Q4H    amiodarone  200 mg Per NG tube Daily    amLODIPine  10 mg Per NG tube Daily    atorvastatin  40 mg Per NG tube Daily    ceFEPime (MAXIPIME) IVPB  1 g Intravenous Q8H    heparin (porcine)  5,000 Units Subcutaneous Q8H    hydrALAZINE  75 mg Per NG tube Q8H    insulin aspart U-100  5 Units Subcutaneous Q4H    losartan  100 mg Per NG tube Daily    metoprolol tartrate  25 mg Per NG tube BID    metronidazole  500 mg Intravenous Q8H    modafinil  100 mg Per NG tube After lunch    modafinil  200 mg Per NG tube Daily    senna-docusate 8.6-50 mg  1 tablet Per NG tube Daily    sodium chloride 0.9%  1,000 mL Intravenous Once    vancomycin (VANCOCIN) IVPB  20 mg/kg Intravenous Once     Continuous Infusions:    PRN Meds:acetaminophen, dextrose 50%, dextrose 50%, glucagon (human recombinant), insulin aspart U-100, labetalol, magnesium oxide, magnesium oxide, phenylephrine HCl in 0.9% NaCl, potassium chloride 10%, potassium chloride 10%, potassium chloride 10%, potassium, sodium phosphates, potassium, sodium phosphates, potassium, sodium phosphates, sodium chloride 0.9%    Objective:     Vital Signs (Most Recent):  Temp: (!) 101 °F (38.3 °C) (07/29/18 1248)  Pulse: 86 (07/29/18 1205)  Resp: (!) 28 (07/29/18 1205)  BP: 116/69 (07/29/18 1205)  SpO2: 100 % (07/29/18 1205)  BP Location: Left  arm    Vital Signs Range (Last 24H):  Temp:  [98.4 °F (36.9 °C)-101 °F (38.3 °C)]   Pulse:  []   Resp:  [14-39]   BP: (104-175)/(58-94)   SpO2:  [97 %-100 %]   BP Location: Left arm    Physical Exam   Constitutional: He appears well-developed and well-nourished. He appears lethargic.   HENT:   Head: Normocephalic.   Eyes: EOM are normal. Pupils are equal, round, and reactive to light.   Neck: Normal range of motion.   Cardiovascular: Normal rate and regular rhythm.    Pulmonary/Chest: Effort normal.   Abdominal: Soft.   Neurological: He appears lethargic. GCS eye subscore is 3. GCS verbal subscore is 3. GCS motor subscore is 5.   Patient alert, but not oriented, no following commands, speaks but not meaningfull.         Neurological Exam:   LOC: drowsy  Attention Span: poor  Language: Global aphasia  Articulation: Untestable due to severe aphasia   Orientation: Untestable due to severe aphasia   Visual Fields: Full  EOM (CN III, IV, VI): Full/intact  Pupils (CN II, III): PERRL, slugging pin point pupils  Facial Sensation (CN V): Normal  Facial Movement (CN VII): Symmetric facial expression    Motor: Arm left  Paresis: 3/5  Leg left  Plegia 0/5  Arm right  Paresis: 4/5  Leg right Paresis: 4/5  Cebellar: No evidence of appendicular or axial ataxia  Sensation: Intact to light touch, temperature and vibration    Laboratory:  CMP:     Recent Labs  Lab 07/29/18 0157   CALCIUM 9.7      K 4.0   CO2 27      BUN 19   CREATININE 0.9     BMP:     Recent Labs  Lab 07/29/18 0157      K 4.0      CO2 27   BUN 19   CREATININE 0.9   CALCIUM 9.7     CBC:     Recent Labs  Lab 07/29/18 0157   WBC 9.48   RBC 3.51*   HGB 10.2*   HCT 30.7*      MCV 88   MCH 29.1   MCHC 33.2     Lipid Panel: No results for input(s): CHOL, LDLCALC, HDL, TRIG in the last 168 hours.  Coagulation: No results for input(s): PT, INR, APTT in the last 168 hours.  Platelet Aggregation Study: No results for input(s): PLTAGG,  PLTAGINTERP, PLTAGREGLACO, ADPPLTAGGREG in the last 168 hours.  Hgb A1C: No results for input(s): HGBA1C in the last 168 hours.  TSH: No results for input(s): TSH in the last 168 hours.    Diagnostic Results     Brain imaging:  CTH 7/25/18  Unchanged position of right frontal coursing ventriculostomy catheter with unchanged size and configuration of prominent ventricular system.    Stable right thalamic intraparenchymal hemorrhage with associated interventricular hemorrhage layering in the occipital horns of the lateral ventricles.    Chronic microvascular ischemic changes above with associated remote infarcts.    CTH 7/21/18  1. Interval placement of right frontal coursing ventriculostomy with unchanged size and configuration of the shunted ventricular system, which remains prominent.  2. Evolution of right thalamic intraparenchymal hemorrhage with stable mass effect.  3. Chronic microvascular ischemic change and remote infarcts as above.      CTH: 1. Redemonstration of evolving right thalamic hemorrhage with mild adjacent edema and slight localized mass effect.  Subtle, subcentimeter focus of hyperdensity within the left thalamus, concerning for additional focus of evolving hemorrhage.  2. Persistent interventricular extension of hemorrhage with unchanged dilatation of the ventricular system concerning for component of hydrocephalus.  3. Generalized cerebral volume loss, chronic microvascular ischemic change and remote infarcts as above.     CTH ( 07/29/2018)  Evolving right thalamic intraparenchymal hematoma with intraventricular extension, as above, without detrimental change from 07/29/2018 CT at 12:24 p.m. No new infarct or hemorrhage.    Persistent diffuse enlargement of the ventricular system, unchanged.  Small focus of pneumocephalus in the right frontal horn persists.  Vessel Imaging:  none     Cardiac Evaluation:   ECHO- CONCLUSIONS     1 - Low normal left ventricular systolic function.     2 -  Indeterminate LV diastolic function.     3 - Normal right ventricular systolic function .     4 - There is anterior pericardial fat pad..       Giulia Pulido MD  Comprehensive Stroke Center  Department of Vascular Neurology   [facility]

## 2018-07-29 NOTE — ASSESSMENT & PLAN NOTE
EVD pulled previously without complication  Hydrocephalus - persists on imaging, but stable since EVD removed   -180  PT OT SLP

## 2018-07-29 NOTE — ASSESSMENT & PLAN NOTE
SBP goal 100 - 180  Amlodipine 10mg daily  Hydralazine 75mg q8h  Losartan 100mg daily  Metoprolol 25mg bid  Prn labetalol    Echo: 1 - Low normal left ventricular systolic function.     2 - Indeterminate LV diastolic function.     3 - Normal right ventricular systolic function .     4 - There is anterior pericardial fat pad..

## 2018-07-29 NOTE — SUBJECTIVE & OBJECTIVE
Interval History:   Worsening mental status overnight and sent for multiple scans without change.    Review of Systems    Unable to obtain a complete ROS due to level of consciousness.  Objective:     Vitals:  Temp: (!) 101 °F (38.3 °C)  Pulse: 86  Rhythm: normal sinus rhythm  BP: 116/69  MAP (mmHg): 88  Resp: (!) 28  SpO2: 100 %  O2 Device (Oxygen Therapy): nasal cannula    Temp  Min: 98.4 °F (36.9 °C)  Max: 101 °F (38.3 °C)  Pulse  Min: 71  Max: 105  BP  Min: 104/58  Max: 175/94  MAP (mmHg)  Min: 78  Max: 125  Resp  Min: 14  Max: 39  SpO2  Min: 97 %  Max: 100 %    07/28 0701 - 07/29 0700  In: 1745   Out: 1255 [Urine:1255]   Unmeasured Output  Urine Occurrence: 1  Stool Occurrence: 1  Pad Count: 1       Physical Exam   Constitutional: He appears well-developed and well-nourished. He appears lethargic.   HENT:   Head: Normocephalic.   Eyes: EOM are normal. Pupils are equal, round, and reactive to light.   Neck: Normal range of motion.   Cardiovascular: Normal rate and regular rhythm.    Pulmonary/Chest: Effort normal.   Abdominal: Soft.   Neurological: He appears lethargic. GCS eye subscore is 2. GCS verbal subscore is 2. GCS motor subscore is 5.   Difficult to arouse; not following commands           Medications:  Continuous   Scheduled    albuterol-ipratropium 3 mL Q4H   amiodarone 200 mg Daily   amLODIPine 10 mg Daily   atorvastatin 40 mg Daily   heparin (porcine) 5,000 Units Q8H   hydrALAZINE 75 mg Q8H   insulin aspart U-100 5 Units Q4H   insulin detemir U-100 18 Units BID   losartan 100 mg Daily   metoprolol tartrate 25 mg BID   modafinil 100 mg After lunch   modafinil 200 mg Daily   senna-docusate 8.6-50 mg 1 tablet Daily   sodium chloride 0.9% 1,000 mL Once   PRN    acetaminophen 650 mg Q6H PRN   dextrose 50% 12.5 g PRN   dextrose 50% 12.5 g PRN   glucagon (human recombinant) 1 mg PRN   insulin aspart U-100 1-10 Units Q4H PRN   labetalol 10 mg Q4H PRN   magnesium oxide 800 mg PRN   magnesium oxide 800 mg PRN    phenylephrine HCl in 0.9% NaCl 100 mcg Q10 Min PRN   potassium chloride 10% 40 mEq PRN   potassium chloride 10% 40 mEq PRN   potassium chloride 10% 60 mEq PRN   potassium, sodium phosphates 2 packet PRN   potassium, sodium phosphates 2 packet PRN   potassium, sodium phosphates 2 packet PRN   sodium chloride 0.9% 5 mL PRN         Diet  Diet NPO  Diet NPO      Pulse: 86 (07/29/18 1205)  Resp: (!) 28 (07/29/18 1205)  BP: 116/69 (07/29/18 1205)  SpO2: 100 % (07/29/18 1205)                07/28 0701 - 07/29 0700  In: 1745   Out: 1255 [Urine:1255]       12  07/28 0701 - 07/29 0700  In: 1745   Out: 1255 [Urine:1255]     Recent Labs  Lab 07/29/18  1129   PH 7.478*   PCO2 41.1   PO2 66*   BE 7       Recent Labs  Lab 07/29/18  0157      K 4.0      CO2 27   BUN 19   CREATININE 0.9   CALCIUM 9.7   MG 2.0   PHOS 3.5       Recent Labs  Lab 07/29/18  0157   WBC 9.48   HGB 10.2*      No results for input(s): PROT, ALBUMIN, AST, ALT, LDH, ALKPHOS, BILITOT in the last 24 hours.  Recent Labs      07/29/18   0239  07/29/18   0528  07/29/18   0941   POCTGLUCOSE  125*  227*  236*     Continuous   Scheduled    albuterol-ipratropium 3 mL Q4H   amiodarone 200 mg Daily   amLODIPine 10 mg Daily   atorvastatin 40 mg Daily   heparin (porcine) 5,000 Units Q8H   hydrALAZINE 75 mg Q8H   insulin aspart U-100 5 Units Q4H   insulin detemir U-100 18 Units BID   losartan 100 mg Daily   metoprolol tartrate 25 mg BID   modafinil 100 mg After lunch   modafinil 200 mg Daily   senna-docusate 8.6-50 mg 1 tablet Daily   sodium chloride 0.9% 1,000 mL Once   PRN    acetaminophen 650 mg Q6H PRN   dextrose 50% 12.5 g PRN   dextrose 50% 12.5 g PRN   glucagon (human recombinant) 1 mg PRN   insulin aspart U-100 1-10 Units Q4H PRN   labetalol 10 mg Q4H PRN   magnesium oxide 800 mg PRN   magnesium oxide 800 mg PRN   phenylephrine HCl in 0.9% NaCl 100 mcg Q10 Min PRN   potassium chloride 10% 40 mEq PRN   potassium chloride 10% 40 mEq PRN    potassium chloride 10% 60 mEq PRN   potassium, sodium phosphates 2 packet PRN   potassium, sodium phosphates 2 packet PRN   potassium, sodium phosphates 2 packet PRN   sodium chloride 0.9% 5 mL PRN           Lines/Drains/Airways     Drain            Male External Urinary Catheter 07/18/18 1800 Medium 10 days         NG/OG Tube 07/27/18 0630 Left nostril 2 days          Peripheral Intravenous Line                 Peripheral IV - Single Lumen 03/16/17 1732 Right Wrist 499 days         Peripheral IV - Single Lumen 07/24/18 0600 Right Antecubital 5 days         Peripheral IV - Single Lumen 07/25/18 0600 Left Hand 4 days         Peripheral IV - Single Lumen 07/29/18 0145 Left Antecubital less than 1 day

## 2018-07-29 NOTE — ASSESSMENT & PLAN NOTE
67 yo M with PMHx of HTN, HLD, DM, dementia, A. Fib, on coumadin as outpatient who [place] [facility] [facility] where he was reportedly admitted for R thalamic IPH w IVH on 7/9/2018. S/P EVD, Removed 7/27/18.   Patient more somnolent this AM; less responsive to external stimuli. Febrile. Infectious w/u per primary team.   CTH w/o significant changes. Alkalotic on AM ABG.  Plan for SNF at discharge      Antithrombotics for secondary stroke prevention: Anticoagulants: holding a/c due to bleed    Statins for secondary stroke prevention and hyperlipidemia, if present: Statins: Atorvastatin- 40 mg daily    Aggressive risk factor modification: HTN, DM, HLD, Diet, Exercise, A-Fib     Rehab efforts: Occupational Therapy, PT/OT/SLP to evaluate and treat, PM&R consult     Diagnostics ordered/pending: None     VTE prophylaxis: Heparin and SCD    BP parameters: ICH: SBP <140

## 2018-07-29 NOTE — SUBJECTIVE & OBJECTIVE
Neurologic Chief Complaint:  ICH (intracerebral hemorrhage)    Subjective:     Interval History: Patient is seen for follow-up neurological assessment and treatment recommendations: Patient more somnolent and less responsive today. Family at bedside agrees. Patient w.o changes on the CTH done overnight, w/ evolving R thalamic hemorrhage w/ intraventricular extension and ventricular system remains enlarged, similar to prior study although upon personal evaluation the ventricles appear to have a slight interval increase.      HPI, Past Medical, Family, and Social History remains the same as documented in the initial encounter.     Review of Systems   Unable to perform ROS: Mental status change     Scheduled Meds:   albuterol-ipratropium  3 mL Nebulization Q4H    amiodarone  200 mg Per NG tube Daily    amLODIPine  10 mg Per NG tube Daily    atorvastatin  40 mg Per NG tube Daily    ceFEPime (MAXIPIME) IVPB  1 g Intravenous Q8H    heparin (porcine)  5,000 Units Subcutaneous Q8H    hydrALAZINE  75 mg Per NG tube Q8H    insulin aspart U-100  5 Units Subcutaneous Q4H    losartan  100 mg Per NG tube Daily    metoprolol tartrate  25 mg Per NG tube BID    metronidazole  500 mg Intravenous Q8H    modafinil  100 mg Per NG tube After lunch    modafinil  200 mg Per NG tube Daily    senna-docusate 8.6-50 mg  1 tablet Per NG tube Daily    sodium chloride 0.9%  1,000 mL Intravenous Once    vancomycin (VANCOCIN) IVPB  20 mg/kg Intravenous Once     Continuous Infusions:    PRN Meds:acetaminophen, dextrose 50%, dextrose 50%, glucagon (human recombinant), insulin aspart U-100, labetalol, magnesium oxide, magnesium oxide, phenylephrine HCl in 0.9% NaCl, potassium chloride 10%, potassium chloride 10%, potassium chloride 10%, potassium, sodium phosphates, potassium, sodium phosphates, potassium, sodium phosphates, sodium chloride 0.9%    Objective:     Vital Signs (Most Recent):  Temp: (!) 101 °F (38.3 °C) (07/29/18  1248)  Pulse: 86 (07/29/18 1205)  Resp: (!) 28 (07/29/18 1205)  BP: 116/69 (07/29/18 1205)  SpO2: 100 % (07/29/18 1205)  BP Location: Left arm    Vital Signs Range (Last 24H):  Temp:  [98.4 °F (36.9 °C)-101 °F (38.3 °C)]   Pulse:  []   Resp:  [14-39]   BP: (104-175)/(58-94)   SpO2:  [97 %-100 %]   BP Location: Left arm    Physical Exam   Constitutional: He appears well-developed and well-nourished. He appears lethargic.   HENT:   Head: Normocephalic.   Eyes: EOM are normal. Pupils are equal, round, and reactive to light.   Neck: Normal range of motion.   Cardiovascular: Normal rate and regular rhythm.    Pulmonary/Chest: Effort normal.   Abdominal: Soft.   Neurological: He appears lethargic. GCS eye subscore is 3. GCS verbal subscore is 3. GCS motor subscore is 5.   Patient alert, but not oriented, no following commands, speaks but not meaningfull.         Neurological Exam:   LOC: drowsy  Attention Span: poor  Language: Global aphasia  Articulation: Untestable due to severe aphasia   Orientation: Untestable due to severe aphasia   Visual Fields: Full  EOM (CN III, IV, VI): Full/intact  Pupils (CN II, III): PERRL, slugging pin point pupils  Facial Sensation (CN V): Normal  Facial Movement (CN VII): Symmetric facial expression    Motor: Arm left  Paresis: 3/5  Leg left  Plegia 0/5  Arm right  Paresis: 4/5  Leg right Paresis: 4/5  Cebellar: No evidence of appendicular or axial ataxia  Sensation: Intact to light touch, temperature and vibration    Laboratory:  CMP:     Recent Labs  Lab 07/29/18 0157   CALCIUM 9.7      K 4.0   CO2 27      BUN 19   CREATININE 0.9     BMP:     Recent Labs  Lab 07/29/18 0157      K 4.0      CO2 27   BUN 19   CREATININE 0.9   CALCIUM 9.7     CBC:     Recent Labs  Lab 07/29/18 0157   WBC 9.48   RBC 3.51*   HGB 10.2*   HCT 30.7*      MCV 88   MCH 29.1   MCHC 33.2     Lipid Panel: No results for input(s): CHOL, LDLCALC, HDL, TRIG in the last 168  hours.  Coagulation: No results for input(s): PT, INR, APTT in the last 168 hours.  Platelet Aggregation Study: No results for input(s): PLTAGG, PLTAGINTERP, PLTAGREGLACO, ADPPLTAGGREG in the last 168 hours.  Hgb A1C: No results for input(s): HGBA1C in the last 168 hours.  TSH: No results for input(s): TSH in the last 168 hours.    Diagnostic Results     Brain imaging:  CTH 7/25/18  Unchanged position of right frontal coursing ventriculostomy catheter with unchanged size and configuration of prominent ventricular system.    Stable right thalamic intraparenchymal hemorrhage with associated interventricular hemorrhage layering in the occipital horns of the lateral ventricles.    Chronic microvascular ischemic changes above with associated remote infarcts.    CTH 7/21/18  1. Interval placement of right frontal coursing ventriculostomy with unchanged size and configuration of the shunted ventricular system, which remains prominent.  2. Evolution of right thalamic intraparenchymal hemorrhage with stable mass effect.  3. Chronic microvascular ischemic change and remote infarcts as above.      CTH: 1. Redemonstration of evolving right thalamic hemorrhage with mild adjacent edema and slight localized mass effect.  Subtle, subcentimeter focus of hyperdensity within the left thalamus, concerning for additional focus of evolving hemorrhage.  2. Persistent interventricular extension of hemorrhage with unchanged dilatation of the ventricular system concerning for component of hydrocephalus.  3. Generalized cerebral volume loss, chronic microvascular ischemic change and remote infarcts as above.     CTH ( 07/29/2018)  Evolving right thalamic intraparenchymal hematoma with intraventricular extension, as above, without detrimental change from 07/29/2018 CT at 12:24 p.m. No new infarct or hemorrhage.    Persistent diffuse enlargement of the ventricular system, unchanged.  Small focus of pneumocephalus in the right frontal horn  persists.  Vessel Imaging:  none     Cardiac Evaluation:   ECHO- CONCLUSIONS     1 - Low normal left ventricular systolic function.     2 - Indeterminate LV diastolic function.     3 - Normal right ventricular systolic function .     4 - There is anterior pericardial fat pad..

## 2018-07-29 NOTE — SUBJECTIVE & OBJECTIVE
Interval History:  >4 elements OR status of 3 inpatient conditions  Patient doing well. EVD got D/C yesterday. Pending transfer to stroke team. Patient has dysphagia and some difficulty clearing secretions.  Review of Systems   Unable to perform ROS: Mental status change   HENT: Negative.    Eyes: Negative.    Respiratory: Negative.    Cardiovascular: Negative.    Gastrointestinal: Negative.    Endocrine: Negative.    Genitourinary: Negative.    Musculoskeletal: Negative.    Skin: Negative.    Neurological: Positive for facial asymmetry, speech difficulty and weakness.     2 systems OR Unable to obtain a complete ROS due to level of consciousness.  Objective:     Vitals:  Temp: 99.5 °F (37.5 °C)  Pulse: 87  Rhythm: normal sinus rhythm  BP: 137/68  MAP (mmHg): 96  Resp: (!) 28  SpO2: 100 %  O2 Device (Oxygen Therapy): room air    Temp  Min: 98.3 °F (36.8 °C)  Max: 100.4 °F (38 °C)  Pulse  Min: 71  Max: 94  BP  Min: 110/63  Max: 160/77  MAP (mmHg)  Min: 79  Max: 112  Resp  Min: 14  Max: 36  SpO2  Min: 97 %  Max: 100 %    07/28 0701 - 07/29 0700  In: 1355   Out: 805 [Urine:805]   Unmeasured Output  Urine Occurrence: 1  Stool Occurrence: 1  Pad Count: 1       Physical Exam  Unable to test orientation, language, memory, judgment, insight, fund of knowledge, hearing, shoulder shrug, tongue protrusion, coordination, gait due to level of consciousness.  General   HEENT: UK  Chest Heart RRR / Lungs Clear to auscultation  Abdomen: Soft nontender + BS  Extremities: OK distal pulses.  Skin: UK  Neurological Exam:  MS; Arousable and following simple commands.  CN: II-XII  RUMN VII, dysarthria, difficulty clearing secretions.  R gaze preference.  Motor: LUE   2/5 / RUE 5/5  Tone normal bilaterally             LLE   1/5 /  RLE  5/5  Tone normal bilaterally  Sensory: LT/PP/T/ Vibration                  Complex sensory modalities: not tested  DTR:  normal throughout.  Coordination /Fine motor: UK  Gait: Not tested.  Meningeal  signs: Absent  Medications:  Continuous Scheduled  albuterol-ipratropium 3 mL Q4H   amiodarone 200 mg Daily   amLODIPine 10 mg Daily   atorvastatin 40 mg Daily   heparin (porcine) 5,000 Units Q8H   hydrALAZINE 75 mg Q8H   insulin aspart U-100 5 Units Q4H   insulin detemir U-100 18 Units BID   losartan 100 mg Daily   metoprolol tartrate 25 mg BID   modafinil 100 mg After lunch   modafinil 200 mg Daily   senna-docusate 8.6-50 mg 1 tablet Daily   sodium chloride 0.9% 1,000 mL Once   PRN  acetaminophen 650 mg Q6H PRN   dextrose 50% 12.5 g PRN   dextrose 50% 12.5 g PRN   glucagon (human recombinant) 1 mg PRN   insulin aspart U-100 1-10 Units Q4H PRN   labetalol 10 mg Q4H PRN   magnesium oxide 800 mg PRN   magnesium oxide 800 mg PRN   phenylephrine HCl in 0.9% NaCl 100 mcg Q10 Min PRN   potassium chloride 10% 40 mEq PRN   potassium chloride 10% 40 mEq PRN   potassium chloride 10% 60 mEq PRN   potassium, sodium phosphates 2 packet PRN   potassium, sodium phosphates 2 packet PRN   potassium, sodium phosphates 2 packet PRN   sodium chloride 0.9% 5 mL PRN     Today I personally reviewed pertinent medications, lines/drains/airways, imaging, cardiology, lab results, microbiology results, notably:    Diet  Diet NPO  CMP:   Recent Labs  Lab 07/28/18 0119   CALCIUM 9.7      K 4.1   CO2 28      BUN 15   CREATININE 0.8      BMP:   Recent Labs  Lab 07/28/18 0119      K 4.1      CO2 28   BUN 15   CREATININE 0.8   CALCIUM 9.7      CBC:   Recent Labs  Lab 07/28/18 0119   WBC 7.49   RBC 3.76*   HGB 11.0*   HCT 33.0*      MCV 88   MCH 29.3   MCHC 33.3      Lipid Panel: No results for input(s): CHOL, LDLCALC, HDL, TRIG in the last 168 hours.  Coagulation: No results for input(s): PT, INR, APTT in the last 168 hours.  Platelet Aggregation Study: No results for input(s): PLTAGG, PLTAGINTERP, PLTAGREGLACO, ADPPLTAGGREG in the last 168 hours.  Hgb A1C: No results for input(s): HGBA1C in the last 168  hours.  TSH: No results for input(s): TSH in the last 168 hours.  No results for input(s): PH, PCO2, PO2, HCO3, POCSATURATED, BE in the last 24 hours.

## 2018-07-29 NOTE — PROGRESS NOTES
[facility]  Neurocritical Care  Progress Note    Admit Date: 7/17/2018  Service Date: 07/28/2018  Length of Stay: 11    Subjective:     Chief Complaint: ICH (intracerebral hemorrhage)    History of Present Illness: 69 yo M with PMHx of HTN, HLD, DM, dementia, A. Fib, on coumadin as outpatient who was transferred to [facility] where he was reportedly admitted for R thalamic IPH w IVH. Per OSH records, patient admitted to ICU and sent to floor with little follow up imaging and no interval improvement in exam. Wife requested second opinion thus was transferred to [facility] however for unknown reason was admitted to hospital medicine service last night. [facility] called this morning after seen by Nsgy due to concern for airway protection. CT this morning with R thalamic IPH and lateral ventricle extension bilaterally and slight enlargement of ventricular system. Of note baseline enlargement of vents at previous CT a year ago. On exam patient with GCS of 8, however per wife and OSH records, this is baseline since initial admission in [place]. Will admit to [facility] for higher level of care.       Hospital Course: --admitted to [facility] 7/18.   7/21: EVD at bedside by NSGY  7/23 EVD in place lowered to 5cm h2o per nsgy.. Blood sugars running >200 adjusted detemir. Increased hydralazine for better BP control  7/24: EVD at 5, insulin adjustments   7/25: EVD clamped per Nsgy, [facility] tonight  7/26: Held tube feeds, insulin adjustments, Nsgy contacted about MSC and EVD.   7/27: Evd pulled per Nsgy, Ngt replace  7/28: Patient doing well. EVD got D/C yesterday. Pending transfer to stroke team. Patient has dysphagia and some difficulty clearing secretions.      Interval History:  >4 elements OR status of 3 inpatient conditions  Patient doing well. EVD got D/C yesterday. Pending transfer to stroke team. Patient has dysphagia and some difficulty clearing secretions.  Review of Systems   Unable to perform ROS: Mental status  change   HENT: Negative.    Eyes: Negative.    Respiratory: Negative.    Cardiovascular: Negative.    Gastrointestinal: Negative.    Endocrine: Negative.    Genitourinary: Negative.    Musculoskeletal: Negative.    Skin: Negative.    Neurological: Positive for facial asymmetry, speech difficulty and weakness.     2 systems OR Unable to obtain a complete ROS due to level of consciousness.  Objective:     Vitals:  Temp: 99.5 °F (37.5 °C)  Pulse: 87  Rhythm: normal sinus rhythm  BP: 137/68  MAP (mmHg): 96  Resp: (!) 28  SpO2: 100 %  O2 Device (Oxygen Therapy): room air    Temp  Min: 98.3 °F (36.8 °C)  Max: 100.4 °F (38 °C)  Pulse  Min: 71  Max: 94  BP  Min: 110/63  Max: 160/77  MAP (mmHg)  Min: 79  Max: 112  Resp  Min: 14  Max: 36  SpO2  Min: 97 %  Max: 100 %    07/28 0701 - 07/29 0700  In: 1355   Out: 805 [Urine:805]   Unmeasured Output  Urine Occurrence: 1  Stool Occurrence: 1  Pad Count: 1       Physical Exam  Unable to test orientation, language, memory, judgment, insight, fund of knowledge, hearing, shoulder shrug, tongue protrusion, coordination, gait due to level of consciousness.  General   HEENT: UK  Chest Heart RRR / Lungs Clear to auscultation  Abdomen: Soft nontender + BS  Extremities: OK distal pulses.  Skin: UK  Neurological Exam:  MS; Arousable and following simple commands.  CN: II-XII  RUMN VII, dysarthria, difficulty clearing secretions.  R gaze preference.  Motor: LUE   2/5 / RUE 5/5  Tone normal bilaterally             LLE   1/5 /  RLE  5/5  Tone normal bilaterally  Sensory: LT/PP/T/ Vibration                  Complex sensory modalities: not tested  DTR:  normal throughout.  Coordination /Fine motor: UK  Gait: Not tested.  Meningeal signs: Absent  Medications:  Continuous Scheduled  albuterol-ipratropium 3 mL Q4H   amiodarone 200 mg Daily   amLODIPine 10 mg Daily   atorvastatin 40 mg Daily   heparin (porcine) 5,000 Units Q8H   hydrALAZINE 75 mg Q8H   insulin aspart U-100 5 Units Q4H   insulin  detemir U-100 18 Units BID   losartan 100 mg Daily   metoprolol tartrate 25 mg BID   modafinil 100 mg After lunch   modafinil 200 mg Daily   senna-docusate 8.6-50 mg 1 tablet Daily   sodium chloride 0.9% 1,000 mL Once   PRN  acetaminophen 650 mg Q6H PRN   dextrose 50% 12.5 g PRN   dextrose 50% 12.5 g PRN   glucagon (human recombinant) 1 mg PRN   insulin aspart U-100 1-10 Units Q4H PRN   labetalol 10 mg Q4H PRN   magnesium oxide 800 mg PRN   magnesium oxide 800 mg PRN   phenylephrine HCl in 0.9% NaCl 100 mcg Q10 Min PRN   potassium chloride 10% 40 mEq PRN   potassium chloride 10% 40 mEq PRN   potassium chloride 10% 60 mEq PRN   potassium, sodium phosphates 2 packet PRN   potassium, sodium phosphates 2 packet PRN   potassium, sodium phosphates 2 packet PRN   sodium chloride 0.9% 5 mL PRN     Today I personally reviewed pertinent medications, lines/drains/airways, imaging, cardiology, lab results, microbiology results, notably:    Diet  Diet NPO  CMP:   Recent Labs  Lab 07/28/18 0119   CALCIUM 9.7      K 4.1   CO2 28      BUN 15   CREATININE 0.8      BMP:   Recent Labs  Lab 07/28/18 0119      K 4.1      CO2 28   BUN 15   CREATININE 0.8   CALCIUM 9.7      CBC:   Recent Labs  Lab 07/28/18 0119   WBC 7.49   RBC 3.76*   HGB 11.0*   HCT 33.0*      MCV 88   MCH 29.3   MCHC 33.3      Lipid Panel: No results for input(s): CHOL, LDLCALC, HDL, TRIG in the last 168 hours.  Coagulation: No results for input(s): PT, INR, APTT in the last 168 hours.  Platelet Aggregation Study: No results for input(s): PLTAGG, PLTAGINTERP, PLTAGREGLACO, ADPPLTAGGREG in the last 168 hours.  Hgb A1C: No results for input(s): HGBA1C in the last 168 hours.  TSH: No results for input(s): TSH in the last 168 hours.  No results for input(s): PH, PCO2, PO2, HCO3, POCSATURATED, BE in the last 24 hours.              Assessment/Plan:     No new Assessment & Plan notes have been filed under this hospital service since the last  note was generated.  Service: Neuro Critical Care      Active Problem List:   1.Acute R thalamic IPH w IVH.  S/P EVD placement and discontinuation  2. Chronic ischemic changes with brain atrophy  3. HTN,   4. HLD  5. DM  6. Dementia  7. Afib on coumadin       Assessment / Plan:     Neuro:  -Lipitor 40mg qd  -PT/OT and speech  Resp:  -Chest PT, cough induction, NTS suctioning q 4hrs   -Dual nebs q 4hrs  CV: Keep SBP <=160 mmHg. TTE: mild diastolic dysf.  Afib on Coumadin.  -Amiodaron 200mg qd  -Lozartan 100mg qd  -Amlodipine 10mg qd  -Lipitor 40mg qd  IVF/nutrition/Renal/GI:  -TF at goal  -Reassess swallowing on Monday.  -IVF D/C.  -BR  Hem / ID:  -No antibiotics.  Endo:  -Detemir 18 U bid  -Aspart 5 U q 4hrs  -SSI q 4hrs  -Lipitor 40mg qd  Prophylaxis:  -SC Heparin prophylaxis 5000 U q 8hrs  Advance Directives and Disposition:    DNR. Transfer to stroke team tomorrow..        Uninterrupted level 3  Follow-up /Counseling Time (not including procedures):  = 35 min          Activity Orders          Straight Cath starting at 07/18 0237        DNR    Julius Frias MD  Neurocritical Care  [facility]

## 2018-07-29 NOTE — PROGRESS NOTES
[facility]  Neurocritical Care  Progress Note    Admit Date: 7/17/2018  Service Date: 07/29/2018  Length of Stay: 12    Subjective:     Chief Complaint: ICH (intracerebral hemorrhage)    History of Present Illness: 69 yo M with PMHx of HTN, HLD, DM, dementia, A. Fib, on coumadin as outpatient who was transferred to [facility] from [facility] where he was reportedly admitted for R thalamic IPH w IVH. Per OSH records, patient admitted to ICU and sent to floor with little follow up imaging and no interval improvement in exam. Wife requested second opinion thus was transferred to [facility] however for unknown reason was admitted to hospital medicine service last night. [facility] called this morning after seen by Nsgy due to concern for airway protection. CT this morning with R thalamic IPH and lateral ventricle extension bilaterally and slight enlargement of ventricular system. Of note baseline enlargement of vents at previous CT a year ago. On exam patient with GCS of 8, however per wife and OSH records, this is baseline since initial admission in [place]. Will admit to [facility] for higher level of care.       Hospital Course: --admitted to [facility] 7/18.   7/21: EVD at bedside by NSGY  7/23 EVD in place lowered to 5cm h2o per nsgy.. Blood sugars running >200 adjusted detemir. Increased hydralazine for better BP control  7/24: EVD at 5, insulin adjustments   7/25: EVD clamped per Nsgy, [facility] tonight  7/26: Held tube feeds, insulin adjustments, Nsgy contacted about MSC and EVD.   7/27: Evd pulled per Nsgy, Ngt replace  7/28: Patient doing well. EVD got D/C yesterday. Pending transfer to stroke team. Patient has dysphagia and some difficulty clearing secretions.      Interval History:   Worsening mental status overnight and sent for multiple scans without change.    Review of Systems    Unable to obtain a complete ROS due to level of consciousness.  Objective:     Vitals:  Temp: (!) 101 °F (38.3 °C)  Pulse: 86  Rhythm: normal  sinus rhythm  BP: 116/69  MAP (mmHg): 88  Resp: (!) 28  SpO2: 100 %  O2 Device (Oxygen Therapy): nasal cannula    Temp  Min: 98.4 °F (36.9 °C)  Max: 101 °F (38.3 °C)  Pulse  Min: 71  Max: 105  BP  Min: 104/58  Max: 175/94  MAP (mmHg)  Min: 78  Max: 125  Resp  Min: 14  Max: 39  SpO2  Min: 97 %  Max: 100 %    07/28 0701 - 07/29 0700  In: 1745   Out: 1255 [Urine:1255]   Unmeasured Output  Urine Occurrence: 1  Stool Occurrence: 1  Pad Count: 1       Physical Exam   Constitutional: He appears well-developed and well-nourished. He appears lethargic.   HENT:   Head: Normocephalic.   Eyes: EOM are normal. Pupils are equal, round, and reactive to light.   Neck: Normal range of motion.   Cardiovascular: Normal rate and regular rhythm.    Pulmonary/Chest: Effort normal.   Abdominal: Soft.   Neurological: He appears lethargic. GCS eye subscore is 2. GCS verbal subscore is 2. GCS motor subscore is 5.   Difficult to arouse; not following commands           Medications:  Continuous   Scheduled    albuterol-ipratropium 3 mL Q4H   amiodarone 200 mg Daily   amLODIPine 10 mg Daily   atorvastatin 40 mg Daily   heparin (porcine) 5,000 Units Q8H   hydrALAZINE 75 mg Q8H   insulin aspart U-100 5 Units Q4H   insulin detemir U-100 18 Units BID   losartan 100 mg Daily   metoprolol tartrate 25 mg BID   modafinil 100 mg After lunch   modafinil 200 mg Daily   senna-docusate 8.6-50 mg 1 tablet Daily   sodium chloride 0.9% 1,000 mL Once   PRN    acetaminophen 650 mg Q6H PRN   dextrose 50% 12.5 g PRN   dextrose 50% 12.5 g PRN   glucagon (human recombinant) 1 mg PRN   insulin aspart U-100 1-10 Units Q4H PRN   labetalol 10 mg Q4H PRN   magnesium oxide 800 mg PRN   magnesium oxide 800 mg PRN   phenylephrine HCl in 0.9% NaCl 100 mcg Q10 Min PRN   potassium chloride 10% 40 mEq PRN   potassium chloride 10% 40 mEq PRN   potassium chloride 10% 60 mEq PRN   potassium, sodium phosphates 2 packet PRN   potassium, sodium phosphates 2 packet PRN   potassium,  sodium phosphates 2 packet PRN   sodium chloride 0.9% 5 mL PRN         Diet  Diet NPO  Diet NPO      Pulse: 86 (07/29/18 1205)  Resp: (!) 28 (07/29/18 1205)  BP: 116/69 (07/29/18 1205)  SpO2: 100 % (07/29/18 1205)                07/28 0701 - 07/29 0700  In: 1745   Out: 1255 [Urine:1255]       12  07/28 0701 - 07/29 0700  In: 1745   Out: 1255 [Urine:1255]     Recent Labs  Lab 07/29/18  1129   PH 7.478*   PCO2 41.1   PO2 66*   BE 7       Recent Labs  Lab 07/29/18  0157      K 4.0      CO2 27   BUN 19   CREATININE 0.9   CALCIUM 9.7   MG 2.0   PHOS 3.5       Recent Labs  Lab 07/29/18  0157   WBC 9.48   HGB 10.2*      No results for input(s): PROT, ALBUMIN, AST, ALT, LDH, ALKPHOS, BILITOT in the last 24 hours.  Recent Labs      07/29/18   0239  07/29/18   0528  07/29/18   0941   POCTGLUCOSE  125*  227*  236*     Continuous   Scheduled    albuterol-ipratropium 3 mL Q4H   amiodarone 200 mg Daily   amLODIPine 10 mg Daily   atorvastatin 40 mg Daily   heparin (porcine) 5,000 Units Q8H   hydrALAZINE 75 mg Q8H   insulin aspart U-100 5 Units Q4H   insulin detemir U-100 18 Units BID   losartan 100 mg Daily   metoprolol tartrate 25 mg BID   modafinil 100 mg After lunch   modafinil 200 mg Daily   senna-docusate 8.6-50 mg 1 tablet Daily   sodium chloride 0.9% 1,000 mL Once   PRN    acetaminophen 650 mg Q6H PRN   dextrose 50% 12.5 g PRN   dextrose 50% 12.5 g PRN   glucagon (human recombinant) 1 mg PRN   insulin aspart U-100 1-10 Units Q4H PRN   labetalol 10 mg Q4H PRN   magnesium oxide 800 mg PRN   magnesium oxide 800 mg PRN   phenylephrine HCl in 0.9% NaCl 100 mcg Q10 Min PRN   potassium chloride 10% 40 mEq PRN   potassium chloride 10% 40 mEq PRN   potassium chloride 10% 60 mEq PRN   potassium, sodium phosphates 2 packet PRN   potassium, sodium phosphates 2 packet PRN   potassium, sodium phosphates 2 packet PRN   sodium chloride 0.9% 5 mL PRN           Lines/Drains/Airways     Drain            Male External  Urinary Catheter 07/18/18 1800 Medium 10 days         NG/OG Tube 07/27/18 0630 Left nostril 2 days          Peripheral Intravenous Line                 Peripheral IV - Single Lumen 03/16/17 1732 Right Wrist 499 days         Peripheral IV - Single Lumen 07/24/18 0600 Right Antecubital 5 days         Peripheral IV - Single Lumen 07/25/18 0600 Left Hand 4 days         Peripheral IV - Single Lumen 07/29/18 0145 Left Antecubital less than 1 day                        Assessment/Plan:     Neuro   * ICH (intracerebral hemorrhage)    EVD pulled previously without complication  Hydrocephalus - persists on imaging, but stable since EVD removed   -180  PT OT SLP          Encephalopathy acute    Modafinil  CTH stable  Get UA and Cx's after fever          Brain compression    Due to ICH        Obstructive hydrocephalus    7/21EVD at bedside by NSGy   For obstructive hydrocephalus.  7/23 EVD lowered to 5cm/h2o per NSGY Drained 46cc overnight. ICP 2-3 this am.  7/24 EVD remains at 5cm ICP 4-8  7/25 EVD clamped per Nsgy  7/26 Nsgy to eval EVD and patient at 5pm  7/27 Evd puller per Nsgy  7/29 repeat scans stable        Nontraumatic intraventricular intracerebral hemorrhage    See ICH        Dementia without behavioral disturbance    --holding home aricept          Cardiac/Vascular   Cardiomyopathy    Monitor  Low normal EF        Essential hypertension    SBP goal 100 - 180  Amlodipine 10mg daily  Hydralazine 75mg q8h  Losartan 100mg daily  Metoprolol 25mg bid  Prn labetalol    Echo: 1 - Low normal left ventricular systolic function.     2 - Indeterminate LV diastolic function.     3 - Normal right ventricular systolic function .     4 - There is anterior pericardial fat pad..           Typical atrial flutter    Amiodarone 200mg daily for A-fib/flutter        Endocrine   Type 2 diabetes mellitus with diabetic polyneuropathy, with long-term current use of insulin    A1C 7.2  accuchecks q4h with mod SSI  - detemir - 18 units  bid  aspart 5 units q4h  Tube feeds diabetasource 65cc/h          GI   Dysphagia    SLP following  NGT in place, and getting tube feeds  Progressing toward need for PEG            Prophylaxis:  Venous Thromboembolism: mechanical chemical  Stress Ulcer: NA  Ventilator Pneumonia: not applicable     Activity Orders          Straight Cath starting at 07/18 9697        DNR    Charles Hayes MD  Neurocritical Care  [facility]

## 2018-07-29 NOTE — NURSING
Pt more lethargic and not opening eyes like he did day before per daughter. Neuro CC notified. MD ordered ABG. Respiratory called.     Will continue to monitor.

## 2018-07-29 NOTE — NURSING
STAT chest x-ray and ABG ordered by [facility]. PT. Has course breath sounds, SATS  on room air. Will continue to monitor.

## 2018-07-29 NOTE — NURSING
PADMINI Eddy (PA) notified of neuro change. PT. Not oriented to person and eyes not opening spontaneously. Pt. Seen at bedside. Awaiting orders. Will continue to monitor.

## 2018-07-29 NOTE — PLAN OF CARE
Problem: Patient Care Overview  Goal: Plan of Care Review  POC reviewed with pt at 0535. Pt unable to verbalize understanding.No acute events overnight. CT scan overnight. Chest x-ray overnight. Pt progressing toward goals. Will continue to monitor. See flowsheets for full assessment and VS info

## 2018-07-30 PROBLEM — N39.0 UTI (URINARY TRACT INFECTION): Status: ACTIVE | Noted: 2018-07-30

## 2018-07-30 LAB
ANION GAP SERPL CALC-SCNC: 10 MMOL/L
BASOPHILS # BLD AUTO: 0.03 K/UL
BASOPHILS NFR BLD: 0.3 %
BUN SERPL-MCNC: 17 MG/DL
CALCIUM SERPL-MCNC: 9.3 MG/DL
CHLORIDE SERPL-SCNC: 103 MMOL/L
CO2 SERPL-SCNC: 26 MMOL/L
CREAT SERPL-MCNC: 0.7 MG/DL
DIFFERENTIAL METHOD: ABNORMAL
EOSINOPHIL # BLD AUTO: 0.2 K/UL
EOSINOPHIL NFR BLD: 1.6 %
ERYTHROCYTE [DISTWIDTH] IN BLOOD BY AUTOMATED COUNT: 12.8 %
EST. GFR  (AFRICAN AMERICAN): >60 ML/MIN/1.73 M^2
EST. GFR  (NON AFRICAN AMERICAN): >60 ML/MIN/1.73 M^2
GLUCOSE SERPL-MCNC: 78 MG/DL
HCT VFR BLD AUTO: 32.8 %
HGB BLD-MCNC: 10.8 G/DL
IMM GRANULOCYTES # BLD AUTO: 0.05 K/UL
IMM GRANULOCYTES NFR BLD AUTO: 0.5 %
LYMPHOCYTES # BLD AUTO: 0.8 K/UL
LYMPHOCYTES NFR BLD: 7.5 %
MAGNESIUM SERPL-MCNC: 1.9 MG/DL
MCH RBC QN AUTO: 29.2 PG
MCHC RBC AUTO-ENTMCNC: 32.9 G/DL
MCV RBC AUTO: 89 FL
MONOCYTES # BLD AUTO: 0.8 K/UL
MONOCYTES NFR BLD: 7.9 %
NEUTROPHILS # BLD AUTO: 8.7 K/UL
NEUTROPHILS NFR BLD: 82.2 %
NRBC BLD-RTO: 0 /100 WBC
PHOSPHATE SERPL-MCNC: 3.5 MG/DL
PLATELET # BLD AUTO: 282 K/UL
PMV BLD AUTO: 10.4 FL
POCT GLUCOSE: 131 MG/DL (ref 70–110)
POCT GLUCOSE: 180 MG/DL (ref 70–110)
POCT GLUCOSE: 187 MG/DL (ref 70–110)
POCT GLUCOSE: 190 MG/DL (ref 70–110)
POCT GLUCOSE: 226 MG/DL (ref 70–110)
POCT GLUCOSE: 76 MG/DL (ref 70–110)
POTASSIUM SERPL-SCNC: 3.9 MMOL/L
RBC # BLD AUTO: 3.7 M/UL
SODIUM SERPL-SCNC: 139 MMOL/L
VANCOMYCIN SERPL-MCNC: 9.7 UG/ML
WBC # BLD AUTO: 10.57 K/UL

## 2018-07-30 PROCEDURE — 94640 AIRWAY INHALATION TREATMENT: CPT

## 2018-07-30 PROCEDURE — 94761 N-INVAS EAR/PLS OXIMETRY MLT: CPT

## 2018-07-30 PROCEDURE — 63600175 PHARM REV CODE 636 W HCPCS: Performed by: NURSE PRACTITIONER

## 2018-07-30 PROCEDURE — 92526 ORAL FUNCTION THERAPY: CPT

## 2018-07-30 PROCEDURE — 83735 ASSAY OF MAGNESIUM: CPT

## 2018-07-30 PROCEDURE — 25000003 PHARM REV CODE 250: Performed by: STUDENT IN AN ORGANIZED HEALTH CARE EDUCATION/TRAINING PROGRAM

## 2018-07-30 PROCEDURE — 85025 COMPLETE CBC W/AUTO DIFF WBC: CPT

## 2018-07-30 PROCEDURE — 25000242 PHARM REV CODE 250 ALT 637 W/ HCPCS: Performed by: PHYSICIAN ASSISTANT

## 2018-07-30 PROCEDURE — S0030 INJECTION, METRONIDAZOLE: HCPCS | Performed by: STUDENT IN AN ORGANIZED HEALTH CARE EDUCATION/TRAINING PROGRAM

## 2018-07-30 PROCEDURE — 25000003 PHARM REV CODE 250: Performed by: NURSE PRACTITIONER

## 2018-07-30 PROCEDURE — 20000000 HC ICU ROOM

## 2018-07-30 PROCEDURE — 99231 SBSQ HOSP IP/OBS SF/LOW 25: CPT | Mod: ,,, | Performed by: NURSE PRACTITIONER

## 2018-07-30 PROCEDURE — 80202 ASSAY OF VANCOMYCIN: CPT

## 2018-07-30 PROCEDURE — 84100 ASSAY OF PHOSPHORUS: CPT

## 2018-07-30 PROCEDURE — 97803 MED NUTRITION INDIV SUBSEQ: CPT

## 2018-07-30 PROCEDURE — 80048 BASIC METABOLIC PNL TOTAL CA: CPT

## 2018-07-30 PROCEDURE — 63600175 PHARM REV CODE 636 W HCPCS: Performed by: PSYCHIATRY & NEUROLOGY

## 2018-07-30 PROCEDURE — 99233 SBSQ HOSP IP/OBS HIGH 50: CPT | Mod: GC,,, | Performed by: PSYCHIATRY & NEUROLOGY

## 2018-07-30 PROCEDURE — 94668 MNPJ CHEST WALL SBSQ: CPT

## 2018-07-30 PROCEDURE — 25000003 PHARM REV CODE 250: Performed by: PSYCHIATRY & NEUROLOGY

## 2018-07-30 PROCEDURE — 97530 THERAPEUTIC ACTIVITIES: CPT

## 2018-07-30 PROCEDURE — 97110 THERAPEUTIC EXERCISES: CPT

## 2018-07-30 RX ORDER — POLYETHYLENE GLYCOL 3350 17 G/17G
17 POWDER, FOR SOLUTION ORAL DAILY
Status: DISCONTINUED | OUTPATIENT
Start: 2018-07-30 | End: 2018-08-02

## 2018-07-30 RX ORDER — CEFTRIAXONE 1 G/1
1 INJECTION, POWDER, FOR SOLUTION INTRAMUSCULAR; INTRAVENOUS
Status: DISCONTINUED | OUTPATIENT
Start: 2018-07-30 | End: 2018-07-31

## 2018-07-30 RX ORDER — AMOXICILLIN 250 MG
1 CAPSULE ORAL 2 TIMES DAILY
Status: DISCONTINUED | OUTPATIENT
Start: 2018-07-30 | End: 2018-08-02

## 2018-07-30 RX ORDER — METOCLOPRAMIDE HYDROCHLORIDE 5 MG/ML
10 INJECTION INTRAMUSCULAR; INTRAVENOUS EVERY 8 HOURS
Status: DISCONTINUED | OUTPATIENT
Start: 2018-07-30 | End: 2018-07-31

## 2018-07-30 RX ADMIN — SENNOSIDES AND DOCUSATE SODIUM 1 TABLET: 8.6; 5 TABLET ORAL at 09:07

## 2018-07-30 RX ADMIN — IPRATROPIUM BROMIDE AND ALBUTEROL SULFATE 3 ML: .5; 3 SOLUTION RESPIRATORY (INHALATION) at 08:07

## 2018-07-30 RX ADMIN — INSULIN ASPART 2 UNITS: 100 INJECTION, SOLUTION INTRAVENOUS; SUBCUTANEOUS at 09:07

## 2018-07-30 RX ADMIN — HYDRALAZINE HYDROCHLORIDE 75 MG: 50 TABLET ORAL at 09:07

## 2018-07-30 RX ADMIN — METOPROLOL TARTRATE 25 MG: 25 TABLET, FILM COATED ORAL at 09:07

## 2018-07-30 RX ADMIN — INSULIN ASPART 5 UNITS: 100 INJECTION, SOLUTION INTRAVENOUS; SUBCUTANEOUS at 06:07

## 2018-07-30 RX ADMIN — SENNOSIDES AND DOCUSATE SODIUM 1 TABLET: 8.6; 5 TABLET ORAL at 08:07

## 2018-07-30 RX ADMIN — MODAFINIL 100 MG: 100 TABLET ORAL at 01:07

## 2018-07-30 RX ADMIN — HEPARIN SODIUM 5000 UNITS: 5000 INJECTION, SOLUTION INTRAVENOUS; SUBCUTANEOUS at 09:07

## 2018-07-30 RX ADMIN — IPRATROPIUM BROMIDE AND ALBUTEROL SULFATE 3 ML: .5; 3 SOLUTION RESPIRATORY (INHALATION) at 03:07

## 2018-07-30 RX ADMIN — CEFEPIME 1 G: 1 INJECTION, POWDER, FOR SOLUTION INTRAMUSCULAR; INTRAVENOUS at 06:07

## 2018-07-30 RX ADMIN — METOCLOPRAMIDE 10 MG: 5 INJECTION, SOLUTION INTRAMUSCULAR; INTRAVENOUS at 12:07

## 2018-07-30 RX ADMIN — AMIODARONE HYDROCHLORIDE 200 MG: 200 TABLET ORAL at 09:07

## 2018-07-30 RX ADMIN — LABETALOL HYDROCHLORIDE 10 MG: 5 INJECTION, SOLUTION INTRAVENOUS at 03:07

## 2018-07-30 RX ADMIN — METOCLOPRAMIDE 10 MG: 5 INJECTION, SOLUTION INTRAMUSCULAR; INTRAVENOUS at 09:07

## 2018-07-30 RX ADMIN — INSULIN ASPART 4 UNITS: 100 INJECTION, SOLUTION INTRAVENOUS; SUBCUTANEOUS at 01:07

## 2018-07-30 RX ADMIN — HEPARIN SODIUM 5000 UNITS: 5000 INJECTION, SOLUTION INTRAVENOUS; SUBCUTANEOUS at 05:07

## 2018-07-30 RX ADMIN — IPRATROPIUM BROMIDE AND ALBUTEROL SULFATE 3 ML: .5; 3 SOLUTION RESPIRATORY (INHALATION) at 04:07

## 2018-07-30 RX ADMIN — LOSARTAN POTASSIUM 100 MG: 50 TABLET, FILM COATED ORAL at 09:07

## 2018-07-30 RX ADMIN — HYDRALAZINE HYDROCHLORIDE 75 MG: 50 TABLET ORAL at 01:07

## 2018-07-30 RX ADMIN — INSULIN ASPART 5 UNITS: 100 INJECTION, SOLUTION INTRAVENOUS; SUBCUTANEOUS at 01:07

## 2018-07-30 RX ADMIN — METOPROLOL TARTRATE 25 MG: 25 TABLET, FILM COATED ORAL at 08:07

## 2018-07-30 RX ADMIN — METRONIDAZOLE 500 MG: 500 INJECTION, SOLUTION INTRAVENOUS at 05:07

## 2018-07-30 RX ADMIN — IPRATROPIUM BROMIDE AND ALBUTEROL SULFATE 3 ML: .5; 3 SOLUTION RESPIRATORY (INHALATION) at 12:07

## 2018-07-30 RX ADMIN — INSULIN ASPART 2 UNITS: 100 INJECTION, SOLUTION INTRAVENOUS; SUBCUTANEOUS at 06:07

## 2018-07-30 RX ADMIN — AMLODIPINE BESYLATE 10 MG: 10 TABLET ORAL at 09:07

## 2018-07-30 RX ADMIN — CEFTRIAXONE SODIUM 1 G: 1 INJECTION, POWDER, FOR SOLUTION INTRAMUSCULAR; INTRAVENOUS at 12:07

## 2018-07-30 RX ADMIN — INSULIN ASPART 5 UNITS: 100 INJECTION, SOLUTION INTRAVENOUS; SUBCUTANEOUS at 09:07

## 2018-07-30 RX ADMIN — MODAFINIL 200 MG: 100 TABLET ORAL at 05:07

## 2018-07-30 RX ADMIN — HEPARIN SODIUM 5000 UNITS: 5000 INJECTION, SOLUTION INTRAVENOUS; SUBCUTANEOUS at 01:07

## 2018-07-30 RX ADMIN — ATORVASTATIN CALCIUM 40 MG: 20 TABLET, FILM COATED ORAL at 09:07

## 2018-07-30 RX ADMIN — POLYETHYLENE GLYCOL 3350 17 G: 17 POWDER, FOR SOLUTION ORAL at 09:07

## 2018-07-30 RX ADMIN — HYDRALAZINE HYDROCHLORIDE 75 MG: 50 TABLET ORAL at 05:07

## 2018-07-30 NOTE — ASSESSMENT & PLAN NOTE
- Noted on UA  - UCx pending  - Ceftriaxone 1g Q24 started   - Unclear if symptomatic as patient is aphasic  - Febrile to Tmax 101  - WBC's slowly increasing but still WNL

## 2018-07-30 NOTE — PLAN OF CARE
Problem: SLP Goal  Goal: SLP Goal  1.  Tolerate dental soft diet with thin liquids with no s/s of aspiration  2.  Follow simple commands with 90% accuracy  3.  Respond to simple yes/no questions with 90% accuracy  4,  Respond to simple word finding tasks with 80% accuracy  5.  Assess visual spatial skills   Outcome: Ongoing (interventions implemented as appropriate)  Mod barium swallow study recommended for 7/31  Chanel Matias MA/KEL-SLP  Speech Language Pathologist  Pager [phone]  7/30/2018

## 2018-07-30 NOTE — PLAN OF CARE
FOZIA spoke with Luna with [facility] ([phone]) regarding this Pt. Gave updates and sent most recent therapy notes via . They are looking to submit to [employer] due to it taking a little while to get auth. They are medically accepting the Pt for when he is ready.    Kelley Bonilla, VICKEY  Neurocritical Care   [facility]  [phone]

## 2018-07-30 NOTE — PT/OT/SLP PROGRESS
"Speech Language Pathology Treatment    Patient Name:  Ciro Chandler   MRN:  [id number]  Admitting Diagnosis: ICH (intracerebral hemorrhage)    Recommendations:                 General Recommendations:  Dysphagia therapy and Speech/language therapy  Diet recommendations:  NPO, Liquid Diet Level: NPO   Aspiration Precautions: Standard aspiration precautions   General Precautions: Standard, aspiration, fall, NPO  Communication strategies:  none    Subjective     "His memory was so so before this" per wife  Patient goals: did not state  Pain/Comfort:  · Pain Rating 1: 0/10  · Pain Rating Post-Intervention 1: 0/10    Objective:     Has the patient been evaluated by SLP for swallowing?   Yes  Keep patient NPO? Yes   Current Respiratory Status: room air      Pt. Seen at bedside with family present.  Pt. Was alert with eyes open during session.  He was not oriented to place with limited verbalizations elicited.  Pt.often made no response at all to questions and did not follow simple commands.  Verbalizations elicited were intelligible with adequate vocal intensity.  He did not cough, throat clear on command.  When presented with teaspoons of water, pt. Readily removed the bolus from the spoon and initiated oral transit with out delay.  MIldly delayed initiation of pharyngeal swallow noted with no coughing noted on teaspoons of water.  Throat clear noted on 2/3 bolus of cup fed water with slightly wet vocal quality.  Education provided to family re recommendation of mod barium swallow study.      Assessment:     Ciro Chandler is a 68 y.o. male with an SLP diagnosis of Dysphagia and Cognitive-Linguistic Impairment.     Goals:    SLP Goals        Problem: SLP Goal    Goal Priority Disciplines Outcome   SLP Goal     SLP Ongoing (interventions implemented as appropriate)   Description:  1.  Tolerate dental soft diet with thin liquids with no s/s of aspiration  2.  Follow simple commands with 90% accuracy  3.  Respond to simple yes/no " questions with 90% accuracy  4,  Respond to simple word finding tasks with 80% accuracy  5.  Assess visual spatial skills                    Plan:     · Patient to be seen:  4 x/week   · Plan of Care expires:  08/23/18  · Plan of Care reviewed with:  patient, spouse   · SLP Follow-Up:  Yes       Discharge recommendations:  nursing facility, skilled       Time Tracking:     SLP Treatment Date:   07/30/18  Speech Start Time:  1045  Speech Stop Time:  1100     Speech Total Time (min):  15 min    Billable Minutes: Treatment Swallowing Dysfunction 15    Chanel Matias MA, CCC-SLP  07/30/2018

## 2018-07-30 NOTE — PROGRESS NOTES
" [facility]  Adult Nutrition  Progress Note    SUMMARY       Recommendations    Recommendation/Intervention:   As medically able, recommend restarting TF and advancing to goal as tolerated.   Diabetisource @ 65mL/hr.   - Provides 1872kcals, 94g protein and 1276mL free water.   - Hold for residuals >500mL.     RD to monitor.    Goals: Meet % EEN, EPN  Nutrition Goal Status: goal met  Communication of RD Recs: reviewed with RN    Reason for Assessment    Reason for Assessment: RD follow-up  Diagnosis: other (see comments) (Intraventricular intracerebral hemorrhage)  Relevant Medical History: DM, HTN, HLD, Dementia  Interdisciplinary Rounds: attended  General Information Comments: Pt remains NPO. TF held 2/2 possible neuro status change.   Nutrition Discharge Planning: Unable to determine at this time    Nutrition Risk Screen    Nutrition Risk Screen: no indicators present    Nutrition/Diet History    Patient Reported Diet/Restrictions/Preferences: other (see comments) ([place])  Do you have any cultural, spiritual, Judaism conflicts, given your current situation?: none stated  Factors Affecting Nutritional Intake: impaired cognitive status/motor control, NPO    Anthropometrics    Temp: 98.2 °F (36.8 °C)  Height Method: Stated (per family)  Height: 5' 10" (177.8 cm)  Height (inches): 70 in  Weight Method: Bed Scale  Weight: 82.8 kg (182 lb 8.7 oz)  Weight (lb): 182.54 lb  Ideal Body Weight (IBW), Male: 166 lb  % Ideal Body Weight, Male (lb): 104.78 lb  BMI (Calculated): 25  BMI Grade: 25 - 29.9 - overweight  Usual Body Weight (UBW), kg:  (CHESTER)       Lab/Procedures/Meds    Pertinent Labs Reviewed: reviewed  Pertinent Labs Comments: POCT Glu   Pertinent Medications Reviewed: reviewed  Pertinent Medications Comments: IVF, statin, insulin    Physical Findings/Assessment    Overall Physical Appearance: nourished, lethargic  Tubes: nasogastric tube  Oral/Mouth Cavity: WDL  Skin: " intact    Estimated/Assessed Needs    Weight Used For Calorie Calculations: 79 kg (174 lb 2.6 oz)  Energy Calorie Requirements (kcal): 1951 kcal/d  Energy Need Method: Roane-St Jeor (1.25 PAL)  Protein Requirements: 79-95 g/d (1-1.2 g/kg)  Weight Used For Protein Calculations: 79 kg (174 lb 2.6 oz)     Fluid Need Method: other (see comments) (Per MD or 1 mL/kcal)  RDA Method (mL): 1951  CHO Requirement: 50% total kcals      Nutrition Prescription Ordered    Current Diet Order: NPO  Nutrition Order Comments: TF held  Current Nutrition Support Formula Ordered: Diabetisource AC  Current Nutrition Support Rate Ordered: 65 (ml)  Current Nutrition Support Frequency Ordered: mL/hr    Evaluation of Received Nutrient/Fluid Intake    Enteral Calories (kcal): 1872  Enteral Protein (gm): 94  Enteral (Free Water) Fluid (mL): 1276    % Kcal Needs: 96  % Protein Needs: 100    IV Fluid (mL): 2400  I/O: +I/O, good UOP, LBM 7/28    Energy Calories Required: meeting needs  Protein Required: meeting needs  Fluid Required: other (see comments) (per MD)    Comments: 0mL residuals 7/29    % Intake of Estimated Energy Needs: 75 - 100 %  % Meal Intake: NPO    Nutrition Risk    Level of Risk/Frequency of Follow-up:  (f/u 1x/week)     Assessment and Plan    Nutrition Problem  Inadequate energy intake     Related to (etiology):   Inability to consume sufficient energy     Signs and Symptoms (as evidenced by):   NPO with no alternate means of nutrition     Nutrition Diagnosis Status:   Resolved            Monitor and Evaluation    Food and Nutrient Intake: enteral nutrition intake  Food and Nutrient Adminstration: enteral and parenteral nutrition administration  Physical Activity and Function: nutrition-related ADLs and IADLs  Anthropometric Measurements: weight, weight change  Biochemical Data, Medical Tests and Procedures: lipid profile, inflammatory profile, electrolyte and renal panel, gastrointestinal profile, glucose/endocrine  profile  Nutrition-Focused Physical Findings: overall appearance     Nutrition Follow-Up    RD Follow-up?: Yes

## 2018-07-30 NOTE — PLAN OF CARE
Problem: Patient Care Overview  Goal: Plan of Care Review  Outcome: Ongoing (interventions implemented as appropriate)  POC reviewed with patient and spouse at bedside at 0300. Patient's spouse acknowledged understanding of POC; Patient is unable. Patient has remained free of falls, injuries and skin breakdown for the duration of the shift. VSS. No acute distress noted. No complaints; denies pain. Plans: maintain NPO until NCC resumes TF; maintain SBP<160. Will continue to monitor and update as needed.

## 2018-07-30 NOTE — PT/OT/SLP PROGRESS
"Physical Therapy Treatment    Patient Name:  Ciro Chandler   MRN:  [id number]    Recommendations:     Discharge Recommendations:  nursing facility, skilled   Discharge Equipment Recommendations: hospital bed, lift device   Barriers to discharge: Decreased caregiver support    Plan:     During this hospitalization, patient to be seen 4 x/week to address the above listed problems via therapeutic activities, therapeutic exercises, neuromuscular re-education, gait training  · Plan of Care Expires:  08/20/18   Plan of Care Reviewed with: patient, spouse    Subjective     Communicated with RN prior to session.    Patient comments/goals: wife: "He worked really hard with OT earlier and he has been asleep ever since."  Pain/Comfort:  · Pain Rating 1: 0/10  · Pain Rating Post-Intervention 1: 0/10      Objective:     Patient found with: bed alarm, blood pressure cuff, pulse ox (continuous), restraints, telemetry, NG tube, peripheral IV, land catheter     General Precautions: Standard, aspiration, aphasia, fall, vision impaired     Patient was found supine in bed asleep with family at bedside.  He was difficult to arouse.  PT and rehab tech assisted patient to sitting EOB (total assistance x 2 people) in order to attempt to improve arousal and participation in therapy.  Patient sat EOB x 13 minutes with total assistance for balance. PT and patient's family member attempted tactile stimulation, noxious stimuli (sternal rub), and wet wash cloth on his face and neck to improve arousal.  He moaned and attempted to move his head away from the wash cloth, but no other active participation in sitting activities.  Pt opened his eyes twice when the washcloth was applied, but otherwise his eyes remained closed throughout all activities.  Assistance in sitting was withdrawn 5x repetitively to assess righting reactions and to use startle response to increase alertness.  No balance reactions were observed in response to LOB with PT and rehab " tech catching patient from falling straight back onto the bed on each trial.  Sit to stand to scoot higher in the bed and replace draw sheet, total assistance.     Functional Outcome Measures:  AM-PAC 6 CLICK MOBILITY  Turning over in bed (including adjusting bedclothes, sheets and blankets)?: 1  Sitting down on and standing up from a chair with arms (e.g., wheelchair, bedside commode, etc.): 1  Moving from lying on back to sitting on the side of the bed?: 1  Moving to and from a bed to a chair (including a wheelchair)?: 1  Need to walk in hospital room?: 1  Climbing 3-5 steps with a railing?: 1  Basic Mobility Total Score: 6       Therapeutic Activities and Exercises:   PT educated patient and his family on reduction in PT POC d/t poor participation by the patient.  The family verbalized understanding. Time was provided for additional questions.      Patient was left in L sidelying on wedge with all lines in place, call bell in reach, restraints reapplied, family present, and RN notified..    GOALS:    Physical Therapy Goals        Problem: Physical Therapy Goal    Goal Priority Disciplines Outcome Goal Variances Interventions   Physical Therapy Goal     PT/OT, PT Ongoing (interventions implemented as appropriate)     Description:  Goals to be met by: 8/3/18    1. Pt will perform rolling to the R and L with moderate assistance.   2. Pt will perform supine to sit from both sides of the bed with max assistance.  3. Pt will perform sit to supine with max assistance.  4. Pt will sit EOB x 5 minutes with min assistance to prepare for functional tasks in sitting.   5. Pt will perform sit to stand transfers with moderate assistance.    6. Pt will perform bed <> chair transfers with max assistance.  7. Pt will perform gait x 10 feet with max assistance  8. Family will be independent with ROM using handout.                        Assessment:     Ciro Chandler is a 68 y.o. male admitted with a medical diagnosis of ICH  (intracerebral hemorrhage).  The patient was again very lethargic with minimal participation in therapy despite max cueing including change in position, noxious stimuli and wet wash cloth.  PT POC will be reduced to 3x/wk pending improved participation in therapy.     He presents with the following impairments/functional limitations:  weakness, impaired functional mobilty, impaired cognition, decreased safety awareness, impaired coordination, impaired endurance, gait instability, impaired cardiopulmonary response to activity, impaired sensation, impaired balance, decreased upper extremity function, abnormal tone, impaired self care skills, visual deficits, decreased lower extremity function.    Rehab Prognosis:  limited; patient would benefit from acute skilled PT services to address these deficits and reach maximum level of function.      Recent Surgery: * No surgery found *        Time Tracking:     PT Received On: 07/30/18  PT Start Time: 1300     PT Stop Time: 1325  PT Total Time (min): 25 min     Billable Minutes: Therapeutic Activity 25    Treatment Type: Treatment  PT/PTA: PT         Suzanna Rey, PT  7/30/2018  [phone] (pager)

## 2018-07-30 NOTE — PLAN OF CARE
07/30/18 1338   Discharge Reassessment   Assessment Type Discharge Planning Assessment   Provided patient/caregiver education on the expected discharge date and the discharge plan No   Do you have any problems affording any of your prescribed medications? No   Discharge Plan A Skilled Nursing Facility   Discharge Plan B Skilled Nursing Facility   Patient choice form signed by patient/caregiver N/A   Can the patient answer the patient profile reliably? No, cognitively impaired   Describe the patient's ability to walk at the present time. Major restrictions/daily assistance from another person   How often would a person be available to care for the patient? Whenever needed   Number of comorbid conditions (as recorded on the chart) Five or more   During the past month, has the patient often been bothered by feeling down, depressed or hopeless? (nigel)   During the past month, has the patient often been bothered by little interest or pleasure in doing things? Yes  (nigel)     Per [facility] ([phone]) has medically accepted patient pending insurance auth    Patient not medically stable for discharge.     Marisabel Paige RN, CCRN-K, [facility]  Neuro-Critical Care   [phone]    .

## 2018-07-30 NOTE — SUBJECTIVE & OBJECTIVE
Neurologic Chief Complaint:  ICH (intracerebral hemorrhage)    Subjective:     Interval History: Patient is seen for follow-up neurological assessment and treatment recommendations: Patient significantly better today and is much more alert today. UA w/ evidence of UTI; Ceftriaxone started. Patient w.o changes on the CTH done overnight, w/ evolving R thalamic hemorrhage w/ intraventricular extension and ventricular system remains enlarged, similar to prior study although upon personal evaluation the ventricles appear to have a slight interval increase.    TF stopped overnight and restarted at 20 cc/hr, tolerating well. Not following commands.    HPI, Past Medical, Family, and Social History remains the same as documented in the initial encounter.     Review of Systems   Unable to perform ROS: Mental status change     Scheduled Meds:   albuterol-ipratropium  3 mL Nebulization Q4H    amiodarone  200 mg Per NG tube Daily    amLODIPine  10 mg Per NG tube Daily    atorvastatin  40 mg Per NG tube Daily    cefTRIAXone (ROCEPHIN) IVPB  1 g Intravenous Q24H    heparin (porcine)  5,000 Units Subcutaneous Q8H    hydrALAZINE  75 mg Per NG tube Q8H    insulin aspart U-100  5 Units Subcutaneous Q4H    losartan  100 mg Per NG tube Daily    metoclopramide HCl  10 mg Intravenous Q8H    metoprolol tartrate  25 mg Per NG tube BID    modafinil  100 mg Per NG tube After lunch    modafinil  200 mg Per NG tube Daily    polyethylene glycol  17 g Per NG tube Daily    senna-docusate 8.6-50 mg  1 tablet Per NG tube BID    sodium chloride 0.9%  1,000 mL Intravenous Once     Continuous Infusions:   sodium chloride 0.9% 100 mL/hr at 07/30/18 1100     PRN Meds:acetaminophen, dextrose 50%, dextrose 50%, glucagon (human recombinant), insulin aspart U-100, labetalol, magnesium oxide, magnesium oxide, phenylephrine HCl in 0.9% NaCl, potassium chloride 10%, potassium chloride 10%, potassium chloride 10%, potassium, sodium phosphates,  potassium, sodium phosphates, potassium, sodium phosphates, sodium chloride 0.9%    Objective:     Vital Signs (Most Recent):  Temp: 98.2 °F (36.8 °C) (07/30/18 1105)  Pulse: 80 (07/30/18 1105)  Resp: (!) 39 (07/30/18 1105)  BP: (!) 169/74 (07/30/18 1105)  SpO2: 99 % (07/30/18 1105)  BP Location: Left arm    Vital Signs Range (Last 24H):  Temp:  [98.2 °F (36.8 °C)-101 °F (38.3 °C)]   Pulse:  []   Resp:  [12-39]   BP: ()/(49-90)   SpO2:  [99 %-100 %]   BP Location: Left arm    Physical Exam   Constitutional: He appears well-developed and well-nourished. He appears lethargic.   HENT:   Head: Normocephalic.   Eyes: EOM are normal. Pupils are equal, round, and reactive to light.   Neck: Normal range of motion.   Cardiovascular: Normal rate and regular rhythm.    Pulmonary/Chest: Effort normal.   Abdominal: Soft.   Neurological: He appears lethargic. GCS eye subscore is 4. GCS verbal subscore is 3. GCS motor subscore is 5.   Patient alert, but not oriented, no following commands, speaks but not meaningfull.         Neurological Exam:   LOC: drowsy  Attention Span: poor  Language: Global aphasia  Articulation: Untestable due to severe aphasia   Orientation: Untestable due to severe aphasia   Visual Fields: Full  EOM (CN III, IV, VI): Full/intact  Pupils (CN II, III): PERRL, slugging pin point pupils  Facial Sensation (CN V): Normal  Facial Movement (CN VII): Symmetric facial expression    Motor: Arm left  Paresis: 4/5  Leg left  Plegia 0/5  Arm right  Paresis: 4+/5  Leg right Paresis: 4+/5  Cebellar: No evidence of appendicular or axial ataxia  Sensation: Intact to light touch, temperature and vibration    Laboratory:  CMP:     Recent Labs  Lab 07/30/18  0240   CALCIUM 9.3      K 3.9   CO2 26      BUN 17   CREATININE 0.7     BMP:     Recent Labs  Lab 07/30/18  0240      K 3.9      CO2 26   BUN 17   CREATININE 0.7   CALCIUM 9.3     CBC:     Recent Labs  Lab 07/30/18  0240   WBC 10.57    RBC 3.70*   HGB 10.8*   HCT 32.8*      MCV 89   MCH 29.2   MCHC 32.9     Lipid Panel: No results for input(s): CHOL, LDLCALC, HDL, TRIG in the last 168 hours.  Coagulation:     Recent Labs  Lab 07/29/18  1409   INR 1.0     Platelet Aggregation Study: No results for input(s): PLTAGG, PLTAGINTERP, PLTAGREGLACO, ADPPLTAGGREG in the last 168 hours.  Hgb A1C: No results for input(s): HGBA1C in the last 168 hours.  TSH:     Recent Labs  Lab 07/29/18  1409   TSH 3.589       Diagnostic Results     Brain imaging:  CTH 7/25/18  Unchanged position of right frontal coursing ventriculostomy catheter with unchanged size and configuration of prominent ventricular system.    Stable right thalamic intraparenchymal hemorrhage with associated interventricular hemorrhage layering in the occipital horns of the lateral ventricles.    Chronic microvascular ischemic changes above with associated remote infarcts.    CTH 7/21/18  1. Interval placement of right frontal coursing ventriculostomy with unchanged size and configuration of the shunted ventricular system, which remains prominent.  2. Evolution of right thalamic intraparenchymal hemorrhage with stable mass effect.  3. Chronic microvascular ischemic change and remote infarcts as above.      CTH: 1. Redemonstration of evolving right thalamic hemorrhage with mild adjacent edema and slight localized mass effect.  Subtle, subcentimeter focus of hyperdensity within the left thalamus, concerning for additional focus of evolving hemorrhage.  2. Persistent interventricular extension of hemorrhage with unchanged dilatation of the ventricular system concerning for component of hydrocephalus.  3. Generalized cerebral volume loss, chronic microvascular ischemic change and remote infarcts as above.     CTH ( 07/29/2018)  Evolving right thalamic intraparenchymal hematoma with intraventricular extension, as above, without detrimental change from 07/29/2018 CT at 12:24 p.m. No new infarct or  hemorrhage.    Persistent diffuse enlargement of the ventricular system, unchanged.  Small focus of pneumocephalus in the right frontal horn persists.  Vessel Imaging:  none     Cardiac Evaluation:   ECHO- CONCLUSIONS     1 - Low normal left ventricular systolic function.     2 - Indeterminate LV diastolic function.     3 - Normal right ventricular systolic function .     4 - There is anterior pericardial fat pad..

## 2018-07-30 NOTE — ASSESSMENT & PLAN NOTE
Modafinil  CTH stable  Sputum cx pending  UA positive  Ceftriaxone started   Possible transfer to stroke tomorrow

## 2018-07-30 NOTE — PLAN OF CARE
Problem: Occupational Therapy Goal  Goal: Occupational Therapy Goal  Goals to be met by: 8/6/18     Patient will increase functional independence with ADLs by performing:    UE Dressing with Moderate Assistance.  LE Dressing with Max Assistance.  Grooming while seated with Min Assistance.  Toileting from bedside commode with Moderate Assistance for hygiene and clothing management.   Rolling to Bilateral with Moderate Assistance.   Supine to sit with Moderate Assistance.  Stand pivot transfers with Moderate Assistance.     Outcome: Ongoing (interventions implemented as appropriate)  Goals updated.

## 2018-07-30 NOTE — PT/OT/SLP PROGRESS
Occupational Therapy   Treatment    Name: Ciro Chandler  MRN: [id number]  Admitting Diagnosis:  ICH (intracerebral hemorrhage)       Recommendations:     Discharge Recommendations: nursing facility, skilled  Discharge Equipment Recommendations:   (TBD)    Subjective   Pt nodded head yes/no to questions at times during session.  Communicated with: RN prior to session.  Pain/Comfort:  · Pain Rating 1: 0/10  · Pain Rating Post-Intervention 1: 0/10 (no indication of pain during session)    Patients cultural, spiritual, Congregation conflicts given the current situation: none stated    Objective:     Patient found with: blood pressure cuff, Condom Catheter, NG tube, pulse ox (continuous), restraints, SCD, telemetry, ventilator, peripheral IV, pressure relief boots    General Precautions: Standard, aspiration, fall, NPO (cardiac, DNR)     Occupational Performance:    Bed Mobility:    · Patient completed Rolling/Turning to Left with  total assistance  · Patient completed Rolling/Turning to Right with total assistance  · Patient completed Scooting/Bridging with total assistance forward on EOB, to the left while seated EOB x 4 trials; dependent drawsheet transfer up [facility] while supine  · Patient completed Supine to Sit with total assistance  · Patient completed Sit to Supine with total assistance     Functional Mobility/Transfers:  · Patient completed Sit <> Stand Transfer with total assistance  with  no assistive device from EOB    Patient left supine with all lines intact, call button in reach, bed alarm on, restraints reapplied at end of session, RN notified, wife & daughter present and white board updated.    [facility] 6 Click:  [facility] Total Score: 6    Treatment & Education:  Pt required Min-Mod (A) with postural control while seated EOB.  Provided verbal & physical cues to facilitate postural control while seated EOB.  Provided cues to decrease pushing with BUE while seated EOB.  Pt followed 3/3 one step commands during session  with increased time & directions to facilitate response.  Pt with little to no verbalizations during session.  Pt with eyes open 50% of session (initially & while seated EOB).  Provided PROM to BUE in all available planes x 10 reps each while supine.  Provided education to pt & family regarding pt performance & progress.  Pt's family had no further questions & when asked whether there were any concerns pt's family reported none.    Education:    Assessment:     Ciro Chandler is a 68 y.o. male with a medical diagnosis of ICH (intracerebral hemorrhage).  He presents with fair participation and motivation.  Pt with improved balance on this date.  Performance deficits affecting function are weakness, impaired endurance, impaired sensation, impaired self care skills, impaired functional mobilty, impaired balance, impaired cognition, visual deficits, decreased coordination, decreased upper extremity function, decreased lower extremity function, decreased safety awareness.      Rehab Prognosis:  fair; patient would benefit from acute skilled OT services to address these deficits and reach maximum level of function.       Plan:     Patient to be seen 3 x/week to address the above listed problems via self-care/home management, therapeutic activities, therapeutic exercises, sensory integration, cognitive retraining, neuromuscular re-education  · Plan of Care Expires: 08/24/18  · Plan of Care Reviewed with: patient, spouse, daughter    This Plan of care has been discussed with the patient who was involved in its development and understands and is in agreement with the identified goals and treatment plan    GOALS:    Occupational Therapy Goals        Problem: Occupational Therapy Goal    Goal Priority Disciplines Outcome Interventions   Occupational Therapy Goal     OT, PT/OT Ongoing (interventions implemented as appropriate)    Description:  Goals to be met by: 8/6/18     Patient will increase functional independence with ADLs  by performing:    UE Dressing with Moderate Assistance.  LE Dressing with Max Assistance.  Grooming while seated with Min Assistance.  Toileting from bedside commode with Moderate Assistance for hygiene and clothing management.   Rolling to Bilateral with Moderate Assistance.   Supine to sit with Moderate Assistance.  Stand pivot transfers with Moderate Assistance.                       Time Tracking:     OT Date of Treatment: 07/30/18  OT Start Time: 1101  OT Stop Time: 1140  OT Total Time (min): 39 min    Billable Minutes:Therapeutic Activity 23  Therapeutic Exercise 16    ZOE Hernandez  7/30/2018

## 2018-07-30 NOTE — ASSESSMENT & PLAN NOTE
A1C 7.2  accuchecks q4h with mod SSI  - detemir - 18 units bid  aspart 5 units q4h  Tube feeds restarted diabetasource 65cc/hr

## 2018-07-30 NOTE — PROGRESS NOTES
[facility]  Neurocritical Care  Progress Note    Admit Date: 7/17/2018  Service Date: 07/30/2018  Length of Stay: 13    Subjective:     Chief Complaint: ICH (intracerebral hemorrhage)    History of Present Illness: 67 yo M with PMHx of HTN, HLD, DM, dementia, A. Fib, on coumadin as outpatient who [place] [facility] [place] where he was reportedly admitted for R thalamic IPH w IVH. Per OSH records, patient admitted to ICU and sent to floor with little follow up imaging and no interval improvement in exam. Wife requested second opinion thus was transferred [place] [facility] however for unknown reason was admitted to hospital medicine service last night. [facility] called this morning after seen by Nsgy due to concern for airway protection. CT this morning with R thalamic IPH and lateral ventricle extension bilaterally and slight enlargement of ventricular system. Of note baseline enlargement of vents at previous CT a year ago. On exam patient with GCS of 8, however per wife and OSH records, this is baseline since initial admission in [place]. Will admit to [facility] for higher level of care.       Hospital Course: --admitted to [facility] 7/18.   7/21: EVD at bedside by NSGY  7/23 EVD in place lowered to 5cm h2o per nsgy.. Blood sugars running >200 adjusted detemir. Increased hydralazine for better BP control  7/24: EVD at 5, insulin adjustments   7/25: EVD clamped per Nsgy, [facility] tonight  7/26: Held tube feeds, insulin adjustments, Nsgy contacted about MSC and EVD.   7/27: Evd pulled per Nsgy, Ngt replace  7/28: Patient doing well. EVD got D/C yesterday. Pending transfer to stroke team. Patient has dysphagia and some difficulty clearing secretions.  7/30: TF restarted, Resp Cx, ABX adjustment, MBS tomorrow       Review of Systems  Unable to obtain a complete ROS due to level of consciousness.  Objective:     Vitals:  Temp: 98.2 °F (36.8 °C)  Pulse: 83  Rhythm: normal sinus rhythm  BP: (!) 169/74  MAP (mmHg):  106  Resp: 19  SpO2: 100 %  O2 Device (Oxygen Therapy): room air    Temp  Min: 98.2 °F (36.8 °C)  Max: 101 °F (38.3 °C)  Pulse  Min: 67  Max: 104  BP  Min: 76/49  Max: 169/74  MAP (mmHg)  Min: 59  Max: 120  Resp  Min: 12  Max: 39  SpO2  Min: 99 %  Max: 100 %    07/29 0701 - 07/30 0700  In: 1840 [I.V.:1190]  Out: 1165 [Urine:1165]   Unmeasured Output  Urine Occurrence: 1  Stool Occurrence: 1  Pad Count: 1       Physical Exam    GA: Awake and alert, comfortable, no acute distress.   HEENT: No scleral icterus or JVD.   Pulmonary: Clear to auscultation A/L. No wheezing, crackles, or rhonchi.  Cardiac: RRR S1 & S2 w/o rubs/murmurs/gallops.   Abdominal: Bowel sounds present x 4. No appreciable hepatosplenomegaly.  Skin: No jaundice, rashes, or visible lesions.   Neuro:  --GCS: E4 V4 M5  --Mental Status:  Awake and Alert, communicates with basic questions, not following commands but moves all exts spontaneously, weaker in LLE   --CN II-XII grossly intact.   --Pupils 3 mm, PERRL.   --Corneal reflex, gag, cough intact.  --LUE strength: 3/5  --LLE strength: 2/5  --RUE strength: 3/5  --RLE strength: 3/5      Medications:  Continuous    sodium chloride 0.9% Last Rate: 100 mL/hr at 07/30/18 1100   Scheduled    albuterol-ipratropium 3 mL Q4H   amiodarone 200 mg Daily   amLODIPine 10 mg Daily   atorvastatin 40 mg Daily   cefTRIAXone (ROCEPHIN) IVPB 1 g Q24H   heparin (porcine) 5,000 Units Q8H   hydrALAZINE 75 mg Q8H   insulin aspart U-100 5 Units Q4H   losartan 100 mg Daily   metoclopramide HCl 10 mg Q8H   metoprolol tartrate 25 mg BID   modafinil 100 mg After lunch   modafinil 200 mg Daily   polyethylene glycol 17 g Daily   senna-docusate 8.6-50 mg 1 tablet BID   sodium chloride 0.9% 1,000 mL Once   PRN    acetaminophen 650 mg Q6H PRN   dextrose 50% 12.5 g PRN   dextrose 50% 12.5 g PRN   glucagon (human recombinant) 1 mg PRN   insulin aspart U-100 1-10 Units Q4H PRN   labetalol 10 mg Q4H PRN   magnesium oxide 800 mg PRN    magnesium oxide 800 mg PRN   phenylephrine HCl in 0.9% NaCl 100 mcg Q10 Min PRN   potassium chloride 10% 40 mEq PRN   potassium chloride 10% 40 mEq PRN   potassium chloride 10% 60 mEq PRN   potassium, sodium phosphates 2 packet PRN   potassium, sodium phosphates 2 packet PRN   potassium, sodium phosphates 2 packet PRN   sodium chloride 0.9% 5 mL PRN     Today I personally reviewed pertinent medications, lines/drains/airways, imaging, cardiology, lab results, microbiology results, notably:    Diet  Diet NPO  Diet NPO        Assessment/Plan:     Neuro   * ICH (intracerebral hemorrhage)    EVD pulled previously without complication  Hydrocephalus - persists on imaging, but stable since EVD removed   -180  PT OT SLP          Encephalopathy acute    Modafinil  CTH stable  Sputum cx pending  UA positive  Ceftriaxone started   Possible transfer to stroke tomorrow        Brain compression    Due to ICH        Obstructive hydrocephalus    7/21EVD at bedside by NSGy   For obstructive hydrocephalus.  7/23 EVD lowered to 5cm/h2o per NSGY Drained 46cc overnight. ICP 2-3 this am.  7/24 EVD remains at 5cm ICP 4-8  7/25 EVD clamped per Nsgy  7/26 Nsgy to eval EVD and patient at 5pm  7/27 Evd puller per Nsgy  7/29 repeat scans stable        Nontraumatic intraventricular intracerebral hemorrhage    See ICH        Dementia without behavioral disturbance    --holding home aricept          Cardiac/Vascular   Cardiomyopathy    Monitor  Low normal EF        Persistent atrial fibrillation    Amiodarone 200mg daily  Metoprolol 25mg bid  -rate controlled  -hold coumadin due to ICH        Essential hypertension    SBP goal 100 - 180  Amlodipine 10mg daily  Hydralazine 75mg q8h  Losartan 100mg daily  Metoprolol 25mg bid  Prn labetalol    Echo: 1 - Low normal left ventricular systolic function.     2 - Indeterminate LV diastolic function.     3 - Normal right ventricular systolic function .     4 - There is anterior pericardial fat  pad..           Typical atrial flutter    Amiodarone 200mg daily for A-fib/flutter        Renal/   UTI (urinary tract infection)    Ceftriaxone started  Cefepime and flagyl dced        Endocrine   Type 2 diabetes mellitus with diabetic polyneuropathy, with long-term current use of insulin    A1C 7.2  accuchecks q4h with mod SSI  - detemir - 18 units bid  aspart 5 units q4h  Tube feeds restarted diabetasource 65cc/hr          GI   Dysphagia    SLP following  NGT in place, and tube feeds restarted   Progressing toward need for PEG/MBS ordered for tomorrow             Activity Orders          Straight Cath starting at 07/18 0237        DNR    Maico Gray NP  Neurocritical Care  [facility]

## 2018-07-30 NOTE — PLAN OF CARE
Problem: Physical Therapy Goal  Goal: Physical Therapy Goal  Goals to be met by: 8/3/18    1. Pt will perform rolling to the R and L with moderate assistance.   2. Pt will perform supine to sit from both sides of the bed with max assistance.  3. Pt will perform sit to supine with max assistance.  4. Pt will sit EOB x 5 minutes with min assistance to prepare for functional tasks in sitting.   5. Pt will perform sit to stand transfers with moderate assistance.    6. Pt will perform bed <> chair transfers with max assistance.  7. Pt will perform gait x 10 feet with max assistance  8. Family will be independent with ROM using handout.       Outcome: Ongoing (interventions implemented as appropriate)  Patient very lethargic with minimal active participation in therapy.  POC and goals remain appropriate.  Please refer to progress note for functional mobility.     Suzanna Rey, PT  7/30/2018  [phone] (pager)

## 2018-07-30 NOTE — PROGRESS NOTES
[facility]  Vascular Neurology  Comprehensive Stroke Center  Progress Note    Assessment/Plan:     * ICH (intracerebral hemorrhage)    69 yo M with PMHx of HTN, HLD, DM, dementia, A. Fib, on coumadin as outpatient who [place] [facility] [place] [facility] where he was reportedly admitted for R thalamic IPH w IVH on 7/9/2018. S/P EVD, Removed 7/27/18.   Patient more responsive today and appears to be back to his new baseline.   Treatment for UTI initiated  [facility] w/o significant changes.   Plan for SNF at discharge      Antithrombotics for secondary stroke prevention: Anticoagulants: holding a/c due to bleed    Statins for secondary stroke prevention and hyperlipidemia, if present: Statins: Atorvastatin- 40 mg daily    Aggressive risk factor modification: HTN, DM, HLD, Diet, Exercise, A-Fib     Rehab efforts: Occupational Therapy, PT/OT/SLP to evaluate and treat, PM&R consult     Diagnostics ordered/pending: None     VTE prophylaxis: Heparin and SCD    BP parameters: ICH: SBP <140        UTI (urinary tract infection)    - Noted on UA  - UCx pending  - Ceftriaxone 1g Q24 started   - Unclear if symptomatic as patient is aphasic  - Febrile to Tmax 101  - WBC's slowly increasing but still WNL        Obstructive hydrocephalus    - S/P EVD, removed on 07/27  - CTH on 07/29 w/ stable ventricular size, possibly mildly increased        Dysphagia    - Tube feeds stopped overnight and now restarted  - MBSS ordered and pending         Cardiomyopathy    - Latest Echo with Low normal left ventricular systolic function.   - Continue BB        Persistent atrial fibrillation    - Stroke risk factor  - Holding anticoagulation due to bleed  - Continue rate control and amiodarone and metoprolol         Dyslipidemia    - Stroke risk factor  - Continue Atorvastatin 40mg QHS        Essential hypertension    - Continue on antihypertensives. BP goal <140         Type 2 diabetes mellitus with  diabetic polyneuropathy, with long-term current use of insulin    - Stroke risk factor. A1C 7.2  - Continue Insulin regimen              7/21: EVD at bedside by NSGY  7/23 EVD in place lowered to 5cm h2o per nsgy.. Blood sugars running >200 adjusted detemir. Increased hydralazine for better BP control  7/24: EVD at 5, insulin adjustments   7/25: EVD clamped per Nsgy, CTH tonight  7/26: Held tube feeds, insulin adjustments, Nsgy contacted about MSC and EVD.   7/27: Evd pulled per Nsgy, Ngt replace, transfer to stroke service  07/28/2018  Pending bed availability, largely stable neuro exam. SNF placement on discharge   07/29/2018 More somnolent and less responsive this AM. CTH stable but patient w/ fevers now.   07/30/2018 Patient a lot more responsive this AM. Animated. TF held and re-started overnight. Ceftriaxone started for UTI.       STROKE DOCUMENTATION        NIH Scale:  1a. Level Of Consciousness: 0-->Alert: keenly responsive  1b. LOC Questions: 2-->Answers neither question correctly  1c. LOC Commands: 2-->Performs neither task correctly  2. Best Gaze: 0-->Normal  3. Visual: 0-->No visual loss  4. Facial Palsy: 1-->Minor paralysis (flattened nasolabial fold, asymmetry on smiling)  5a. Motor Arm, Left: 1-->Drift: limb holds 90 (or 45) degrees, but drifts down before full 10 seconds: does not hit bed or other support  5b. Motor Arm, Right: 0-->No drift: limb holds 90 (or 45) degrees for full 10 secs  6a. Motor Leg, Left: 3-->No effort against gravity: leg falls to bed immediately  6b. Motor Leg, Right: 1-->Drift: leg falls by the end of the 5-sec period but does not hit bed  7. Limb Ataxia: 0-->Absent  8. Sensory: 0-->Normal: no sensory loss  9. Best Language: 3-->Mute, global aphasia: no usable speech or auditory comprehension  10. Dysarthria: 0-->Normal  11. Extinction and Inattention (formerly Neglect): 0-->No abnormality  Total (NIH Stroke Scale): 13       Modified [place]    Lucian Coma Scale:12   ABCD2  Score:    [id number] Score:6  HAS -BLED Score:2  ICH Score:3  Hunt & Fox Classification:      Hemorrhagic change of an Ischemic Stroke: Does this patient have an ischemic stroke with hemorrhagic changes? No     Neurologic Chief Complaint:  ICH (intracerebral hemorrhage)    Subjective:     Interval History: Patient is seen for follow-up neurological assessment and treatment recommendations: Patient significantly better today and is much more alert today. UA w/ evidence of UTI; Ceftriaxone started. Patient w.o changes on the CTH done overnight, w/ evolving R thalamic hemorrhage w/ intraventricular extension and ventricular system remains enlarged, similar to prior study although upon personal evaluation the ventricles appear to have a slight interval increase.    TF stopped overnight and restarted at 20 cc/hr, tolerating well. Not following commands.    HPI, Past Medical, Family, and Social History remains the same as documented in the initial encounter.     Review of Systems   Unable to perform ROS: Mental status change     Scheduled Meds:   albuterol-ipratropium  3 mL Nebulization Q4H    amiodarone  200 mg Per NG tube Daily    amLODIPine  10 mg Per NG tube Daily    atorvastatin  40 mg Per NG tube Daily    cefTRIAXone (ROCEPHIN) IVPB  1 g Intravenous Q24H    heparin (porcine)  5,000 Units Subcutaneous Q8H    hydrALAZINE  75 mg Per NG tube Q8H    insulin aspart U-100  5 Units Subcutaneous Q4H    losartan  100 mg Per NG tube Daily    metoclopramide HCl  10 mg Intravenous Q8H    metoprolol tartrate  25 mg Per NG tube BID    modafinil  100 mg Per NG tube After lunch    modafinil  200 mg Per NG tube Daily    polyethylene glycol  17 g Per NG tube Daily    senna-docusate 8.6-50 mg  1 tablet Per NG tube BID    sodium chloride 0.9%  1,000 mL Intravenous Once     Continuous Infusions:   sodium chloride 0.9% 100 mL/hr at 07/30/18 1100     PRN Meds:acetaminophen, dextrose 50%, dextrose 50%, glucagon (human  recombinant), insulin aspart U-100, labetalol, magnesium oxide, magnesium oxide, phenylephrine HCl in 0.9% NaCl, potassium chloride 10%, potassium chloride 10%, potassium chloride 10%, potassium, sodium phosphates, potassium, sodium phosphates, potassium, sodium phosphates, sodium chloride 0.9%    Objective:     Vital Signs (Most Recent):  Temp: 98.2 °F (36.8 °C) (07/30/18 1105)  Pulse: 80 (07/30/18 1105)  Resp: (!) 39 (07/30/18 1105)  BP: (!) 169/74 (07/30/18 1105)  SpO2: 99 % (07/30/18 1105)  BP Location: Left arm    Vital Signs Range (Last 24H):  Temp:  [98.2 °F (36.8 °C)-101 °F (38.3 °C)]   Pulse:  []   Resp:  [12-39]   BP: ()/(49-90)   SpO2:  [99 %-100 %]   BP Location: Left arm    Physical Exam   Constitutional: He appears well-developed and well-nourished. He appears lethargic.   HENT:   Head: Normocephalic.   Eyes: EOM are normal. Pupils are equal, round, and reactive to light.   Neck: Normal range of motion.   Cardiovascular: Normal rate and regular rhythm.    Pulmonary/Chest: Effort normal.   Abdominal: Soft.   Neurological: He appears lethargic. GCS eye subscore is 4. GCS verbal subscore is 3. GCS motor subscore is 5.   Patient alert, but not oriented, no following commands, speaks but not meaningfull.         Neurological Exam:   LOC: drowsy  Attention Span: poor  Language: Global aphasia  Articulation: Untestable due to severe aphasia   Orientation: Untestable due to severe aphasia   Visual Fields: Full  EOM (CN III, IV, VI): Full/intact  Pupils (CN II, III): PERRL, slugging pin point pupils  Facial Sensation (CN V): Normal  Facial Movement (CN VII): Symmetric facial expression    Motor: Arm left  Paresis: 4/5  Leg left  Plegia 0/5  Arm right  Paresis: 4+/5  Leg right Paresis: 4+/5  Cebellar: No evidence of appendicular or axial ataxia  Sensation: Intact to light touch, temperature and vibration    Laboratory:  CMP:     Recent Labs  Lab 07/30/18  0240   CALCIUM 9.3      K 3.9   CO2 26       BUN 17   CREATININE 0.7     BMP:     Recent Labs  Lab 07/30/18  0240      K 3.9      CO2 26   BUN 17   CREATININE 0.7   CALCIUM 9.3     CBC:     Recent Labs  Lab 07/30/18  0240   WBC 10.57   RBC 3.70*   HGB 10.8*   HCT 32.8*      MCV 89   MCH 29.2   MCHC 32.9     Lipid Panel: No results for input(s): CHOL, LDLCALC, HDL, TRIG in the last 168 hours.  Coagulation:     Recent Labs  Lab 07/29/18  1409   INR 1.0     Platelet Aggregation Study: No results for input(s): PLTAGG, PLTAGINTERP, PLTAGREGLACO, ADPPLTAGGREG in the last 168 hours.  Hgb A1C: No results for input(s): HGBA1C in the last 168 hours.  TSH:     Recent Labs  Lab 07/29/18  1409   TSH 3.589       Diagnostic Results     Brain imaging:  CTH 7/25/18  Unchanged position of right frontal coursing ventriculostomy catheter with unchanged size and configuration of prominent ventricular system.    Stable right thalamic intraparenchymal hemorrhage with associated interventricular hemorrhage layering in the occipital horns of the lateral ventricles.    Chronic microvascular ischemic changes above with associated remote infarcts.    CTH 7/21/18  1. Interval placement of right frontal coursing ventriculostomy with unchanged size and configuration of the shunted ventricular system, which remains prominent.  2. Evolution of right thalamic intraparenchymal hemorrhage with stable mass effect.  3. Chronic microvascular ischemic change and remote infarcts as above.      CTH: 1. Redemonstration of evolving right thalamic hemorrhage with mild adjacent edema and slight localized mass effect.  Subtle, subcentimeter focus of hyperdensity within the left thalamus, concerning for additional focus of evolving hemorrhage.  2. Persistent interventricular extension of hemorrhage with unchanged dilatation of the ventricular system concerning for component of hydrocephalus.  3. Generalized cerebral volume loss, chronic microvascular ischemic change and remote  infarcts as above.     CTH ( 07/29/2018)  Evolving right thalamic intraparenchymal hematoma with intraventricular extension, as above, without detrimental change from 07/29/2018 CT at 12:24 p.m. No new infarct or hemorrhage.    Persistent diffuse enlargement of the ventricular system, unchanged.  Small focus of pneumocephalus in the right frontal horn persists.  Vessel Imaging:  none     Cardiac Evaluation:   ECHO- CONCLUSIONS     1 - Low normal left ventricular systolic function.     2 - Indeterminate LV diastolic function.     3 - Normal right ventricular systolic function .     4 - There is anterior pericardial fat pad..       Giulia Pulido MD  Comprehensive Stroke Center  Department of Vascular Neurology   [facility]

## 2018-07-30 NOTE — ASSESSMENT & PLAN NOTE
67 yo M with PMHx of HTN, HLD, DM, dementia, A. Fib, on coumadin as outpatient who was [place] [facility] [place] [facility] where he was reportedly admitted for R thalamic IPH w IVH on 7/9/2018. S/P EVD, Removed 7/27/18.   Patient more responsive today and appears to be back to his new baseline.   Treatment for UTI initiated  [place] [facility]  [place] w/o significant changes.   Plan for SNF at discharge      Antithrombotics for secondary stroke prevention: Anticoagulants: holding a/c due to bleed    Statins for secondary stroke prevention and hyperlipidemia, if present: Statins: Atorvastatin- 40 mg daily    Aggressive risk factor modification: HTN, DM, HLD, Diet, Exercise, A-Fib     Rehab efforts: Occupational Therapy, PT/OT/SLP to evaluate and treat, PM&R consult     Diagnostics ordered/pending: None     VTE prophylaxis: Heparin and SCD    BP parameters: ICH: SBP <140

## 2018-07-30 NOTE — ASSESSMENT & PLAN NOTE
SLP following  NGT in place, and tube feeds restarted   Progressing toward need for PEG/MBS ordered for tomorrow

## 2018-07-30 NOTE — SUBJECTIVE & OBJECTIVE
Review of Systems  Unable to obtain a complete ROS due to level of consciousness.  Objective:     Vitals:  Temp: 98.2 °F (36.8 °C)  Pulse: 83  Rhythm: normal sinus rhythm  BP: (!) 169/74  MAP (mmHg): 106  Resp: 19  SpO2: 100 %  O2 Device (Oxygen Therapy): room air    Temp  Min: 98.2 °F (36.8 °C)  Max: 101 °F (38.3 °C)  Pulse  Min: 67  Max: 104  BP  Min: 76/49  Max: 169/74  MAP (mmHg)  Min: 59  Max: 120  Resp  Min: 12  Max: 39  SpO2  Min: 99 %  Max: 100 %    07/29 0701 - 07/30 0700  In: 1840 [I.V.:1190]  Out: 1165 [Urine:1165]   Unmeasured Output  Urine Occurrence: 1  Stool Occurrence: 1  Pad Count: 1       Physical Exam    GA: Awake and alert, comfortable, no acute distress.   HEENT: No scleral icterus or JVD.   Pulmonary: Clear to auscultation A/L. No wheezing, crackles, or rhonchi.  Cardiac: RRR S1 & S2 w/o rubs/murmurs/gallops.   Abdominal: Bowel sounds present x 4. No appreciable hepatosplenomegaly.  Skin: No jaundice, rashes, or visible lesions.   Neuro:  --GCS: E4 V4 M5  --Mental Status:  Awake and Alert, communicates with basic questions, not following commands but moves all exts spontaneously, weaker in LLE   --CN II-XII grossly intact.   --Pupils 3 mm, PERRL.   --Corneal reflex, gag, cough intact.  --LUE strength: 3/5  --LLE strength: 2/5  --RUE strength: 3/5  --RLE strength: 3/5      Medications:  Continuous    sodium chloride 0.9% Last Rate: 100 mL/hr at 07/30/18 1100   Scheduled    albuterol-ipratropium 3 mL Q4H   amiodarone 200 mg Daily   amLODIPine 10 mg Daily   atorvastatin 40 mg Daily   cefTRIAXone (ROCEPHIN) IVPB 1 g Q24H   heparin (porcine) 5,000 Units Q8H   hydrALAZINE 75 mg Q8H   insulin aspart U-100 5 Units Q4H   losartan 100 mg Daily   metoclopramide HCl 10 mg Q8H   metoprolol tartrate 25 mg BID   modafinil 100 mg After lunch   modafinil 200 mg Daily   polyethylene glycol 17 g Daily   senna-docusate 8.6-50 mg 1 tablet BID   sodium chloride 0.9% 1,000 mL Once   PRN    acetaminophen 650 mg Q6H  PRN   dextrose 50% 12.5 g PRN   dextrose 50% 12.5 g PRN   glucagon (human recombinant) 1 mg PRN   insulin aspart U-100 1-10 Units Q4H PRN   labetalol 10 mg Q4H PRN   magnesium oxide 800 mg PRN   magnesium oxide 800 mg PRN   phenylephrine HCl in 0.9% NaCl 100 mcg Q10 Min PRN   potassium chloride 10% 40 mEq PRN   potassium chloride 10% 40 mEq PRN   potassium chloride 10% 60 mEq PRN   potassium, sodium phosphates 2 packet PRN   potassium, sodium phosphates 2 packet PRN   potassium, sodium phosphates 2 packet PRN   sodium chloride 0.9% 5 mL PRN     Today I personally reviewed pertinent medications, lines/drains/airways, imaging, cardiology, lab results, microbiology results, notably:    Diet  Diet NPO  Diet NPO

## 2018-07-31 PROBLEM — D64.9 ANEMIA: Status: ACTIVE | Noted: 2018-07-31

## 2018-07-31 LAB
ALLENS TEST: ABNORMAL
ANION GAP SERPL CALC-SCNC: 8 MMOL/L
BACTERIA CSF CULT: NO GROWTH
BASOPHILS # BLD AUTO: 0.02 K/UL
BASOPHILS # BLD AUTO: 0.03 K/UL
BASOPHILS NFR BLD: 0.2 %
BASOPHILS NFR BLD: 0.4 %
BUN SERPL-MCNC: 13 MG/DL
CALCIUM SERPL-MCNC: 8.6 MG/DL
CHLORIDE SERPL-SCNC: 106 MMOL/L
CO2 SERPL-SCNC: 24 MMOL/L
CREAT SERPL-MCNC: 0.7 MG/DL
DELSYS: ABNORMAL
DIFFERENTIAL METHOD: ABNORMAL
DIFFERENTIAL METHOD: ABNORMAL
EOSINOPHIL # BLD AUTO: 0.2 K/UL
EOSINOPHIL # BLD AUTO: 0.2 K/UL
EOSINOPHIL NFR BLD: 2.6 %
EOSINOPHIL NFR BLD: 2.7 %
ERYTHROCYTE [DISTWIDTH] IN BLOOD BY AUTOMATED COUNT: 12.8 %
ERYTHROCYTE [DISTWIDTH] IN BLOOD BY AUTOMATED COUNT: 13 %
ERYTHROCYTE [SEDIMENTATION RATE] IN BLOOD BY WESTERGREN METHOD: 10 MM/H
EST. GFR  (AFRICAN AMERICAN): >60 ML/MIN/1.73 M^2
EST. GFR  (NON AFRICAN AMERICAN): >60 ML/MIN/1.73 M^2
FIO2: 21
GLUCOSE SERPL-MCNC: 162 MG/DL
GRAM STN SPEC: NORMAL
GRAM STN SPEC: NORMAL
HCO3 UR-SCNC: 26.7 MMOL/L (ref 24–28)
HCT VFR BLD AUTO: 27.3 %
HCT VFR BLD AUTO: 27.3 %
HGB BLD-MCNC: 8.6 G/DL
HGB BLD-MCNC: 8.9 G/DL
IMM GRANULOCYTES # BLD AUTO: 0.04 K/UL
IMM GRANULOCYTES # BLD AUTO: 0.04 K/UL
IMM GRANULOCYTES NFR BLD AUTO: 0.5 %
IMM GRANULOCYTES NFR BLD AUTO: 0.5 %
LYMPHOCYTES # BLD AUTO: 0.7 K/UL
LYMPHOCYTES # BLD AUTO: 0.8 K/UL
LYMPHOCYTES NFR BLD: 9.3 %
LYMPHOCYTES NFR BLD: 9.4 %
MAGNESIUM SERPL-MCNC: 2 MG/DL
MCH RBC QN AUTO: 28.5 PG
MCH RBC QN AUTO: 29.3 PG
MCHC RBC AUTO-ENTMCNC: 31.5 G/DL
MCHC RBC AUTO-ENTMCNC: 32.6 G/DL
MCV RBC AUTO: 90 FL
MCV RBC AUTO: 90 FL
MODE: ABNORMAL
MONOCYTES # BLD AUTO: 0.6 K/UL
MONOCYTES # BLD AUTO: 0.6 K/UL
MONOCYTES NFR BLD: 7.4 %
MONOCYTES NFR BLD: 7.6 %
NEUTROPHILS # BLD AUTO: 6.2 K/UL
NEUTROPHILS # BLD AUTO: 6.9 K/UL
NEUTROPHILS NFR BLD: 79.5 %
NEUTROPHILS NFR BLD: 79.9 %
NRBC BLD-RTO: 0 /100 WBC
NRBC BLD-RTO: 0 /100 WBC
PCO2 BLDA: 34.9 MMHG (ref 35–45)
PH SMN: 7.49 [PH] (ref 7.35–7.45)
PHOSPHATE SERPL-MCNC: 3 MG/DL
PLATELET # BLD AUTO: 264 K/UL
PLATELET # BLD AUTO: 282 K/UL
PMV BLD AUTO: 10.9 FL
PMV BLD AUTO: 11.1 FL
PO2 BLDA: 79 MMHG (ref 80–100)
POC BE: 3 MMOL/L
POC SATURATED O2: 97 % (ref 95–100)
POC TCO2: 28 MMOL/L (ref 23–27)
POCT GLUCOSE: 140 MG/DL (ref 70–110)
POCT GLUCOSE: 185 MG/DL (ref 70–110)
POCT GLUCOSE: 189 MG/DL (ref 70–110)
POCT GLUCOSE: 203 MG/DL (ref 70–110)
POCT GLUCOSE: 214 MG/DL (ref 70–110)
POCT GLUCOSE: 260 MG/DL (ref 70–110)
POTASSIUM SERPL-SCNC: 3.7 MMOL/L
POTASSIUM SERPL-SCNC: 4.2 MMOL/L
RBC # BLD AUTO: 3.02 M/UL
RBC # BLD AUTO: 3.04 M/UL
SAMPLE: ABNORMAL
SITE: ABNORMAL
SODIUM SERPL-SCNC: 138 MMOL/L
SP02: 100
WBC # BLD AUTO: 7.78 K/UL
WBC # BLD AUTO: 8.62 K/UL

## 2018-07-31 PROCEDURE — 25000003 PHARM REV CODE 250: Performed by: NURSE PRACTITIONER

## 2018-07-31 PROCEDURE — 83735 ASSAY OF MAGNESIUM: CPT

## 2018-07-31 PROCEDURE — 99233 SBSQ HOSP IP/OBS HIGH 50: CPT | Mod: ,,, | Performed by: PSYCHIATRY & NEUROLOGY

## 2018-07-31 PROCEDURE — 63600175 PHARM REV CODE 636 W HCPCS: Performed by: PSYCHIATRY & NEUROLOGY

## 2018-07-31 PROCEDURE — 25000003 PHARM REV CODE 250: Performed by: PSYCHIATRY & NEUROLOGY

## 2018-07-31 PROCEDURE — 63600175 PHARM REV CODE 636 W HCPCS: Performed by: NURSE PRACTITIONER

## 2018-07-31 PROCEDURE — 99233 SBSQ HOSP IP/OBS HIGH 50: CPT | Mod: GC,,, | Performed by: PSYCHIATRY & NEUROLOGY

## 2018-07-31 PROCEDURE — 27000221 HC OXYGEN, UP TO 24 HOURS

## 2018-07-31 PROCEDURE — 80048 BASIC METABOLIC PNL TOTAL CA: CPT

## 2018-07-31 PROCEDURE — 82803 BLOOD GASES ANY COMBINATION: CPT

## 2018-07-31 PROCEDURE — 36600 WITHDRAWAL OF ARTERIAL BLOOD: CPT

## 2018-07-31 PROCEDURE — 84132 ASSAY OF SERUM POTASSIUM: CPT

## 2018-07-31 PROCEDURE — 84100 ASSAY OF PHOSPHORUS: CPT

## 2018-07-31 PROCEDURE — 85025 COMPLETE CBC W/AUTO DIFF WBC: CPT | Mod: 91

## 2018-07-31 PROCEDURE — 99900035 HC TECH TIME PER 15 MIN (STAT)

## 2018-07-31 PROCEDURE — 94668 MNPJ CHEST WALL SBSQ: CPT

## 2018-07-31 PROCEDURE — 94761 N-INVAS EAR/PLS OXIMETRY MLT: CPT

## 2018-07-31 PROCEDURE — 25000242 PHARM REV CODE 250 ALT 637 W/ HCPCS: Performed by: PHYSICIAN ASSISTANT

## 2018-07-31 PROCEDURE — 25000003 PHARM REV CODE 250: Performed by: STUDENT IN AN ORGANIZED HEALTH CARE EDUCATION/TRAINING PROGRAM

## 2018-07-31 PROCEDURE — 20000000 HC ICU ROOM

## 2018-07-31 PROCEDURE — 94640 AIRWAY INHALATION TREATMENT: CPT

## 2018-07-31 RX ORDER — CEFTRIAXONE 1 G/1
1 INJECTION, POWDER, FOR SOLUTION INTRAMUSCULAR; INTRAVENOUS
Status: COMPLETED | OUTPATIENT
Start: 2018-07-31 | End: 2018-08-04

## 2018-07-31 RX ORDER — BISACODYL 10 MG
10 SUPPOSITORY, RECTAL RECTAL DAILY
Status: COMPLETED | OUTPATIENT
Start: 2018-07-31 | End: 2018-07-31

## 2018-07-31 RX ADMIN — IPRATROPIUM BROMIDE AND ALBUTEROL SULFATE 3 ML: .5; 3 SOLUTION RESPIRATORY (INHALATION) at 12:07

## 2018-07-31 RX ADMIN — INSULIN ASPART 5 UNITS: 100 INJECTION, SOLUTION INTRAVENOUS; SUBCUTANEOUS at 06:07

## 2018-07-31 RX ADMIN — INSULIN ASPART 5 UNITS: 100 INJECTION, SOLUTION INTRAVENOUS; SUBCUTANEOUS at 09:07

## 2018-07-31 RX ADMIN — ATORVASTATIN CALCIUM 40 MG: 20 TABLET, FILM COATED ORAL at 08:07

## 2018-07-31 RX ADMIN — INSULIN ASPART 2 UNITS: 100 INJECTION, SOLUTION INTRAVENOUS; SUBCUTANEOUS at 06:07

## 2018-07-31 RX ADMIN — HEPARIN SODIUM 5000 UNITS: 5000 INJECTION, SOLUTION INTRAVENOUS; SUBCUTANEOUS at 06:07

## 2018-07-31 RX ADMIN — HEPARIN SODIUM 5000 UNITS: 5000 INJECTION, SOLUTION INTRAVENOUS; SUBCUTANEOUS at 09:07

## 2018-07-31 RX ADMIN — IPRATROPIUM BROMIDE AND ALBUTEROL SULFATE 3 ML: .5; 3 SOLUTION RESPIRATORY (INHALATION) at 08:07

## 2018-07-31 RX ADMIN — MODAFINIL 100 MG: 100 TABLET ORAL at 01:07

## 2018-07-31 RX ADMIN — HYDRALAZINE HYDROCHLORIDE 75 MG: 50 TABLET ORAL at 09:07

## 2018-07-31 RX ADMIN — INSULIN ASPART 1 UNITS: 100 INJECTION, SOLUTION INTRAVENOUS; SUBCUTANEOUS at 02:07

## 2018-07-31 RX ADMIN — IPRATROPIUM BROMIDE AND ALBUTEROL SULFATE 3 ML: .5; 3 SOLUTION RESPIRATORY (INHALATION) at 04:07

## 2018-07-31 RX ADMIN — INSULIN ASPART 5 UNITS: 100 INJECTION, SOLUTION INTRAVENOUS; SUBCUTANEOUS at 10:07

## 2018-07-31 RX ADMIN — POLYETHYLENE GLYCOL 3350 17 G: 17 POWDER, FOR SOLUTION ORAL at 08:07

## 2018-07-31 RX ADMIN — CEFTRIAXONE SODIUM 1 G: 1 INJECTION, POWDER, FOR SOLUTION INTRAMUSCULAR; INTRAVENOUS at 12:07

## 2018-07-31 RX ADMIN — SENNOSIDES AND DOCUSATE SODIUM 1 TABLET: 8.6; 5 TABLET ORAL at 08:07

## 2018-07-31 RX ADMIN — METOPROLOL TARTRATE 25 MG: 25 TABLET, FILM COATED ORAL at 08:07

## 2018-07-31 RX ADMIN — INSULIN ASPART 5 UNITS: 100 INJECTION, SOLUTION INTRAVENOUS; SUBCUTANEOUS at 05:07

## 2018-07-31 RX ADMIN — BISACODYL 10 MG: 10 SUPPOSITORY RECTAL at 03:07

## 2018-07-31 RX ADMIN — AMIODARONE HYDROCHLORIDE 200 MG: 200 TABLET ORAL at 08:07

## 2018-07-31 RX ADMIN — MODAFINIL 200 MG: 100 TABLET ORAL at 06:07

## 2018-07-31 RX ADMIN — IPRATROPIUM BROMIDE AND ALBUTEROL SULFATE 3 ML: .5; 3 SOLUTION RESPIRATORY (INHALATION) at 11:07

## 2018-07-31 RX ADMIN — SODIUM CHLORIDE: 0.9 INJECTION, SOLUTION INTRAVENOUS at 06:07

## 2018-07-31 RX ADMIN — INSULIN ASPART 5 UNITS: 100 INJECTION, SOLUTION INTRAVENOUS; SUBCUTANEOUS at 02:07

## 2018-07-31 RX ADMIN — INSULIN ASPART 5 UNITS: 100 INJECTION, SOLUTION INTRAVENOUS; SUBCUTANEOUS at 01:07

## 2018-07-31 RX ADMIN — INSULIN ASPART 4 UNITS: 100 INJECTION, SOLUTION INTRAVENOUS; SUBCUTANEOUS at 01:07

## 2018-07-31 RX ADMIN — HYDRALAZINE HYDROCHLORIDE 75 MG: 50 TABLET ORAL at 01:07

## 2018-07-31 RX ADMIN — IPRATROPIUM BROMIDE AND ALBUTEROL SULFATE 3 ML: .5; 3 SOLUTION RESPIRATORY (INHALATION) at 09:07

## 2018-07-31 RX ADMIN — INSULIN ASPART 4 UNITS: 100 INJECTION, SOLUTION INTRAVENOUS; SUBCUTANEOUS at 10:07

## 2018-07-31 RX ADMIN — INSULIN ASPART 3 UNITS: 100 INJECTION, SOLUTION INTRAVENOUS; SUBCUTANEOUS at 09:07

## 2018-07-31 RX ADMIN — POTASSIUM CHLORIDE 40 MEQ: 20 SOLUTION ORAL at 06:07

## 2018-07-31 RX ADMIN — HEPARIN SODIUM 5000 UNITS: 5000 INJECTION, SOLUTION INTRAVENOUS; SUBCUTANEOUS at 01:07

## 2018-07-31 RX ADMIN — HYDRALAZINE HYDROCHLORIDE 75 MG: 50 TABLET ORAL at 06:07

## 2018-07-31 RX ADMIN — AMLODIPINE BESYLATE 10 MG: 10 TABLET ORAL at 08:07

## 2018-07-31 RX ADMIN — METOCLOPRAMIDE 10 MG: 5 INJECTION, SOLUTION INTRAMUSCULAR; INTRAVENOUS at 06:07

## 2018-07-31 NOTE — PLAN OF CARE
FOZIA spoke with Luna with [facility] ([phone]) who had called for an update. Sent more notes via [facility] and reported Pt is stepping down but MBS is still pending.     Kelley Bonilla, VICKEY  Neurocritical Care   [facility]  [id number]

## 2018-07-31 NOTE — ASSESSMENT & PLAN NOTE
SLP following  NGT in place, and tube feeds restarted   MBS held with drowsiness, need to reassess when more awake.

## 2018-07-31 NOTE — PROGRESS NOTES
[facility]  Vascular Neurology  Comprehensive Stroke Center  Progress Note    Assessment/Plan:     * ICH (intracerebral hemorrhage)    69 yo M with PMHx of HTN, HLD, DM, dementia, A. Fib, on coumadin as outpatient who was transferred to [facility] from [facility] where he was reportedly admitted for R thalamic IPH w IVH on 7/9/2018. S/P EVD, Removed 7/27/18.   Afebrile today and neurologically unchanged.   MBSS today  Continues to work w/ PT/OT  Hgb continues to drop; w/u per primary team, will remain in the ICU for one more night; Plt stable so does not appear to be dilutional; last BM on 28th per nursing staff   Last CTH w/o significant changes.   Plan for SNF at discharge      Antithrombotics for secondary stroke prevention: Anticoagulants: holding a/c due to bleed    Statins for secondary stroke prevention and hyperlipidemia, if present: Statins: Atorvastatin- 40 mg daily    Aggressive risk factor modification: HTN, DM, HLD, Diet, Exercise, A-Fib     Rehab efforts: Occupational Therapy, PT/OT/SLP to evaluate and treat, PM&R consult     Diagnostics ordered/pending: None     VTE prophylaxis: Heparin and SCD    BP parameters: ICH: SBP <140        Normocytic anemia    - Hgb continues to trend down for the past 3-4 days  - 10.8--> 8.9 today  - Unclear source, patient on SQ PPx Heparin  - No melenic stools per nursing staff  - Work-up per primary team         UTI (urinary tract infection)    - Noted on UA  - Consider ordering UCx   - Ceftriaxone 1g Q24 continued  - Afebrile overnight  - WBC improving/down-trending         Obstructive hydrocephalus    - S/P EVD, removed on 07/27  - CTH on 07/29 w/ stable ventricular size, possibly mildly increased        Dysphagia    - Tube feeds stopped overnight and now restarted  - MBSS ordered and pending         Cardiomyopathy    - Latest Echo with Low normal left ventricular systolic function.   - Continue BB        Persistent atrial fibrillation    -  Stroke risk factor  - Holding anticoagulation due to bleed  - Continue rate control and amiodarone and metoprolol         Dyslipidemia    - Stroke risk factor  - Continue Atorvastatin 40mg QHS        Essential hypertension    - Continue on antihypertensives. BP goal <140         Type 2 diabetes mellitus with diabetic polyneuropathy, with long-term current use of insulin    - Stroke risk factor. A1C 7.2  - Continue Insulin regimen              7/21: EVD at bedside by NSGY  7/23 EVD in place lowered to 5cm h2o per nsgy.. Blood sugars running >200 adjusted detemir. Increased hydralazine for better BP control  7/24: EVD at 5, insulin adjustments   7/25: EVD clamped per Nsgy, [facility] tonight  7/26: Held tube feeds, insulin adjustments, Nsgy contacted about MSC and EVD.   7/27: Evd pulled per Nsgy, Ngt replace, transfer to stroke service  07/28/2018  Pending bed availability, largely stable neuro exam. SNF placement on discharge   07/29/2018 More somnolent and less responsive this AM. CTH stable but patient w/ fevers now.   07/30/2018 Patient a lot more responsive this AM. Animated. TF held and re-started overnight. Ceftriaxone started for UTI.   07/31/2018 Neurologically unchanged. Hgb continues to trend down.  No over signs of bleeding per nursing staff.      STROKE DOCUMENTATION        NIH Scale:  1a. Level Of Consciousness: 1-->Not alert: but arousable by minor stimulation to obey, answer, or respond  1b. LOC Questions: 2-->Answers neither question correctly  1c. LOC Commands: 2-->Performs neither task correctly  2. Best Gaze: 0-->Normal  3. Visual: 0-->No visual loss  4. Facial Palsy: 1-->Minor paralysis (flattened nasolabial fold, asymmetry on smiling)  5a. Motor Arm, Left: 1-->Drift: limb holds 90 (or 45) degrees, but drifts down before full 10 seconds: does not hit bed or other support  5b. Motor Arm, Right: 0-->No drift: limb holds 90 (or 45) degrees for full 10 secs  6a. Motor Leg, Left: 3-->No effort against  gravity: leg falls to bed immediately  6b. Motor Leg, Right: 1-->Drift: leg falls by the end of the 5-sec period but does not hit bed  7. Limb Ataxia: 0-->Absent  8. Sensory: 0-->Normal: no sensory loss  9. Best Language: 3-->Mute, global aphasia: no usable speech or auditory comprehension  10. Dysarthria: 2-->Severe dysarthria: patients speech is so slurred as to be unintelligible in the absence of or out of proportion to any dysphasia, or is mute/anarthric  11. Extinction and Inattention (formerly Neglect): 0-->No abnormality  Total (NIH Stroke Scale): 16       Modified Tanisha    Lucian Coma Scale:    ABCD2 Score:    [id number] Score:6  HAS -BLED Score:2  ICH Score:3  Hunt & Fox Classification:      Hemorrhagic change of an Ischemic Stroke: Does this patient have an ischemic stroke with hemorrhagic changes? No     Neurologic Chief Complaint:  ICH (intracerebral hemorrhage)    Subjective:     Interval History: Patient is seen for follow-up neurological assessment and treatment recommendations: Neurologically unchanged. Hgb continues to trend down.  No over signs of bleeding per nursing staff. Afebrile overnight and patient is now on Abx. MBSS planned for today.     HPI, Past Medical, Family, and Social History remains the same as documented in the initial encounter.     Review of Systems   Unable to perform ROS: Mental status change   Constitutional: Positive for diaphoresis.     Scheduled Meds:   albuterol-ipratropium  3 mL Nebulization Q4H    amiodarone  200 mg Per NG tube Daily    amLODIPine  10 mg Per NG tube Daily    atorvastatin  40 mg Per NG tube Daily    cefTRIAXone (ROCEPHIN) IVPB  1 g Intravenous Q24H    heparin (porcine)  5,000 Units Subcutaneous Q8H    hydrALAZINE  75 mg Per NG tube Q8H    insulin aspart U-100  5 Units Subcutaneous Q4H    losartan  100 mg Per NG tube Daily    metoclopramide HCl  10 mg Intravenous Q8H    metoprolol tartrate  25 mg Per NG tube BID    modafinil  100 mg Per  NG tube After lunch    modafinil  200 mg Per NG tube Daily    polyethylene glycol  17 g Per NG tube Daily    senna-docusate 8.6-50 mg  1 tablet Per NG tube BID    sodium chloride 0.9%  1,000 mL Intravenous Once     Continuous Infusions:   sodium chloride 0.9% 100 mL/hr at 07/31/18 0900     PRN Meds:acetaminophen, dextrose 50%, dextrose 50%, glucagon (human recombinant), insulin aspart U-100, labetalol, magnesium oxide, magnesium oxide, phenylephrine HCl in 0.9% NaCl, potassium chloride 10%, potassium chloride 10%, potassium chloride 10%, potassium, sodium phosphates, potassium, sodium phosphates, potassium, sodium phosphates, sodium chloride 0.9%    Objective:     Vital Signs (Most Recent):  Temp: 99.4 °F (37.4 °C) (07/31/18 0705)  Pulse: 77 (07/31/18 0849)  Resp: 17 (07/31/18 0820)  BP: 132/66 (07/31/18 0849)  SpO2: 100 % (07/31/18 0820)  BP Location: Left arm    Vital Signs Range (Last 24H):  Temp:  [98.2 °F (36.8 °C)-99.4 °F (37.4 °C)]   Pulse:  [70-89]   Resp:  [11-23]   BP: (112-169)/(56-77)   SpO2:  [99 %-100 %]   BP Location: Left arm    Physical Exam   Constitutional: He appears well-developed and well-nourished. He appears lethargic.   HENT:   Head: Normocephalic.   Eyes: EOM are normal. Pupils are equal, round, and reactive to light.   Neck: Normal range of motion.   Cardiovascular: Normal rate and regular rhythm.    Pulmonary/Chest: Effort normal.   Abdominal: Soft.   Neurological: He appears lethargic. GCS eye subscore is 4. GCS verbal subscore is 3. GCS motor subscore is 5.   Patient alert, but not oriented, no following commands, speaks but not meaningfull.         Neurological Exam:   LOC: drowsy  Attention Span: poor  Language: Global aphasia  Articulation: Untestable due to severe aphasia   Orientation: Untestable due to severe aphasia   Visual Fields: Full  EOM (CN III, IV, VI): Full/intact  Pupils (CN II, III): PERRL, slugging pin point pupils  Facial Sensation (CN V): Normal  Facial Movement  (CN VII): Symmetric facial expression    Motor: Arm left  Paresis: 4/5  Leg left  Plegia 0/5  Arm right  Paresis: 4+/5  Leg right Paresis: 4+/5  Cebellar: No evidence of appendicular or axial ataxia  Sensation: Intact to light touch, temperature and vibration    Laboratory:  CMP:     Recent Labs  Lab 07/31/18  0315   CALCIUM 8.6*      K 3.7   CO2 24      BUN 13   CREATININE 0.7     BMP:     Recent Labs  Lab 07/31/18  0315      K 3.7      CO2 24   BUN 13   CREATININE 0.7   CALCIUM 8.6*     CBC:     Recent Labs  Lab 07/31/18  0315   WBC 8.62   RBC 3.04*   HGB 8.9*   HCT 27.3*      MCV 90   MCH 29.3   MCHC 32.6     Lipid Panel: No results for input(s): CHOL, LDLCALC, HDL, TRIG in the last 168 hours.  Coagulation:     Recent Labs  Lab 07/29/18  1409   INR 1.0     Platelet Aggregation Study: No results for input(s): PLTAGG, PLTAGINTERP, PLTAGREGLACO, ADPPLTAGGREG in the last 168 hours.  Hgb A1C: No results for input(s): HGBA1C in the last 168 hours.  TSH:     Recent Labs  Lab 07/29/18  1409   TSH 3.589       Diagnostic Results     Brain imaging:  CTH 7/25/18  Unchanged position of right frontal coursing ventriculostomy catheter with unchanged size and configuration of prominent ventricular system.    Stable right thalamic intraparenchymal hemorrhage with associated interventricular hemorrhage layering in the occipital horns of the lateral ventricles.    Chronic microvascular ischemic changes above with associated remote infarcts.    CTH 7/21/18  1. Interval placement of right frontal coursing ventriculostomy with unchanged size and configuration of the shunted ventricular system, which remains prominent.  2. Evolution of right thalamic intraparenchymal hemorrhage with stable mass effect.  3. Chronic microvascular ischemic change and remote infarcts as above.      CTH: 1. Redemonstration of evolving right thalamic hemorrhage with mild adjacent edema and slight localized mass effect.  Subtle,  subcentimeter focus of hyperdensity within the left thalamus, concerning for additional focus of evolving hemorrhage.  2. Persistent interventricular extension of hemorrhage with unchanged dilatation of the ventricular system concerning for component of hydrocephalus.  3. Generalized cerebral volume loss, chronic microvascular ischemic change and remote infarcts as above.     CTH ( 07/29/2018)  Evolving right thalamic intraparenchymal hematoma with intraventricular extension, as above, without detrimental change from 07/29/2018 CT at 12:24 p.m. No new infarct or hemorrhage.    Persistent diffuse enlargement of the ventricular system, unchanged.  Small focus of pneumocephalus in the right frontal horn persists.  Vessel Imaging:  None     Cardiac Evaluation:   ECHO- CONCLUSIONS     1 - Low normal left ventricular systolic function.     2 - Indeterminate LV diastolic function.     3 - Normal right ventricular systolic function .     4 - There is anterior pericardial fat pad..       Giulia Pulido MD  Comprehensive Stroke Center  Department of Vascular Neurology   [facility]

## 2018-07-31 NOTE — PROGRESS NOTES
[facility]  Neurocritical Care  Progress Note    Admit Date: 7/17/2018  Service Date: 07/31/2018  Length of Stay: 14    Subjective:     Chief Complaint: ICH (intracerebral hemorrhage)    History of Present Illness: 69 yo M with PMHx of HTN, HLD, DM, dementia, A. Fib, on coumadin as outpatient who was transferred to [facility] from [facility] where he was reportedly admitted for R thalamic IPH w IVH. Per OSH records, patient admitted to ICU and sent to floor with little follow up imaging and no interval improvement in exam. Wife requested second opinion thus was transferred to [facility] however for unknown reason was admitted to hospital medicine service last night. [facility] called this morning after seen by Nsgy due to concern for airway protection. CT this morning with R thalamic IPH and lateral ventricle extension bilaterally and slight enlargement of ventricular system. Of note baseline enlargement of vents at previous CT a year ago. On exam patient with GCS of 8, however per wife and OSH records, this is baseline since initial admission in [place]. Will admit to [facility] for higher level of care.       Hospital Course: --admitted to [facility] 7/18.   7/21: EVD at bedside by NSGY  7/23 EVD in place lowered to 5cm h2o per nsgy.. Blood sugars running >200 adjusted detemir. Increased hydralazine for better BP control  7/24: EVD at 5, insulin adjustments   7/25: EVD clamped per Nsgy, CT tonight  7/26: Held tube feeds, insulin adjustments, Nsgy contacted about MSC and EVD.   7/27: Evd pulled per Nsgy, Ngt replace  7/28: Patient doing well. EVD got D/C yesterday. Pending transfer to stroke team. Patient has dysphagia and some difficulty clearing secretions.  7/30: TF restarted, Resp Cx, ABX adjustment, MBS tomorrow   7/31: Unable to perform MBS d/t drowsiness. Believe drowsiness d/t infection. Discuss step down to Vascular Neurology.    Interval History:    -infelicitous encephalopathy causing drowsiness  -MBS held until  more awake, concern patient unable to participate and will aspirate  -discusse Step down to [facility]  -anemia present, no s/s hemorhage    Review of Systems   Reason unable to perform ROS: nonverbal.       Objective:     Vitals:  Temp: 99.4 °F (37.4 °C)  Pulse: 77  Rhythm: normal sinus rhythm  BP: (!) 118/58  MAP (mmHg): 84  Resp: 17  SpO2: 100 %  O2 Device (Oxygen Therapy): room air    Temp  Min: 98.4 °F (36.9 °C)  Max: 99.4 °F (37.4 °C)  Pulse  Min: 70  Max: 89  BP  Min: 112/60  Max: 161/75  MAP (mmHg)  Min: 78  Max: 107  Resp  Min: 11  Max: 23  SpO2  Min: 100 %  Max: 100 %    07/30 0701 - 07/31 0700  In: 3495 [I.V.:2500]  Out: 2685 [Urine:2685]   Unmeasured Output  Urine Occurrence: 1  Stool Occurrence: 1  Pad Count: 1       Physical Exam   Constitutional: He appears well-developed.   Cardiovascular: Normal rate, regular rhythm, normal heart sounds and intact distal pulses.    Pulmonary/Chest: Effort normal and breath sounds normal.   Abdominal: Soft. Bowel sounds are normal.   Neurological:   E3 V1 M5  PERRLA, open eyes to pain  Left hemiparesis  Follows some simple commands on right     Skin: Skin is warm.   Vitals reviewed.        Medications:  Continuous Scheduled  albuterol-ipratropium 3 mL Q4H   amiodarone 200 mg Daily   amLODIPine 10 mg Daily   atorvastatin 40 mg Daily   cefTRIAXone (ROCEPHIN) IVPB 1 g Q24H   heparin (porcine) 5,000 Units Q8H   hydrALAZINE 75 mg Q8H   insulin aspart U-100 5 Units Q4H   losartan 100 mg Daily   metoclopramide HCl 10 mg Q8H   metoprolol tartrate 25 mg BID   modafinil 100 mg After lunch   modafinil 200 mg Daily   polyethylene glycol 17 g Daily   senna-docusate 8.6-50 mg 1 tablet BID   sodium chloride 0.9% 1,000 mL Once   PRN  acetaminophen 650 mg Q6H PRN   dextrose 50% 12.5 g PRN   dextrose 50% 12.5 g PRN   glucagon (human recombinant) 1 mg PRN   insulin aspart U-100 1-10 Units Q4H PRN   labetalol 10 mg Q4H PRN   magnesium oxide 800 mg PRN   magnesium oxide 800 mg  PRN   phenylephrine HCl in 0.9% NaCl 100 mcg Q10 Min PRN   potassium chloride 10% 40 mEq PRN   potassium chloride 10% 40 mEq PRN   potassium chloride 10% 60 mEq PRN   potassium, sodium phosphates 2 packet PRN   potassium, sodium phosphates 2 packet PRN   potassium, sodium phosphates 2 packet PRN   sodium chloride 0.9% 5 mL PRN     Today I personally reviewed pertinent medications, lines/drains/airways, imaging, cardiology, lab results, microbiology results,     Diet  Diet NPO  Diet NPO        Assessment/Plan:     Neuro   * ICH (intracerebral hemorrhage)    EVD pulled previously without complication  Hydrocephalus - persists on imaging, but stable since EVD removed   -180  PT OT SLP          Encephalopathy acute    Modafinil BID  CTH stable  Sputum cx pending  UA positive, Ceftriaxone x 7 days. Believe infectious encephalopathy cause of mild drowsiness  Possible transfer to stroke tomorrow        Obstructive hydrocephalus    7/21EVD at bedside by NSGy   For obstructive hydrocephalus.  7/23 EVD lowered to 5cm/h2o per NSGY Drained 46cc overnight. ICP 2-3 this am.  7/24 EVD remains at 5cm ICP 4-8  7/25 EVD clamped per Nsgy  7/26 Nsgy to eval EVD and patient at 5pm  7/27 Evd puller per Nsgy  7/29 repeat scans stable,  7/31: mild drowsyness believed d/t infection. If persist with ABX, may consider repeat CTH        Nontraumatic intraventricular intracerebral hemorrhage    See ICH        Dementia without behavioral disturbance    --holding home aricept          Cardiac/Vascular   Dyslipidemia    On statin          Essential hypertension    SBP goal 100 - 180  Amlodipine 10mg daily  Hydralazine 75mg q8h  Losartan 100mg daily  Metoprolol 25mg bid  Prn labetalol    Echo: 1 - Low normal left ventricular systolic function.     2 - Indeterminate LV diastolic function.     3 - Normal right ventricular systolic function .     4 - There is anterior pericardial fat pad..           Typical atrial flutter    Amiodarone 200mg  daily for A-fib/flutter  Metoprolol 25mg BID  Rate controlled at present        Renal/   UTI (urinary tract infection)    Ceftriaxone started, x 7 days  Cefepime and flagyl dced        Oncology   Normocytic anemia    Follow CBC  Hgb 8.9, previously was 10's.   No s/s , will follow        Endocrine   Type 2 diabetes mellitus with diabetic polyneuropathy, with long-term current use of insulin    A1C 7.2  accuchecks q4h with mod SSI  aspart 5 units q4h   diabetasource 65cc/hr          GI   Dysphagia    SLP following  NGT in place, and tube feeds restarted   MBS held with drowsiness, need to reassess when more awake.             Prophylaxis:  Venous Thromboembolism: mechanical chemical  Stress Ulcer: None  Ventilator Pneumonia: not applicable     Activity Orders          Straight Cath starting at 07/18 0237        DNR   Level 3  I spent >35 minutes reviewing patient records, examining, and counseling the patient with greater than 50% of the time spent with direct patient care and coordination.       Levi Steel NP  Neurocritical Care  [facility]

## 2018-07-31 NOTE — ASSESSMENT & PLAN NOTE
- Hgb continues to trend down for the past 3-4 days  - 10.8--> 8.9 today  - Unclear source, patient on SQ PPx Heparin  - No melenic stools per nursing staff  - Work-up per primary team

## 2018-07-31 NOTE — ASSESSMENT & PLAN NOTE
7/21EVD at bedside by NSGy   For obstructive hydrocephalus.  7/23 EVD lowered to 5cm/h2o per NSGY Drained 46cc overnight. ICP 2-3 this am.  7/24 EVD remains at 5cm ICP 4-8  7/25 EVD clamped per Nsgy  7/26 Nsgy to eval EVD and patient at 5pm  7/27 Evd puller per Nsgy  7/29 repeat scans stable,  7/31: mild drowsyness believed d/t infection. If persist with ABX, may consider repeat CTH

## 2018-07-31 NOTE — PLAN OF CARE
Problem: Patient Care Overview  Goal: Plan of Care Review  POC reviewed with pt at 0500. Pt unable verbalize understanding.No acute events overnight. Pt progressing toward goals. Will continue to monitor. See flowsheets for full assessment and VS info

## 2018-07-31 NOTE — SUBJECTIVE & OBJECTIVE
Interval History:    -infelicitous encephalopathy causing drowsiness  -MBS held until more awake, concern patient unable to participate and will aspirate  -discusse Step down to [facility]  -anemia present, no s/s hemorhage    Review of Systems   Reason unable to perform ROS: nonverbal.       Objective:     Vitals:  Temp: 99.4 °F (37.4 °C)  Pulse: 77  Rhythm: normal sinus rhythm  BP: (!) 118/58  MAP (mmHg): 84  Resp: 17  SpO2: 100 %  O2 Device (Oxygen Therapy): room air    Temp  Min: 98.4 °F (36.9 °C)  Max: 99.4 °F (37.4 °C)  Pulse  Min: 70  Max: 89  BP  Min: 112/60  Max: 161/75  MAP (mmHg)  Min: 78  Max: 107  Resp  Min: 11  Max: 23  SpO2  Min: 100 %  Max: 100 %    07/30 0701 - 07/31 0700  In: 3495 [I.V.:2500]  Out: 2685 [Urine:2685]   Unmeasured Output  Urine Occurrence: 1  Stool Occurrence: 1  Pad Count: 1       Physical Exam   Constitutional: He appears well-developed.   Cardiovascular: Normal rate, regular rhythm, normal heart sounds and intact distal pulses.    Pulmonary/Chest: Effort normal and breath sounds normal.   Abdominal: Soft. Bowel sounds are normal.   Neurological:   E3 V1 M5  PERRLA, open eyes to pain  Left hemiparesis  Follows some simple commands on right     Skin: Skin is warm.   Vitals reviewed.        Medications:  Continuous Scheduled  albuterol-ipratropium 3 mL Q4H   amiodarone 200 mg Daily   amLODIPine 10 mg Daily   atorvastatin 40 mg Daily   cefTRIAXone (ROCEPHIN) IVPB 1 g Q24H   heparin (porcine) 5,000 Units Q8H   hydrALAZINE 75 mg Q8H   insulin aspart U-100 5 Units Q4H   losartan 100 mg Daily   metoclopramide HCl 10 mg Q8H   metoprolol tartrate 25 mg BID   modafinil 100 mg After lunch   modafinil 200 mg Daily   polyethylene glycol 17 g Daily   senna-docusate 8.6-50 mg 1 tablet BID   sodium chloride 0.9% 1,000 mL Once   PRN  acetaminophen 650 mg Q6H PRN   dextrose 50% 12.5 g PRN   dextrose 50% 12.5 g PRN   glucagon (human recombinant) 1 mg PRN   insulin aspart U-100 1-10 Units Q4H  PRN   labetalol 10 mg Q4H PRN   magnesium oxide 800 mg PRN   magnesium oxide 800 mg PRN   phenylephrine HCl in 0.9% NaCl 100 mcg Q10 Min PRN   potassium chloride 10% 40 mEq PRN   potassium chloride 10% 40 mEq PRN   potassium chloride 10% 60 mEq PRN   potassium, sodium phosphates 2 packet PRN   potassium, sodium phosphates 2 packet PRN   potassium, sodium phosphates 2 packet PRN   sodium chloride 0.9% 5 mL PRN     Today I personally reviewed pertinent medications, lines/drains/airways, imaging, cardiology, lab results, microbiology results,     Diet  Diet NPO  Diet NPO

## 2018-07-31 NOTE — SUBJECTIVE & OBJECTIVE
Neurologic Chief Complaint:  ICH (intracerebral hemorrhage)    Subjective:     Interval History: Patient is seen for follow-up neurological assessment and treatment recommendations: Neurologically unchanged. Hgb continues to trend down.  No over signs of bleeding per nursing staff. Afebrile overnight and patient is now on Abx. MBSS planned for today.     HPI, Past Medical, Family, and Social History remains the same as documented in the initial encounter.     Review of Systems   Unable to perform ROS: Mental status change   Constitutional: Positive for diaphoresis.     Scheduled Meds:   albuterol-ipratropium  3 mL Nebulization Q4H    amiodarone  200 mg Per NG tube Daily    amLODIPine  10 mg Per NG tube Daily    atorvastatin  40 mg Per NG tube Daily    cefTRIAXone (ROCEPHIN) IVPB  1 g Intravenous Q24H    heparin (porcine)  5,000 Units Subcutaneous Q8H    hydrALAZINE  75 mg Per NG tube Q8H    insulin aspart U-100  5 Units Subcutaneous Q4H    losartan  100 mg Per NG tube Daily    metoclopramide HCl  10 mg Intravenous Q8H    metoprolol tartrate  25 mg Per NG tube BID    modafinil  100 mg Per NG tube After lunch    modafinil  200 mg Per NG tube Daily    polyethylene glycol  17 g Per NG tube Daily    senna-docusate 8.6-50 mg  1 tablet Per NG tube BID    sodium chloride 0.9%  1,000 mL Intravenous Once     Continuous Infusions:   sodium chloride 0.9% 100 mL/hr at 07/31/18 0900     PRN Meds:acetaminophen, dextrose 50%, dextrose 50%, glucagon (human recombinant), insulin aspart U-100, labetalol, magnesium oxide, magnesium oxide, phenylephrine HCl in 0.9% NaCl, potassium chloride 10%, potassium chloride 10%, potassium chloride 10%, potassium, sodium phosphates, potassium, sodium phosphates, potassium, sodium phosphates, sodium chloride 0.9%    Objective:     Vital Signs (Most Recent):  Temp: 99.4 °F (37.4 °C) (07/31/18 0705)  Pulse: 77 (07/31/18 0849)  Resp: 17 (07/31/18 0820)  BP: 132/66 (07/31/18  0849)  SpO2: 100 % (07/31/18 0820)  BP Location: Left arm    Vital Signs Range (Last 24H):  Temp:  [98.2 °F (36.8 °C)-99.4 °F (37.4 °C)]   Pulse:  [70-89]   Resp:  [11-23]   BP: (112-169)/(56-77)   SpO2:  [99 %-100 %]   BP Location: Left arm    Physical Exam   Constitutional: He appears well-developed and well-nourished. He appears lethargic.   HENT:   Head: Normocephalic.   Eyes: EOM are normal. Pupils are equal, round, and reactive to light.   Neck: Normal range of motion.   Cardiovascular: Normal rate and regular rhythm.    Pulmonary/Chest: Effort normal.   Abdominal: Soft.   Neurological: He appears lethargic. GCS eye subscore is 4. GCS verbal subscore is 3. GCS motor subscore is 5.   Patient alert, but not oriented, no following commands, speaks but not meaningfull.         Neurological Exam:   LOC: drowsy  Attention Span: poor  Language: Global aphasia  Articulation: Untestable due to severe aphasia   Orientation: Untestable due to severe aphasia   Visual Fields: Full  EOM (CN III, IV, VI): Full/intact  Pupils (CN II, III): PERRL, slugging pin point pupils  Facial Sensation (CN V): Normal  Facial Movement (CN VII): Symmetric facial expression    Motor: Arm left  Paresis: 4/5  Leg left  Plegia 0/5  Arm right  Paresis: 4+/5  Leg right Paresis: 4+/5  Cebellar: No evidence of appendicular or axial ataxia  Sensation: Intact to light touch, temperature and vibration    Laboratory:  CMP:     Recent Labs  Lab 07/31/18 0315   CALCIUM 8.6*      K 3.7   CO2 24      BUN 13   CREATININE 0.7     BMP:     Recent Labs  Lab 07/31/18 0315      K 3.7      CO2 24   BUN 13   CREATININE 0.7   CALCIUM 8.6*     CBC:     Recent Labs  Lab 07/31/18 0315   WBC 8.62   RBC 3.04*   HGB 8.9*   HCT 27.3*      MCV 90   MCH 29.3   MCHC 32.6     Lipid Panel: No results for input(s): CHOL, LDLCALC, HDL, TRIG in the last 168 hours.  Coagulation:     Recent Labs  Lab 07/29/18  1409   INR 1.0     Platelet  Aggregation Study: No results for input(s): PLTAGG, PLTAGINTERP, PLTAGREGLACO, ADPPLTAGGREG in the last 168 hours.  Hgb A1C: No results for input(s): HGBA1C in the last 168 hours.  TSH:     Recent Labs  Lab 07/29/18  1409   TSH 3.589       Diagnostic Results     Brain imaging:  CTH 7/25/18  Unchanged position of right frontal coursing ventriculostomy catheter with unchanged size and configuration of prominent ventricular system.    Stable right thalamic intraparenchymal hemorrhage with associated interventricular hemorrhage layering in the occipital horns of the lateral ventricles.    Chronic microvascular ischemic changes above with associated remote infarcts.    CTH 7/21/18  1. Interval placement of right frontal coursing ventriculostomy with unchanged size and configuration of the shunted ventricular system, which remains prominent.  2. Evolution of right thalamic intraparenchymal hemorrhage with stable mass effect.  3. Chronic microvascular ischemic change and remote infarcts as above.      CTH: 1. Redemonstration of evolving right thalamic hemorrhage with mild adjacent edema and slight localized mass effect.  Subtle, subcentimeter focus of hyperdensity within the left thalamus, concerning for additional focus of evolving hemorrhage.  2. Persistent interventricular extension of hemorrhage with unchanged dilatation of the ventricular system concerning for component of hydrocephalus.  3. Generalized cerebral volume loss, chronic microvascular ischemic change and remote infarcts as above.     CTH ( 07/29/2018)  Evolving right thalamic intraparenchymal hematoma with intraventricular extension, as above, without detrimental change from 07/29/2018 CT at 12:24 p.m. No new infarct or hemorrhage.    Persistent diffuse enlargement of the ventricular system, unchanged.  Small focus of pneumocephalus in the right frontal horn persists.  Vessel Imaging:  None     Cardiac Evaluation:   ECHO- CONCLUSIONS     1 - Low normal  left ventricular systolic function.     2 - Indeterminate LV diastolic function.     3 - Normal right ventricular systolic function .     4 - There is anterior pericardial fat pad..

## 2018-07-31 NOTE — PLAN OF CARE
Problem: Patient Care Overview  Goal: Plan of Care Review  Outcome: Ongoing (interventions implemented as appropriate)  POC reviewed with pt at 1400. Pt verbalized understanding. Questions and concerns addressed. No acute events today. Pt progressing toward goals. Will continue to monitor. See flowsheets for full assessment and VS info.

## 2018-07-31 NOTE — PT/OT/SLP PROGRESS
Speech Language Pathology      Ciro Chandler   [id number]    MRN: [id number]    Patient not seen today secondary to Patient fatigue (MBSS unable to be completed as pt somnolent during scheduled time for completion.  ). Despite HOB raised, extensive verbal stimulation, sternal rub, and tactile stimulation via cool, damp cloth, awake and alert state necessary for participation unappreciated. Will follow-up according to SLP POC if pt medically stable and available.     LINDA Sainz, CCC-SLP  [phone]  7/31/2018

## 2018-07-31 NOTE — ASSESSMENT & PLAN NOTE
Modafinil BID  CTH stable  Sputum cx pending  UA positive, Ceftriaxone x 7 days. Believe infectious encephalopathy cause of mild drowsiness  Possible transfer to stroke tomorrow

## 2018-07-31 NOTE — ASSESSMENT & PLAN NOTE
67 yo M with PMHx of HTN, HLD, DM, dementia, A. Fib, on coumadin as outpatient who was transferred to [facility] from [facility] where he was reportedly admitted for R thalamic IPH w IVH on 7/9/2018. S/P EVD, Removed 7/27/18.   Afebrile today and neurologically unchanged.   MBSS today  Continues to work w/ PT/OT  Hgb continues to drop; w/u per primary team, will remain in the ICU for one more night; Plt stable so does not appear to be dilutional; last BM on 28th per nursing staff   Last CTH w/o significant changes.   Plan for SNF at discharge      Antithrombotics for secondary stroke prevention: Anticoagulants: holding a/c due to bleed    Statins for secondary stroke prevention and hyperlipidemia, if present: Statins: Atorvastatin- 40 mg daily    Aggressive risk factor modification: HTN, DM, HLD, Diet, Exercise, A-Fib     Rehab efforts: Occupational Therapy, PT/OT/SLP to evaluate and treat, PM&R consult     Diagnostics ordered/pending: None     VTE prophylaxis: Heparin and SCD    BP parameters: ICH: SBP <140

## 2018-07-31 NOTE — PLAN OF CARE
Problem: Patient Care Overview  Goal: Plan of Care Review  Outcome: Ongoing (interventions implemented as appropriate)  POC reviewed with pt at 1800. Pt verbalized understanding. Questions and concerns addressed. No acute events today. Pt progressing toward goals. Will continue to monitor. See flowsheets for full assessment and VS info.

## 2018-08-01 LAB
ANION GAP SERPL CALC-SCNC: 7 MMOL/L
BASOPHILS # BLD AUTO: 0.02 K/UL
BASOPHILS NFR BLD: 0.2 %
BUN SERPL-MCNC: 14 MG/DL
CALCIUM SERPL-MCNC: 8.9 MG/DL
CHLORIDE SERPL-SCNC: 104 MMOL/L
CO2 SERPL-SCNC: 26 MMOL/L
CREAT SERPL-MCNC: 0.8 MG/DL
DIFFERENTIAL METHOD: ABNORMAL
EOSINOPHIL # BLD AUTO: 0.2 K/UL
EOSINOPHIL NFR BLD: 1.8 %
ERYTHROCYTE [DISTWIDTH] IN BLOOD BY AUTOMATED COUNT: 12.9 %
EST. GFR  (AFRICAN AMERICAN): >60 ML/MIN/1.73 M^2
EST. GFR  (NON AFRICAN AMERICAN): >60 ML/MIN/1.73 M^2
GLUCOSE SERPL-MCNC: 238 MG/DL
HCT VFR BLD AUTO: 27.7 %
HGB BLD-MCNC: 9.1 G/DL
IMM GRANULOCYTES # BLD AUTO: 0.05 K/UL
IMM GRANULOCYTES NFR BLD AUTO: 0.6 %
LYMPHOCYTES # BLD AUTO: 0.8 K/UL
LYMPHOCYTES NFR BLD: 9.3 %
MAGNESIUM SERPL-MCNC: 2.1 MG/DL
MCH RBC QN AUTO: 29.1 PG
MCHC RBC AUTO-ENTMCNC: 32.9 G/DL
MCV RBC AUTO: 89 FL
MONOCYTES # BLD AUTO: 0.7 K/UL
MONOCYTES NFR BLD: 8.2 %
NEUTROPHILS # BLD AUTO: 6.8 K/UL
NEUTROPHILS NFR BLD: 79.9 %
NRBC BLD-RTO: 0 /100 WBC
PHOSPHATE SERPL-MCNC: 3 MG/DL
PLATELET # BLD AUTO: 276 K/UL
PMV BLD AUTO: 10.8 FL
POCT GLUCOSE: 163 MG/DL (ref 70–110)
POCT GLUCOSE: 221 MG/DL (ref 70–110)
POCT GLUCOSE: 235 MG/DL (ref 70–110)
POCT GLUCOSE: 249 MG/DL (ref 70–110)
POCT GLUCOSE: 250 MG/DL (ref 70–110)
POCT GLUCOSE: 257 MG/DL (ref 70–110)
POTASSIUM SERPL-SCNC: 4.2 MMOL/L
RBC # BLD AUTO: 3.13 M/UL
SODIUM SERPL-SCNC: 137 MMOL/L
WBC # BLD AUTO: 8.49 K/UL

## 2018-08-01 PROCEDURE — 92526 ORAL FUNCTION THERAPY: CPT

## 2018-08-01 PROCEDURE — 20600001 HC STEP DOWN PRIVATE ROOM

## 2018-08-01 PROCEDURE — 97530 THERAPEUTIC ACTIVITIES: CPT

## 2018-08-01 PROCEDURE — 63600175 PHARM REV CODE 636 W HCPCS: Performed by: NURSE PRACTITIONER

## 2018-08-01 PROCEDURE — 25000003 PHARM REV CODE 250: Performed by: PSYCHIATRY & NEUROLOGY

## 2018-08-01 PROCEDURE — 80048 BASIC METABOLIC PNL TOTAL CA: CPT

## 2018-08-01 PROCEDURE — 94761 N-INVAS EAR/PLS OXIMETRY MLT: CPT

## 2018-08-01 PROCEDURE — 63600175 PHARM REV CODE 636 W HCPCS: Performed by: PHYSICIAN ASSISTANT

## 2018-08-01 PROCEDURE — 25000003 PHARM REV CODE 250: Performed by: NURSE PRACTITIONER

## 2018-08-01 PROCEDURE — 83735 ASSAY OF MAGNESIUM: CPT

## 2018-08-01 PROCEDURE — 85025 COMPLETE CBC W/AUTO DIFF WBC: CPT

## 2018-08-01 PROCEDURE — 99233 SBSQ HOSP IP/OBS HIGH 50: CPT | Mod: ,,, | Performed by: PSYCHIATRY & NEUROLOGY

## 2018-08-01 PROCEDURE — 94668 MNPJ CHEST WALL SBSQ: CPT

## 2018-08-01 PROCEDURE — 25000242 PHARM REV CODE 250 ALT 637 W/ HCPCS: Performed by: PHYSICIAN ASSISTANT

## 2018-08-01 PROCEDURE — 94640 AIRWAY INHALATION TREATMENT: CPT

## 2018-08-01 PROCEDURE — 25000003 PHARM REV CODE 250: Performed by: STUDENT IN AN ORGANIZED HEALTH CARE EDUCATION/TRAINING PROGRAM

## 2018-08-01 PROCEDURE — 99233 SBSQ HOSP IP/OBS HIGH 50: CPT | Mod: GC,,, | Performed by: PSYCHIATRY & NEUROLOGY

## 2018-08-01 PROCEDURE — 84100 ASSAY OF PHOSPHORUS: CPT

## 2018-08-01 RX ADMIN — HEPARIN SODIUM 5000 UNITS: 5000 INJECTION, SOLUTION INTRAVENOUS; SUBCUTANEOUS at 05:08

## 2018-08-01 RX ADMIN — LOSARTAN POTASSIUM 100 MG: 50 TABLET, FILM COATED ORAL at 08:08

## 2018-08-01 RX ADMIN — ATORVASTATIN CALCIUM 40 MG: 20 TABLET, FILM COATED ORAL at 08:08

## 2018-08-01 RX ADMIN — INSULIN ASPART 2 UNITS: 100 INJECTION, SOLUTION INTRAVENOUS; SUBCUTANEOUS at 10:08

## 2018-08-01 RX ADMIN — INSULIN ASPART 4 UNITS: 100 INJECTION, SOLUTION INTRAVENOUS; SUBCUTANEOUS at 10:08

## 2018-08-01 RX ADMIN — INSULIN ASPART 4 UNITS: 100 INJECTION, SOLUTION INTRAVENOUS; SUBCUTANEOUS at 05:08

## 2018-08-01 RX ADMIN — IPRATROPIUM BROMIDE AND ALBUTEROL SULFATE 3 ML: .5; 3 SOLUTION RESPIRATORY (INHALATION) at 08:08

## 2018-08-01 RX ADMIN — METOPROLOL TARTRATE 25 MG: 25 TABLET, FILM COATED ORAL at 09:08

## 2018-08-01 RX ADMIN — INSULIN ASPART 5 UNITS: 100 INJECTION, SOLUTION INTRAVENOUS; SUBCUTANEOUS at 06:08

## 2018-08-01 RX ADMIN — SENNOSIDES AND DOCUSATE SODIUM 1 TABLET: 8.6; 5 TABLET ORAL at 08:08

## 2018-08-01 RX ADMIN — INSULIN ASPART 5 UNITS: 100 INJECTION, SOLUTION INTRAVENOUS; SUBCUTANEOUS at 10:08

## 2018-08-01 RX ADMIN — IPRATROPIUM BROMIDE AND ALBUTEROL SULFATE 3 ML: .5; 3 SOLUTION RESPIRATORY (INHALATION) at 11:08

## 2018-08-01 RX ADMIN — INSULIN ASPART 5 UNITS: 100 INJECTION, SOLUTION INTRAVENOUS; SUBCUTANEOUS at 02:08

## 2018-08-01 RX ADMIN — INSULIN ASPART 5 UNITS: 100 INJECTION, SOLUTION INTRAVENOUS; SUBCUTANEOUS at 01:08

## 2018-08-01 RX ADMIN — HYDRALAZINE HYDROCHLORIDE 75 MG: 50 TABLET ORAL at 10:08

## 2018-08-01 RX ADMIN — MODAFINIL 100 MG: 100 TABLET ORAL at 02:08

## 2018-08-01 RX ADMIN — METOPROLOL TARTRATE 25 MG: 25 TABLET, FILM COATED ORAL at 08:08

## 2018-08-01 RX ADMIN — INSULIN ASPART 6 UNITS: 100 INJECTION, SOLUTION INTRAVENOUS; SUBCUTANEOUS at 01:08

## 2018-08-01 RX ADMIN — HYDRALAZINE HYDROCHLORIDE 75 MG: 50 TABLET ORAL at 05:08

## 2018-08-01 RX ADMIN — MODAFINIL 200 MG: 100 TABLET ORAL at 05:08

## 2018-08-01 RX ADMIN — IPRATROPIUM BROMIDE AND ALBUTEROL SULFATE 3 ML: .5; 3 SOLUTION RESPIRATORY (INHALATION) at 03:08

## 2018-08-01 RX ADMIN — AMIODARONE HYDROCHLORIDE 200 MG: 200 TABLET ORAL at 08:08

## 2018-08-01 RX ADMIN — INSULIN ASPART 2 UNITS: 100 INJECTION, SOLUTION INTRAVENOUS; SUBCUTANEOUS at 02:08

## 2018-08-01 RX ADMIN — SENNOSIDES AND DOCUSATE SODIUM 1 TABLET: 8.6; 5 TABLET ORAL at 09:08

## 2018-08-01 RX ADMIN — HYDRALAZINE HYDROCHLORIDE 75 MG: 50 TABLET ORAL at 02:08

## 2018-08-01 RX ADMIN — IPRATROPIUM BROMIDE AND ALBUTEROL SULFATE 3 ML: .5; 3 SOLUTION RESPIRATORY (INHALATION) at 04:08

## 2018-08-01 RX ADMIN — IPRATROPIUM BROMIDE AND ALBUTEROL SULFATE 3 ML: .5; 3 SOLUTION RESPIRATORY (INHALATION) at 09:08

## 2018-08-01 RX ADMIN — INSULIN DETEMIR 10 UNITS: 100 INJECTION, SOLUTION SUBCUTANEOUS at 09:08

## 2018-08-01 RX ADMIN — HEPARIN SODIUM 5000 UNITS: 5000 INJECTION, SOLUTION INTRAVENOUS; SUBCUTANEOUS at 10:08

## 2018-08-01 RX ADMIN — HEPARIN SODIUM 5000 UNITS: 5000 INJECTION, SOLUTION INTRAVENOUS; SUBCUTANEOUS at 02:08

## 2018-08-01 RX ADMIN — CEFTRIAXONE SODIUM 1 G: 1 INJECTION, POWDER, FOR SOLUTION INTRAMUSCULAR; INTRAVENOUS at 11:08

## 2018-08-01 RX ADMIN — INSULIN ASPART 5 UNITS: 100 INJECTION, SOLUTION INTRAVENOUS; SUBCUTANEOUS at 05:08

## 2018-08-01 RX ADMIN — AMLODIPINE BESYLATE 10 MG: 10 TABLET ORAL at 08:08

## 2018-08-01 RX ADMIN — INSULIN ASPART 4 UNITS: 100 INJECTION, SOLUTION INTRAVENOUS; SUBCUTANEOUS at 06:08

## 2018-08-01 RX ADMIN — INSULIN DETEMIR 10 UNITS: 100 INJECTION, SOLUTION SUBCUTANEOUS at 02:08

## 2018-08-01 NOTE — PLAN OF CARE
Problem: SLP Goal  Goal: SLP Goal  1.  Tolerate dental soft diet with thin liquids with no s/s of aspiration  2.  Follow simple commands with 90% accuracy  3.  Respond to simple yes/no questions with 90% accuracy  4,  Respond to simple word finding tasks with 80% accuracy  5.  Assess visual spatial skills   Outcome: Ongoing (interventions implemented as appropriate)  Pt. Alert/communicative  Chanel Matias MA/[facility]-SLP  Speech Language Pathologist  Pager [phone]  8/1/2018

## 2018-08-01 NOTE — PLAN OF CARE
Problem: Patient Care Overview  Goal: Plan of Care Review  Outcome: Ongoing (interventions implemented as appropriate)  PT TRANSFERRED FROM ICU, SEE TRANSFER NOTE AND FLOWSHEET FOR DOCUMENTATION. WILL ENDORSE CARE TO NOC RN.

## 2018-08-01 NOTE — PLAN OF CARE
Problem: Patient Care Overview  Goal: Plan of Care Review  Outcome: Ongoing (interventions implemented as appropriate)  POC reviewed with pt and family at 1400. Pt verbalized understanding. Questions and concerns addressed. No acute events today. Pt progressing toward goals. Will continue to monitor. See flowsheets for full assessment and VS info.     Stable vitals and neuro exam. Pending transfer.

## 2018-08-01 NOTE — NURSING TRANSFER
Nursing Transfer Note      8/1/2018     PT ARRIVED TO UNIT. PT IS STABLE AND VS OBTAINED-SEE FLOWSHEET FOR DOCUMENTATION. ORIENTATION TO ROOM PROVIDED.  HEART MONITOR APPLIED TO PATIENT. NG TUBE SECURED, WITH TF INFUSING AT GOAL RATE OF 65, RESIDUALS AND PLACEMENT CHECKED. RUBI TO GRAVITY, DRAINING CLEAR/YELLOW. BUE SOFT WRIST RESTRAINTS AND R HAND MITTEN ASSESSED. WILL CONTINUE TO MONITOR PATIENT.

## 2018-08-01 NOTE — NURSING TRANSFER
Nursing Transfer Note      8/1/2018     Transfer To: 728A    Transfer via bed    Transfer with cardiac monitoring    Transported by RN, PCT    Medicines sent: insulin pens    Chart send with patient: Yes    Notified: n/a    Patient reassessed at: 8/1/18 1600 (date, time)    Upon arrival to floor: cardiac monitor applied, patient oriented to room, call bell in reach and bed in lowest position

## 2018-08-01 NOTE — PROGRESS NOTES
[facility]  Vascular Neurology  Comprehensive Stroke Center  Progress Note    Assessment/Plan:     * ICH (intracerebral hemorrhage)    69 yo M with PMHx of HTN, HLD, DM, dementia, A. Fib, on coumadin as outpatient who was transferred to [facility] from [facility] where he was reportedly admitted for R thalamic IPH w IVH on 7/9/2018. S/P EVD, Removed 7/27/18.   Afebrile today and neurologically unchanged. Per nursing, Patient's mental status waxes and wanes greatly.   Unable to perform MBS due to fatigue today.   Continues to work w/ PT/OT  Patient had a small Hgb drop yesterday but stable this AM.   Last CTH w/o significant changes.   Plan for SNF at discharge    Antithrombotics for secondary stroke prevention: Anticoagulants: holding a/c due to bleed    Statins for secondary stroke prevention and hyperlipidemia, if present: Statins: Atorvastatin- 40 mg daily    Aggressive risk factor modification: HTN, DM, HLD, Diet, Exercise, A-Fib     Rehab efforts: Occupational Therapy, PT/OT/SLP to evaluate and treat, PM&R consult     Diagnostics ordered/pending: None     VTE prophylaxis: Heparin and SCD    BP parameters: ICH: SBP <140            Normocytic anemia    - Hgb continues to trend down for the past 3-4 days  - 10.8--> 8.9 today  - Unclear source, patient on SQ PPx Heparin  - No melenic stools per nursing staff  - Work-up per primary team         UTI (urinary tract infection)    - Noted on UA  - Consider ordering UCx   - Ceftriaxone 1g Q24 continued  - Afebrile overnight  - WBC improving/down-trending         Obstructive hydrocephalus    - S/P EVD, removed on 07/27  - CTH on 07/29 w/ stable ventricular size, possibly mildly increased        Dysphagia    - Tube feeds stopped overnight and now restarted  - MBSS ordered and pending         Cardiomyopathy    - Latest Echo with Low normal left ventricular systolic function.   - Continue BB        Persistent atrial fibrillation    - Stroke risk  factor  - Holding anticoagulation due to bleed  - Continue rate control and amiodarone and metoprolol         Dyslipidemia    - Stroke risk factor  - Continue Atorvastatin 40mg QHS        Essential hypertension    - Continue on antihypertensives. BP goal <140         Type 2 diabetes mellitus with diabetic polyneuropathy, with long-term current use of insulin    - Stroke risk factor. A1C 7.2  - Continue Insulin regimen              7/21: EVD at bedside by NSGY  7/23 EVD in place lowered to 5cm h2o per nsgy.. Blood sugars running >200 adjusted detemir. Increased hydralazine for better BP control  7/24: EVD at 5, insulin adjustments   7/25: EVD clamped per Nsgy, CT tonight  7/26: Held tube feeds, insulin adjustments, Nsgy contacted about MSC and EVD.   7/27: Evd pulled per Nsgy, Ngt replace, transfer to stroke service  07/28/2018  Pending bed availability, largely stable neuro exam. SNF placement on discharge   07/29/2018 More somnolent and less responsive this AM. CTH stable but patient w/ fevers now.   07/30/2018 Patient a lot more responsive this AM. Animated. TF held and re-started overnight. Ceftriaxone started for UTI.   07/31/2018 Neurologically unchanged. Hgb continues to trend down.  No over signs of bleeding per nursing staff.      STROKE DOCUMENTATION        NIH Scale:  1a. Level Of Consciousness: 2-->Not alert: requires repeated stimulation to attend, or is obtunded and requires strong or painful stimulation to make movements (not stereotyped)  1b. LOC Questions: 2-->Answers neither question correctly  1c. LOC Commands: 2-->Performs neither task correctly  2. Best Gaze: 0-->Normal  3. Visual: 0-->No visual loss  4. Facial Palsy: 1-->Minor paralysis (flattened nasolabial fold, asymmetry on smiling)  5a. Motor Arm, Left: 3-->No effort against gravity: limb falls  5b. Motor Arm, Right: 3-->No effort against gravity: limb falls  6a. Motor Leg, Left: 3-->No effort against gravity: leg falls to bed  immediately  6b. Motor Leg, Right: 3-->No effort against gravity: leg falls to bed immediately  7. Limb Ataxia: 0-->Absent  8. Sensory: 0-->Normal: no sensory loss  9. Best Language: 3-->[place]ute, global aphasia: no usable speech or auditory comprehension  10. Dysarthria: 0-->Normal  11. Extinction and Inattention (formerly Neglect): 0-->No abnormality  Total (NIH Stroke Scale): 22       Modified [place] Coma Scale:9   ABCD2 Score:    [id number] Score:6  HAS -BLED Score:2  ICH Score:3  Hunt & Fox Classification:      Hemorrhagic change of an Ischemic Stroke: Does this patient have an ischemic stroke with hemorrhagic changes? No     Neurologic Chief Complaint: ICH    Subjective:     Interval History: Patient is seen for follow-up neurological assessment and treatment recommendations:     HPI, Past Medical, Family, and Social History remains the same as documented in the initial encounter.     Review of Systems   Unable to perform ROS: Mental status change     Scheduled Meds:   albuterol-ipratropium  3 mL Nebulization Q4H    amiodarone  200 mg Per NG tube Daily    amLODIPine  10 mg Per NG tube Daily    atorvastatin  40 mg Per NG tube Daily    cefTRIAXone (ROCEPHIN) IVPB  1 g Intravenous Q24H    heparin (porcine)  5,000 Units Subcutaneous Q8H    hydrALAZINE  75 mg Per NG tube Q8H    insulin aspart U-100  5 Units Subcutaneous Q4H    losartan  100 mg Per NG tube Daily    metoprolol tartrate  25 mg Per NG tube BID    modafinil  100 mg Per NG tube After lunch    modafinil  200 mg Per NG tube Daily    polyethylene glycol  17 g Per NG tube Daily    senna-docusate 8.6-50 mg  1 tablet Per NG tube BID    sodium chloride 0.9%  1,000 mL Intravenous Once     Continuous Infusions:  PRN Meds:acetaminophen, dextrose 50%, dextrose 50%, glucagon (human recombinant), insulin aspart U-100, labetalol, magnesium oxide, magnesium oxide, phenylephrine HCl in 0.9% NaCl, potassium chloride 10%, potassium chloride 10%,  potassium chloride 10%, potassium, sodium phosphates, potassium, sodium phosphates, potassium, sodium phosphates, sodium chloride 0.9%    Objective:     Vital Signs (Most Recent):  Temp: 99.5 °F (37.5 °C) (08/01/18 0700)  Pulse: 70 (08/01/18 0900)  Resp: 19 (08/01/18 0900)  BP: (!) 152/71 (08/01/18 0900)  SpO2: 100 % (08/01/18 0900)  BP Location: Left arm    Vital Signs Range (Last 24H):  Temp:  [98.8 °F (37.1 °C)-99.7 °F (37.6 °C)]   Pulse:  [67-90]   Resp:  [15-25]   BP: (115-161)/(58-96)   SpO2:  [98 %-100 %]   BP Location: Left arm    Physical Exam   Constitutional: He appears well-developed and well-nourished. He appears lethargic.   HENT:   Head: Normocephalic.   Eyes: EOM are normal. Pupils are equal, round, and reactive to light.   Neck: Normal range of motion.   Cardiovascular: Normal rate and regular rhythm.    Pulmonary/Chest: Effort normal.   Abdominal: Soft.   Neurological: He appears lethargic. GCS eye subscore is 4. GCS verbal subscore is 3. GCS motor subscore is 5.   Patient alert, but not oriented, no following commands, speaks but not meaningfull.       Laboratory:  BMP:   Recent Labs  Lab 08/01/18  0114      K 4.2      CO2 26   BUN 14   CREATININE 0.8   CALCIUM 8.9     CBC:   Recent Labs  Lab 08/01/18  0114   WBC 8.49   RBC 3.13*   HGB 9.1*   HCT 27.7*      MCV 89   MCH 29.1   MCHC 32.9       Diagnostic Results     Brain imaging:  CTH 7/25/18  Unchanged position of right frontal coursing ventriculostomy catheter with unchanged size and configuration of prominent ventricular system.    Stable right thalamic intraparenchymal hemorrhage with associated interventricular hemorrhage layering in the occipital horns of the lateral ventricles.    Chronic microvascular ischemic changes above with associated remote infarcts.     CTH 7/21/18  1. Interval placement of right frontal coursing ventriculostomy with unchanged size and configuration of the shunted ventricular system, which remains  prominent.  2. Evolution of right thalamic intraparenchymal hemorrhage with stable mass effect.  3. Chronic microvascular ischemic change and remote infarcts as above.       CTH: 1. Redemonstration of evolving right thalamic hemorrhage with mild adjacent edema and slight localized mass effect.  Subtle, subcentimeter focus of hyperdensity within the left thalamus, concerning for additional focus of evolving hemorrhage.  2. Persistent interventricular extension of hemorrhage with unchanged dilatation of the ventricular system concerning for component of hydrocephalus.  3. Generalized cerebral volume loss, chronic microvascular ischemic change and remote infarcts as above.     CTH ( 07/29/2018)  Evolving right thalamic intraparenchymal hematoma with intraventricular extension, as above, without detrimental change from 07/29/2018 CT at 12:24 p.m. No new infarct or hemorrhage.    Persistent diffuse enlargement of the ventricular system, unchanged.  Small focus of pneumocephalus in the right frontal horn persists.  Vessel Imaging:  None     Cardiac Evaluation:   ECHO- CONCLUSIONS     1 - Low normal left ventricular systolic function.     2 - Indeterminate LV diastolic function.     3 - Normal right ventricular systolic function .     4 - There is anterior pericardial fat pad..          Gelacio Schwartz MD  Comprehensive Stroke Center  Department of Vascular Neurology   [facility]

## 2018-08-01 NOTE — SUBJECTIVE & OBJECTIVE
Neurologic Chief Complaint: ICH    Subjective:     Interval History: Patient is seen for follow-up neurological assessment and treatment recommendations:     HPI, Past Medical, Family, and Social History remains the same as documented in the initial encounter.     Review of Systems   Unable to perform ROS: Mental status change     Scheduled Meds:   albuterol-ipratropium  3 mL Nebulization Q4H    amiodarone  200 mg Per NG tube Daily    amLODIPine  10 mg Per NG tube Daily    atorvastatin  40 mg Per NG tube Daily    cefTRIAXone (ROCEPHIN) IVPB  1 g Intravenous Q24H    heparin (porcine)  5,000 Units Subcutaneous Q8H    hydrALAZINE  75 mg Per NG tube Q8H    insulin aspart U-100  5 Units Subcutaneous Q4H    losartan  100 mg Per NG tube Daily    metoprolol tartrate  25 mg Per NG tube BID    modafinil  100 mg Per NG tube After lunch    modafinil  200 mg Per NG tube Daily    polyethylene glycol  17 g Per NG tube Daily    senna-docusate 8.6-50 mg  1 tablet Per NG tube BID    sodium chloride 0.9%  1,000 mL Intravenous Once     Continuous Infusions:  PRN Meds:acetaminophen, dextrose 50%, dextrose 50%, glucagon (human recombinant), insulin aspart U-100, labetalol, magnesium oxide, magnesium oxide, phenylephrine HCl in 0.9% NaCl, potassium chloride 10%, potassium chloride 10%, potassium chloride 10%, potassium, sodium phosphates, potassium, sodium phosphates, potassium, sodium phosphates, sodium chloride 0.9%    Objective:     Vital Signs (Most Recent):  Temp: 99.5 °F (37.5 °C) (08/01/18 0700)  Pulse: 70 (08/01/18 0900)  Resp: 19 (08/01/18 0900)  BP: (!) 152/71 (08/01/18 0900)  SpO2: 100 % (08/01/18 0900)  BP Location: Left arm    Vital Signs Range (Last 24H):  Temp:  [98.8 °F (37.1 °C)-99.7 °F (37.6 °C)]   Pulse:  [67-90]   Resp:  [15-25]   BP: (115-161)/(58-96)   SpO2:  [98 %-100 %]   BP Location: Left arm    Physical Exam   Constitutional: He appears well-developed and well-nourished. He appears lethargic.    HENT:   Head: Normocephalic.   Eyes: EOM are normal. Pupils are equal, round, and reactive to light.   Neck: Normal range of motion.   Cardiovascular: Normal rate and regular rhythm.    Pulmonary/Chest: Effort normal.   Abdominal: Soft.   Neurological: He appears lethargic. GCS eye subscore is 4. GCS verbal subscore is 3. GCS motor subscore is 5.   Patient alert, but not oriented, no following commands, speaks but not meaningfull.       Laboratory:  BMP:   Recent Labs  Lab 08/01/18  0114      K 4.2      CO2 26   BUN 14   CREATININE 0.8   CALCIUM 8.9     CBC:   Recent Labs  Lab 08/01/18  0114   WBC 8.49   RBC 3.13*   HGB 9.1*   HCT 27.7*      MCV 89   MCH 29.1   MCHC 32.9       Diagnostic Results     Brain imaging:  CTH 7/25/18  Unchanged position of right frontal coursing ventriculostomy catheter with unchanged size and configuration of prominent ventricular system.    Stable right thalamic intraparenchymal hemorrhage with associated interventricular hemorrhage layering in the occipital horns of the lateral ventricles.    Chronic microvascular ischemic changes above with associated remote infarcts.     CTH 7/21/18  1. Interval placement of right frontal coursing ventriculostomy with unchanged size and configuration of the shunted ventricular system, which remains prominent.  2. Evolution of right thalamic intraparenchymal hemorrhage with stable mass effect.  3. Chronic microvascular ischemic change and remote infarcts as above.       CTH: 1. Redemonstration of evolving right thalamic hemorrhage with mild adjacent edema and slight localized mass effect.  Subtle, subcentimeter focus of hyperdensity within the left thalamus, concerning for additional focus of evolving hemorrhage.  2. Persistent interventricular extension of hemorrhage with unchanged dilatation of the ventricular system concerning for component of hydrocephalus.  3. Generalized cerebral volume loss, chronic microvascular ischemic  change and remote infarcts as above.     CTH ( 07/29/2018)  Evolving right thalamic intraparenchymal hematoma with intraventricular extension, as above, without detrimental change from 07/29/2018 CT at 12:24 p.m. No new infarct or hemorrhage.    Persistent diffuse enlargement of the ventricular system, unchanged.  Small focus of pneumocephalus in the right frontal horn persists.  Vessel Imaging:  None     Cardiac Evaluation:   ECHO- CONCLUSIONS     1 - Low normal left ventricular systolic function.     2 - Indeterminate LV diastolic function.     3 - Normal right ventricular systolic function .     4 - There is anterior pericardial fat pad..

## 2018-08-01 NOTE — PT/OT/SLP PROGRESS
Physical Therapy Treatment    Patient Name:  Ciro Chandler   MRN:  [id number]    Recommendations:     Discharge Recommendations:  nursing facility, skilled   Discharge Equipment Recommendations: hospital bed, lift device   Barriers to discharge: Decreased caregiver support    Assessment:     Ciro Chandler is a 68 y.o. male admitted with a medical diagnosis of ICH (intracerebral hemorrhage).  He presents with the following impairments/functional limitations:  weakness, impaired endurance, impaired self care skills, impaired functional mobilty, gait instability, impaired balance, impaired cognition, impaired cardiopulmonary response to activity, decreased lower extremity function, decreased upper extremity function, visual deficits Patient alert today and improved participation.Able to sit EOB w/ assist ~15 minutes w/min to mod assist. Max/total assist of 2 for bed mobility.    Rehab Prognosis:  good; patient would benefit from acute skilled PT services to address these deficits and reach maximum level of function.      Recent Surgery: * No surgery found *      Plan:     During this hospitalization, patient to be seen 4 x/week to address the above listed problems via therapeutic activities, therapeutic exercises, neuromuscular re-education, gait training  · Plan of Care Expires:  08/20/18   Plan of Care Reviewed with: patient    Subjective     Communicated with nurse prior to session.  Patient found supine w/ HOB elevated upon PT entry to room, agreeable to treatment.      Chief Complaint: none stated  Patient comments/goals: none stated  Pain/Comfort:  · Pain Rating 1: 0/10  · Pain Rating Post-Intervention 1: 0/10    Patients cultural, spiritual, Orthodox conflicts given the current situation: none stated    Objective:     Patient found with: blood pressure cuff, pulse ox (continuous), restraints, telemetry, land catheter, NG tube     General Precautions: Standard, aspiration, fall   Orthopedic Precautions:N/A    Braces: N/A     Functional Mobility:  · Bed Mobility:     · Supine to Sit: total assistance and of 2 persons for trunk and LEs w/ increased assist posterior trunk while scooting to EOB.  · Sit to Supine: total assistance and of 2 persons for trunk and LEs  Total assistit of 2 for drawsheet to HOB    AM-PAC 6 CLICK MOBILITY  Turning over in bed (including adjusting bedclothes, sheets and blankets)?: 2  Sitting down on and standing up from a chair with arms (e.g., wheelchair, bedside commode, etc.): 1  Moving from lying on back to sitting on the side of the bed?: 2  Moving to and from a bed to a chair (including a wheelchair)?: 1  Need to walk in hospital room?: 1  Climbing 3-5 steps with a railing?: 1  Basic Mobility Total Score: 8       Therapeutic Activities and Exercises:   patient sits EOB ~ 15 minutes w/ min to mod assist. Rounded shoulders and posterior pelvic tilt, cues/ facilitation to facilitate improved upright trunk w/ slight/transient change. UE positioning for support, weight bearing. LE ankle , knee flex/ext while sitting w/ assist.    Patient left HOB elevated with all lines intact, call button in reach, nurse notified and nurse present..    GOALS:    Physical Therapy Goals        Problem: Physical Therapy Goal    Goal Priority Disciplines Outcome Goal Variances Interventions   Physical Therapy Goal     PT/OT, PT Ongoing (interventions implemented as appropriate)     Description:  Goals to be met by: 8/3/18    1. Pt will perform rolling to the R and L with moderate assistance.   2. Pt will perform supine to sit from both sides of the bed with max assistance.  3. Pt will perform sit to supine with max assistance.  4. Pt will sit EOB x 5 minutes with min assistance to prepare for functional tasks in sitting.   5. Pt will perform sit to stand transfers with moderate assistance.    6. Pt will perform bed <> chair transfers with max assistance.  7. Pt will perform gait x 10 feet with max assistance  8.  Family will be independent with ROM using handout.                        Time Tracking:     PT Received On: 08/01/18  PT Start Time: 1151     PT Stop Time: 1212  PT Total Time (min): 21 min     Billable Minutes: Therapeutic Activity 21    Treatment Type: Treatment  PT/PTA: PT     PTA Visit Number: 0     Tamy Recinos, PT  08/01/2018

## 2018-08-01 NOTE — ASSESSMENT & PLAN NOTE
- Noted on UA  - Consider ordering UCx   - Ceftriaxone 1g Q24 continued  - Afebrile overnight  - WBC improving/down-trending

## 2018-08-01 NOTE — ASSESSMENT & PLAN NOTE
67 yo M with PMHx of HTN, HLD, DM, dementia, A. Fib, on coumadin as outpatient who was transferred to [facility] from [facility] where he was reportedly admitted for R thalamic IPH w IVH on 7/9/2018. S/P EVD, Removed 7/27/18.   Afebrile today and neurologically unchanged. Per nursing, Patient's mental status waxes and wanes greatly.   Unable to perform MBS due to fatigue today.   Continues to work w/ PT/OT  Patient had a small Hgb drop yesterday but stable this AM.   Last CTH w/o significant changes.   Plan for SNF at discharge    Antithrombotics for secondary stroke prevention: Anticoagulants: holding a/c due to bleed    Statins for secondary stroke prevention and hyperlipidemia, if present: Statins: Atorvastatin- 40 mg daily    Aggressive risk factor modification: HTN, DM, HLD, Diet, Exercise, A-Fib     Rehab efforts: Occupational Therapy, PT/OT/SLP to evaluate and treat, PM&R consult     Diagnostics ordered/pending: None     VTE prophylaxis: Heparin and SCD    BP parameters: ICH: SBP <140

## 2018-08-01 NOTE — PT/OT/SLP PROGRESS
"Speech Language Pathology Treatment    Patient Name:  Ciro Chandler   MRN:  [id number]  Admitting Diagnosis: ICH (intracerebral hemorrhage)    Recommendations:                 General Recommendations:  Dysphagia therapy and Speech/language therapy  Diet recommendations:  NPO, Liquid Diet Level: NPO   Aspiration Precautions: Strict aspiration precautions   General Precautions: Standard, aspiration, fall  Communication strategies:  none    Subjective     "okay" when asked how he felt  Patient goals: nigel    Pain/Comfort:  · Pain Rating 1: 0/10  · Pain Rating Post-Intervention 1: 0/10    Objective:     Has the patient been evaluated by SLP for swallowing?   Yes  Keep patient NPO? Yes   Current Respiratory Status: room air      Pt. Seen at [facility] and was alert with some verbalizations elicited. He did not consistently model simple commands.  Verbalizations elicited were intelligible with no significant dysarthria.  He did not cough/throat clear on command.  Oral care was provided and hob was raised to 90 degree angle.  When presented with teaspoon of water x3 trials pt. Readily removed the bolus from the spoon and initiated oral transit without delay.  He then took 2 sips of water via cup.  No coughing, throat clearing noted however silent aspiration cannot be ruled out.  Plan to attempt mod barium swallow study on 8/2     Assessment:     Ciro Chandler is a 68 y.o. male with an SLP diagnosis of Dysphagia and Cognitive-Linguistic Impairment.     SLP Goals        Problem: SLP Goal    Goal Priority Disciplines Outcome   SLP Goal     SLP Ongoing (interventions implemented as appropriate)   Description:  1.  Tolerate dental soft diet with thin liquids with no s/s of aspiration  2.  Follow simple commands with 90% accuracy  3.  Respond to simple yes/no questions with 90% accuracy  4,  Respond to simple word finding tasks with 80% accuracy  5.  Assess visual spatial skills                    Plan:     · Patient to be seen:  4 " x/week   · Plan of Care expires:  08/23/18  · Plan of Care reviewed with:  patient   · SLP Follow-Up:  Yes       Discharge recommendations:  nursing facility, skilled       Time Tracking:     SLP Treatment Date:   08/01/18  Speech Start Time:  0845  Speech Stop Time:  0855     Speech Total Time (min):  10 min    Billable Minutes: Treatment Swallowing Dysfunction 10    Chanel Matias MA, CCC-SLP  08/01/2018

## 2018-08-02 PROBLEM — G93.6 VASOGENIC CEREBRAL EDEMA: Status: ACTIVE | Noted: 2018-08-02

## 2018-08-02 PROBLEM — I61.0 NONTRAUMATIC SUBCORTICAL HEMORRHAGE OF RIGHT CEREBRAL HEMISPHERE: Status: ACTIVE | Noted: 2018-07-19

## 2018-08-02 LAB
ANION GAP SERPL CALC-SCNC: 9 MMOL/L
BASOPHILS # BLD AUTO: 0.03 K/UL
BASOPHILS NFR BLD: 0.4 %
BUN SERPL-MCNC: 9 MG/DL
CALCIUM SERPL-MCNC: 9.1 MG/DL
CHLORIDE SERPL-SCNC: 102 MMOL/L
CO2 SERPL-SCNC: 26 MMOL/L
CREAT SERPL-MCNC: 0.6 MG/DL
DIFFERENTIAL METHOD: ABNORMAL
EOSINOPHIL # BLD AUTO: 0.3 K/UL
EOSINOPHIL NFR BLD: 3 %
ERYTHROCYTE [DISTWIDTH] IN BLOOD BY AUTOMATED COUNT: 12.7 %
EST. GFR  (AFRICAN AMERICAN): >60 ML/MIN/1.73 M^2
EST. GFR  (NON AFRICAN AMERICAN): >60 ML/MIN/1.73 M^2
GLUCOSE SERPL-MCNC: 122 MG/DL
HCT VFR BLD AUTO: 29.7 %
HGB BLD-MCNC: 9.6 G/DL
IMM GRANULOCYTES # BLD AUTO: 0.08 K/UL
IMM GRANULOCYTES NFR BLD AUTO: 0.9 %
LYMPHOCYTES # BLD AUTO: 0.9 K/UL
LYMPHOCYTES NFR BLD: 10.2 %
MAGNESIUM SERPL-MCNC: 1.4 MG/DL
MCH RBC QN AUTO: 28.7 PG
MCHC RBC AUTO-ENTMCNC: 32.3 G/DL
MCV RBC AUTO: 89 FL
MONOCYTES # BLD AUTO: 0.8 K/UL
MONOCYTES NFR BLD: 8.8 %
NEUTROPHILS # BLD AUTO: 6.6 K/UL
NEUTROPHILS NFR BLD: 76.7 %
NRBC BLD-RTO: 0 /100 WBC
PHOSPHATE SERPL-MCNC: 3.3 MG/DL
PLATELET # BLD AUTO: 317 K/UL
PMV BLD AUTO: 11 FL
POCT GLUCOSE: 114 MG/DL (ref 70–110)
POCT GLUCOSE: 193 MG/DL (ref 70–110)
POCT GLUCOSE: 212 MG/DL (ref 70–110)
POCT GLUCOSE: 245 MG/DL (ref 70–110)
POCT GLUCOSE: 281 MG/DL (ref 70–110)
POTASSIUM SERPL-SCNC: 3.9 MMOL/L
RBC # BLD AUTO: 3.35 M/UL
SODIUM SERPL-SCNC: 137 MMOL/L
WBC # BLD AUTO: 8.55 K/UL

## 2018-08-02 PROCEDURE — 36415 COLL VENOUS BLD VENIPUNCTURE: CPT

## 2018-08-02 PROCEDURE — G8997 SWALLOW GOAL STATUS: HCPCS | Mod: CJ

## 2018-08-02 PROCEDURE — A4216 STERILE WATER/SALINE, 10 ML: HCPCS | Performed by: STUDENT IN AN ORGANIZED HEALTH CARE EDUCATION/TRAINING PROGRAM

## 2018-08-02 PROCEDURE — 25000242 PHARM REV CODE 250 ALT 637 W/ HCPCS: Performed by: PHYSICIAN ASSISTANT

## 2018-08-02 PROCEDURE — 63600175 PHARM REV CODE 636 W HCPCS: Performed by: NURSE PRACTITIONER

## 2018-08-02 PROCEDURE — 25000003 PHARM REV CODE 250: Performed by: NURSE PRACTITIONER

## 2018-08-02 PROCEDURE — 84100 ASSAY OF PHOSPHORUS: CPT

## 2018-08-02 PROCEDURE — 99900035 HC TECH TIME PER 15 MIN (STAT)

## 2018-08-02 PROCEDURE — 25000003 PHARM REV CODE 250: Performed by: STUDENT IN AN ORGANIZED HEALTH CARE EDUCATION/TRAINING PROGRAM

## 2018-08-02 PROCEDURE — 92611 MOTION FLUOROSCOPY/SWALLOW: CPT

## 2018-08-02 PROCEDURE — 25000003 PHARM REV CODE 250: Performed by: PSYCHIATRY & NEUROLOGY

## 2018-08-02 PROCEDURE — 94668 MNPJ CHEST WALL SBSQ: CPT

## 2018-08-02 PROCEDURE — 86580 TB INTRADERMAL TEST: CPT | Performed by: PSYCHIATRY & NEUROLOGY

## 2018-08-02 PROCEDURE — 94761 N-INVAS EAR/PLS OXIMETRY MLT: CPT

## 2018-08-02 PROCEDURE — 27000221 HC OXYGEN, UP TO 24 HOURS

## 2018-08-02 PROCEDURE — 92507 TX SP LANG VOICE COMM INDIV: CPT

## 2018-08-02 PROCEDURE — 80048 BASIC METABOLIC PNL TOTAL CA: CPT

## 2018-08-02 PROCEDURE — 20600001 HC STEP DOWN PRIVATE ROOM

## 2018-08-02 PROCEDURE — 94640 AIRWAY INHALATION TREATMENT: CPT

## 2018-08-02 PROCEDURE — G8996 SWALLOW CURRENT STATUS: HCPCS | Mod: CK

## 2018-08-02 PROCEDURE — 83735 ASSAY OF MAGNESIUM: CPT

## 2018-08-02 PROCEDURE — 99233 SBSQ HOSP IP/OBS HIGH 50: CPT | Mod: GC,,, | Performed by: PSYCHIATRY & NEUROLOGY

## 2018-08-02 PROCEDURE — 85025 COMPLETE CBC W/AUTO DIFF WBC: CPT

## 2018-08-02 PROCEDURE — 63600175 PHARM REV CODE 636 W HCPCS: Performed by: PSYCHIATRY & NEUROLOGY

## 2018-08-02 PROCEDURE — 25000003 PHARM REV CODE 250: Performed by: PHYSICIAN ASSISTANT

## 2018-08-02 RX ORDER — LABETALOL HYDROCHLORIDE 5 MG/ML
10 INJECTION, SOLUTION INTRAVENOUS EVERY 6 HOURS PRN
Status: DISCONTINUED | OUTPATIENT
Start: 2018-08-02 | End: 2018-08-02

## 2018-08-02 RX ORDER — HYDRALAZINE HYDROCHLORIDE 50 MG/1
100 TABLET, FILM COATED ORAL EVERY 8 HOURS
Status: DISCONTINUED | OUTPATIENT
Start: 2018-08-02 | End: 2018-08-02

## 2018-08-02 RX ORDER — AMIODARONE HYDROCHLORIDE 200 MG/1
200 TABLET ORAL DAILY
Status: DISCONTINUED | OUTPATIENT
Start: 2018-08-03 | End: 2018-08-09 | Stop reason: HOSPADM

## 2018-08-02 RX ORDER — AMOXICILLIN 250 MG
1 CAPSULE ORAL 2 TIMES DAILY
Status: DISCONTINUED | OUTPATIENT
Start: 2018-08-02 | End: 2018-08-09 | Stop reason: HOSPADM

## 2018-08-02 RX ORDER — METOPROLOL TARTRATE 25 MG/1
25 TABLET, FILM COATED ORAL 2 TIMES DAILY
Status: DISCONTINUED | OUTPATIENT
Start: 2018-08-02 | End: 2018-08-08

## 2018-08-02 RX ORDER — ACETAMINOPHEN 325 MG/1
650 TABLET ORAL EVERY 6 HOURS PRN
Status: DISCONTINUED | OUTPATIENT
Start: 2018-08-02 | End: 2018-08-09 | Stop reason: HOSPADM

## 2018-08-02 RX ORDER — LOSARTAN POTASSIUM 50 MG/1
100 TABLET ORAL DAILY
Status: DISCONTINUED | OUTPATIENT
Start: 2018-08-03 | End: 2018-08-09 | Stop reason: HOSPADM

## 2018-08-02 RX ORDER — LABETALOL HYDROCHLORIDE 5 MG/ML
10 INJECTION, SOLUTION INTRAVENOUS EVERY 6 HOURS PRN
Status: DISCONTINUED | OUTPATIENT
Start: 2018-08-02 | End: 2018-08-09 | Stop reason: HOSPADM

## 2018-08-02 RX ORDER — ATORVASTATIN CALCIUM 20 MG/1
40 TABLET, FILM COATED ORAL DAILY
Status: DISCONTINUED | OUTPATIENT
Start: 2018-08-03 | End: 2018-08-09 | Stop reason: HOSPADM

## 2018-08-02 RX ORDER — AMLODIPINE BESYLATE 10 MG/1
10 TABLET ORAL DAILY
Status: DISCONTINUED | OUTPATIENT
Start: 2018-08-03 | End: 2018-08-09 | Stop reason: HOSPADM

## 2018-08-02 RX ORDER — POLYETHYLENE GLYCOL 3350 17 G/17G
17 POWDER, FOR SOLUTION ORAL DAILY
Status: DISCONTINUED | OUTPATIENT
Start: 2018-08-03 | End: 2018-08-09 | Stop reason: HOSPADM

## 2018-08-02 RX ORDER — MODAFINIL 100 MG/1
200 TABLET ORAL DAILY
Status: DISCONTINUED | OUTPATIENT
Start: 2018-08-03 | End: 2018-08-09 | Stop reason: HOSPADM

## 2018-08-02 RX ORDER — MODAFINIL 100 MG/1
100 TABLET ORAL
Status: DISCONTINUED | OUTPATIENT
Start: 2018-08-03 | End: 2018-08-09 | Stop reason: HOSPADM

## 2018-08-02 RX ADMIN — LOSARTAN POTASSIUM 100 MG: 50 TABLET, FILM COATED ORAL at 10:08

## 2018-08-02 RX ADMIN — INSULIN ASPART 6 UNITS: 100 INJECTION, SOLUTION INTRAVENOUS; SUBCUTANEOUS at 06:08

## 2018-08-02 RX ADMIN — INSULIN ASPART 5 UNITS: 100 INJECTION, SOLUTION INTRAVENOUS; SUBCUTANEOUS at 04:08

## 2018-08-02 RX ADMIN — IPRATROPIUM BROMIDE AND ALBUTEROL SULFATE 3 ML: .5; 3 SOLUTION RESPIRATORY (INHALATION) at 05:08

## 2018-08-02 RX ADMIN — SODIUM CHLORIDE, PRESERVATIVE FREE 5 ML: 5 INJECTION INTRAVENOUS at 04:08

## 2018-08-02 RX ADMIN — INSULIN DETEMIR 10 UNITS: 100 INJECTION, SOLUTION SUBCUTANEOUS at 11:08

## 2018-08-02 RX ADMIN — AMIODARONE HYDROCHLORIDE 200 MG: 200 TABLET ORAL at 11:08

## 2018-08-02 RX ADMIN — IPRATROPIUM BROMIDE AND ALBUTEROL SULFATE 3 ML: .5; 3 SOLUTION RESPIRATORY (INHALATION) at 07:08

## 2018-08-02 RX ADMIN — CEFTRIAXONE SODIUM 1 G: 1 INJECTION, POWDER, FOR SOLUTION INTRAMUSCULAR; INTRAVENOUS at 01:08

## 2018-08-02 RX ADMIN — AMLODIPINE BESYLATE 10 MG: 10 TABLET ORAL at 10:08

## 2018-08-02 RX ADMIN — METOPROLOL TARTRATE 25 MG: 25 TABLET, FILM COATED ORAL at 11:08

## 2018-08-02 RX ADMIN — IPRATROPIUM BROMIDE AND ALBUTEROL SULFATE 3 ML: .5; 3 SOLUTION RESPIRATORY (INHALATION) at 03:08

## 2018-08-02 RX ADMIN — HYDRALAZINE HYDROCHLORIDE 75 MG: 25 TABLET ORAL at 09:08

## 2018-08-02 RX ADMIN — HEPARIN SODIUM 5000 UNITS: 5000 INJECTION, SOLUTION INTRAVENOUS; SUBCUTANEOUS at 06:08

## 2018-08-02 RX ADMIN — HYDRALAZINE HYDROCHLORIDE 75 MG: 50 TABLET ORAL at 06:08

## 2018-08-02 RX ADMIN — IPRATROPIUM BROMIDE AND ALBUTEROL SULFATE 3 ML: .5; 3 SOLUTION RESPIRATORY (INHALATION) at 11:08

## 2018-08-02 RX ADMIN — MODAFINIL 100 MG: 100 TABLET ORAL at 01:08

## 2018-08-02 RX ADMIN — SENNOSIDES AND DOCUSATE SODIUM 1 TABLET: 8.6; 5 TABLET ORAL at 10:08

## 2018-08-02 RX ADMIN — Medication 5 UNITS: at 10:08

## 2018-08-02 RX ADMIN — MODAFINIL 200 MG: 100 TABLET ORAL at 06:08

## 2018-08-02 RX ADMIN — SENNOSIDES AND DOCUSATE SODIUM 1 TABLET: 8.6; 5 TABLET ORAL at 09:08

## 2018-08-02 RX ADMIN — HEPARIN SODIUM 5000 UNITS: 5000 INJECTION, SOLUTION INTRAVENOUS; SUBCUTANEOUS at 09:08

## 2018-08-02 RX ADMIN — INSULIN ASPART 1 UNITS: 100 INJECTION, SOLUTION INTRAVENOUS; SUBCUTANEOUS at 01:08

## 2018-08-02 RX ADMIN — METOPROLOL TARTRATE 25 MG: 25 TABLET ORAL at 09:08

## 2018-08-02 RX ADMIN — INSULIN ASPART 5 UNITS: 100 INJECTION, SOLUTION INTRAVENOUS; SUBCUTANEOUS at 06:08

## 2018-08-02 RX ADMIN — INSULIN DETEMIR 10 UNITS: 100 INJECTION, SOLUTION SUBCUTANEOUS at 09:08

## 2018-08-02 RX ADMIN — LABETALOL HYDROCHLORIDE 10 MG: 5 INJECTION, SOLUTION INTRAVENOUS at 06:08

## 2018-08-02 RX ADMIN — IPRATROPIUM BROMIDE AND ALBUTEROL SULFATE 3 ML: .5; 3 SOLUTION RESPIRATORY (INHALATION) at 12:08

## 2018-08-02 RX ADMIN — INSULIN ASPART 4 UNITS: 100 INJECTION, SOLUTION INTRAVENOUS; SUBCUTANEOUS at 01:08

## 2018-08-02 RX ADMIN — HEPARIN SODIUM 5000 UNITS: 5000 INJECTION, SOLUTION INTRAVENOUS; SUBCUTANEOUS at 04:08

## 2018-08-02 RX ADMIN — INSULIN ASPART 5 UNITS: 100 INJECTION, SOLUTION INTRAVENOUS; SUBCUTANEOUS at 11:08

## 2018-08-02 RX ADMIN — INSULIN ASPART 5 UNITS: 100 INJECTION, SOLUTION INTRAVENOUS; SUBCUTANEOUS at 01:08

## 2018-08-02 RX ADMIN — INSULIN ASPART 2 UNITS: 100 INJECTION, SOLUTION INTRAVENOUS; SUBCUTANEOUS at 09:08

## 2018-08-02 RX ADMIN — ATORVASTATIN CALCIUM 40 MG: 20 TABLET, FILM COATED ORAL at 10:08

## 2018-08-02 NOTE — PROGRESS NOTES
[facility]  Neurocritical Care  Progress Note    Admit Date: 7/17/2018  Service Date: 08/01/2018  Length of Stay: 15    Subjective:     Chief Complaint: ICH (intracerebral hemorrhage)    History of Present Illness: 69 yo M with PMHx of HTN, HLD, DM, dementia, A. Fib, on coumadin as outpatient who was transferred to [facility] from [facility] where he was reportedly admitted for R thalamic IPH w IVH. Per OSH records, patient admitted to ICU and sent to floor with little follow up imaging and no interval improvement in exam. Wife requested second opinion thus was transferred to [facility] however for unknown reason was admitted to hospital medicine service last night. [facility] called this morning after seen by Nsgy due to concern for airway protection. CT this morning with R thalamic IPH and lateral ventricle extension bilaterally and slight enlargement of ventricular system. Of note baseline enlargement of vents at previous CT a year ago. On exam patient with GCS of 8, however per wife and OSH records, this is baseline since initial admission in [place]. Will admit to [facility] for higher level of care.       Hospital Course: --admitted to [facility] 7/18.   7/21: EVD at bedside by NSGY  7/23 EVD in place lowered to 5cm h2o per nsgy.. Blood sugars running >200 adjusted detemir. Increased hydralazine for better BP control  7/24: EVD at 5, insulin adjustments   7/25: EVD clamped per Nsgy, [facility] tonight  7/26: Held tube feeds, insulin adjustments, Nsgy contacted about MSC and EVD.   7/27: Evd pulled per Nsgy, Ngt replace  7/28: Patient doing well. EVD got D/C yesterday. Pending transfer to stroke team. Patient has dysphagia and some difficulty clearing secretions.  7/30: TF restarted, Resp Cx, ABX adjustment, MBS tomorrow   7/31: Unable to perform MBS d/t drowsiness. Believe drowsiness d/t infection. Discuss step down to Vascular Neurology.  Past Medical History:   Diagnosis Date    Anemia 7/31/2018    Aneurysm     Clotting  "disorder     blood clot takes coumadin    Diabetes mellitus     Diabetes mellitus type II     Hyperlipidemia     Hypertension     PAF (paroxysmal atrial fibrillation)     Stroke     (2) in the past    Urinary tract infection      History reviewed. No pertinent surgical history.    No current facility-administered medications on file prior to encounter.      Current Outpatient Prescriptions on File Prior to Encounter   Medication Sig Dispense Refill    amiodarone (PACERONE) 200 MG Tab Take 2 pills twice daily for 2 weeks then take 1 pill daily thereafter 120 tablet 2    atorvastatin (LIPITOR) 80 MG tablet Take 0.5 tablets (40 mg total) by mouth once daily. 90 tablet 3    blood sugar diagnostic (ACCU-CHEK SMARTVIEW TEST STRIP) Strp 1 each by Misc.(Non-Drug; Combo Route) route 4 (four) times daily. 150 each 11    blood-glucose meter (ACCU-CHEK MALLORY) Misc 1 each by Misc.(Non-Drug; Combo Route) route once daily. 1 each 0    econazole nitrate 1 % cream Apply topically 2 (two) times daily. 85 g 2    insulin aspart (NOVOLOG) 100 unit/mL InPn pen Inject 6 Units into the skin 3 (three) times daily with meals. 6 Syringe 1    insulin degludec (TRESIBA FLEXTOUCH U-200) 200 unit/mL (3 mL) InPn Inject 36 Units into the skin every evening. 6 Syringe 1    lancets (ACCU-CHEK FASTCLIX) Misc 1 each by Misc.(Non-Drug; Combo Route) route 4 (four) times daily. 150 each 11    lancing device with lancets (ONETOUCH DELICA LANC DEVICE) Kit 1 each by Misc.(Non-Drug; Combo Route) route 2 (two) times daily. 1 each 1    losartan (COZAAR) 100 MG tablet Take 1 tablet (100 mg total) by mouth once daily. 90 tablet 3    metformin (GLUCOPHAGE) 1000 MG tablet Take 1 tablet (1,000 mg total) by mouth 2 (two) times daily with meals. 180 tablet 2    metoprolol succinate (TOPROL-XL) 50 MG 24 hr tablet Take 1 tablet (50 mg total) by mouth once daily. 90 tablet 3    pen needle, diabetic (BD ULTRA-FINE MALLORY PEN NEEDLES) 32 gauge x 5/32" Ndle " Use with lantus pen 100 each 2    PREVNAR 13, PF, 0.5 mL Syrg ADM 0.5ML IM UTD  0    senna-docusate 8.6-50 mg (SENNA WITH DOCUSATE SODIUM) 8.6-50 mg per tablet Take 1 tablet by mouth once daily.      trazodone (DESYREL) 50 MG tablet 1 - 2 Tablet Oral At bedtime 90 tablet 3     Allergies: Patient has no known allergies.    Family History   Problem Relation Age of Onset    Heart disease Mother     Diabetes Mother     Heart attack Mother     Hypertension Mother     Asthma Father     Vision loss Sister     Cancer Sister         breast    Cancer Brother         lung       Social History   Substance Use Topics    Smoking status: Never Smoker    Smokeless tobacco: Not on file    Alcohol use No      Review of Systems: 1 Unable to obtain a complete ROS due to level of consciousness.     Vitals:   Temp: 98.4 °F (36.9 °C)  Pulse: 72  Rhythm: normal sinus rhythm  BP: 124/64  MAP (mmHg): 87  Resp: 18  SpO2: 98 %  O2 Device (Oxygen Therapy): room air    Temp  Min: 96.5 °F (35.8 °C)  Max: 99.5 °F (37.5 °C)  Pulse  Min: 69  Max: 82  BP  Min: 121/63  Max: 160/76  MAP (mmHg)  Min: 84  Max: 122  Resp  Min: 12  Max: 25  SpO2  Min: 95 %  Max: 100 %    08/01 0701 - 08/02 0700  In: 770   Out: 515 [Urine:515]   Unmeasured Output  Urine Occurrence: 1  Stool Occurrence: 1  Pad Count: 1     Examination:   Examination:   Constitutional: Well-nourished and -developed. No apparent distress.   Eyes: Conjunctiva clear, anicteric. Lids no lesions.  Head/Ears/Nose/Mouth/Throat/Neck: drumucous membranes. External ears, nose atraumatic.   Cardiovascular: Regular rhythm. No murmurs. No leg edema.  Respiratory: Comfortable respirations. Clear to auscultation.  Gastrointestinal: No hernia. Soft, nondistended, nontender. + bowel sounds.     Neurologic:   -E2V1M5  --intermitently opens eyes spontaneously , tracks examiner   --corneal reflexes intact, blinks to threat bilaterally  --localizes with RUE, moves all others spontaneously  --follows  commands on R when awake,  wiggles fingers on R hand  --increased tone to LUE     Assessment/Plan:     Neuro   * ICH (intracerebral hemorrhage)    EVD pulled previously without complication  Hydrocephalus - persists on imaging, but stable since EVD removed   -180  PT OT SLP          Encephalopathy acute    Modafinil BID  CTH stable  Sputum cx pending  UA positive, Ceftriaxone x 7 days. Believe infectious encephalopathy cause of mild drowsiness  Possible transfer to stroke tomorrow        Brain compression    Due to ICH        Obstructive hydrocephalus    7/21EVD at bedside by NSGy   For obstructive hydrocephalus.  7/23 EVD lowered to 5cm/h2o per NSGY Drained 46cc overnight. ICP 2-3 this am.  7/24 EVD remains at 5cm ICP 4-8  7/25 EVD clamped per Nsgy  7/26 Nsgy to eval EVD and patient at 5pm  7/27 Evd puller per Nsgy  7/29 repeat scans stable,  7/31: mild drowsyness believed d/t infection. If persist with ABX, may consider repeat CTH        Nontraumatic intraventricular intracerebral hemorrhage    See ICH        Dementia without behavioral disturbance    --holding home aricept          Cardiac/Vascular   Cardiomyopathy    Monitor  Low normal EF        Persistent atrial fibrillation    Amiodarone 200mg daily  Metoprolol 25mg bid  -rate controlled  -hold coumadin due to ICH        Dyslipidemia    On statin          Essential hypertension    SBP goal 100 - 180  Amlodipine 10mg daily  Hydralazine 75mg q8h  Losartan 100mg daily  Metoprolol 25mg bid  Prn labetalol    Echo: 1 - Low normal left ventricular systolic function.     2 - Indeterminate LV diastolic function.     3 - Normal right ventricular systolic function .     4 - There is anterior pericardial fat pad..           Typical atrial flutter    Amiodarone 200mg daily for A-fib/flutter  Metoprolol 25mg BID  Rate controlled at present        Renal/   UTI (urinary tract infection)    Ceftriaxone started, x 7 days  Cefepime and flagyl dced        Oncology    Normocytic anemia    Follow CBC  Hgb 8.9, previously was 10's.   No s/s , will follow        Endocrine   Type 2 diabetes mellitus with diabetic polyneuropathy, with long-term current use of insulin    A1C 7.2  accuchecks q4h with mod SSI  aspart 5 units q4h   diabetasource 65cc/hr          GI   Dysphagia    SLP following  NGT in place, and tube feeds restarted   MBS held with drowsiness, need to reassess when more awake.             Prophylaxis:  Venous Thromboembolism: mechanical chemical  Stress Ulcer: H2B  Ventilator Pneumonia: not applicable     Activity Orders          None        DNR    Sarah Beth Turner PA-C  Neurocritical Care  [facility]

## 2018-08-02 NOTE — PLAN OF CARE
Problem: SLP Goal  Goal: SLP Goal  Speech Language Pathology Goals  Goals expected to be met by 8/2/18  1.  Tolerate dental soft diet with thin liquids with no s/s of aspiration.  2.  Follow simple commands with 90% accuracy  3.  Respond to simple yes/no questions with 90% accuracy  4,  Respond to simple word finding tasks with 80% accuracy  5.  Assess visual spatial skills   Outcome: Ongoing (interventions implemented as appropriate)  Pt with minimal eye contact t/o session. Pt follows commands 20% of attempts provided maximum encouragement and cueing from family members at bedside. ST to continue to follow.     MIKE Castañeda., [facility]-SLP  Speech-Language Pathology  Pager: [phone]  8/2/2018

## 2018-08-02 NOTE — PLAN OF CARE
Problem: Stroke (Hemorrhagic) (Adult)  Intervention: Prevent/Manage DVT/VTE Risk  Pt utilizing SCDs for DVT prevention as well as Heparin .  Removal of SCDs to assess for skin integrity done.  Bleeding precations maintained.

## 2018-08-02 NOTE — ASSESSMENT & PLAN NOTE
69 yo M with PMHx of HTN, HLD, DM, dementia, A. Fib, on coumadin as outpatient who was transferred to [facility] from [facility] where he was reportedly admitted for R thalamic IPH w IVH on 7/9/2018. S/P EVD, Removed 7/27/18. Etiology likely hypertensive        Antithrombotics for secondary stroke prevention: Anticoagulants: holding a/c due to bleed    Statins for secondary stroke prevention and hyperlipidemia, if present: Statins: Atorvastatin- 40 mg daily    Aggressive risk factor modification: HTN, DM, HLD, Diet, Exercise, A-Fib     Rehab efforts: Occupational Therapy, PT/OT/SLP to evaluate and treat, PM&R consult     Diagnostics ordered/pending: None     VTE prophylaxis: Heparin and SCD    BP parameters: ICH: SBP <140

## 2018-08-02 NOTE — ASSESSMENT & PLAN NOTE
Area of vasogenic cerebral edema identified when reviewing brain imaging in the R thamalus. There is not mild mass effect associated with it. We will continue to monitor the patients clinical exam for any worsening of symptoms which may indicate expansion of the hemorrhage or the area of the edema resulting in the clinical change. The ICH is likely 2/2 HTN.

## 2018-08-02 NOTE — PROCEDURES
Modified Barium Swallow    Patient Name:  Ciro Chandler   MRN:  [id number]    Recommendations:     Recommendations:                General Recommendations:  Dysphagia therapy and Cognitive-linguistic therapy  Diet recommendations:  Puree, Thin . Please note, pending PO acceptance, Patient might require supplemental means nutrition/hydration to ensure adequate PO intake.   Aspiration Precautions: Only when awake and alert Patient requires strict 1:1 assistance with all PO for safety, 1 bite/sip at a time, Alternating bites/sips, Assistance with meals, Eliminate distractions, Frequent oral care, HOB to 90 degrees, Meds crushed in puree, Monitor for s/s of aspiration, Small bites/sips and Strict aspiration precautions Please Continue to monitor for signs and symptoms of aspiration and discontinue oral feeding should you notice any of the following: watery eyes, reddened facial area, wet vocal quality, increased work of breathing, change in respiratory status, increased congestion, coughing, fever, and/or confusion.   General Precautions: Standard, aspiration, fall, pureed diet  Communication strategies:  provide increased time to answer and go to room if call light pushed    Referral     Reason for Referral  Patient was referred for a Modified Barium Swallow Study to assess the efficiency of his/her swallow function, rule out aspiration and make recommendations regarding safe dietary consistencies, effective compensatory strategies, and safe eating environment.     Diagnosis: 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       History:     Past Medical History:   Diagnosis Date    Anemia 7/31/2018    Aneurysm     Clotting disorder     blood clot takes coumadin    Diabetes mellitus     Diabetes mellitus type II     Hyperlipidemia     Hypertension     PAF (paroxysmal atrial fibrillation)     Stroke     (2) in the past    Urinary tract infection        Objective:     Current Respiratory Status:  08/02/18    Alert: yes    Cooperative: inconsistent, required moderate-maximum cueing to accept PO trials as study progressed    Follows Directions: inconsistent    Visualization  · Patient was seen in the lateral view  · NG tube observed in place   · Patient with bilateral soft, wrist restraints and R hand mitten 2/2 pulling on NG tube, nurse with Patient t/o assessment.     Oral Peripheral Examination  · Oral Musculature: unable to assess due to poor participation/comprehension  · Dentition: present and adequate  · Mucosal Quality: adequate  · Mandibular Strength and Mobility:  (CHESTER 2/2 decreased command following)  · Oral Labial Strength and Mobility: other (see comments) (CHESTER 2/2 decreased command following)  · Lingual Strength and Mobility: functional protrusion  · Velar Elevation: WFL  · Buccal Strength and Mobility: WFL  · Volitional Cough: CHESTER, unable to elicit upon command   · Volitional Swallow: elicited   · Voice Prior to PO Intake: unable to elicit     Consistencies Assessed  · Thin 45 mL via teaspoon presentations, cup edge sips and straw sips fed by clinician   · Puree 12 mL via teaspoon presentations fed by clinician  · Solids bite of cracker coated in barium paste x1  · Other: Barium table x1    Oral Preparation/Oral Phase  · Anterior loss of thin liquid, cup edge  bolus  · prolonged A-P transfer  · Oral residue present post swallow with barium tablet  · Premature spillage  · Patient chewing on barium tablet when instructed to swallow whole with thin liquids     Pharyngeal Phase   Patient with generalized weakness and mildly delayed initiation of swallow with premature spillage to the level of the pyriforms with trials of thin liquids x2. No aspiration visualized with trials presented. Flash penetration prior to initiation of swallow x1 with thin liquids, cleared with swallow, with thin liquids x1. Patient with pooling in valleculae with trials of puree x1.  Patient with mildly decreased laryngeal  elevation and epiglottic inversion patterns.  Mild residuals post swallow with whole barium tablet, requiring use of liquid wash and puree x1 to clear.  Mild residuals noted along BOT and in valleculae with puree trials.  Use of alternating bites/sips noted to reduce residuals.     Cervical Esophageal Phase  · Patient moving about during study, obscuring view of UES. No retrograde flow observed.     Assessment:     Impressions   Pt with a Moderate oral  phase dysphagia amd mild pharyngeal phase dysphagia as characterized by oral holding, prolonged A-P transit and mildly delayed initiation of swallow. Flash penetration of thin liquids x1.  No aspiration visualized. Patient with prolonged oral preparation and moderate lingual stasis post swallow with trials of solids and whole barium tablet.  Patient's cognitive status impacting utilization of compensatory strategies. Patient with decreased command following and attention, requiring moderate cues to participate and utilize second swallows to clear residuals.  Alternating bites/sips noted to reduce residuals.  When awake and alert, Patient appearing safe to re-initiate Puree diet with thin liquids, medications crushed in puree, provided strict aspiration precautions and 1:1 assistance with meals; However aspiration risk remains 2/2 cognition. Patient might require consult with nutritionist and supplemental means nutrition/hydration to ensure adequate nutrition/hydration.     Prognosis: Fair provided participation with therapy and acceptance of PO intake.     Barriers:  · Fatigue  · Cognitive status    Plan  ST to continue to f/u at the bedside to provide dysphagia therapy, monitor tolerance and assess safety/feasibility of PO upgrade.     Education  Results were discussed with patient. Results were discussed with Medical Team who was in agreement with plan.  Results were reviewed with Patient's nurse and Spouse    Goals:    SLP Goals        Problem: SLP Goal    Goal  Priority Disciplines Outcome   SLP Goal     SLP Ongoing (interventions implemented as appropriate)   Description:  Speech Language Pathology Goals  Goals expected to be met by 8/3/18  1.  Pt will tolerate puree diet with thin liquids with no s/s of aspiration, MIN A  2. Pt will follow simple commands with 90% accuracy, MIN A  3. Pt will respond to simple yes/no questions with 90% accuracy, MOD A  4, Pt will tespond to simple word finding tasks with 80% accuracy, MIN A  5.  Assess visual spatial skills    6. Pt will tolerate trials of advanced textures with adequate oral clearance and no overt S/S aspiration, MIN A  7. Educate Pt and family on SLP role and S/S aspiration   Goals expected to be met by 8/2/18  1.  Tolerate dental soft diet with thin liquids with no s/s of aspiration.   2.  Follow simple commands with 90% accuracy  3.  Respond to simple yes/no questions with 90% accuracy  4,  Respond to simple word finding tasks with 80% accuracy  5.  Assess visual spatial skills                       Plan:   · Patient to be seen:  Therapy Frequency: 4 x/week   · Plan of Care expires:  08/23/18  · Plan of Care reviewed with:  patient, spouse        Discharge recommendations:  nursing facility, skilled   Barriers to Discharge:  None    Time Tracking:   SLP Treatment Date:   08/02/18  Speech Start Time:  1526  Speech Stop Time:  1546     Speech Total Time (min):  20 min    MARCIANO Castañeda, [facility]-SLP  Speech-Language Pathology  Pager: [phone]      08/02/2018

## 2018-08-02 NOTE — PROGRESS NOTES
[facility]  Vascular Neurology  Comprehensive Stroke Center  Progress Note    Assessment/Plan:     * 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    67 yo M with PMHx of HTN, HLD, DM, dementia, A. Fib, on coumadin as outpatient who was transferred to [facility] where he was reportedly admitted for R thalamic IPH w IVH on 7/9/2018. S/P EVD, Removed 7/27/18. Etiology likely hypertensive        Antithrombotics for secondary stroke prevention: Anticoagulants: holding a/c due to bleed    Statins for secondary stroke prevention and hyperlipidemia, if present: Statins: Atorvastatin- 40 mg daily    Aggressive risk factor modification: HTN, DM, HLD, Diet, Exercise, A-Fib     Rehab efforts: Occupational Therapy, PT/OT/SLP to evaluate and treat, PM&R consult     Diagnostics ordered/pending: None     VTE prophylaxis: Heparin and SCD    BP parameters: ICH: SBP <140              Vasogenic cerebral edema    Area of vasogenic cerebral edema identified when reviewing brain imaging in the R th[place]alus. There is not mild mass effect associated with it. We will continue to monitor the patients clinical exam for any worsening of symptoms which may indicate expansion of the hemorrhage or the area of the edema resulting in the clinical change. The ICH is likely 2/2 HTN.          Normocytic anemia    - Hgb continues to trend down for the past 3-4 days  - 10.8--> 8.9 today  - Unclear source, patient on SQ PPx Heparin  - No melenic stools per nursing staff  - Work-up per primary team         UTI (urinary tract infection)    - Noted on UA  - Consider ordering UCx   - Ceftriaxone 1g Q24 continued  - Afebrile overnight  - WBC improving/down-trending         Obstructive hydrocephalus    - S/P EVD, removed on 07/27  - CTH on 07/29 w/ stable ventricular size, possibly mildly increased        Dysphagia    - NG tube in place  - MBSS ordered and pending         ICH (intracerebral hemorrhage)               Cardiomyopathy    - Latest Echo with Low normal left ventricular systolic function.   - Continue BB and ARB        Persistent atrial fibrillation    - Stroke risk factor  - Holding anticoagulation due to bleed  - Continue rate control and amiodarone and metoprolol         Dyslipidemia    - Stroke risk factor  - Continue Atorvastatin 40mg QHS        Essential hypertension    SBP goal <140   Continue amlodipine, metoprolol, hydralazine, and losartan  Labetalol PRN          Type 2 diabetes mellitus with diabetic polyneuropathy, with long-term current use of insulin    - Stroke risk factor. A1C 7.2  - Continue Insulin regimen              7/21: EVD at bedside by NSGY  7/23 EVD in place lowered to 5cm h2o per nsgy.. Blood sugars running >200 adjusted detemir. Increased hydralazine for better BP control  7/24: EVD at 5, insulin adjustments   7/25: EVD clamped per Nsgy, CTH tonight  7/26: Held tube feeds, insulin adjustments, Nsgy contacted about MSC and EVD.   7/27: Evd pulled per Nsgy, Ngt replace, transfer to stroke service  07/28/2018  Pending bed availability, largely stable neuro exam. SNF placement on discharge   07/29/2018 More somnolent and less responsive this AM. CTH stable but patient w/ fevers now.   07/30/2018 Patient a lot more responsive this AM. Animated. TF held and re-started overnight. Ceftriaxone started for UTI.   07/31/2018 Neurologically unchanged. Hgb continues to trend down.  No over signs of bleeding per nursing staff.  08/01/2018 Hgb stable today. No blood in stools. Continues to be fatigued but per nursing, patient's mental status greatly waxes and wanes. At his best, he is able to work with PT/OT and mumble but understands speech.   08/02/2018 H&H stable, attempting to repeat MBSS today pending patient's level of alertness    STROKE DOCUMENTATION        NIH Scale:  1a. Level Of Consciousness: 1-->Not alert: but arousable by minor stimulation to obey, answer, or respond  1b. LOC  Questions: 2-->Answers neither question correctly  1c. LOC Commands: 1-->Performs one task correctly  2. Best Gaze: 1-->Partial gaze palsy: gaze is abnormal in one or both eyes, but forced deviation or total gaze paresis is not present  3. Visual: 2-->Complete hemianopia  4. Facial Palsy: 1-->Minor paralysis (flattened nasolabial fold, asymmetry on smiling)  5a. Motor Arm, Left: 2-->Some effort against gravity: limb cannot get to or maintain (if cued) 90 (or 45) degrees, drifts down to bed, but has some effort against gravity  5b. Motor Arm, Right: 0-->No drift: limb holds 90 (or 45) degrees for full 10 secs  6a. Motor Leg, Left: 3-->No effort against gravity: leg falls to bed immediately  6b. Motor Leg, Right: 3-->No effort against gravity: leg falls to bed immediately  7. Limb Ataxia: 0-->Absent  8. Sensory: 1-->Mild-to-moderate sensory loss: patient feels pinprick is less sharp or is dull on the affected side: or there is a loss of superficial pain with pinprick, but patient is aware of being touched  9. Best Language: 1-->Mild-to-moderate aphasia: some obvious loss of fluency or facility of comprehension, without significant limitation on ideas expressed or form of expression. Reduction of speech and/or comprehension, however, makes conversation. . . (see row details)  10. Dysarthria: 1-->Mild-to-moderate dysarthria: patient slurs at least some words and, at worst, can be understood with some difficulty  11. Extinction and Inattention (formerly Neglect): 0-->No abnormality  Total (NIH Stroke Scale): 19       Modified Tanisha    Lucian Coma Scale:    ABCD2 Score:    [id number] Score:6  HAS -BLED Score:2  ICH Score:3  Hunt & Fox Classification:      Hemorrhagic change of an Ischemic Stroke: Does this patient have an ischemic stroke with hemorrhagic changes? No     Neurologic Chief Complaint: ICH    Subjective:     Interval History: Patient is seen for follow-up neurological assessment and treatment  recommendations: NAEON, no new complaints, exam appears effort dependent    HPI, Past Medical, Family, and Social History remains the same as documented in the initial encounter.     Review of Systems   Constitutional: Negative for chills and fever.   Respiratory: Positive for cough.    Gastrointestinal: Negative for vomiting.   Neurological: Positive for facial asymmetry, speech difficulty and weakness.     Scheduled Meds:   albuterol-ipratropium  3 mL Nebulization Q4H    amiodarone  200 mg Per NG tube Daily    amLODIPine  10 mg Per NG tube Daily    atorvastatin  40 mg Per NG tube Daily    cefTRIAXone (ROCEPHIN) IVPB  1 g Intravenous Q24H    heparin (porcine)  5,000 Units Subcutaneous Q8H    hydrALAZINE  75 mg Per NG tube Q8H    insulin aspart U-100  5 Units Subcutaneous Q4H    insulin detemir U-100  10 Units Subcutaneous BID    losartan  100 mg Per NG tube Daily    metoprolol tartrate  25 mg Per NG tube BID    modafinil  100 mg Per NG tube After lunch    modafinil  200 mg Per NG tube Daily    polyethylene glycol  17 g Per NG tube Daily    senna-docusate 8.6-50 mg  1 tablet Per NG tube BID     Continuous Infusions:  PRN Meds:acetaminophen, dextrose 50%, dextrose 50%, glucagon (human recombinant), insulin aspart U-100, labetalol, sodium chloride 0.9%    Objective:     Vital Signs (Most Recent):  Temp: 97.2 °F (36.2 °C) (08/02/18 0802)  Pulse: 81 (08/02/18 0802)  Resp: 20 (08/02/18 0802)  BP: 120/68 (08/02/18 0802)  SpO2: 96 % (08/02/18 0802)  BP Location: Left arm    Vital Signs Range (Last 24H):  Temp:  [96.5 °F (35.8 °C)-99.9 °F (37.7 °C)]   Pulse:  [69-87]   Resp:  [12-23]   BP: (120-180)/()   SpO2:  [93 %-99 %]   BP Location: Left arm    Physical Exam   Constitutional: He appears well-developed and well-nourished. No distress.   HENT:   Head: Normocephalic.   Eyes: Pupils are equal, round, and reactive to light.   Cardiovascular: Normal rate.    Pulmonary/Chest: Effort normal. No respiratory  distress.   Skin: Skin is warm and dry.   Psychiatric: His affect is blunt.   Vitals reviewed.           Neurological Exam: exam appears effort dependent, patient seems unwilling to participate  LOC: drowsy  Attention Span: poor  Language: Mild aphasia  Articulation: Dysarthria  Orientation: not oriented to person, place, or time  Visual Fields: L hemianopsia  EOM (CN III, IV, VI): R gaze preference  Pupils (CN II, III): PERRL  Facial Sensation (CN V): Normal  Facial Movement (CN VII): Lower facial weakness on the Left  Motor: Arm left  Plegia  3/5  Leg left  Plegia  2/5  Arm right  Normal  2/5  Leg right Normal 5/5  Cebellar: No evidence of appendicular or axial ataxia  Sensation: Mild sensory deficit on L   Tone: normal     Laboratory:  BMP:     Recent Labs  Lab 08/02/18  0511      K 3.9      CO2 26   BUN 9   CREATININE 0.6   CALCIUM 9.1     CBC:     Recent Labs  Lab 08/02/18  0511   WBC 8.55   RBC 3.35*   HGB 9.6*   HCT 29.7*      MCV 89   MCH 28.7   MCHC 32.3       Diagnostic Results     Brain imaging:  CTH 7/25/18  Unchanged position of right frontal coursing ventriculostomy catheter with unchanged size and configuration of prominent ventricular system.    Stable right thalamic intraparenchymal hemorrhage with associated interventricular hemorrhage layering in the occipital horns of the lateral ventricles.    Chronic microvascular ischemic changes above with associated remote infarcts.     CTH 7/21/18  1. Interval placement of right frontal coursing ventriculostomy with unchanged size and configuration of the shunted ventricular system, which remains prominent.  2. Evolution of right thalamic intraparenchymal hemorrhage with stable mass effect.  3. Chronic microvascular ischemic change and remote infarcts as above.       CTH: 1. Redemonstration of evolving right thalamic hemorrhage with mild adjacent edema and slight localized mass effect.  Subtle, subcentimeter focus of hyperdensity within  the left thalamus, concerning for additional focus of evolving hemorrhage.  2. Persistent interventricular extension of hemorrhage with unchanged dilatation of the ventricular system concerning for component of hydrocephalus.  3. Generalized cerebral volume loss, chronic microvascular ischemic change and remote infarcts as above.     CTH ( 07/29/2018)  Evolving right thalamic intraparenchymal hematoma with intraventricular extension, as above, without detrimental change from 07/29/2018 CT at 12:24 p.m. No new infarct or hemorrhage.    Persistent diffuse enlargement of the ventricular system, unchanged.  Small focus of pneumocephalus in the right frontal horn persists.  Vessel Imaging:  None     Cardiac Evaluation:   ECHO- CONCLUSIONS     1 - Low normal left ventricular systolic function.     2 - Indeterminate LV diastolic function.     3 - Normal right ventricular systolic function .     4 - There is anterior pericardial fat pad..                Kelsie Carter PA-C  Comprehensive Stroke Center  Department of Vascular Neurology   [facility]

## 2018-08-02 NOTE — PT/OT/SLP PROGRESS
"Speech Language Pathology Treatment    Patient Name:  Ciro Chandler   MRN:  [id number]  Admitting Diagnosis: 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  Diagnoses of ICH (intracerebral hemorrhage), Atrial fibrillation, Right-sided nontraumatic intracerebral hemorrhage of brainstem, and Nontraumatic intraventricular intracerebral hemorrhage were pertinent to this visit.    Recommendations:                 General Recommendations:  Dysphagia therapy, Speech/language therapy and Modified barium swallow study  Diet recommendations:  NPO, Liquid Diet Level: NPO   Aspiration Precautions: Continue alternate means of nutrition, Ice chips sparingly and Strict aspiration precautions   General Precautions: Standard, aspiration, fall  Communication strategies:  provide increased time to answer and go to room if call light pushed    Subjective     SLP reviewed Pt with nurse and MD team, NP asked for MBSS to remain on schedule and monitor JAMIN   Pt presented lethargic  Pt with occasional moaning, otherwise unable to elicit spontaneous speech  Spouse says, "He was just talking to us, he can talk"  Daughter says, "He is keeping his eyes shut but he hears you, Dad tell her your name"  Patient goals: CHESTER 2/2 minimal participation with ST    Pain/Comfort:  · Pain Rating 1: 0/10    Objective:     Has the patient been evaluated by SLP for swallowing?   Yes  Keep patient NPO? Yes   Current Respiratory Status: room air      Pt seen for speech therapy. Pt found awake and alert with family (Spouse, daughter) at bedside. Pt closes eyes as SLP initiates session. Pt requires MAX A to follow prompts to open eyes 2/5 attempts. He follows basic commands with 20% accuracy (protruded tongue x1 , opened mouth x1) upon review of the oral mechanism provided maximum cueing from SLP and family at bedside. SLP unable to elicit automatic speech or spontaneous speech tasks, as Pt keeps eyes closed and moans across SLP attempts to continue " session. SLP educated Pt on family on SLP role, pending orders for MBSS later service day and ongoing SLP POC. When asked by his daughter if he would participate in MBSS, Patient nods head yes. Patient's spouse and Daughter verbalize understanding of SLP POC. Patient not able to demonstrate understanding and requires ongoing review/reinforcement. No additional questions. Whiteboard current.     Assessment:     Ciro Chandler is a 68 y.o. male with an SLP diagnosis of Dysphagia and Cognitive-Linguistic Impairment.  He presents with minimal participation with ST today.      Goals:    SLP Goals        Problem: SLP Goal    Goal Priority Disciplines Outcome   SLP Goal     SLP Ongoing (interventions implemented as appropriate)   Description:  Speech Language Pathology Goals  Goals expected to be met by 8/2/18  1.  Tolerate dental soft diet with thin liquids with no s/s of aspiration.  2.  Follow simple commands with 90% accuracy  3.  Respond to simple yes/no questions with 90% accuracy  4,  Respond to simple word finding tasks with 80% accuracy  5.  Assess visual spatial skills                   Plan:     · Patient to be seen:  4 x/week   · Plan of Care expires:  08/23/18  · Plan of Care reviewed with:  patient, spouse, daughter   · SLP Follow-Up:  Yes       Discharge recommendations:  nursing facility, skilled   Barriers to Discharge:  means of nutrition/hydration/medication not yet established, Pt NPO with NG    Time Tracking:     SLP Treatment Date:   08/02/18  Speech Start Time:  1408  Speech Stop Time:  1419     Speech Total Time (min):  11 min    Billable Minutes: Speech Therapy Individual 11    MARCIANO Castañeda, [facility]-SLP  Speech-Language Pathology  Pager: [phone]    08/02/2018

## 2018-08-02 NOTE — PLAN OF CARE
SW sent clinical updates to [facility] foe review. FOZIA will follow up.    Judy Brooks LMSW  [facility]  Ext. [phone]

## 2018-08-02 NOTE — PLAN OF CARE
Problem: SLP Goal  Goal: SLP Goal  Speech Language Pathology Goals  Goals expected to be met by 8/3/18  1.  Pt will tolerate puree diet with thin liquids with no s/s of aspiration, MIN A  2. Pt will follow simple commands with 90% accuracy, MIN A  3. Pt will respond to simple yes/no questions with 90% accuracy, MOD A  4, Pt will tespond to simple word finding tasks with 80% accuracy, MIN A  5.  Assess visual spatial skills    6. Pt will tolerate trials of advanced textures with adequate oral clearance and no overt S/S aspiration, MIN A  7. Educate Pt and family on SLP role and S/S aspiration   Goals expected to be met by 8/2/18  1.  Tolerate dental soft diet with thin liquids with no s/s of aspiration.   2.  Follow simple commands with 90% accuracy  3.  Respond to simple yes/no questions with 90% accuracy  4,  Respond to simple word finding tasks with 80% accuracy  5.  Assess visual spatial skills     Outcome: Ongoing (interventions implemented as appropriate)  Modified Barium Swallow Study completed. Patient requires maximum, consistent cueing to accept PO trials this service day. Patient presents with Moderate Oral Phase Dysphagia and Mild Pharyngeal Phase Dysphagia as characterized by oral holding, prolonged A-P transit with premature spillage. No aspiration visualized; however, Patient aspiration risk remains secondary to cognitive status. REC: Puree diet with thin liquids, medications crushed in puree, and strict 1:1 assistance with all PO intake for safety, and frequent oral care. Supplemental, alternative means nutrition/hydration/medication might be required to ensure adequate nutrition/hydration due to minimal acceptance of PO trials and waxing/waning level of attention. Findings and recommendations reviewed with Patient, nurse and MD team. Thank you.     MIKE Castañeda., [facility]-SLP  Speech-Language Pathology  Pager: [phone]  8/2/2018

## 2018-08-02 NOTE — SUBJECTIVE & OBJECTIVE
Neurologic Chief Complaint: ICH    Subjective:     Interval History: Patient is seen for follow-up neurological assessment and treatment recommendations: NAEON, no new complaints, exam appears effort dependent    HPI, Past Medical, Family, and Social History remains the same as documented in the initial encounter.     Review of Systems   Constitutional: Negative for chills and fever.   Respiratory: Positive for cough.    Gastrointestinal: Negative for vomiting.   Neurological: Positive for facial asymmetry, speech difficulty and weakness.     Scheduled Meds:   albuterol-ipratropium  3 mL Nebulization Q4H    amiodarone  200 mg Per NG tube Daily    amLODIPine  10 mg Per NG tube Daily    atorvastatin  40 mg Per NG tube Daily    cefTRIAXone (ROCEPHIN) IVPB  1 g Intravenous Q24H    heparin (porcine)  5,000 Units Subcutaneous Q8H    hydrALAZINE  75 mg Per NG tube Q8H    insulin aspart U-100  5 Units Subcutaneous Q4H    insulin detemir U-100  10 Units Subcutaneous BID    losartan  100 mg Per NG tube Daily    metoprolol tartrate  25 mg Per NG tube BID    modafinil  100 mg Per NG tube After lunch    modafinil  200 mg Per NG tube Daily    polyethylene glycol  17 g Per NG tube Daily    senna-docusate 8.6-50 mg  1 tablet Per NG tube BID     Continuous Infusions:  PRN Meds:acetaminophen, dextrose 50%, dextrose 50%, glucagon (human recombinant), insulin aspart U-100, labetalol, sodium chloride 0.9%    Objective:     Vital Signs (Most Recent):  Temp: 97.2 °F (36.2 °C) (08/02/18 0802)  Pulse: 81 (08/02/18 0802)  Resp: 20 (08/02/18 0802)  BP: 120/68 (08/02/18 0802)  SpO2: 96 % (08/02/18 0802)  BP Location: Left arm    Vital Signs Range (Last 24H):  Temp:  [96.5 °F (35.8 °C)-99.9 °F (37.7 °C)]   Pulse:  [69-87]   Resp:  [12-23]   BP: (120-180)/()   SpO2:  [93 %-99 %]   BP Location: Left arm    Physical Exam   Constitutional: He appears well-developed and well-nourished. No distress.   HENT:   Head: Normocephalic.    Eyes: Pupils are equal, round, and reactive to light.   Cardiovascular: Normal rate.    Pulmonary/Chest: Effort normal. No respiratory distress.   Skin: Skin is warm and dry.   Psychiatric: His affect is blunt.   Vitals reviewed.           Neurological Exam: exam appears effort dependent, patient seems unwilling to participate  LOC: drowsy  Attention Span: poor  Language: Mild aphasia  Articulation: Dysarthria  Orientation: not oriented to person, place, or time  Visual Fields: L hemianopsia  EOM (CN III, IV, VI): R gaze preference  Pupils (CN II, III): PERRL  Facial Sensation (CN V): Normal  Facial Movement (CN VII): Lower facial weakness on the Left  Motor: Arm left  Plegia 3/5  Leg left  Plegia 2/5  Arm right  Normal 2/5  Leg right Normal 5/5  Cebellar: No evidence of appendicular or axial ataxia  Sensation: Mild sensory deficit on L   Tone: normal     Laboratory:  BMP:     Recent Labs  Lab 08/02/18  0511      K 3.9      CO2 26   BUN 9   CREATININE 0.6   CALCIUM 9.1     CBC:     Recent Labs  Lab 08/02/18  0511   WBC 8.55   RBC 3.35*   HGB 9.6*   HCT 29.7*      MCV 89   MCH 28.7   MCHC 32.3       Diagnostic Results     Brain imaging:  CTH 7/25/18  Unchanged position of right frontal coursing ventriculostomy catheter with unchanged size and configuration of prominent ventricular system.    Stable right thalamic intraparenchymal hemorrhage with associated interventricular hemorrhage layering in the occipital horns of the lateral ventricles.    Chronic microvascular ischemic changes above with associated remote infarcts.     CTH 7/21/18  1. Interval placement of right frontal coursing ventriculostomy with unchanged size and configuration of the shunted ventricular system, which remains prominent.  2. Evolution of right thalamic intraparenchymal hemorrhage with stable mass effect.  3. Chronic microvascular ischemic change and remote infarcts as above.       CTH: 1. Redemonstration of evolving  right thalamic hemorrhage with mild adjacent edema and slight localized mass effect.  Subtle, subcentimeter focus of hyperdensity within the left thalamus, concerning for additional focus of evolving hemorrhage.  2. Persistent interventricular extension of hemorrhage with unchanged dilatation of the ventricular system concerning for component of hydrocephalus.  3. Generalized cerebral volume loss, chronic microvascular ischemic change and remote infarcts as above.     CTH ( 07/29/2018)  Evolving right thalamic intraparenchymal hematoma with intraventricular extension, as above, without detrimental change from 07/29/2018 CT at 12:24 p.m. No new infarct or hemorrhage.    Persistent diffuse enlargement of the ventricular system, unchanged.  Small focus of pneumocephalus in the right frontal horn persists.  Vessel Imaging:  None     Cardiac Evaluation:   ECHO- CONCLUSIONS     1 - Low normal left ventricular systolic function.     2 - Indeterminate LV diastolic function.     3 - Normal right ventricular systolic function .     4 - There is anterior pericardial fat pad..

## 2018-08-03 LAB
ANION GAP SERPL CALC-SCNC: 11 MMOL/L
BACTERIA BLD CULT: NORMAL
BACTERIA BLD CULT: NORMAL
BASOPHILS # BLD AUTO: 0.02 K/UL
BASOPHILS NFR BLD: 0.2 %
BUN SERPL-MCNC: 9 MG/DL
CALCIUM SERPL-MCNC: 9.4 MG/DL
CHLORIDE SERPL-SCNC: 100 MMOL/L
CO2 SERPL-SCNC: 26 MMOL/L
CREAT SERPL-MCNC: 0.7 MG/DL
DIFFERENTIAL METHOD: ABNORMAL
EOSINOPHIL # BLD AUTO: 0.3 K/UL
EOSINOPHIL NFR BLD: 3.6 %
ERYTHROCYTE [DISTWIDTH] IN BLOOD BY AUTOMATED COUNT: 12.5 %
EST. GFR  (AFRICAN AMERICAN): >60 ML/MIN/1.73 M^2
EST. GFR  (NON AFRICAN AMERICAN): >60 ML/MIN/1.73 M^2
GLUCOSE SERPL-MCNC: 77 MG/DL
HCT VFR BLD AUTO: 30.8 %
HGB BLD-MCNC: 10.2 G/DL
IMM GRANULOCYTES # BLD AUTO: 0.07 K/UL
IMM GRANULOCYTES NFR BLD AUTO: 0.8 %
LYMPHOCYTES # BLD AUTO: 1 K/UL
LYMPHOCYTES NFR BLD: 11.3 %
MAGNESIUM SERPL-MCNC: 1.8 MG/DL
MCH RBC QN AUTO: 29.1 PG
MCHC RBC AUTO-ENTMCNC: 33.1 G/DL
MCV RBC AUTO: 88 FL
MONOCYTES # BLD AUTO: 0.7 K/UL
MONOCYTES NFR BLD: 7.9 %
NEUTROPHILS # BLD AUTO: 6.4 K/UL
NEUTROPHILS NFR BLD: 76.2 %
NRBC BLD-RTO: 0 /100 WBC
PHOSPHATE SERPL-MCNC: 4 MG/DL
PLATELET # BLD AUTO: 310 K/UL
PMV BLD AUTO: 11.6 FL
POCT GLUCOSE: 131 MG/DL (ref 70–110)
POCT GLUCOSE: 196 MG/DL (ref 70–110)
POCT GLUCOSE: 314 MG/DL (ref 70–110)
POCT GLUCOSE: 364 MG/DL (ref 70–110)
POCT GLUCOSE: 83 MG/DL (ref 70–110)
POTASSIUM SERPL-SCNC: 3.8 MMOL/L
RBC # BLD AUTO: 3.51 M/UL
SODIUM SERPL-SCNC: 137 MMOL/L
WBC # BLD AUTO: 8.43 K/UL

## 2018-08-03 PROCEDURE — 20600001 HC STEP DOWN PRIVATE ROOM

## 2018-08-03 PROCEDURE — 94640 AIRWAY INHALATION TREATMENT: CPT

## 2018-08-03 PROCEDURE — 97530 THERAPEUTIC ACTIVITIES: CPT

## 2018-08-03 PROCEDURE — 36415 COLL VENOUS BLD VENIPUNCTURE: CPT

## 2018-08-03 PROCEDURE — 85025 COMPLETE CBC W/AUTO DIFF WBC: CPT

## 2018-08-03 PROCEDURE — 99900035 HC TECH TIME PER 15 MIN (STAT)

## 2018-08-03 PROCEDURE — 84100 ASSAY OF PHOSPHORUS: CPT

## 2018-08-03 PROCEDURE — 80048 BASIC METABOLIC PNL TOTAL CA: CPT

## 2018-08-03 PROCEDURE — 83735 ASSAY OF MAGNESIUM: CPT

## 2018-08-03 PROCEDURE — 63600175 PHARM REV CODE 636 W HCPCS: Performed by: NURSE PRACTITIONER

## 2018-08-03 PROCEDURE — 25000003 PHARM REV CODE 250: Performed by: STUDENT IN AN ORGANIZED HEALTH CARE EDUCATION/TRAINING PROGRAM

## 2018-08-03 PROCEDURE — 94668 MNPJ CHEST WALL SBSQ: CPT

## 2018-08-03 PROCEDURE — 92507 TX SP LANG VOICE COMM INDIV: CPT

## 2018-08-03 PROCEDURE — 97112 NEUROMUSCULAR REEDUCATION: CPT

## 2018-08-03 PROCEDURE — 94761 N-INVAS EAR/PLS OXIMETRY MLT: CPT

## 2018-08-03 PROCEDURE — 25000003 PHARM REV CODE 250: Performed by: PHYSICIAN ASSISTANT

## 2018-08-03 PROCEDURE — A4216 STERILE WATER/SALINE, 10 ML: HCPCS | Performed by: STUDENT IN AN ORGANIZED HEALTH CARE EDUCATION/TRAINING PROGRAM

## 2018-08-03 PROCEDURE — 99233 SBSQ HOSP IP/OBS HIGH 50: CPT | Mod: ,,, | Performed by: PSYCHIATRY & NEUROLOGY

## 2018-08-03 PROCEDURE — 25000242 PHARM REV CODE 250 ALT 637 W/ HCPCS: Performed by: PHYSICIAN ASSISTANT

## 2018-08-03 RX ORDER — HYDRALAZINE HYDROCHLORIDE 50 MG/1
100 TABLET, FILM COATED ORAL EVERY 8 HOURS
Status: DISCONTINUED | OUTPATIENT
Start: 2018-08-03 | End: 2018-08-09 | Stop reason: HOSPADM

## 2018-08-03 RX ADMIN — HEPARIN SODIUM 5000 UNITS: 5000 INJECTION, SOLUTION INTRAVENOUS; SUBCUTANEOUS at 05:08

## 2018-08-03 RX ADMIN — AMLODIPINE BESYLATE 10 MG: 10 TABLET ORAL at 09:08

## 2018-08-03 RX ADMIN — METOPROLOL TARTRATE 25 MG: 25 TABLET ORAL at 09:08

## 2018-08-03 RX ADMIN — MODAFINIL 200 MG: 100 TABLET ORAL at 05:08

## 2018-08-03 RX ADMIN — HEPARIN SODIUM 5000 UNITS: 5000 INJECTION, SOLUTION INTRAVENOUS; SUBCUTANEOUS at 09:08

## 2018-08-03 RX ADMIN — AMIODARONE HYDROCHLORIDE 200 MG: 200 TABLET ORAL at 09:08

## 2018-08-03 RX ADMIN — IPRATROPIUM BROMIDE AND ALBUTEROL SULFATE 3 ML: .5; 3 SOLUTION RESPIRATORY (INHALATION) at 11:08

## 2018-08-03 RX ADMIN — INSULIN ASPART 10 UNITS: 100 INJECTION, SOLUTION INTRAVENOUS; SUBCUTANEOUS at 06:08

## 2018-08-03 RX ADMIN — INSULIN ASPART 2 UNITS: 100 INJECTION, SOLUTION INTRAVENOUS; SUBCUTANEOUS at 11:08

## 2018-08-03 RX ADMIN — MODAFINIL 100 MG: 100 TABLET ORAL at 02:08

## 2018-08-03 RX ADMIN — INSULIN DETEMIR 10 UNITS: 100 INJECTION, SOLUTION SUBCUTANEOUS at 09:08

## 2018-08-03 RX ADMIN — SENNOSIDES AND DOCUSATE SODIUM 1 TABLET: 8.6; 5 TABLET ORAL at 09:08

## 2018-08-03 RX ADMIN — SODIUM CHLORIDE, PRESERVATIVE FREE 5 ML: 5 INJECTION INTRAVENOUS at 09:08

## 2018-08-03 RX ADMIN — INSULIN ASPART 4 UNITS: 100 INJECTION, SOLUTION INTRAVENOUS; SUBCUTANEOUS at 09:08

## 2018-08-03 RX ADMIN — LOSARTAN POTASSIUM 100 MG: 50 TABLET, FILM COATED ORAL at 09:08

## 2018-08-03 RX ADMIN — HEPARIN SODIUM 5000 UNITS: 5000 INJECTION, SOLUTION INTRAVENOUS; SUBCUTANEOUS at 02:08

## 2018-08-03 RX ADMIN — INSULIN DETEMIR 10 UNITS: 100 INJECTION, SOLUTION SUBCUTANEOUS at 12:08

## 2018-08-03 RX ADMIN — ATORVASTATIN CALCIUM 40 MG: 20 TABLET, FILM COATED ORAL at 09:08

## 2018-08-03 RX ADMIN — HYDRALAZINE HYDROCHLORIDE 100 MG: 50 TABLET ORAL at 02:08

## 2018-08-03 RX ADMIN — IPRATROPIUM BROMIDE AND ALBUTEROL SULFATE 3 ML: .5; 3 SOLUTION RESPIRATORY (INHALATION) at 03:08

## 2018-08-03 RX ADMIN — HYDRALAZINE HYDROCHLORIDE 75 MG: 25 TABLET ORAL at 05:08

## 2018-08-03 RX ADMIN — CEFTRIAXONE SODIUM 1 G: 1 INJECTION, POWDER, FOR SOLUTION INTRAMUSCULAR; INTRAVENOUS at 11:08

## 2018-08-03 RX ADMIN — IPRATROPIUM BROMIDE AND ALBUTEROL SULFATE 3 ML: .5; 3 SOLUTION RESPIRATORY (INHALATION) at 07:08

## 2018-08-03 RX ADMIN — HYDRALAZINE HYDROCHLORIDE 100 MG: 50 TABLET ORAL at 09:08

## 2018-08-03 NOTE — PLAN OF CARE
Problem: Stroke (Hemorrhagic) (Adult)  Intervention: Prevent/Manage DVT/VTE Risk  Gone over benefits of the SCDs with the pt and family at bedside.  Gone over benefits of Heparin for prophylaxis of DVTs as well.  Family verbalized understanding.

## 2018-08-03 NOTE — ASSESSMENT & PLAN NOTE
- passed MBSS  - currently on puree diet with thin liquids  - calorie counts ordered and nutrition consulted

## 2018-08-03 NOTE — CONSULTS
Consult received for calorie count. Calorie count envelope placed on pt's door 8/3. Notified Nurse. RD will check calorie count at follow up. Thanks.

## 2018-08-03 NOTE — PLAN OF CARE
Problem: Occupational Therapy Goal  Goal: Occupational Therapy Goal  Goals to be met by: 8/6/18     Patient will increase functional independence with ADLs by performing:    UE Dressing with Moderate Assistance.  LE Dressing with Max Assistance.  Grooming while seated with Min Assistance.  Toileting from bedside commode with Moderate Assistance for hygiene and clothing management.   Rolling to Bilateral with Moderate Assistance.   Supine to sit with Moderate Assistance.  Stand pivot transfers with Moderate Assistance.      Continue OT POC.     Comments: Aung Barnett OTR/L  8/3/2018

## 2018-08-03 NOTE — PROGRESS NOTES
[facility]  Vascular Neurology  Comprehensive Stroke Center  Progress Note    Assessment/Plan:     * 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    67 yo M with PMHx of HTN, HLD, DM, dementia, A. Fib, on coumadin as outpatient who [facility] where he was reportedly admitted for R thalamic IPH w IVH on 7/9/2018. S/P EVD, Removed 7/27/18. Etiology likely hypertensive        Antithrombotics for secondary stroke prevention: Anticoagulants: holding a/c due to bleed    Statins for secondary stroke prevention and hyperlipidemia, if present: Statins: Atorvastatin- 40 mg daily    Aggressive risk factor modification: HTN, DM, HLD, Diet, Exercise, A-Fib     Rehab efforts: Occupational Therapy, PT/OT/SLP to evaluate and treat, PM&R consult     Diagnostics ordered/pending: None     VTE prophylaxis: Heparin and SCD    BP parameters: ICH: SBP <140              Vasogenic cerebral edema    Area of vasogenic cerebral edema identified when reviewing brain imaging in the R [place]. There is not mild mass effect associated with it. We will continue to monitor the patients clinical exam for any worsening of symptoms which may indicate expansion of the hemorrhage or the area of the edema resulting in the clinical change. The ICH is likely 2/2 HTN.          Normocytic anemia    - Hgb downtrended from 07/30-7/31  - Unclear source, patient on SQ Heparin  - No melenic stools per nursing staff  - H&H stable since 8/1  - no apparent bleeding        UTI (urinary tract infection)    - Noted on UA  - Consider ordering UCx   - Ceftriaxone 1g Q24 continued  - Afebrile overnight  - WBC WNL        Obstructive hydrocephalus    - S/P EVD, removed on 07/27  - CTH on 07/29 w/ stable ventricular size, possibly mildly increased        Dysphagia    - passed MBSS  - currently on puree diet with thin liquids  - calorie counts ordered and nutrition consulted        ICH  (intracerebral hemorrhage)              Cardiomyopathy    - Latest Echo with Low normal left ventricular systolic function.   - Continue BB and ARB        Persistent atrial fibrillation    - Stroke risk factor  - Holding anticoagulation due to bleed  - Continue rate control and amiodarone and metoprolol         Dyslipidemia    - Stroke risk factor  - Continue Atorvastatin 40mg QHS        Essential hypertension    SBP goal <140   Continue amlodipine, metoprolol, hydralazine, and losartan  Increased hydralazine 8/3  Labetalol PRN          Type 2 diabetes mellitus with diabetic polyneuropathy, with long-term current use of insulin    - Stroke risk factor. A1C 7.2  - Continue Insulin regimen              7/21: EVD at bedside by NSGY  7/23 EVD in place lowered to 5cm h2o per nsgy.. Blood sugars running >200 adjusted detemir. Increased hydralazine for better BP control  7/24: EVD at 5, insulin adjustments   7/25: EVD clamped per Nsgy, CT tonight  7/26: Held tube feeds, insulin adjustments, Nsgy contacted about MSC and EVD.   7/27: Evd pulled per Nsgy, Ngt replace, transfer to stroke service  07/28/2018  Pending bed availability, largely stable neuro exam. SNF placement on discharge   07/29/2018 More somnolent and less responsive this AM. CTH stable but patient w/ fevers now.   07/30/2018 Patient a lot more responsive this AM. Animated. TF held and re-started overnight. Ceftriaxone started for UTI.   07/31/2018 Neurologically unchanged. Hgb continues to trend down.  No over signs of bleeding per nursing staff.  08/01/2018 Hgb stable today. No blood in stools. Continues to be fatigued but per nursing, patient's mental status greatly waxes and wanes. At his best, he is able to work with PT/OT and mumble but understands speech.   08/02/2018 H&H stable, attempting to repeat MBSS today pending patient's level of alertness  08/03/2018 passed MBSS yesterday and started on a puree diet with thin liquids, calorie counts ordered,  increased hydralazine    STROKE DOCUMENTATION        NIH Scale:  1a. Level Of Consciousness: 1-->Not alert: but arousable by minor stimulation to obey, answer, or respond  1b. LOC Questions: 2-->Answers neither question correctly  1c. LOC Commands: 1-->Performs one task correctly  2. Best Gaze: 1-->Partial gaze palsy: gaze is abnormal in one or both eyes, but forced deviation or total gaze paresis is not present  3. Visual: 2-->Complete hemianopia  4. Facial Palsy: 1-->Minor paralysis (flattened nasolabial fold, asymmetry on smiling)  5a. Motor Arm, Left: 2-->Some effort against gravity: limb cannot get to or maintain (if cued) 90 (or 45) degrees, drifts down to bed, but has some effort against gravity  5b. Motor Arm, Right: 0-->No drift: limb holds 90 (or 45) degrees for full 10 secs  6a. Motor Leg, Left: 3-->No effort against gravity: leg falls to bed immediately  6b. Motor Leg, Right: 3-->No effort against gravity: leg falls to bed immediately  7. Limb Ataxia: 0-->Absent  8. Sensory: 1-->Mild-to-moderate sensory loss: patient feels pinprick is less sharp or is dull on the affected side: or there is a loss of superficial pain with pinprick, but patient is aware of being touched  9. Best Language: 1-->Mild-to-moderate aphasia: some obvious loss of fluency or facility of comprehension, without significant limitation on ideas expressed or form of expression. Reduction of speech and/or comprehension, however, makes conversation. . . (see row details)  10. Dysarthria: 1-->Mild-to-moderate dysarthria: patient slurs at least some words and, at worst, can be understood with some difficulty  11. Extinction and Inattention (formerly Neglect): 0-->No abnormality  Total (NIH Stroke Scale): 19       Modified Swisher    Lucian Coma Scale:    ABCD2 Score:    [id number] Score:6  HAS -BLED Score:2  ICH Score:3  Hunt & Fox Classification:      Hemorrhagic change of an Ischemic Stroke: Does this patient have an ischemic stroke  with hemorrhagic changes? No     Neurologic Chief Complaint: ICH    Subjective:     Interval History: Patient is seen for follow-up neurological assessment and treatment recommendations: NAEON, no new complaints, exam appears effort dependent    HPI, Past Medical, Family, and Social History remains the same as documented in the initial encounter.     Review of Systems   Constitutional: Negative for chills and fever.   HENT: Positive for trouble swallowing.    Respiratory: Positive for cough.    Gastrointestinal: Negative for vomiting.   Neurological: Positive for facial asymmetry, speech difficulty and weakness.     Scheduled Meds:   albuterol-ipratropium  3 mL Nebulization Q4H    amiodarone  200 mg Oral Daily    amLODIPine  10 mg Oral Daily    atorvastatin  40 mg Oral Daily    cefTRIAXone (ROCEPHIN) IVPB  1 g Intravenous Q24H    heparin (porcine)  5,000 Units Subcutaneous Q8H    hydrALAZINE  100 mg Oral Q8H    insulin detemir U-100  10 Units Subcutaneous BID    losartan  100 mg Oral Daily    metoprolol tartrate  25 mg Oral BID    modafinil  100 mg Oral After lunch    modafinil  200 mg Oral Daily    polyethylene glycol  17 g Oral Daily    senna-docusate 8.6-50 mg  1 tablet Oral BID     Continuous Infusions:  PRN Meds:acetaminophen, dextrose 50%, dextrose 50%, glucagon (human recombinant), insulin aspart U-100, labetalol, sodium chloride 0.9%    Objective:     Vital Signs (Most Recent):  Temp: 98.3 °F (36.8 °C) (08/03/18 1144)  Pulse: 67 (08/03/18 1144)  Resp: 18 (08/03/18 1144)  BP: 127/64 (08/03/18 1144)  SpO2: (!) 94 % (08/03/18 1144)  BP Location: Left arm    Vital Signs Range (Last 24H):  Temp:  [98.3 °F (36.8 °C)-100.2 °F (37.9 °C)]   Pulse:  [60-92]   Resp:  [16-19]   BP: (122-162)/(62-76)   SpO2:  [91 %-99 %]   BP Location: Left arm    Physical Exam   Constitutional: He appears well-developed and well-nourished. No distress.   HENT:   Head: Normocephalic.   Eyes: Pupils are equal, round, and  reactive to light.   Cardiovascular: Normal rate.    Pulmonary/Chest: Effort normal. No respiratory distress.   Skin: Skin is warm and dry.   Psychiatric: His affect is blunt.   Vitals reviewed.           Neurological Exam: exam appears effort dependent, patient seems unwilling to participate  LOC: drowsy  Attention Span: poor  Language: Mild aphasia  Articulation: Dysarthria  Orientation: not oriented to person, place, or time  Visual Fields: L hemianopsia  EOM (CN III, IV, VI): R gaze preference  Pupils (CN II, III): PERRL  Facial Sensation (CN V): Normal  Facial Movement (CN VII): Lower facial weakness on the Left  Motor: Arm left  Plegia 3/5  Leg left  Plegia 2/5  Arm right  Normal 2/5  Leg right Normal 5/5  Cebellar: No evidence of appendicular or axial ataxia  Sensation: Mild sensory deficit on L   Tone: normal     Laboratory:  BMP:     Recent Labs  Lab 08/03/18  0533      K 3.8      CO2 26   BUN 9   CREATININE 0.7   CALCIUM 9.4     CBC:     Recent Labs  Lab 08/03/18  0533   WBC 8.43   RBC 3.51*   HGB 10.2*   HCT 30.8*      MCV 88   MCH 29.1   MCHC 33.1       Diagnostic Results     Brain imaging:  CTH 7/25/18  Unchanged position of right frontal coursing ventriculostomy catheter with unchanged size and configuration of prominent ventricular system.    Stable right thalamic intraparenchymal hemorrhage with associated interventricular hemorrhage layering in the occipital horns of the lateral ventricles.    Chronic microvascular ischemic changes above with associated remote infarcts.     CTH 7/21/18  1. Interval placement of right frontal coursing ventriculostomy with unchanged size and configuration of the shunted ventricular system, which remains prominent.  2. Evolution of right thalamic intraparenchymal hemorrhage with stable mass effect.  3. Chronic microvascular ischemic change and remote infarcts as above.       CTH: 1. Redemonstration of evolving right thalamic hemorrhage with mild  adjacent edema and slight localized mass effect.  Subtle, subcentimeter focus of hyperdensity within the left thalamus, concerning for additional focus of evolving hemorrhage.  2. Persistent interventricular extension of hemorrhage with unchanged dilatation of the ventricular system concerning for component of hydrocephalus.  3. Generalized cerebral volume loss, chronic microvascular ischemic change and remote infarcts as above.     CTH ( 07/29/2018)  Evolving right thalamic intraparenchymal hematoma with intraventricular extension, as above, without detrimental change from 07/29/2018 CT at 12:24 p.m. No new infarct or hemorrhage.    Persistent diffuse enlargement of the ventricular system, unchanged.  Small focus of pneumocephalus in the right frontal horn persists.  Vessel Imaging:  None     Cardiac Evaluation:   ECHO- CONCLUSIONS     1 - Low normal left ventricular systolic function.     2 - Indeterminate LV diastolic function.     3 - Normal right ventricular systolic function .     4 - There is anterior pericardial fat pad..                Kelsie Carter PA-C  Comprehensive Stroke Center  Department of Vascular Neurology   [facility]

## 2018-08-03 NOTE — PLAN OF CARE
Problem: SLP Goal  Goal: SLP Goal  Speech Language Pathology Goals  Goals expected to be met by 8/3/18  1.  Pt will tolerate puree diet with thin liquids with no s/s of aspiration, MIN A  2. Pt will follow simple commands with 90% accuracy, MIN A  3. Pt will respond to simple yes/no questions with 90% accuracy, MOD A  4, Pt will tespond to simple word finding tasks with 80% accuracy, MIN A  5.  Assess visual spatial skills    6. Pt will tolerate trials of advanced textures with adequate oral clearance and no overt S/S aspiration, MIN A  7. Educate Pt and family on SLP role and S/S aspiration   Goals expected to be met by 8/2/18  1.  Tolerate dental soft diet with thin liquids with no s/s of aspiration.   2.  Follow simple commands with 90% accuracy  3.  Respond to simple yes/no questions with 90% accuracy  4,  Respond to simple word finding tasks with 80% accuracy  5.  Assess visual spatial skills      Outcome: Ongoing (interventions implemented as appropriate)  Pt ongoing with current goals. Strict aspiration precautions advised. Please continue to monitor for signs and symptoms of aspiration and discontinue oral feeding should you notice any of the following: watery eyes, reddened facial area, wet vocal quality, increased work of breathing, change in respiratory status, increased congestion, coughing, fever, and.or change in CXR. ST to continue to follow.     MIKE Castañeda., [facility]-SLP  Speech-Language Pathology  Pager: [phone]  8/3/2018

## 2018-08-03 NOTE — PT/OT/SLP PROGRESS
Occupational Therapy   Treatment    Name: Ciro Chandler  MRN: [id number]  Admitting Diagnosis:  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       Recommendations:     Discharge Recommendations: nursing facility, skilled  Discharge Equipment Recommendations:  hospital bed, lift device  Barriers to discharge:  None    Subjective     Communicated with: RN prior to session.  Pain/Comfort:  · Pain Rating 1: 0/10  · Pain Rating Post-Intervention 1: 0/10    Patients cultural, spiritual, Rastafarian conflicts given the current situation: none stated    Objective:     Patient found with: telemetry, SCD, pressure relief boots      Pt received w/ HOB elevated. Pt lethargic throughout session. Pt requiring constant verbal cues to maintain arousal. Pt did not verbalize or signal understanding when asked to follow commands.       General Precautions: Standard, aspiration, fall, pureed diet   Orthopedic Precautions:N/A   Braces: N/A     Occupational Performance:    Bed Mobility:    · Patient completed Scooting/Bridging with total assistance  · Patient completed Supine to Sit with total assistance  · Patient completed Sit to Supine with total assistance     Functional Mobility/Transfers:  · Not appropriate at this time.     Activities of Daily Living:  · Grooming: total assistance [facility] for facial hygiene while seated EOB    Patient left HOB elevated with all lines intact, call button in reach and daughter present    [facility] 6 Click:  [facility] Total Score: 6    Treatment & Education:  Pt sat EOB ~12-15 minutes at total assist.   Pt performed lateral and forward trunk weight shifting seated EOB requiring total assist to attain upright posture.   PROM to LUE to all joints in all planes for 5 reps w/ ~10 second holds 2* increased tone.   Pt's spouse educated on stroke rehabilitation, deficits following a stroke, and POC.     Education:    Assessment:     Ciro Chandler is a 68 y.o. male with a medical diagnosis of Nontraumatic  subcortical hemorrhage of right cerebral hemisphere.  He presents with impairments listed below. Pt displayed general global deconditioning, R sided hemiparesis, and aphasia; causing pt to require increased assist for ADLs and functional tasks at this time. Pt would benefit from skilled OT services to improve independence and overall occupational functioning.      Performance deficits affecting function are weakness, impaired endurance, impaired self care skills, impaired functional mobilty, gait instability, impaired balance, decreased lower extremity function, impaired cognition, decreased upper extremity function, decreased safety awareness, decreased ROM, abnormal tone, impaired coordination, impaired fine motor, impaired cardiopulmonary response to activity.      Rehab Prognosis:  good; patient would benefit from acute skilled OT services to address these deficits and reach maximum level of function.       Plan:     Patient to be seen 3 x/week to address the above listed problems via self-care/home management, therapeutic activities, therapeutic exercises, neuromuscular re-education, cognitive retraining, sensory integration  · Plan of Care Expires: 08/24/18  · Plan of Care Reviewed with: patient    This Plan of care has been discussed with the patient who was involved in its development and understands and is in agreement with the identified goals and treatment plan    GOALS:    Occupational Therapy Goals        Problem: Occupational Therapy Goal    Goal Priority Disciplines Outcome Interventions   Occupational Therapy Goal     OT, PT/OT Ongoing (interventions implemented as appropriate)    Description:  Goals to be met by: 8/6/18     Patient will increase functional independence with ADLs by performing:    UE Dressing with Moderate Assistance.  LE Dressing with Max Assistance.  Grooming while seated with Min Assistance.  Toileting from bedside commode with Moderate Assistance for hygiene and clothing  management.   Rolling to Bilateral with Moderate Assistance.   Supine to sit with Moderate Assistance.  Stand pivot transfers with Moderate Assistance.                       Time Tracking:     OT Date of Treatment: 08/03/18  OT Start Time: 0840  OT Stop Time: 0912  OT Total Time (min): 32 min    Billable Minutes:Self Care/Home Management 2 minutes  Therapeutic Activity 30 minutes    Aung Barnett, OT  8/3/2018

## 2018-08-03 NOTE — ASSESSMENT & PLAN NOTE
- Noted on UA  - Consider ordering UCx   - Ceftriaxone 1g Q24 continued  - Afebrile overnight  - WBC WNL

## 2018-08-03 NOTE — PLAN OF CARE
Problem: Patient Care Overview  Goal: Plan of Care Review  Plan of care reviewed with pt. Pt occasionally answers appropriately. No apparent distress noted. Fall precautions maintained. Bed in lowest position, and locked. Call light within reach and advised to call for assistance. Side rails x 2 and slip resistant socks on at this time. Aspiration precautions maintained. Restraints D/C'd. Will continue to monitor.

## 2018-08-03 NOTE — SUBJECTIVE & OBJECTIVE
Neurologic Chief Complaint: ICH    Subjective:     Interval History: Patient is seen for follow-up neurological assessment and treatment recommendations: NAEON, no new complaints, exam appears effort dependent    HPI, Past Medical, Family, and Social History remains the same as documented in the initial encounter.     Review of Systems   Constitutional: Negative for chills and fever.   HENT: Positive for trouble swallowing.    Respiratory: Positive for cough.    Gastrointestinal: Negative for vomiting.   Neurological: Positive for facial asymmetry, speech difficulty and weakness.     Scheduled Meds:   albuterol-ipratropium  3 mL Nebulization Q4H    amiodarone  200 mg Oral Daily    amLODIPine  10 mg Oral Daily    atorvastatin  40 mg Oral Daily    cefTRIAXone (ROCEPHIN) IVPB  1 g Intravenous Q24H    heparin (porcine)  5,000 Units Subcutaneous Q8H    hydrALAZINE  100 mg Oral Q8H    insulin detemir U-100  10 Units Subcutaneous BID    losartan  100 mg Oral Daily    metoprolol tartrate  25 mg Oral BID    modafinil  100 mg Oral After lunch    modafinil  200 mg Oral Daily    polyethylene glycol  17 g Oral Daily    senna-docusate 8.6-50 mg  1 tablet Oral BID     Continuous Infusions:  PRN Meds:acetaminophen, dextrose 50%, dextrose 50%, glucagon (human recombinant), insulin aspart U-100, labetalol, sodium chloride 0.9%    Objective:     Vital Signs (Most Recent):  Temp: 98.3 °F (36.8 °C) (08/03/18 1144)  Pulse: 67 (08/03/18 1144)  Resp: 18 (08/03/18 1144)  BP: 127/64 (08/03/18 1144)  SpO2: (!) 94 % (08/03/18 1144)  BP Location: Left arm    Vital Signs Range (Last 24H):  Temp:  [98.3 °F (36.8 °C)-100.2 °F (37.9 °C)]   Pulse:  [60-92]   Resp:  [16-19]   BP: (122-162)/(62-76)   SpO2:  [91 %-99 %]   BP Location: Left arm    Physical Exam   Constitutional: He appears well-developed and well-nourished. No distress.   HENT:   Head: Normocephalic.   Eyes: Pupils are equal, round, and reactive to light.   Cardiovascular:  Normal rate.    Pulmonary/Chest: Effort normal. No respiratory distress.   Skin: Skin is warm and dry.   Psychiatric: His affect is blunt.   Vitals reviewed.           Neurological Exam: exam appears effort dependent, patient seems unwilling to participate  LOC: drowsy  Attention Span: poor  Language: Mild aphasia  Articulation: Dysarthria  Orientation: not oriented to person, place, or time  Visual Fields: L hemianopsia  EOM (CN III, IV, VI): R gaze preference  Pupils (CN II, III): PERRL  Facial Sensation (CN V): Normal  Facial Movement (CN VII): Lower facial weakness on the Left  Motor: Arm left  Plegia 3/5  Leg left  Plegia 2/5  Arm right  Normal 2/5  Leg right Normal 5/5  Cebellar: No evidence of appendicular or axial ataxia  Sensation: Mild sensory deficit on L   Tone: normal     Laboratory:  BMP:     Recent Labs  Lab 08/03/18  0533      K 3.8      CO2 26   BUN 9   CREATININE 0.7   CALCIUM 9.4     CBC:     Recent Labs  Lab 08/03/18  0533   WBC 8.43   RBC 3.51*   HGB 10.2*   HCT 30.8*      MCV 88   MCH 29.1   MCHC 33.1       Diagnostic Results     Brain imaging:  [facility] 7/25/18  Unchanged position of right frontal coursing ventriculostomy catheter with unchanged size and configuration of prominent ventricular system.    Stable right thalamic intraparenchymal hemorrhage with associated interventricular hemorrhage layering in the occipital horns of the lateral ventricles.    Chronic microvascular ischemic changes above with associated remote infarcts.     CTH 7/21/18  1. Interval placement of right frontal coursing ventriculostomy with unchanged size and configuration of the shunted ventricular system, which remains prominent.  2. Evolution of right thalamic intraparenchymal hemorrhage with stable mass effect.  3. Chronic microvascular ischemic change and remote infarcts as above.       CTH: 1. Redemonstration of evolving right thalamic hemorrhage with mild adjacent edema and slight localized mass  effect.  Subtle, subcentimeter focus of hyperdensity within the left thalamus, concerning for additional focus of evolving hemorrhage.  2. Persistent interventricular extension of hemorrhage with unchanged dilatation of the ventricular system concerning for component of hydrocephalus.  3. Generalized cerebral volume loss, chronic microvascular ischemic change and remote infarcts as above.     CTH ( 07/29/2018)  Evolving right thalamic intraparenchymal hematoma with intraventricular extension, as above, without detrimental change from 07/29/2018 CT at 12:24 p.m. No new infarct or hemorrhage.    Persistent diffuse enlargement of the ventricular system, unchanged.  Small focus of pneumocephalus in the right frontal horn persists.  Vessel Imaging:  None     Cardiac Evaluation:   ECHO- CONCLUSIONS     1 - Low normal left ventricular systolic function.     2 - Indeterminate LV diastolic function.     3 - Normal right ventricular systolic function .     4 - There is anterior pericardial fat pad..

## 2018-08-03 NOTE — PT/OT/SLP PROGRESS
"Speech Language Pathology Treatment    Patient Name:  Ciro Chandler   MRN:  [id number]  Admitting Diagnosis: 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    Recommendations:                 General Recommendations:  Dysphagia therapy and Speech/language therapy  Diet recommendations:  Puree, Liquid Diet Level: Thin  Please note, pending PO acceptance, Patient might require supplemental means nutrition/hydration to ensure adequate PO intake.   Aspiration Precautions: Only when awake and alert Patient requires strict 1:1 assistance with all PO for safety, 1 bite/sip at a time, Alternating bites/sips, Assistance with meals, Eliminate distractions, Frequent oral care, HOB to 90 degrees, Meds crushed in puree, Monitor for s/s of aspiration, Small bites/sips and Strict aspiration precautions Please Continue to monitor for signs and symptoms of aspiration and discontinue oral feeding should you notice any of the following: watery eyes, reddened facial area, wet vocal quality, increased work of breathing, change in respiratory status, increased congestion, coughing, fever, and/or worsening CXR.   General Precautions: Standard, aspiration, fall, pureed diet  Communication strategies:  provide increased time to answer and go to room if call light pushed    Subjective     SLP reviewed Pt with nurse, nurse reports Pt consuming ~75% of meal trays and tolerating medications crushed in puree  Pt presents calm  Pt communicates with SLP via head nods only, SLP unable to elicit spontaneous speech or gestures  Pt's Daughter explains, "I've been with him for his meals, I've been helping to feed him but he took a few sips on his own from the cup with lunch"    Pain/Comfort:  · Pain Rating 1: 0/10  · Pain Rating Post-Intervention 1: 0/10    Objective:     Has the patient been evaluated by SLP for swallowing?   Yes  Keep patient NPO? No   Current Respiratory Status: room air      Patient seen for dysphagia and speech " therapies. Pt declining PO offerings from  this service day. Pt answers personal/basic YNQ with head nod response with 50% accuracy, MOD A. Pt does not follow commands across attempts provided maximum encouragement from Daughter at bedside with SLP . SLP unable to elicit spontaneous speech, automatic speech or imitation of gestures. Pt and Daughter educated on SLP Role, findings from MBSS day prior, safe swallow strategies, aspiration precautions, diet recommendations and S/S aspiration for ongoing monitoring. Patient not aquiles to demonstrate or verbalize understanding. Daughter verbalized understanding. No additional questions. Whiteboard current. Patient's Daughter at bedside with Pt as SLP discontinued session.     Assessment:     Ciro Chandler is a 68 y.o. male with an SLP diagnosis of Dysphagia and Cognitive-Linguistic Impairment.  He presents with minimal interaction with  this service day. Patient ongoing with current goals 2/2 decreased command following.     Goals:    SLP Goals        Problem: SLP Goal    Goal Priority Disciplines Outcome   SLP Goal     SLP Ongoing (interventions implemented as appropriate)   Description:  Speech Language Pathology Goals  Goals expected to be met by 8/9/18  1.  Pt will tolerate puree diet with thin liquids with no s/s of aspiration, MIN A  2. Pt will follow simple commands with 90% accuracy, MIN A  3. Pt will respond to simple yes/no questions with 90% accuracy, MOD A  4, Pt will tespond to simple word finding tasks with 80% accuracy, MIN A  5.  Assess visual spatial skills    6. Pt will tolerate trials of advanced textures with adequate oral clearance and no overt S/S aspiration, MIN A  7. Educate Pt and family on SLP role and S/S aspiration   Goals expected to be met by 8/2/18  1.  Tolerate dental soft diet with thin liquids with no s/s of aspiration.   2.  Follow simple commands with 90% accuracy  3.  Respond to simple yes/no questions with 90% accuracy  4,  Respond to  simple word finding tasks with 80% accuracy  5.  Assess visual spatial skills                        Plan:     · Patient to be seen:  4 x/week   · Plan of Care expires:  08/23/18  · Plan of Care reviewed with:  patient, daughter   · SLP Follow-Up:  Yes       Discharge recommendations:  nursing facility, skilled   Barriers to Discharge:  None    Time Tracking:     SLP Treatment Date:   08/03/18  Speech Start Time:  1531  Speech Stop Time:  1545     Speech Total Time (min):  14 min    Billable Minutes: Speech Therapy Individual 14    MARCIANO Castañeda, [facility], [place]-SLP  Speech-Language Pathology  Pager: [phone]    08/03/2018

## 2018-08-03 NOTE — PLAN OF CARE
FOZIA spoke with Francine with [facility] who states that patient has been accepted to the facility for SNF. Francine requested that updated notes and MBSS be sent for review. Francine states that she will submit to pt's insurance and prepare to admit patient early next week.     Judy Brooks, VICKEY  [facility]  Ext. [phone]

## 2018-08-03 NOTE — PT/OT/SLP PROGRESS
Physical Therapy Treatment    Patient Name:  Ciro Chandler   MRN:  [id number]    Recommendations:     Discharge Recommendations:  nursing facility, skilled   Discharge Equipment Recommendations: hospital bed, lift device, wheelchair   Barriers to discharge: Decreased caregiver support      Plan:     During this hospitalization, patient to be seen 4 x/week to address the above listed problems via gait training, therapeutic activities, therapeutic exercises, neuromuscular re-education, wheelchair management/training  · Plan of Care Expires:  08/20/18   Plan of Care Reviewed with: patient, daughter    Subjective     Communicated with RN prior to session.      Patient comments/goals: non verbal  Pain/Comfort:  · Pain Rating 1: 0/10  · Pain Rating Post-Intervention 1: 0/10      Objective:     Patient found with: telemetry, SCD, pressure relief boots, land catheter     General Precautions: Standard, aphasia, aspiration, fall   Patient found supine in bed with eyes closed and dtr present upon PT entry to room, agreeable to treatment.  He opened his eyes to voice. No command following. No verbalization. R visual gaze preference but attempting to track toward sound.  PT assisted patient to sit EOB.  Static and dynamic sitting were attempted.  2 standing trials.  Patient was then returned to supine and repositioned. See below for details.     Functional Mobility:  Bed Mobility:   · Rolling Right: total assistance   · Rolling Left: total assistance  · Supine to Sit: total assistance  · Sit to Supine: total assistance     Sitting Balance at Edge of Bed:  · Assistance Level Required: max assistance  · Time: 25 minutes  · Comments:  · RUE pushing initially, but improved by removing from weight bearing (dtr performing hand hold). Toward the end of sitting trial, RUE was allowed free movement. The patient returned RUE to the bed but without pushing behavior.    · Poor postural control. Moderate assistance to maintain trunk in neutral  during static sitting. Total assistance to regain neutral in dynamic sitting. The patient actively performs anterior lean but cannot correct posture and return to neutral   · L sided LOB in static sitting with no postural correction  · PT Encouraged/facilitated: midline orientation, participation in activity, visual tracking L, balance, safety     Transfers:   Sit <> Stand Transfer:  2 trials, total assistance from elevated bed with therapist blocking bilateral LEs and minimal activation of LEs or postural control.     Therapeutic Activities, Exercises, or Education:  Therapist educated patient's dtr on the role of PT, POC, and therapy recommendations of SNF.  Therapist discussed the patients current mobility status, deficits, and level of assistance.  Time provided for active listening,  therapeutic counseling and discussion of health disposition. Therapist answered questions to patient/familys satisfaction within scope of practice.  Patient and family aware of patient's deficits and therapy progression. White board updated to reflect current level of assistance.    AM-PAC 6 CLICK MOBILITY  Turning over in bed (including adjusting bedclothes, sheets and blankets)?: 2  Sitting down on and standing up from a chair with arms (e.g., wheelchair, bedside commode, etc.): 2  Moving from lying on back to sitting on the side of the bed?: 2  Moving to and from a bed to a chair (including a wheelchair)?: 1  Need to walk in hospital room?: 1  Climbing 3-5 steps with a railing?: 1  Basic Mobility Total Score: 9     Patient left supine in bed with all lines intact, call button in reach, bed alarm on and family  present..    GOALS:    Physical Therapy Goals        Problem: Physical Therapy Goal    Goal Priority Disciplines Outcome Goal Variances Interventions   Physical Therapy Goal     PT/OT, PT Ongoing (interventions implemented as appropriate)     Description:  Goals to be met by: 8/17/18    1. Pt will perform rolling to the  R and L with moderate assistance.   2. Pt will perform supine to sit from both sides of the bed with max assistance.  3. Pt will perform sit to supine with max assistance.  4. Pt will sit EOB x 5 minutes with min assistance to prepare for functional tasks in sitting.   5. Pt will perform sit to stand transfers with moderate assistance.    6. Pt will perform bed <> chair transfers with max assistance.  7. Pt will perform gait x 10 feet with max assistance  8. Family will be independent with ROM using handout.                         Assessment:     Ciro Chandler is a 68 y.o. 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  He demonstrated improved level of alertness from previous therapy sessions.  He remained awake for the duration of therapy with active attempts to maintain balance in sitting. If he continues to be alert during therapy, he has the potential to progress with balance and mobility.     He presents with the following impairments/functional limitations:  weakness, impaired functional mobilty, impaired cognition, decreased safety awareness, impaired endurance, impaired balance, decreased upper extremity function, decreased lower extremity function, visual deficits, impaired self care skills.    Rehab Prognosis:  limited; patient would benefit from acute skilled PT services to address these deficits and reach maximum level of function.      Recent Surgery: * No surgery found *      Time Tracking:     PT Received On: 08/03/18  PT Start Time: 1451     PT Stop Time: 1529  PT Total Time (min): 38 min     Billable Minutes: Therapeutic Activity 13 and Neuromuscular Re-education 25    Treatment Type: Treatment  PT/PTA: PT     PTA Visit Number: 0     Suzanna Rey, PT  8/3/2018  [phone] (pager)

## 2018-08-03 NOTE — ASSESSMENT & PLAN NOTE
- Hgb downtrended from 07/30-7/31  - Unclear source, patient on SQ Heparin  - No melenic stools per nursing staff  - H&H stable since 8/1  - no apparent bleeding

## 2018-08-03 NOTE — ASSESSMENT & PLAN NOTE
SBP goal <140   Continue amlodipine, metoprolol, hydralazine, and losartan  Increased hydralazine 8/3  Labetalol PRN

## 2018-08-03 NOTE — PLAN OF CARE
Problem: Physical Therapy Goal  Goal: Physical Therapy Goal  Goals to be met by: 8/17/18    1. Pt will perform rolling to the R and L with moderate assistance.   2. Pt will perform supine to sit from both sides of the bed with max assistance.  3. Pt will perform sit to supine with max assistance.  4. Pt will sit EOB x 5 minutes with min assistance to prepare for functional tasks in sitting.   5. Pt will perform sit to stand transfers with moderate assistance.    6. Pt will perform bed <> chair transfers with max assistance.  7. Pt will perform gait x 10 feet with max assistance  8. Family will be independent with ROM using handout.       Outcome: Ongoing (interventions implemented as appropriate)  Patient participated well in therapy.  Goals were reviewed and remain appropriate.  Please refer to progress note for functional mobility.     Suzanna Rey, PT  8/3/2018  [phone] (pager)

## 2018-08-04 LAB
ANION GAP SERPL CALC-SCNC: 9 MMOL/L
BASOPHILS # BLD AUTO: 0.04 K/UL
BASOPHILS NFR BLD: 0.4 %
BUN SERPL-MCNC: 12 MG/DL
CALCIUM SERPL-MCNC: 9.1 MG/DL
CHLORIDE SERPL-SCNC: 101 MMOL/L
CO2 SERPL-SCNC: 26 MMOL/L
CREAT SERPL-MCNC: 0.8 MG/DL
DIFFERENTIAL METHOD: ABNORMAL
EOSINOPHIL # BLD AUTO: 0.3 K/UL
EOSINOPHIL NFR BLD: 3.2 %
ERYTHROCYTE [DISTWIDTH] IN BLOOD BY AUTOMATED COUNT: 12.5 %
EST. GFR  (AFRICAN AMERICAN): >60 ML/MIN/1.73 M^2
EST. GFR  (NON AFRICAN AMERICAN): >60 ML/MIN/1.73 M^2
GLUCOSE SERPL-MCNC: 198 MG/DL
HCT VFR BLD AUTO: 31.6 %
HGB BLD-MCNC: 10.4 G/DL
IMM GRANULOCYTES # BLD AUTO: 0.06 K/UL
IMM GRANULOCYTES NFR BLD AUTO: 0.7 %
LYMPHOCYTES # BLD AUTO: 0.9 K/UL
LYMPHOCYTES NFR BLD: 10.3 %
MAGNESIUM SERPL-MCNC: 1.8 MG/DL
MCH RBC QN AUTO: 28.7 PG
MCHC RBC AUTO-ENTMCNC: 32.9 G/DL
MCV RBC AUTO: 87 FL
MONOCYTES # BLD AUTO: 0.8 K/UL
MONOCYTES NFR BLD: 8.6 %
NEUTROPHILS # BLD AUTO: 6.9 K/UL
NEUTROPHILS NFR BLD: 76.8 %
NRBC BLD-RTO: 0 /100 WBC
PHOSPHATE SERPL-MCNC: 2.8 MG/DL
PLATELET # BLD AUTO: 322 K/UL
PMV BLD AUTO: 10.9 FL
POCT GLUCOSE: 212 MG/DL (ref 70–110)
POCT GLUCOSE: 280 MG/DL (ref 70–110)
POCT GLUCOSE: 330 MG/DL (ref 70–110)
POCT GLUCOSE: 332 MG/DL (ref 70–110)
POTASSIUM SERPL-SCNC: 3.9 MMOL/L
RBC # BLD AUTO: 3.62 M/UL
SODIUM SERPL-SCNC: 136 MMOL/L
WBC # BLD AUTO: 9.02 K/UL

## 2018-08-04 PROCEDURE — 27000221 HC OXYGEN, UP TO 24 HOURS

## 2018-08-04 PROCEDURE — 51798 US URINE CAPACITY MEASURE: CPT

## 2018-08-04 PROCEDURE — 83735 ASSAY OF MAGNESIUM: CPT

## 2018-08-04 PROCEDURE — 99900035 HC TECH TIME PER 15 MIN (STAT)

## 2018-08-04 PROCEDURE — 94640 AIRWAY INHALATION TREATMENT: CPT

## 2018-08-04 PROCEDURE — 99233 SBSQ HOSP IP/OBS HIGH 50: CPT | Mod: GC,,, | Performed by: PSYCHIATRY & NEUROLOGY

## 2018-08-04 PROCEDURE — 63600175 PHARM REV CODE 636 W HCPCS: Performed by: NURSE PRACTITIONER

## 2018-08-04 PROCEDURE — 84100 ASSAY OF PHOSPHORUS: CPT

## 2018-08-04 PROCEDURE — 94761 N-INVAS EAR/PLS OXIMETRY MLT: CPT

## 2018-08-04 PROCEDURE — 36415 COLL VENOUS BLD VENIPUNCTURE: CPT

## 2018-08-04 PROCEDURE — 80048 BASIC METABOLIC PNL TOTAL CA: CPT

## 2018-08-04 PROCEDURE — 94668 MNPJ CHEST WALL SBSQ: CPT

## 2018-08-04 PROCEDURE — 85025 COMPLETE CBC W/AUTO DIFF WBC: CPT

## 2018-08-04 PROCEDURE — 25000242 PHARM REV CODE 250 ALT 637 W/ HCPCS: Performed by: PHYSICIAN ASSISTANT

## 2018-08-04 PROCEDURE — 25000003 PHARM REV CODE 250: Performed by: PHYSICIAN ASSISTANT

## 2018-08-04 PROCEDURE — 20600001 HC STEP DOWN PRIVATE ROOM

## 2018-08-04 RX ADMIN — ATORVASTATIN CALCIUM 40 MG: 20 TABLET, FILM COATED ORAL at 08:08

## 2018-08-04 RX ADMIN — INSULIN ASPART 6 UNITS: 100 INJECTION, SOLUTION INTRAVENOUS; SUBCUTANEOUS at 12:08

## 2018-08-04 RX ADMIN — HEPARIN SODIUM 5000 UNITS: 5000 INJECTION, SOLUTION INTRAVENOUS; SUBCUTANEOUS at 02:08

## 2018-08-04 RX ADMIN — IPRATROPIUM BROMIDE AND ALBUTEROL SULFATE 3 ML: .5; 3 SOLUTION RESPIRATORY (INHALATION) at 03:08

## 2018-08-04 RX ADMIN — METOPROLOL TARTRATE 25 MG: 25 TABLET ORAL at 08:08

## 2018-08-04 RX ADMIN — AMLODIPINE BESYLATE 10 MG: 10 TABLET ORAL at 08:08

## 2018-08-04 RX ADMIN — HYDRALAZINE HYDROCHLORIDE 100 MG: 50 TABLET ORAL at 10:08

## 2018-08-04 RX ADMIN — IPRATROPIUM BROMIDE AND ALBUTEROL SULFATE 3 ML: .5; 3 SOLUTION RESPIRATORY (INHALATION) at 08:08

## 2018-08-04 RX ADMIN — INSULIN ASPART 4 UNITS: 100 INJECTION, SOLUTION INTRAVENOUS; SUBCUTANEOUS at 08:08

## 2018-08-04 RX ADMIN — HEPARIN SODIUM 5000 UNITS: 5000 INJECTION, SOLUTION INTRAVENOUS; SUBCUTANEOUS at 10:08

## 2018-08-04 RX ADMIN — MODAFINIL 100 MG: 100 TABLET ORAL at 01:08

## 2018-08-04 RX ADMIN — LOSARTAN POTASSIUM 100 MG: 50 TABLET, FILM COATED ORAL at 08:08

## 2018-08-04 RX ADMIN — HEPARIN SODIUM 5000 UNITS: 5000 INJECTION, SOLUTION INTRAVENOUS; SUBCUTANEOUS at 06:08

## 2018-08-04 RX ADMIN — AMIODARONE HYDROCHLORIDE 200 MG: 200 TABLET ORAL at 08:08

## 2018-08-04 RX ADMIN — CEFTRIAXONE SODIUM 1 G: 1 INJECTION, POWDER, FOR SOLUTION INTRAMUSCULAR; INTRAVENOUS at 01:08

## 2018-08-04 RX ADMIN — INSULIN DETEMIR 10 UNITS: 100 INJECTION, SOLUTION SUBCUTANEOUS at 08:08

## 2018-08-04 RX ADMIN — SENNOSIDES AND DOCUSATE SODIUM 1 TABLET: 8.6; 5 TABLET ORAL at 10:08

## 2018-08-04 RX ADMIN — METOPROLOL TARTRATE 25 MG: 25 TABLET ORAL at 10:08

## 2018-08-04 RX ADMIN — INSULIN ASPART 8 UNITS: 100 INJECTION, SOLUTION INTRAVENOUS; SUBCUTANEOUS at 05:08

## 2018-08-04 RX ADMIN — POLYETHYLENE GLYCOL 3350 17 G: 17 POWDER, FOR SOLUTION ORAL at 08:08

## 2018-08-04 RX ADMIN — MODAFINIL 200 MG: 100 TABLET ORAL at 06:08

## 2018-08-04 RX ADMIN — SENNOSIDES AND DOCUSATE SODIUM 1 TABLET: 8.6; 5 TABLET ORAL at 08:08

## 2018-08-04 RX ADMIN — HYDRALAZINE HYDROCHLORIDE 100 MG: 50 TABLET ORAL at 06:08

## 2018-08-04 RX ADMIN — INSULIN ASPART 4 UNITS: 100 INJECTION, SOLUTION INTRAVENOUS; SUBCUTANEOUS at 10:08

## 2018-08-04 RX ADMIN — INSULIN DETEMIR 10 UNITS: 100 INJECTION, SOLUTION SUBCUTANEOUS at 10:08

## 2018-08-04 RX ADMIN — HYDRALAZINE HYDROCHLORIDE 100 MG: 50 TABLET ORAL at 02:08

## 2018-08-04 RX ADMIN — IPRATROPIUM BROMIDE AND ALBUTEROL SULFATE 3 ML: .5; 3 SOLUTION RESPIRATORY (INHALATION) at 07:08

## 2018-08-04 NOTE — SUBJECTIVE & OBJECTIVE
Neurologic Chief Complaint: ICH    Subjective:     Interval History: Patient is seen for follow-up neurological assessment and treatment recommendations: no major events overnight, he remains disoriented, communicates with few words and looks withdrawn. Awaiting SNF placement    HPI, Past Medical, Family, and Social History remains the same as documented in the initial encounter.     Review of Systems   Constitutional: Negative for chills and fever.   HENT: Positive for trouble swallowing.    Respiratory: Positive for cough.    Gastrointestinal: Negative for vomiting.   Neurological: Positive for facial asymmetry, speech difficulty and weakness.   Psychiatric/Behavioral: Positive for confusion. Negative for agitation.     Scheduled Meds:   albuterol-ipratropium  3 mL Nebulization Q4H    amiodarone  200 mg Oral Daily    amLODIPine  10 mg Oral Daily    atorvastatin  40 mg Oral Daily    heparin (porcine)  5,000 Units Subcutaneous Q8H    hydrALAZINE  100 mg Oral Q8H    insulin detemir U-100  10 Units Subcutaneous BID    losartan  100 mg Oral Daily    metoprolol tartrate  25 mg Oral BID    modafinil  100 mg Oral After lunch    modafinil  200 mg Oral Daily    polyethylene glycol  17 g Oral Daily    senna-docusate 8.6-50 mg  1 tablet Oral BID     Continuous Infusions:  PRN Meds:acetaminophen, dextrose 50%, dextrose 50%, glucagon (human recombinant), insulin aspart U-100, labetalol, sodium chloride 0.9%    Objective:     Vital Signs (Most Recent):  Temp: 98.4 °F (36.9 °C) (08/04/18 1622)  Pulse: 80 (08/04/18 1622)  Resp: 18 (08/04/18 1622)  BP: (!) 118/56 (08/04/18 1622)  SpO2: 95 % (08/04/18 1622)  BP Location: Left arm    Vital Signs Range (Last 24H):  Temp:  [97 °F (36.1 °C)-98.8 °F (37.1 °C)]   Pulse:  []   Resp:  [16-18]   BP: (106-136)/(56-69)   SpO2:  [91 %-97 %]   BP Location: Left arm    Physical Exam   Constitutional: He appears well-developed and well-nourished. He appears lethargic. He appears  ill. No distress. Nasal cannula in place.   HENT:   Head: Normocephalic.   Eyes: Pupils are equal, round, and reactive to light.   Neck: Normal range of motion.   Cardiovascular: Normal rate.    Pulmonary/Chest: Effort normal. No respiratory distress.   Abdominal: Soft.   Neurological: He appears lethargic. He displays no seizure activity.   Skin: Skin is warm and dry. He is not diaphoretic.   Vitals reviewed.    Neurological Exam: exam appears effort dependent, patient seems unwilling to participate  LOC: drowsy  Attention Span: poor  Language: Mild aphasia  Articulation: Dysarthria  Orientation: not oriented to person, place, or time  Visual Fields: L hemianopsia  EOM (CN III, IV, VI): R gaze preference  Pupils (CN II, III): PERRL  Facial Sensation (CN V): Normal  Facial Movement (CN VII): Lower facial weakness on the Left  Unable to assess strength or sensation due to lethargy and patient not being cooperatiove  Tone: normal     Laboratory:  BMP:     Recent Labs  Lab 08/04/18  0409      K 3.9      CO2 26   BUN 12   CREATININE 0.8   CALCIUM 9.1     CBC:     Recent Labs  Lab 08/04/18  0409   WBC 9.02   RBC 3.62*   HGB 10.4*   HCT 31.6*      MCV 87   MCH 28.7   MCHC 32.9       Diagnostic Results     Brain imaging:  [facility] 7/25/18  Unchanged position of right frontal coursing ventriculostomy catheter with unchanged size and configuration of prominent ventricular system.    Stable right thalamic intraparenchymal hemorrhage with associated interventricular hemorrhage layering in the occipital horns of the lateral ventricles.    Chronic microvascular ischemic changes above with associated remote infarcts.     [facility] 7/21/18  1. Interval placement of right frontal coursing ventriculostomy with unchanged size and configuration of the shunted ventricular system, which remains prominent.  2. Evolution of right thalamic intraparenchymal hemorrhage with stable mass effect.  3. Chronic microvascular ischemic  change and remote infarcts as above.       [facility]: 1. Redemonstration of evolving right thalamic hemorrhage with mild adjacent edema and slight localized mass effect.  Subtle, subcentimeter focus of hyperdensity within the left thalamus, concerning for additional focus of evolving hemorrhage.  2. Persistent interventricular extension of hemorrhage with unchanged dilatation of the ventricular system concerning for component of hydrocephalus.  3. Generalized cerebral volume loss, chronic microvascular ischemic change and remote infarcts as above.     [facility] ( 07/29/2018)  Evolving right thalamic intraparenchymal hematoma with intraventricular extension, as above, without detrimental change from 07/29/2018 CT at 12:24 p.m. No new infarct or hemorrhage.    Persistent diffuse enlargement of the ventricular system, unchanged.  Small focus of pneumocephalus in the right frontal horn persists.  Vessel Imaging:  None     Cardiac Evaluation:   ECHO- CONCLUSIONS     1 - Low normal left ventricular systolic function.     2 - Indeterminate LV diastolic function.     3 - Normal right ventricular systolic function .     4 - There is anterior pericardial fat pad..

## 2018-08-04 NOTE — PROGRESS NOTES
[facility]  Vascular Neurology  Comprehensive Stroke Center  Progress Note    Assessment/Plan:     * 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    69 yo M with PMHx of HTN, HLD, DM, dementia, A. Fib, on coumadin as outpatient who was transferred to [facility] from [facility] where he was reportedly admitted for R thalamic IPH w IVH on 7/9/2018. S/P EVD, Removed 7/27/18. Etiology likely hypertensive    Antithrombotics for secondary stroke prevention: Anticoagulants: holding a/c due to bleed  Statins for secondary stroke prevention and hyperlipidemia, if present: Statins: Atorvastatin- 40 mg daily  Aggressive risk factor modification: HTN, DM, HLD, Diet, Exercise, A-Fib  Rehab efforts: PT/OT/SLP to evaluate and treat, PM&R consult, Dispo: SNF  Diagnostics ordered/pending: None   VTE prophylaxis: SQH and SCD  BP parameters: ICH: SBP <140        UTI (urinary tract infection)    completed 7-day course of antibiotics  Remains afebrile, WBC WNL        Obstructive hydrocephalus    - S/P EVD, removed on 07/27  - CTH on 07/29 w/ stable ventricular size, possibly mildly increased        Dysphagia    - passed MBSS  - currently on puree diet with thin liquids  - calorie counts ordered and nutrition consulted        Persistent atrial fibrillation    - Stroke risk factor  - Holding anticoagulation due to ICH  - Continue amiodarone and metoprolol         Dyslipidemia    - Stroke risk factor, well-controlled, most recent LDL 47  - Continue Atorvastatin 40mg QHS        Essential hypertension    SBP goal <140   Continue amlodipine, metoprolol, hydralazine, and losartan  Increased hydralazine 8/3, current BP WNL  Labetalol PRN        Type 2 diabetes mellitus with diabetic polyneuropathy, with long-term current use of insulin    - Stroke risk factor. A1C 7.2  - on Detemir 10 units q12 + MD SSI             7/21: EVD at bedside by YEIMY  7/23 EVD in place lowered to 5cm h2o per yeimy.. Blood  sugars running >200 adjusted detemir. Increased hydralazine for better BP control  7/24: EVD at 5, insulin adjustments   7/25: EVD clamped per Nsgy, CTH tonight  7/26: Held tube feeds, insulin adjustments, Nsgy contacted about MSC and EVD.   7/27: Evd pulled per Nsgy, Ngt replace, transfer to stroke service  07/28/2018  Pending bed availability, largely stable neuro exam. SNF placement on discharge   07/29/2018 More somnolent and less responsive this AM. CTH stable but patient w/ fevers now.   07/30/2018 Patient a lot more responsive this AM. Animated. TF held and re-started overnight. Ceftriaxone started for UTI.   07/31/2018 Neurologically unchanged. Hgb continues to trend down.  No over signs of bleeding per nursing staff.  08/01/2018 Hgb stable today. No blood in stools. Continues to be fatigued but per nursing, patient's mental status greatly waxes and wanes. At his best, he is able to work with PT/OT and mumble but understands speech.   08/02/2018 H&H stable, attempting to repeat MBSS today pending patient's level of alertness  08/03/2018 passed MBSS yesterday and started on a puree diet with thin liquids, calorie counts ordered, increased hydralazine      Hemorrhagic change of an Ischemic Stroke: Does this patient have an ischemic stroke with hemorrhagic changes? No     Neurologic Chief Complaint: ICH    Subjective:     Interval History: Patient is seen for follow-up neurological assessment and treatment recommendations: no major events overnight, he remains disoriented, communicates with few words and looks withdrawn. Awaiting SNF placement    HPI, Past Medical, Family, and Social History remains the same as documented in the initial encounter.     Review of Systems   Constitutional: Negative for chills and fever.   HENT: Positive for trouble swallowing.    Respiratory: Positive for cough.    Gastrointestinal: Negative for vomiting.   Neurological: Positive for facial asymmetry, speech difficulty and  weakness.   Psychiatric/Behavioral: Positive for confusion. Negative for agitation.     Scheduled Meds:   albuterol-ipratropium  3 mL Nebulization Q4H    amiodarone  200 mg Oral Daily    amLODIPine  10 mg Oral Daily    atorvastatin  40 mg Oral Daily    heparin (porcine)  5,000 Units Subcutaneous Q8H    hydrALAZINE  100 mg Oral Q8H    insulin detemir U-100  10 Units Subcutaneous BID    losartan  100 mg Oral Daily    metoprolol tartrate  25 mg Oral BID    modafinil  100 mg Oral After lunch    modafinil  200 mg Oral Daily    polyethylene glycol  17 g Oral Daily    senna-docusate 8.6-50 mg  1 tablet Oral BID     Continuous Infusions:  PRN Meds:acetaminophen, dextrose 50%, dextrose 50%, glucagon (human recombinant), insulin aspart U-100, labetalol, sodium chloride 0.9%    Objective:     Vital Signs (Most Recent):  Temp: 98.4 °F (36.9 °C) (08/04/18 1622)  Pulse: 80 (08/04/18 1622)  Resp: 18 (08/04/18 1622)  BP: (!) 118/56 (08/04/18 1622)  SpO2: 95 % (08/04/18 1622)  BP Location: Left arm    Vital Signs Range (Last 24H):  Temp:  [97 °F (36.1 °C)-98.8 °F (37.1 °C)]   Pulse:  []   Resp:  [16-18]   BP: (106-136)/(56-69)   SpO2:  [91 %-97 %]   BP Location: Left arm    Physical Exam   Constitutional: He appears well-developed and well-nourished. He appears lethargic. He appears ill. No distress. Nasal cannula in place.   HENT:   Head: Normocephalic.   Eyes: Pupils are equal, round, and reactive to light.   Neck: Normal range of motion.   Cardiovascular: Normal rate.    Pulmonary/Chest: Effort normal. No respiratory distress.   Abdominal: Soft.   Neurological: He appears lethargic. He displays no seizure activity.   Skin: Skin is warm and dry. He is not diaphoretic.   Vitals reviewed.    Neurological Exam:   LOC: drowsy  Attention Span: poor  Language: Mild aphasia  Articulation: Dysarthria  Orientation: not oriented to person, place, or time  Visual Fields: L hemianopsia  EOM (CN III, IV, VI): R gaze  preference  Pupils (CN II, III): PERRL  Facial Sensation (CN V): Normal  Facial Movement (CN VII): Lower facial weakness on the Left  Unable to assess strength or sensation due to lethargy and patient not being cooperatiove  Tone: normal     Laboratory:  BMP:     Recent Labs  Lab 08/04/18  0409      K 3.9      CO2 26   BUN 12   CREATININE 0.8   CALCIUM 9.1     CBC:     Recent Labs  Lab 08/04/18  0409   WBC 9.02   RBC 3.62*   HGB 10.4*   HCT 31.6*      MCV 87   MCH 28.7   MCHC 32.9       Diagnostic Results     Brain imaging:  [facility] 7/25/18  Unchanged position of right frontal coursing ventriculostomy catheter with unchanged size and configuration of prominent ventricular system.    Stable right thalamic intraparenchymal hemorrhage with associated interventricular hemorrhage layering in the occipital horns of the lateral ventricles.    Chronic microvascular ischemic changes above with associated remote infarcts.     [facility] 7/21/18  1. Interval placement of right frontal coursing ventriculostomy with unchanged size and configuration of the shunted ventricular system, which remains prominent.  2. Evolution of right thalamic intraparenchymal hemorrhage with stable mass effect.  3. Chronic microvascular ischemic change and remote infarcts as above.       [facility]: 1. Redemonstration of evolving right thalamic hemorrhage with mild adjacent edema and slight localized mass effect.  Subtle, subcentimeter focus of hyperdensity within the left thalamus, concerning for additional focus of evolving hemorrhage.  2. Persistent interventricular extension of hemorrhage with unchanged dilatation of the ventricular system concerning for component of hydrocephalus.  3. Generalized cerebral volume loss, chronic microvascular ischemic change and remote infarcts as above.     [facility] ( 07/29/2018)  Evolving right thalamic intraparenchymal hematoma with intraventricular extension, as above, without detrimental change from 07/29/2018 CT  at 12:24 p.m. No new infarct or hemorrhage.    Persistent diffuse enlargement of the ventricular system, unchanged.  Small focus of pneumocephalus in the right frontal horn persists.  Vessel Imaging:  None     Cardiac Evaluation:   ECHO- CONCLUSIONS     1 - Low normal left ventricular systolic function.     2 - Indeterminate LV diastolic function.     3 - Normal right ventricular systolic function .     4 - There is anterior pericardial fat pad..          Massiel Anthony MD  Comprehensive Stroke Center  Department of Vascular Neurology   [facility]

## 2018-08-04 NOTE — PLAN OF CARE
Problem: Stroke (Hemorrhagic) (Adult)  Intervention: Monitor/Assist with Self Care  Pt in bed resting, VSS, in NAD. Pt HOB elevated, bed in low position, call light within reach. Pt instructed to call staff for assistance with needs. Will continue to monitor.

## 2018-08-04 NOTE — ASSESSMENT & PLAN NOTE
SBP goal <140   Continue amlodipine, metoprolol, hydralazine, and losartan  Increased hydralazine 8/3, current BP WNL  Labetalol PRN

## 2018-08-05 PROBLEM — N39.0 UTI (URINARY TRACT INFECTION): Status: RESOLVED | Noted: 2018-07-30 | Resolved: 2018-08-05

## 2018-08-05 LAB
ANION GAP SERPL CALC-SCNC: 7 MMOL/L
BASOPHILS # BLD AUTO: 0.03 K/UL
BASOPHILS NFR BLD: 0.3 %
BUN SERPL-MCNC: 14 MG/DL
CALCIUM SERPL-MCNC: 9 MG/DL
CHLORIDE SERPL-SCNC: 102 MMOL/L
CO2 SERPL-SCNC: 27 MMOL/L
CREAT SERPL-MCNC: 0.8 MG/DL
DIFFERENTIAL METHOD: ABNORMAL
EOSINOPHIL # BLD AUTO: 0.1 K/UL
EOSINOPHIL NFR BLD: 1.6 %
ERYTHROCYTE [DISTWIDTH] IN BLOOD BY AUTOMATED COUNT: 12.6 %
EST. GFR  (AFRICAN AMERICAN): >60 ML/MIN/1.73 M^2
EST. GFR  (NON AFRICAN AMERICAN): >60 ML/MIN/1.73 M^2
GLUCOSE SERPL-MCNC: 243 MG/DL
HCT VFR BLD AUTO: 29 %
HGB BLD-MCNC: 9.6 G/DL
IMM GRANULOCYTES # BLD AUTO: 0.07 K/UL
IMM GRANULOCYTES NFR BLD AUTO: 0.8 %
LYMPHOCYTES # BLD AUTO: 1 K/UL
LYMPHOCYTES NFR BLD: 11.6 %
MAGNESIUM SERPL-MCNC: 2 MG/DL
MCH RBC QN AUTO: 29.1 PG
MCHC RBC AUTO-ENTMCNC: 33.1 G/DL
MCV RBC AUTO: 88 FL
MONOCYTES # BLD AUTO: 0.8 K/UL
MONOCYTES NFR BLD: 8.4 %
NEUTROPHILS # BLD AUTO: 6.9 K/UL
NEUTROPHILS NFR BLD: 77.3 %
NRBC BLD-RTO: 0 /100 WBC
PHOSPHATE SERPL-MCNC: 3.2 MG/DL
PLATELET # BLD AUTO: 305 K/UL
PMV BLD AUTO: 10.3 FL
POCT GLUCOSE: 162 MG/DL (ref 70–110)
POCT GLUCOSE: 203 MG/DL (ref 70–110)
POCT GLUCOSE: 214 MG/DL (ref 70–110)
POCT GLUCOSE: 291 MG/DL (ref 70–110)
POCT GLUCOSE: 361 MG/DL (ref 70–110)
POCT GLUCOSE: 369 MG/DL (ref 70–110)
POTASSIUM SERPL-SCNC: 4.1 MMOL/L
RBC # BLD AUTO: 3.3 M/UL
SODIUM SERPL-SCNC: 136 MMOL/L
WBC # BLD AUTO: 8.94 K/UL

## 2018-08-05 PROCEDURE — 85025 COMPLETE CBC W/AUTO DIFF WBC: CPT

## 2018-08-05 PROCEDURE — 97110 THERAPEUTIC EXERCISES: CPT

## 2018-08-05 PROCEDURE — 94761 N-INVAS EAR/PLS OXIMETRY MLT: CPT

## 2018-08-05 PROCEDURE — 97535 SELF CARE MNGMENT TRAINING: CPT

## 2018-08-05 PROCEDURE — 25000242 PHARM REV CODE 250 ALT 637 W/ HCPCS: Performed by: PHYSICIAN ASSISTANT

## 2018-08-05 PROCEDURE — 84100 ASSAY OF PHOSPHORUS: CPT

## 2018-08-05 PROCEDURE — 20600001 HC STEP DOWN PRIVATE ROOM

## 2018-08-05 PROCEDURE — 99233 SBSQ HOSP IP/OBS HIGH 50: CPT | Mod: GC,,, | Performed by: PSYCHIATRY & NEUROLOGY

## 2018-08-05 PROCEDURE — 63600175 PHARM REV CODE 636 W HCPCS: Performed by: NURSE PRACTITIONER

## 2018-08-05 PROCEDURE — 99900035 HC TECH TIME PER 15 MIN (STAT)

## 2018-08-05 PROCEDURE — 80048 BASIC METABOLIC PNL TOTAL CA: CPT

## 2018-08-05 PROCEDURE — 36415 COLL VENOUS BLD VENIPUNCTURE: CPT

## 2018-08-05 PROCEDURE — 94668 MNPJ CHEST WALL SBSQ: CPT

## 2018-08-05 PROCEDURE — 94640 AIRWAY INHALATION TREATMENT: CPT

## 2018-08-05 PROCEDURE — 25000003 PHARM REV CODE 250: Performed by: PHYSICIAN ASSISTANT

## 2018-08-05 PROCEDURE — 27000221 HC OXYGEN, UP TO 24 HOURS

## 2018-08-05 PROCEDURE — 83735 ASSAY OF MAGNESIUM: CPT

## 2018-08-05 PROCEDURE — 97530 THERAPEUTIC ACTIVITIES: CPT

## 2018-08-05 RX ADMIN — METOPROLOL TARTRATE 25 MG: 25 TABLET ORAL at 09:08

## 2018-08-05 RX ADMIN — SENNOSIDES AND DOCUSATE SODIUM 1 TABLET: 8.6; 5 TABLET ORAL at 09:08

## 2018-08-05 RX ADMIN — INSULIN ASPART 10 UNITS: 100 INJECTION, SOLUTION INTRAVENOUS; SUBCUTANEOUS at 05:08

## 2018-08-05 RX ADMIN — HYDRALAZINE HYDROCHLORIDE 100 MG: 50 TABLET ORAL at 06:08

## 2018-08-05 RX ADMIN — HYDRALAZINE HYDROCHLORIDE 100 MG: 50 TABLET ORAL at 03:08

## 2018-08-05 RX ADMIN — ATORVASTATIN CALCIUM 40 MG: 20 TABLET, FILM COATED ORAL at 08:08

## 2018-08-05 RX ADMIN — INSULIN ASPART 6 UNITS: 100 INJECTION, SOLUTION INTRAVENOUS; SUBCUTANEOUS at 01:08

## 2018-08-05 RX ADMIN — MODAFINIL 200 MG: 100 TABLET ORAL at 06:08

## 2018-08-05 RX ADMIN — IPRATROPIUM BROMIDE AND ALBUTEROL SULFATE 3 ML: .5; 3 SOLUTION RESPIRATORY (INHALATION) at 09:08

## 2018-08-05 RX ADMIN — INSULIN ASPART 2 UNITS: 100 INJECTION, SOLUTION INTRAVENOUS; SUBCUTANEOUS at 09:08

## 2018-08-05 RX ADMIN — IPRATROPIUM BROMIDE AND ALBUTEROL SULFATE 3 ML: .5; 3 SOLUTION RESPIRATORY (INHALATION) at 08:08

## 2018-08-05 RX ADMIN — AMLODIPINE BESYLATE 10 MG: 10 TABLET ORAL at 09:08

## 2018-08-05 RX ADMIN — HEPARIN SODIUM 5000 UNITS: 5000 INJECTION, SOLUTION INTRAVENOUS; SUBCUTANEOUS at 06:08

## 2018-08-05 RX ADMIN — LOSARTAN POTASSIUM 100 MG: 50 TABLET, FILM COATED ORAL at 09:08

## 2018-08-05 RX ADMIN — AMIODARONE HYDROCHLORIDE 200 MG: 200 TABLET ORAL at 08:08

## 2018-08-05 RX ADMIN — INSULIN DETEMIR 10 UNITS: 100 INJECTION, SOLUTION SUBCUTANEOUS at 09:08

## 2018-08-05 RX ADMIN — IPRATROPIUM BROMIDE AND ALBUTEROL SULFATE 3 ML: .5; 3 SOLUTION RESPIRATORY (INHALATION) at 01:08

## 2018-08-05 RX ADMIN — IPRATROPIUM BROMIDE AND ALBUTEROL SULFATE 3 ML: .5; 3 SOLUTION RESPIRATORY (INHALATION) at 04:08

## 2018-08-05 RX ADMIN — IPRATROPIUM BROMIDE AND ALBUTEROL SULFATE 3 ML: .5; 3 SOLUTION RESPIRATORY (INHALATION) at 12:08

## 2018-08-05 RX ADMIN — INSULIN ASPART 4 UNITS: 100 INJECTION, SOLUTION INTRAVENOUS; SUBCUTANEOUS at 06:08

## 2018-08-05 RX ADMIN — INSULIN ASPART 5 UNITS: 100 INJECTION, SOLUTION INTRAVENOUS; SUBCUTANEOUS at 10:08

## 2018-08-05 RX ADMIN — MODAFINIL 100 MG: 100 TABLET ORAL at 01:08

## 2018-08-05 RX ADMIN — HEPARIN SODIUM 5000 UNITS: 5000 INJECTION, SOLUTION INTRAVENOUS; SUBCUTANEOUS at 10:08

## 2018-08-05 RX ADMIN — INSULIN DETEMIR 10 UNITS: 100 INJECTION, SOLUTION SUBCUTANEOUS at 10:08

## 2018-08-05 RX ADMIN — HEPARIN SODIUM 5000 UNITS: 5000 INJECTION, SOLUTION INTRAVENOUS; SUBCUTANEOUS at 01:08

## 2018-08-05 RX ADMIN — IPRATROPIUM BROMIDE AND ALBUTEROL SULFATE 3 ML: .5; 3 SOLUTION RESPIRATORY (INHALATION) at 05:08

## 2018-08-05 RX ADMIN — HYDRALAZINE HYDROCHLORIDE 100 MG: 50 TABLET ORAL at 09:08

## 2018-08-05 NOTE — ASSESSMENT & PLAN NOTE
R thalamic IPH w IVH on 7/9/2018. S/P EVD, Removed 7/27/18. Etiology likely hypertensive. Pending SNF placement.    Antithrombotics for secondary stroke prevention: Anticoagulants: holding AC due to bleed  Statins for secondary stroke prevention and hyperlipidemia, if present: Statins: Atorvastatin- 40 mg daily  Aggressive risk factor modification: HTN, DM, HLD, Diet, Exercise, A-Fib  Rehab efforts: Occupational Therapy, PT/OT/SLP to evaluate and treat, PM&R consult   Diagnostics ordered/pending: None   VTE prophylaxis: Heparin and SCD  BP parameters: ICH: SBP <140

## 2018-08-05 NOTE — PROGRESS NOTES
Results of TB test:    8/4/18- 0900hrs- 0/ Negative TB reading  8/5/18- 0900hrs- 0/ Negative TB Reading

## 2018-08-05 NOTE — ASSESSMENT & PLAN NOTE
sBP goal <140   Increased hydralazine 8/3, current BP WNL  Continue amlodipine, metoprolol, hydralazine, and losartan  Labetalol PRN

## 2018-08-05 NOTE — PLAN OF CARE
Problem: Occupational Therapy Goal  Goal: Occupational Therapy Goal  Goals to be met by: 8/12/18     Patient will increase functional independence with ADLs by performing:    UE Dressing with Moderate Assistance.  LE Dressing with Max Assistance.  Grooming while seated with Min Assistance.  Toileting from bedside commode with Moderate Assistance for hygiene and clothing management.   Rolling to Bilateral with Moderate Assistance.   Supine to sit with Moderate Assistance.  Stand pivot transfers with Moderate Assistance.      Outcome: Ongoing (interventions implemented as appropriate)  Goals updated.

## 2018-08-05 NOTE — SUBJECTIVE & OBJECTIVE
Neurologic Chief Complaint: ICH    Subjective:     Interval History: Patient is seen for follow-up neurological assessment and treatment recommendations: no significant change in clinical status, except mild increase in oxygen requirements. Pending SNF placement      HPI, Past Medical, Family, and Social History remains the same as documented in the initial encounter.     Review of Systems   Constitutional: Negative for chills and fever.   HENT: Positive for trouble swallowing.    Respiratory: Positive for cough.    Gastrointestinal: Negative for vomiting.   Neurological: Positive for facial asymmetry, speech difficulty and weakness.   Psychiatric/Behavioral: Positive for confusion. Negative for agitation.     Scheduled Meds:   albuterol-ipratropium  3 mL Nebulization Q4H    amiodarone  200 mg Oral Daily    amLODIPine  10 mg Oral Daily    atorvastatin  40 mg Oral Daily    heparin (porcine)  5,000 Units Subcutaneous Q8H    hydrALAZINE  100 mg Oral Q8H    insulin detemir U-100  10 Units Subcutaneous BID    losartan  100 mg Oral Daily    metoprolol tartrate  25 mg Oral BID    modafinil  100 mg Oral After lunch    modafinil  200 mg Oral Daily    polyethylene glycol  17 g Oral Daily    senna-docusate 8.6-50 mg  1 tablet Oral BID     Continuous Infusions:  PRN Meds:acetaminophen, dextrose 50%, dextrose 50%, glucagon (human recombinant), insulin aspart U-100, labetalol, sodium chloride 0.9%    Objective:     Vital Signs (Most Recent):  Temp: 99.9 °F (37.7 °C) (08/05/18 0510)  Pulse: 88 (08/05/18 0510)  Resp: 18 (08/05/18 0444)  BP: (!) 179/82 (08/05/18 0510)  SpO2: (!) 93 % (08/05/18 0510)  BP Location: Right arm    Vital Signs Range (Last 24H):  Temp:  [97.3 °F (36.3 °C)-99.9 °F (37.7 °C)]   Pulse:  [68-88]   Resp:  [16-18]   BP: (106-179)/(56-82)   SpO2:  [91 %-97 %]   BP Location: Right arm    Physical Exam   Constitutional: He appears well-developed and well-nourished. He appears lethargic. He appears ill.  No distress. Nasal cannula in place.   HENT:   Head: Normocephalic.   Eyes: Pupils are equal, round, and reactive to light.   Neck: Normal range of motion.   Cardiovascular: Normal rate.    Pulmonary/Chest: Effort normal. No respiratory distress.   Abdominal: Soft.   Neurological: He appears lethargic. He displays no seizure activity.   Skin: Skin is warm and dry. He is not diaphoretic.   Vitals reviewed.    Neurological Exam: exam appears effort dependent, patient seems unwilling to participate  LOC: drowsy  Attention Span: poor  Language: Mild aphasia  Articulation: Dysarthria  Orientation: not oriented to person, place, or time  Visual Fields: L hemianopsia  EOM (CN III, IV, VI): R gaze preference  Pupils (CN II, III): PERRL  Facial Sensation (CN V): Normal  Facial Movement (CN VII): Lower facial weakness on the Left  Unable to assess strength or sensation due to lethargy and patient not being cooperatiove  Tone: normal     Laboratory:  BMP:     Recent Labs  Lab 08/05/18  0531      K 4.1      CO2 27   BUN 14   CREATININE 0.8   CALCIUM 9.0     CBC:     Recent Labs  Lab 08/05/18  0530   WBC 8.94   RBC 3.30*   HGB 9.6*   HCT 29.0*      MCV 88   MCH 29.1   MCHC 33.1       Diagnostic Results     Brain imaging:  [facility] 7/25/18  Unchanged position of right frontal coursing ventriculostomy catheter with unchanged size and configuration of prominent ventricular system.    Stable right thalamic intraparenchymal hemorrhage with associated interventricular hemorrhage layering in the occipital horns of the lateral ventricles.    Chronic microvascular ischemic changes above with associated remote infarcts.     CT 7/21/18  1. Interval placement of right frontal coursing ventriculostomy with unchanged size and configuration of the shunted ventricular system, which remains prominent.  2. Evolution of right thalamic intraparenchymal hemorrhage with stable mass effect.  3. Chronic microvascular ischemic change and  remote infarcts as above.       [facility]: 1. Redemonstration of evolving right thalamic hemorrhage with mild adjacent edema and slight localized mass effect.  Subtle, subcentimeter focus of hyperdensity within the left thalamus, concerning for additional focus of evolving hemorrhage.  2. Persistent interventricular extension of hemorrhage with unchanged dilatation of the ventricular system concerning for component of hydrocephalus.  3. Generalized cerebral volume loss, chronic microvascular ischemic change and remote infarcts as above.     [facility] ( 07/29/2018)  Evolving right thalamic intraparenchymal hematoma with intraventricular extension, as above, without detrimental change from 07/29/2018 CT at 12:24 p.m. No new infarct or hemorrhage.    Persistent diffuse enlargement of the ventricular system, unchanged.  Small focus of pneumocephalus in the right frontal horn persists.  Vessel Imaging:  None     Cardiac Evaluation:   ECHO- CONCLUSIONS     1 - Low normal left ventricular systolic function.     2 - Indeterminate LV diastolic function.     3 - Normal right ventricular systolic function .     4 - There is anterior pericardial fat pad..

## 2018-08-05 NOTE — PT/OT/SLP PROGRESS
"Occupational Therapy   Treatment    Name: Ciro Chandler  MRN: [id number]  Admitting Diagnosis:  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       Recommendations:     Discharge Recommendations: nursing facility, skilled  Discharge Equipment Recommendations:   (TBD)    Subjective   "Okay."  Communicated with: RN prior to session.  Pain/Comfort:  · Pain Rating 1: 0/10  · Pain Rating Post-Intervention 1: 0/10    Patients cultural, spiritual, Christianity conflicts given the current situation: none stated    Objective:     Patient found with: telemetry, land catheter, oxygen    General Precautions: Standard, aspiration, fall (cardiac, DNR)     Occupational Performance:    Bed Mobility:    · Patient completed Rolling/Turning to (B) with total assistance  · Patient completed Scooting/Bridging with total assistance scooting forward & to the right along EOB x 3 trials  · Patient completed Supine to Sit with total assistance  · Patient completed Sit to Supine with total assistance     Activities of Daily Living:  · Grooming: total assistance seated EOB with hand over hand (A) required  · Toileting: total assistance while supine due to BM incotinence    Patient left supine with all lines intact, call button in reach, RN notified, daughter present and white board updated.    [facility] 6 Click:  [facility] Total Score: 6    Treatment & Education:  Pt required Max (A) - Total (A) with postural control while seated EOB x ~ 15-20 minutes. Provided weight shifting in all directions however pt with minimal response of attempts to correct posture despite max verbal & physical cues.  Pt with eyes open 40% of session.  Pt followed no commands despite max cues.  Provided PROM to BUE in all planes x 10 reps each while supine.    Education:    Assessment:     Ciro Chandler is a 68 y.o. male with a medical diagnosis of 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.  He presents with limited session due to limited " active participation.  Pt continues to be at risk for falls.  Performance deficits affecting function are weakness, impaired endurance, impaired functional mobilty, impaired self care skills, impaired balance, visual deficits, impaired cognition, decreased lower extremity function, decreased upper extremity function, decreased coordination, decreased safety awareness.      Rehab Prognosis:  fair; patient would benefit from acute skilled OT services to address these deficits and reach maximum level of function.       Plan:     Patient to be seen 3 x/week to address the above listed problems via self-care/home management, therapeutic activities, therapeutic exercises, sensory integration, cognitive retraining, neuromuscular re-education  · Plan of Care Expires: 08/24/18  · Plan of Care Reviewed with: patient, daughter    This Plan of care has been discussed with the patient who was involved in its development and understands and is in agreement with the identified goals and treatment plan    GOALS:    Occupational Therapy Goals        Problem: Occupational Therapy Goal    Goal Priority Disciplines Outcome Interventions   Occupational Therapy Goal     OT, PT/OT Ongoing (interventions implemented as appropriate)    Description:  Goals to be met by: 8/12/18     Patient will increase functional independence with ADLs by performing:    UE Dressing with Moderate Assistance.  LE Dressing with Max Assistance.  Grooming while seated with Min Assistance.  Toileting from bedside commode with Moderate Assistance for hygiene and clothing management.   Rolling to Bilateral with Moderate Assistance.   Supine to sit with Moderate Assistance.  Stand pivot transfers with Moderate Assistance.                        Time Tracking:     OT Date of Treatment: 08/05/18  OT Start Time: 0814  OT Stop Time: 0854  OT Total Time (min): 40 min    Billable Minutes:Self Care/Home Management 10  Therapeutic Activity 15  Therapeutic Exercise  15    Shari Sun, LOTR  8/5/2018

## 2018-08-05 NOTE — PROGRESS NOTES
[facility]  Vascular Neurology  Comprehensive Stroke Center  Progress Note    Assessment/Plan:     * 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    R thalamic IPH w IVH on 7/9/2018. S/P EVD, Removed 7/27/18. Etiology likely hypertensive. Pending SNF placement.    Antithrombotics for secondary stroke prevention: Anticoagulants: holding AC due to bleed  Statins for secondary stroke prevention and hyperlipidemia, if present: Statins: Atorvastatin- 40 mg daily  Aggressive risk factor modification: HTN, DM, HLD, Diet, Exercise, A-Fib  Rehab efforts: Occupational Therapy, PT/OT/SLP to evaluate and treat, PM&R consult   Diagnostics ordered/pending: None   VTE prophylaxis: Heparin and SCD  BP parameters: ICH: SBP <140        Obstructive hydrocephalus    - S/P EVD, removed on 07/27  - CTH on 07/29 w/ stable ventricular size, possibly mildly increased        Dysphagia    - passed MBSS  - currently on puree diet with thin liquids  - calorie counts ordered and nutrition consulted        Cardiomyopathy    - Latest Echo with Low normal left ventricular systolic function.   - Continue BB and ARB        Persistent atrial fibrillation    - Stroke risk factor  - Holding anticoagulation due to ICH  - Continue amiodarone and metoprolol         Dyslipidemia    Stroke risk factor, well-controlled, most recent LDL 47  Continue Atorvastatin 40mg QHS        Essential hypertension    sBP goal <140   Increased hydralazine 8/3, current BP WNL  Continue amlodipine, metoprolol, hydralazine, and losartan  Labetalol PRN        Type 2 diabetes mellitus with diabetic polyneuropathy, with long-term current use of insulin    Stroke risk factor. A1C 7.2  on Detemir 10 units q12 + MD SSI           7/21: EVD at bedside by NSGY  7/23 EVD in place lowered to 5cm h2o per nsgy.. Blood sugars running >200 adjusted detemir. Increased hydralazine for better BP control  7/24: EVD at 5, insulin adjustments   7/25: EVD  clamped per Nsgy, CTH tonight  7/26: Held tube feeds, insulin adjustments, Nsgy contacted about MSC and EVD.   7/27: Evd pulled per Nsgy, Ngt replace, transfer to stroke service  07/28/2018  Pending bed availability, largely stable neuro exam. SNF placement on discharge   07/29/2018 More somnolent and less responsive this AM. CTH stable but patient w/ fevers now.   07/30/2018 Patient a lot more responsive this AM. Animated. TF held and re-started overnight. Ceftriaxone started for UTI.   07/31/2018 Neurologically unchanged. Hgb continues to trend down.  No over signs of bleeding per nursing staff.  08/01/2018 Hgb stable today. No blood in stools. Continues to be fatigued but per nursing, patient's mental status greatly waxes and wanes. At his best, he is able to work with PT/OT and mumble but understands speech.   08/02/2018 H&H stable, attempting to repeat MBSS today pending patient's level of alertness  08/03/2018 passed MBSS yesterday and started on a puree diet with thin liquids, calorie counts ordered, increased hydralazine      Hemorrhagic change of an Ischemic Stroke: Does this patient have an ischemic stroke with hemorrhagic changes? No     Neurologic Chief Complaint: ICH    Subjective:     Interval History: Patient is seen for follow-up neurological assessment and treatment recommendations: no significant change in clinical status, except mild increase in oxygen requirements. Pending SNF placement      HPI, Past Medical, Family, and Social History remains the same as documented in the initial encounter.     Review of Systems   Constitutional: Negative for chills and fever.   HENT: Positive for trouble swallowing.    Respiratory: Positive for cough.    Gastrointestinal: Negative for vomiting.   Neurological: Positive for facial asymmetry, speech difficulty and weakness.   Psychiatric/Behavioral: Positive for confusion. Negative for agitation.     Scheduled Meds:   albuterol-ipratropium  3 mL Nebulization  Q4H    amiodarone  200 mg Oral Daily    amLODIPine  10 mg Oral Daily    atorvastatin  40 mg Oral Daily    heparin (porcine)  5,000 Units Subcutaneous Q8H    hydrALAZINE  100 mg Oral Q8H    insulin detemir U-100  10 Units Subcutaneous BID    losartan  100 mg Oral Daily    metoprolol tartrate  25 mg Oral BID    modafinil  100 mg Oral After lunch    modafinil  200 mg Oral Daily    polyethylene glycol  17 g Oral Daily    senna-docusate 8.6-50 mg  1 tablet Oral BID     Continuous Infusions:  PRN Meds:acetaminophen, dextrose 50%, dextrose 50%, glucagon (human recombinant), insulin aspart U-100, labetalol, sodium chloride 0.9%    Objective:     Vital Signs (Most Recent):  Temp: 99.9 °F (37.7 °C) (08/05/18 0510)  Pulse: 88 (08/05/18 0510)  Resp: 18 (08/05/18 0444)  BP: (!) 179/82 (08/05/18 0510)  SpO2: (!) 93 % (08/05/18 0510)  BP Location: Right arm    Vital Signs Range (Last 24H):  Temp:  [97.3 °F (36.3 °C)-99.9 °F (37.7 °C)]   Pulse:  [68-88]   Resp:  [16-18]   BP: (106-179)/(56-82)   SpO2:  [91 %-97 %]   BP Location: Right arm    Physical Exam   Constitutional: He appears well-developed and well-nourished. He appears lethargic. He appears ill. No distress. Nasal cannula in place.   HENT:   Head: Normocephalic.   Eyes: Pupils are equal, round, and reactive to light.   Neck: Normal range of motion.   Cardiovascular: Normal rate.    Pulmonary/Chest: Effort normal. No respiratory distress.   Abdominal: Soft.   Neurological: He appears lethargic. He displays no seizure activity.   Skin: Skin is warm and dry. He is not diaphoretic.   Vitals reviewed.    Neurological Exam: exam appears effort dependent, patient seems unwilling to participate  LOC: drowsy  Attention Span: poor  Language: Mild aphasia  Articulation: Dysarthria  Orientation: not oriented to person, place, or time  Visual Fields: L hemianopsia  EOM (CN III, IV, VI): R gaze preference  Pupils (CN II, III): PERRL  Facial Sensation (CN V): Normal  Facial  Movement (CN VII): Lower facial weakness on the Left  Unable to assess strength or sensation due to lethargy and patient not being cooperatiove  Tone: normal     Laboratory:  BMP:     Recent Labs  Lab 08/05/18  0531      K 4.1      CO2 27   BUN 14   CREATININE 0.8   CALCIUM 9.0     CBC:     Recent Labs  Lab 08/05/18  0530   WBC 8.94   RBC 3.30*   HGB 9.6*   HCT 29.0*      MCV 88   MCH 29.1   MCHC 33.1       Diagnostic Results     Brain imaging:  CTH 7/25/18  Unchanged position of right frontal coursing ventriculostomy catheter with unchanged size and configuration of prominent ventricular system.    Stable right thalamic intraparenchymal hemorrhage with associated interventricular hemorrhage layering in the occipital horns of the lateral ventricles.    Chronic microvascular ischemic changes above with associated remote infarcts.     CTH 7/21/18  1. Interval placement of right frontal coursing ventriculostomy with unchanged size and configuration of the shunted ventricular system, which remains prominent.  2. Evolution of right thalamic intraparenchymal hemorrhage with stable mass effect.  3. Chronic microvascular ischemic change and remote infarcts as above.       CTH: 1. Redemonstration of evolving right thalamic hemorrhage with mild adjacent edema and slight localized mass effect.  Subtle, subcentimeter focus of hyperdensity within the left thalamus, concerning for additional focus of evolving hemorrhage.  2. Persistent interventricular extension of hemorrhage with unchanged dilatation of the ventricular system concerning for component of hydrocephalus.  3. Generalized cerebral volume loss, chronic microvascular ischemic change and remote infarcts as above.     CTH ( 07/29/2018)  Evolving right thalamic intraparenchymal hematoma with intraventricular extension, as above, without detrimental change from 07/29/2018 CT at 12:24 p.m. No new infarct or hemorrhage.    Persistent diffuse enlargement of  the ventricular system, unchanged.  Small focus of pneumocephalus in the right frontal horn persists.  Vessel Imaging:  None     Cardiac Evaluation:   ECHO- CONCLUSIONS     1 - Low normal left ventricular systolic function.     2 - Indeterminate LV diastolic function.     3 - Normal right ventricular systolic function .     4 - There is anterior pericardial fat pad..          Massiel Anthony MD  Comprehensive Stroke Center  Department of Vascular Neurology   [facility]

## 2018-08-05 NOTE — PLAN OF CARE
Problem: Stroke (Hemorrhagic) (Adult)  Intervention: Promote Effective Communication  Pt in bed resting, VSS, in NAD. Bed in low position, call light within reach, pt instructed to use call light when in need of assistance. Will continue to monitor.

## 2018-08-06 PROBLEM — G93.6 VASOGENIC CEREBRAL EDEMA: Status: ACTIVE | Noted: 2018-08-06

## 2018-08-06 LAB
ANION GAP SERPL CALC-SCNC: 9 MMOL/L
BASOPHILS # BLD AUTO: 0.04 K/UL
BASOPHILS NFR BLD: 0.5 %
BUN SERPL-MCNC: 16 MG/DL
CALCIUM SERPL-MCNC: 8.8 MG/DL
CHLORIDE SERPL-SCNC: 102 MMOL/L
CO2 SERPL-SCNC: 25 MMOL/L
CREAT SERPL-MCNC: 0.8 MG/DL
DIFFERENTIAL METHOD: ABNORMAL
EOSINOPHIL # BLD AUTO: 0.3 K/UL
EOSINOPHIL NFR BLD: 3.2 %
ERYTHROCYTE [DISTWIDTH] IN BLOOD BY AUTOMATED COUNT: 12.6 %
EST. GFR  (AFRICAN AMERICAN): >60 ML/MIN/1.73 M^2
EST. GFR  (NON AFRICAN AMERICAN): >60 ML/MIN/1.73 M^2
GLUCOSE SERPL-MCNC: 267 MG/DL
HCT VFR BLD AUTO: 27.3 %
HGB BLD-MCNC: 9.2 G/DL
IMM GRANULOCYTES # BLD AUTO: 0.05 K/UL
IMM GRANULOCYTES NFR BLD AUTO: 0.6 %
INR PPP: 1
LYMPHOCYTES # BLD AUTO: 1 K/UL
LYMPHOCYTES NFR BLD: 11.8 %
MAGNESIUM SERPL-MCNC: 2 MG/DL
MCH RBC QN AUTO: 29.9 PG
MCHC RBC AUTO-ENTMCNC: 33.7 G/DL
MCV RBC AUTO: 89 FL
MONOCYTES # BLD AUTO: 0.6 K/UL
MONOCYTES NFR BLD: 6.9 %
NEUTROPHILS # BLD AUTO: 6.7 K/UL
NEUTROPHILS NFR BLD: 77 %
NRBC BLD-RTO: 0 /100 WBC
PHOSPHATE SERPL-MCNC: 3.4 MG/DL
PLATELET # BLD AUTO: 309 K/UL
PMV BLD AUTO: 11.1 FL
POCT GLUCOSE: 243 MG/DL (ref 70–110)
POCT GLUCOSE: 268 MG/DL (ref 70–110)
POCT GLUCOSE: 299 MG/DL (ref 70–110)
POCT GLUCOSE: 309 MG/DL (ref 70–110)
POCT GLUCOSE: 352 MG/DL (ref 70–110)
POCT GLUCOSE: 365 MG/DL (ref 70–110)
POTASSIUM SERPL-SCNC: 4 MMOL/L
PROTHROMBIN TIME: 10.3 SEC
RBC # BLD AUTO: 3.08 M/UL
SODIUM SERPL-SCNC: 136 MMOL/L
TROPONIN I SERPL DL<=0.01 NG/ML-MCNC: 0.01 NG/ML
WBC # BLD AUTO: 8.7 K/UL

## 2018-08-06 PROCEDURE — 94668 MNPJ CHEST WALL SBSQ: CPT

## 2018-08-06 PROCEDURE — 84100 ASSAY OF PHOSPHORUS: CPT

## 2018-08-06 PROCEDURE — 99233 SBSQ HOSP IP/OBS HIGH 50: CPT | Mod: ,,, | Performed by: PSYCHIATRY & NEUROLOGY

## 2018-08-06 PROCEDURE — 85610 PROTHROMBIN TIME: CPT

## 2018-08-06 PROCEDURE — 97110 THERAPEUTIC EXERCISES: CPT

## 2018-08-06 PROCEDURE — 85025 COMPLETE CBC W/AUTO DIFF WBC: CPT

## 2018-08-06 PROCEDURE — 84484 ASSAY OF TROPONIN QUANT: CPT

## 2018-08-06 PROCEDURE — 63600175 PHARM REV CODE 636 W HCPCS: Performed by: PHYSICIAN ASSISTANT

## 2018-08-06 PROCEDURE — 94640 AIRWAY INHALATION TREATMENT: CPT

## 2018-08-06 PROCEDURE — 25000003 PHARM REV CODE 250: Performed by: PHYSICIAN ASSISTANT

## 2018-08-06 PROCEDURE — 86580 TB INTRADERMAL TEST: CPT | Performed by: PSYCHIATRY & NEUROLOGY

## 2018-08-06 PROCEDURE — 27000221 HC OXYGEN, UP TO 24 HOURS

## 2018-08-06 PROCEDURE — 92526 ORAL FUNCTION THERAPY: CPT

## 2018-08-06 PROCEDURE — 97530 THERAPEUTIC ACTIVITIES: CPT

## 2018-08-06 PROCEDURE — 36415 COLL VENOUS BLD VENIPUNCTURE: CPT

## 2018-08-06 PROCEDURE — 93010 ELECTROCARDIOGRAM REPORT: CPT | Mod: ,,, | Performed by: INTERNAL MEDICINE

## 2018-08-06 PROCEDURE — 92507 TX SP LANG VOICE COMM INDIV: CPT

## 2018-08-06 PROCEDURE — 83735 ASSAY OF MAGNESIUM: CPT

## 2018-08-06 PROCEDURE — 94761 N-INVAS EAR/PLS OXIMETRY MLT: CPT

## 2018-08-06 PROCEDURE — 20600001 HC STEP DOWN PRIVATE ROOM

## 2018-08-06 PROCEDURE — 80048 BASIC METABOLIC PNL TOTAL CA: CPT

## 2018-08-06 PROCEDURE — 25000242 PHARM REV CODE 250 ALT 637 W/ HCPCS: Performed by: PHYSICIAN ASSISTANT

## 2018-08-06 PROCEDURE — 93005 ELECTROCARDIOGRAM TRACING: CPT

## 2018-08-06 PROCEDURE — 99900035 HC TECH TIME PER 15 MIN (STAT)

## 2018-08-06 PROCEDURE — 63600175 PHARM REV CODE 636 W HCPCS: Performed by: PSYCHIATRY & NEUROLOGY

## 2018-08-06 PROCEDURE — 63600175 PHARM REV CODE 636 W HCPCS: Performed by: NURSE PRACTITIONER

## 2018-08-06 RX ORDER — INSULIN ASPART 100 [IU]/ML
5 INJECTION, SOLUTION INTRAVENOUS; SUBCUTANEOUS
Status: DISCONTINUED | OUTPATIENT
Start: 2018-08-06 | End: 2018-08-09 | Stop reason: HOSPADM

## 2018-08-06 RX ORDER — WARFARIN SODIUM 5 MG/1
5 TABLET ORAL DAILY
Status: DISCONTINUED | OUTPATIENT
Start: 2018-08-06 | End: 2018-08-09

## 2018-08-06 RX ADMIN — MODAFINIL 200 MG: 100 TABLET ORAL at 05:08

## 2018-08-06 RX ADMIN — IPRATROPIUM BROMIDE AND ALBUTEROL SULFATE 3 ML: .5; 3 SOLUTION RESPIRATORY (INHALATION) at 04:08

## 2018-08-06 RX ADMIN — INSULIN DETEMIR 12 UNITS: 100 INJECTION, SOLUTION SUBCUTANEOUS at 09:08

## 2018-08-06 RX ADMIN — HEPARIN SODIUM 5000 UNITS: 5000 INJECTION, SOLUTION INTRAVENOUS; SUBCUTANEOUS at 01:08

## 2018-08-06 RX ADMIN — Medication 5 UNITS: at 01:08

## 2018-08-06 RX ADMIN — IPRATROPIUM BROMIDE AND ALBUTEROL SULFATE 3 ML: .5; 3 SOLUTION RESPIRATORY (INHALATION) at 07:08

## 2018-08-06 RX ADMIN — AMLODIPINE BESYLATE 10 MG: 10 TABLET ORAL at 10:08

## 2018-08-06 RX ADMIN — ACETAMINOPHEN 650 MG: 325 TABLET ORAL at 05:08

## 2018-08-06 RX ADMIN — IPRATROPIUM BROMIDE AND ALBUTEROL SULFATE 3 ML: .5; 3 SOLUTION RESPIRATORY (INHALATION) at 12:08

## 2018-08-06 RX ADMIN — POLYETHYLENE GLYCOL 3350 17 G: 17 POWDER, FOR SOLUTION ORAL at 09:08

## 2018-08-06 RX ADMIN — LOSARTAN POTASSIUM 100 MG: 50 TABLET, FILM COATED ORAL at 10:08

## 2018-08-06 RX ADMIN — INSULIN ASPART 4 UNITS: 100 INJECTION, SOLUTION INTRAVENOUS; SUBCUTANEOUS at 06:08

## 2018-08-06 RX ADMIN — MODAFINIL 100 MG: 100 TABLET ORAL at 01:08

## 2018-08-06 RX ADMIN — SENNOSIDES AND DOCUSATE SODIUM 1 TABLET: 8.6; 5 TABLET ORAL at 10:08

## 2018-08-06 RX ADMIN — INSULIN ASPART 10 UNITS: 100 INJECTION, SOLUTION INTRAVENOUS; SUBCUTANEOUS at 05:08

## 2018-08-06 RX ADMIN — INSULIN ASPART 5 UNITS: 100 INJECTION, SOLUTION INTRAVENOUS; SUBCUTANEOUS at 11:08

## 2018-08-06 RX ADMIN — INSULIN ASPART 10 UNITS: 100 INJECTION, SOLUTION INTRAVENOUS; SUBCUTANEOUS at 02:08

## 2018-08-06 RX ADMIN — SENNOSIDES AND DOCUSATE SODIUM 1 TABLET: 8.6; 5 TABLET ORAL at 09:08

## 2018-08-06 RX ADMIN — INSULIN ASPART 6 UNITS: 100 INJECTION, SOLUTION INTRAVENOUS; SUBCUTANEOUS at 11:08

## 2018-08-06 RX ADMIN — HYDRALAZINE HYDROCHLORIDE 100 MG: 50 TABLET ORAL at 05:08

## 2018-08-06 RX ADMIN — METOPROLOL TARTRATE 25 MG: 25 TABLET ORAL at 10:08

## 2018-08-06 RX ADMIN — AMIODARONE HYDROCHLORIDE 200 MG: 200 TABLET ORAL at 10:08

## 2018-08-06 RX ADMIN — ATORVASTATIN CALCIUM 40 MG: 20 TABLET, FILM COATED ORAL at 10:08

## 2018-08-06 RX ADMIN — IPRATROPIUM BROMIDE AND ALBUTEROL SULFATE 3 ML: .5; 3 SOLUTION RESPIRATORY (INHALATION) at 01:08

## 2018-08-06 RX ADMIN — IPRATROPIUM BROMIDE AND ALBUTEROL SULFATE 3 ML: .5; 3 SOLUTION RESPIRATORY (INHALATION) at 11:08

## 2018-08-06 RX ADMIN — INSULIN ASPART 3 UNITS: 100 INJECTION, SOLUTION INTRAVENOUS; SUBCUTANEOUS at 02:08

## 2018-08-06 RX ADMIN — HYDRALAZINE HYDROCHLORIDE 100 MG: 50 TABLET ORAL at 09:08

## 2018-08-06 RX ADMIN — INSULIN ASPART 5 UNITS: 100 INJECTION, SOLUTION INTRAVENOUS; SUBCUTANEOUS at 05:08

## 2018-08-06 RX ADMIN — HEPARIN SODIUM 5000 UNITS: 5000 INJECTION, SOLUTION INTRAVENOUS; SUBCUTANEOUS at 05:08

## 2018-08-06 RX ADMIN — HYDRALAZINE HYDROCHLORIDE 100 MG: 50 TABLET ORAL at 01:08

## 2018-08-06 RX ADMIN — WARFARIN SODIUM 5 MG: 5 TABLET ORAL at 09:08

## 2018-08-06 RX ADMIN — HEPARIN SODIUM 5000 UNITS: 5000 INJECTION, SOLUTION INTRAVENOUS; SUBCUTANEOUS at 09:08

## 2018-08-06 RX ADMIN — METOPROLOL TARTRATE 25 MG: 25 TABLET ORAL at 09:08

## 2018-08-06 NOTE — PLAN OF CARE
Patient has been approved for [facility]. [facility] is able to accept patient tomorrow. SW will follow up in the morning.     Judy Brooks LMSW  [facility]  Ext. [phone]

## 2018-08-06 NOTE — PLAN OF CARE
SW left message for admissions with [facility] to follow up on referral. SW waiting to hear back.    1:45 PM  SW spoke with Mary with [facility]. SW informed that authorization is pending. FOZIA will continue to follow.    Judy Brooks LMSW  [facility]  Ext. [phone]

## 2018-08-06 NOTE — PT/OT/SLP PROGRESS
"Physical Therapy Treatment    Patient Name:  Ciro Chandler   MRN:  [id number]    Recommendations:     Discharge Recommendations:  nursing facility, skilled   Discharge Equipment Recommendations: hospital bed, lift device, wheelchair   Barriers to discharge: Inaccessible home and Decreased caregiver support    Assessment:     Ciro Chandler is a 68 y.o. 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  He presents with the following impairments/functional limitations:  weakness, impaired endurance, impaired functional mobilty, impaired balance, impaired cognition, decreased lower extremity function, decreased upper extremity function, decreased safety awareness, edema, impaired coordination, impaired fine motor, impaired cardiopulmonary response to activity Patient Appeared very drowsy and lethargic and was able to open his eyes just for a few seconds when requested to open his eyes. B LE PROM continues to be WNL.    Rehab Prognosis:  fair; patient would benefit from acute skilled PT services to address these deficits and reach maximum level of function.      Recent Surgery: * No surgery found *      Plan:     During this hospitalization, patient to be seen 4 x/week to address the above listed problems via gait training, therapeutic activities, therapeutic exercises, neuromuscular re-education  · Plan of Care Expires:  08/20/18   Plan of Care Reviewed with: patient, spouse    Subjective     Communicated with NSG prior to session.  Patient found with HOB elevated upon PT entry to room, agreeable to treatment.   Patient's spouse states " He's been very sleepy".     Chief Complaint: fatigue  Patient comments/goals: none stated   Pain/Comfort:  · Pain Rating 1: 0/10  · Pain Rating Post-Intervention 1: 0/10    Patients cultural, spiritual, Quaker conflicts given the current situation: None stated    Objective:     Patient found with: telemetry, land catheter, SCD, " pressure relief boots, oxygen     General Precautions: Standard, aspiration, fall   Orthopedic Precautions:N/A   Braces: N/A     Functional Mobility:  · Bed Mobility:     · Rolling Left:  total assistance  · Scooting: total assistance and of 2 persons  · Supine to Sit: total assistance  · Sit to Supine: total assistance  · Transfers:     · Sit to Stand:  dependence with no AD  · Balance: poor in sitting.      AM-PAC 6 CLICK MOBILITY  Turning over in bed (including adjusting bedclothes, sheets and blankets)?: 2  Sitting down on and standing up from a chair with arms (e.g., wheelchair, bedside commode, etc.): 2  Moving from lying on back to sitting on the side of the bed?: 2  Moving to and from a bed to a chair (including a wheelchair)?: 1  Need to walk in hospital room?: 1  Climbing 3-5 steps with a railing?: 1  Basic Mobility Total Score: 9       Therapeutic Activities and Exercises:   Doffed/Donned SCD's and pressure relief boots. Static sitting balance at EOB x 15 minutes with mod assistance, but progressing to min assistance. Repositioned in bed after treatment. B LE PROM x 30 reps on all available planes of motion.    Patient left HOB elevated with all lines intact, call button in reach and NSG and spouse present..    GOALS:    Physical Therapy Goals        Problem: Physical Therapy Goal    Goal Priority Disciplines Outcome Goal Variances Interventions   Physical Therapy Goal     PT/OT, PT Ongoing (interventions implemented as appropriate)     Description:  Goals to be met by: 8/17/18    1. Pt will perform rolling to the R and L with moderate assistance.   2. Pt will perform supine to sit from both sides of the bed with max assistance.  3. Pt will perform sit to supine with max assistance.  4. Pt will sit EOB x 5 minutes with min assistance to prepare for functional tasks in sitting.   5. Pt will perform sit to stand transfers with moderate assistance.    6. Pt will perform bed <> chair transfers with max  assistance.  7. Pt will perform gait x 10 feet with max assistance  8. Family will be independent with ROM using handout.                         Time Tracking:     PT Received On: 08/06/18  PT Start Time: 0955     PT Stop Time: 1026  PT Total Time (min): 31 min     Billable Minutes: Therapeutic Activity 22 and Therapeutic Exercise 9    Treatment Type: Treatment  PT/PTA: PTA     PTA Visit Number: 1     Torsten Astudillo, PTA  08/06/2018

## 2018-08-06 NOTE — PLAN OF CARE
Problem: Patient Care Overview  Goal: Plan of Care Review  NO ACUTE EVENTS DURING SHIFT. PT TOLERATED ALL ACTIVITIES WELL. PT VOIDING WITH NO DIFFICULTIES. PT HAD TWO BM TODAY. PT TOLERATING FEEDING BY MOUTH WELL. PT AWAITING PLACEMENT FOR SNF. PPD PLACED IN LEFT ARM TO BE READ 8/8/2018. PT PARTICIPATED WITH APPROPRIATE THERAPIES. ALL QUESTIONS ANSWERED. WILL ENDORSE CARE TO NOC RN.

## 2018-08-06 NOTE — PLAN OF CARE
Problem: Stroke (Hemorrhagic) (Adult)  Intervention: Monitor/Assist with Self Care  Pt in bed resting,VSS in NAD. Pt turned q2 hours. Bed in low position, Call light within reach. Will continue to monitor

## 2018-08-06 NOTE — PLAN OF CARE
SW sent clinical updates to [facility] for review. FOZIA will follow up.    Judy Brooks, VICKEY  [facility]  Ext. [phone]

## 2018-08-06 NOTE — PLAN OF CARE
Problem: SLP Goal  Goal: SLP Goal  Speech Language Pathology Goals  Goals expected to be met by 8/9/18  1.  Pt will tolerate puree diet with thin liquids with no s/s of aspiration, MIN A  2. Pt will follow simple commands with 90% accuracy, MIN A  3. Pt will respond to simple yes/no questions with 90% accuracy, MOD A  4, Pt will tespond to simple word finding tasks with 80% accuracy, MIN A  5.  Assess visual spatial skills    6. Pt will tolerate trials of advanced textures with adequate oral clearance and no overt S/S aspiration, MIN A  7. Educate Pt and family on SLP role and S/S aspiration   Goals expected to be met by 8/2/18  1.  Tolerate dental soft diet with thin liquids with no s/s of aspiration.   2.  Follow simple commands with 90% accuracy  3.  Respond to simple yes/no questions with 90% accuracy  4,  Respond to simple word finding tasks with 80% accuracy  5.  Assess visual spatial skills       Outcome: Ongoing (interventions implemented as appropriate)  Pt ongoing with current goals. ST to continue to follow. Patient requires strict 1:1 assistance with all PO for safety. Please continue to monitor for signs and symptoms of aspiration and discontinue oral feeding should you notice any of the following: watery eyes, reddened facial area, wet vocal quality, increased work of breathing, change in respiratory status, increased congestion, coughing, and/or fever. Thank you.    MIKE Castañeda., [facility]-SLP  Speech-Language Pathology  Pager: [phone]  8/6/2018

## 2018-08-06 NOTE — PLAN OF CARE
Problem: Physical Therapy Goal  Goal: Physical Therapy Goal  Goals to be met by: 8/17/18    1. Pt will perform rolling to the R and L with moderate assistance.   2. Pt will perform supine to sit from both sides of the bed with max assistance.  3. Pt will perform sit to supine with max assistance.  4. Pt will sit EOB x 5 minutes with min assistance to prepare for functional tasks in sitting.   5. Pt will perform sit to stand transfers with moderate assistance.    6. Pt will perform bed <> chair transfers with max assistance.  7. Pt will perform gait x 10 feet with max assistance  8. Family will be independent with ROM using handout.        Outcome: Ongoing (interventions implemented as appropriate)  Patient continues to require total assistance with all activities.

## 2018-08-06 NOTE — PT/OT/SLP PROGRESS
"Speech Language Pathology Treatment    Patient Name:  Ciro Chandler   MRN:  [id number]  Admitting Diagnosis: 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    Recommendations:                 General Recommendations:  Dysphagia therapy and Speech/language therapy  Diet recommendations:  Puree, Liquid Diet Level: Thin   Aspiration Precautions:Only when awake and alert Patient requires strict 1:1 assistance with all PO for safety, 1 bite/sip at a time, Alternating bites/sips, Assistance with meals, Eliminate distractions, Frequent oral care, HOB to 90 degrees, Meds crushed in puree, Monitor for s/s of aspiration, Small bites/sips and Strict aspiration precautions Please Continue to monitor for signs and symptoms of aspiration and discontinue oral feeding should you notice any of the following: watery eyes, reddened facial area, wet vocal quality, increased work of breathing, change in respiratory status, increased congestion, coughing, fever, and/or confusion.   General Precautions: Standard, aspiration, fall  Communication strategies:  provide increased time to answer and utilize YNQ to clarify wants/needs    Subjective     Pt presents lethargic  He says "Ciro Chandler"   Spouse explains, "He ate about 75% of his breakfast tray, He was just having some water with me"  Upon first attempt, Patient reporting chest pain then upon second attempt, Patient denying pain    Pain/Comfort:  · Pain Rating 1: 5/10  · Location - Side 1: Left  · Location 1: chest  · Pain Addressed 1: Distraction, Cessation of Activity, Nurse notified  · Pain Rating Post-Intervention 1: 5/10    Objective:     Has the patient been evaluated by SLP for swallowing?   Yes  Keep patient NPO? No   Current Respiratory Status: nasal cannula      Pt seen for speech and dysphagia therapies. Upon first attempts, Pt found awake and alert in bed, spouse at bedside. Pt stated name and answers basic YNQ provided minimal verbal cues from SLP and " Spouse. Spouse asking about therapy offerings in SNF and cognitive tasks to work on with Patient on own. SLP answered Spouse's questions. Pt's Spouse reported Patient had a decent appetite and was tolerating meals. Patient declined PO trials with ST and reported chest pain. SLP discontinued session and nurse notified.    Upon second attempt, Patient found reclined in bed, sleeping. Patient wakes per simple verbal cues. HOB elevated. Patient reported no c/o pain upon second attempt. Patient seen straw sips thin liquids x3 fed by spouse.   Patient mildly impulsive with sip size, requiring cues to take one sip at a time. No overt S/S aspiration noted with straw sips thin liquids.  Patient with clear vocal quality following presentations of thin liquids.  Patient and Spouse educated on aspiration precautions, safe swallow strategies and SLP POC. Patient with minimal verbal interaction with SLP and not able to verbalize understanding. Spouse verbalized understanding. EKG tech at bedside and SLP discontinued session. No additional questions noted.  Whiteboard updated.     Assessment:     Ciro Chandler is a 68 y.o. male with an SLP diagnosis of Dysphagia and Cognitive-Linguistic Impairment.  He presents with lethargy. Strict aspiration precautions are advised.    Goals:    SLP Goals        Problem: SLP Goal    Goal Priority Disciplines Outcome   SLP Goal     SLP Ongoing (interventions implemented as appropriate)   Description:  Speech Language Pathology Goals  Goals expected to be met by 8/9/18  1.  Pt will tolerate puree diet with thin liquids with no s/s of aspiration, MIN A  2. Pt will follow simple commands with 90% accuracy, MIN A  3. Pt will respond to simple yes/no questions with 90% accuracy, MOD A  4, Pt will tespond to simple word finding tasks with 80% accuracy, MIN A  5.  Assess visual spatial skills    6. Pt will tolerate trials of advanced textures with adequate oral clearance and no overt S/S aspiration, MIN  A  7. Educate Pt and family on SLP role and S/S aspiration   Goals expected to be met by 8/2/18  1.  Tolerate dental soft diet with thin liquids with no s/s of aspiration.   2.  Follow simple commands with 90% accuracy  3.  Respond to simple yes/no questions with 90% accuracy  4,  Respond to simple word finding tasks with 80% accuracy  5.  Assess visual spatial skills                        Plan:     · Patient to be seen:  4 x/week   · Plan of Care expires:  08/23/18  · Plan of Care reviewed with:  patient, daughter   · SLP Follow-Up:  Yes       Discharge recommendations:  nursing facility, skilled   Barriers to Discharge:  Level of Skilled Assistance Needed     Time Tracking:     SLP Treatment Date:   08/06/18  Speech Start Time:  1100 /11:46  Speech Stop Time:  1111   /11:56  Speech Total Time (min):  11 min /10 min  +Patient seen across 2 sessions today  Billable Minutes: Speech Therapy Individual 8 and Treatment Swallowing Dysfunction 10    MARCIANO Castañeda, CCC-SLP  Speech-Language Pathology  Pager: [phone]    08/06/2018

## 2018-08-06 NOTE — PT/OT/SLP PROGRESS
Occupational Therapy      Patient Name:  Ciro Chandler   MRN:  [id number]    Patient not seen today secondary to  (pt with chest pain at time of session attempt therefore RN & OT decision to hold therapy at that time.  OT unable to return later in day for re-attempt at session.). Will follow-up 8/7/18.    ZOE Hernandez  8/6/2018

## 2018-08-07 LAB
ANION GAP SERPL CALC-SCNC: 9 MMOL/L
BACTERIA #/AREA URNS AUTO: ABNORMAL /HPF
BASOPHILS # BLD AUTO: 0.03 K/UL
BASOPHILS NFR BLD: 0.3 %
BILIRUB UR QL STRIP: NEGATIVE
BUN SERPL-MCNC: 15 MG/DL
CALCIUM SERPL-MCNC: 9 MG/DL
CHLORIDE SERPL-SCNC: 104 MMOL/L
CLARITY UR REFRACT.AUTO: ABNORMAL
CO2 SERPL-SCNC: 25 MMOL/L
COLOR UR AUTO: YELLOW
CREAT SERPL-MCNC: 0.8 MG/DL
DIFFERENTIAL METHOD: ABNORMAL
EOSINOPHIL # BLD AUTO: 0.4 K/UL
EOSINOPHIL NFR BLD: 4 %
ERYTHROCYTE [DISTWIDTH] IN BLOOD BY AUTOMATED COUNT: 12.7 %
EST. GFR  (AFRICAN AMERICAN): >60 ML/MIN/1.73 M^2
EST. GFR  (NON AFRICAN AMERICAN): >60 ML/MIN/1.73 M^2
GLUCOSE SERPL-MCNC: 211 MG/DL
GLUCOSE UR QL STRIP: ABNORMAL
HCT VFR BLD AUTO: 29.9 %
HGB BLD-MCNC: 9.4 G/DL
HGB UR QL STRIP: NEGATIVE
IMM GRANULOCYTES # BLD AUTO: 0.05 K/UL
IMM GRANULOCYTES NFR BLD AUTO: 0.6 %
INR PPP: 1
KETONES UR QL STRIP: NEGATIVE
LEUKOCYTE ESTERASE UR QL STRIP: ABNORMAL
LYMPHOCYTES # BLD AUTO: 1.1 K/UL
LYMPHOCYTES NFR BLD: 12.6 %
MAGNESIUM SERPL-MCNC: 2 MG/DL
MCH RBC QN AUTO: 28.4 PG
MCHC RBC AUTO-ENTMCNC: 31.4 G/DL
MCV RBC AUTO: 90 FL
MICROSCOPIC COMMENT: ABNORMAL
MONOCYTES # BLD AUTO: 0.6 K/UL
MONOCYTES NFR BLD: 6.7 %
NEUTROPHILS # BLD AUTO: 6.9 K/UL
NEUTROPHILS NFR BLD: 75.8 %
NITRITE UR QL STRIP: NEGATIVE
NRBC BLD-RTO: 0 /100 WBC
PH UR STRIP: 5 [PH] (ref 5–8)
PHOSPHATE SERPL-MCNC: 3.4 MG/DL
PLATELET # BLD AUTO: 274 K/UL
PMV BLD AUTO: 11.2 FL
POCT GLUCOSE: 213 MG/DL (ref 70–110)
POCT GLUCOSE: 247 MG/DL (ref 70–110)
POCT GLUCOSE: 335 MG/DL (ref 70–110)
POCT GLUCOSE: 344 MG/DL (ref 70–110)
POTASSIUM SERPL-SCNC: 3.8 MMOL/L
PROT UR QL STRIP: NEGATIVE
PROTHROMBIN TIME: 10.6 SEC
RBC # BLD AUTO: 3.31 M/UL
RBC #/AREA URNS AUTO: 0 /HPF (ref 0–4)
SODIUM SERPL-SCNC: 138 MMOL/L
SP GR UR STRIP: 1.03 (ref 1–1.03)
URATE CRY UR QL COMP ASSIST: ABNORMAL
URN SPEC COLLECT METH UR: ABNORMAL
UROBILINOGEN UR STRIP-ACNC: NEGATIVE EU/DL
WBC # BLD AUTO: 9.05 K/UL
WBC #/AREA URNS AUTO: 7 /HPF (ref 0–5)
YEAST UR QL AUTO: ABNORMAL

## 2018-08-07 PROCEDURE — 63600175 PHARM REV CODE 636 W HCPCS: Performed by: NURSE PRACTITIONER

## 2018-08-07 PROCEDURE — 27000221 HC OXYGEN, UP TO 24 HOURS

## 2018-08-07 PROCEDURE — 84100 ASSAY OF PHOSPHORUS: CPT

## 2018-08-07 PROCEDURE — 80048 BASIC METABOLIC PNL TOTAL CA: CPT

## 2018-08-07 PROCEDURE — 99900035 HC TECH TIME PER 15 MIN (STAT)

## 2018-08-07 PROCEDURE — 25000003 PHARM REV CODE 250: Performed by: PHYSICIAN ASSISTANT

## 2018-08-07 PROCEDURE — 99233 SBSQ HOSP IP/OBS HIGH 50: CPT | Mod: ,,, | Performed by: PSYCHIATRY & NEUROLOGY

## 2018-08-07 PROCEDURE — 20600001 HC STEP DOWN PRIVATE ROOM

## 2018-08-07 PROCEDURE — 85610 PROTHROMBIN TIME: CPT

## 2018-08-07 PROCEDURE — 85025 COMPLETE CBC W/AUTO DIFF WBC: CPT

## 2018-08-07 PROCEDURE — 94668 MNPJ CHEST WALL SBSQ: CPT

## 2018-08-07 PROCEDURE — 25000242 PHARM REV CODE 250 ALT 637 W/ HCPCS: Performed by: PHYSICIAN ASSISTANT

## 2018-08-07 PROCEDURE — 81001 URINALYSIS AUTO W/SCOPE: CPT

## 2018-08-07 PROCEDURE — 83735 ASSAY OF MAGNESIUM: CPT

## 2018-08-07 PROCEDURE — 94640 AIRWAY INHALATION TREATMENT: CPT

## 2018-08-07 PROCEDURE — 94761 N-INVAS EAR/PLS OXIMETRY MLT: CPT

## 2018-08-07 RX ORDER — INSULIN ASPART 100 [IU]/ML
5 INJECTION, SOLUTION INTRAVENOUS; SUBCUTANEOUS 3 TIMES DAILY
Refills: 0
Start: 2018-08-07 | End: 2019-08-07

## 2018-08-07 RX ORDER — METOPROLOL TARTRATE 25 MG/1
25 TABLET, FILM COATED ORAL 2 TIMES DAILY
Qty: 60 TABLET | Refills: 0
Start: 2018-08-07 | End: 2019-08-07

## 2018-08-07 RX ORDER — WARFARIN SODIUM 5 MG/1
5 TABLET ORAL DAILY
Qty: 30 TABLET | Refills: 0
Start: 2018-08-07 | End: 2018-08-09 | Stop reason: HOSPADM

## 2018-08-07 RX ORDER — MODAFINIL 200 MG/1
200 TABLET ORAL DAILY
Qty: 30 TABLET | Refills: 0
Start: 2018-08-08 | End: 2018-09-07

## 2018-08-07 RX ORDER — AMLODIPINE BESYLATE 10 MG/1
10 TABLET ORAL DAILY
Qty: 30 TABLET | Refills: 0
Start: 2018-08-08 | End: 2019-08-08

## 2018-08-07 RX ORDER — HYDRALAZINE HYDROCHLORIDE 100 MG/1
100 TABLET, FILM COATED ORAL EVERY 8 HOURS
Qty: 90 TABLET | Refills: 0
Start: 2018-08-07 | End: 2019-08-07

## 2018-08-07 RX ORDER — IPRATROPIUM BROMIDE AND ALBUTEROL SULFATE 2.5; .5 MG/3ML; MG/3ML
3 SOLUTION RESPIRATORY (INHALATION) EVERY 4 HOURS
Qty: 1 BOX | Refills: 0
Start: 2018-08-07 | End: 2019-08-07

## 2018-08-07 RX ORDER — POLYETHYLENE GLYCOL 3350 17 G/17G
17 POWDER, FOR SOLUTION ORAL DAILY
Refills: 0
Start: 2018-08-08

## 2018-08-07 RX ORDER — INSULIN ASPART 100 [IU]/ML
1-10 INJECTION, SOLUTION INTRAVENOUS; SUBCUTANEOUS EVERY 4 HOURS PRN
Refills: 0
Start: 2018-08-07 | End: 2018-08-09 | Stop reason: HOSPADM

## 2018-08-07 RX ADMIN — INSULIN ASPART 4 UNITS: 100 INJECTION, SOLUTION INTRAVENOUS; SUBCUTANEOUS at 08:08

## 2018-08-07 RX ADMIN — IPRATROPIUM BROMIDE AND ALBUTEROL SULFATE 3 ML: .5; 3 SOLUTION RESPIRATORY (INHALATION) at 11:08

## 2018-08-07 RX ADMIN — WARFARIN SODIUM 5 MG: 5 TABLET ORAL at 04:08

## 2018-08-07 RX ADMIN — MODAFINIL 100 MG: 100 TABLET ORAL at 01:08

## 2018-08-07 RX ADMIN — IPRATROPIUM BROMIDE AND ALBUTEROL SULFATE 3 ML: .5; 3 SOLUTION RESPIRATORY (INHALATION) at 05:08

## 2018-08-07 RX ADMIN — IPRATROPIUM BROMIDE AND ALBUTEROL SULFATE 3 ML: .5; 3 SOLUTION RESPIRATORY (INHALATION) at 09:08

## 2018-08-07 RX ADMIN — INSULIN ASPART 5 UNITS: 100 INJECTION, SOLUTION INTRAVENOUS; SUBCUTANEOUS at 08:08

## 2018-08-07 RX ADMIN — INSULIN ASPART 8 UNITS: 100 INJECTION, SOLUTION INTRAVENOUS; SUBCUTANEOUS at 05:08

## 2018-08-07 RX ADMIN — AMLODIPINE BESYLATE 10 MG: 10 TABLET ORAL at 08:08

## 2018-08-07 RX ADMIN — HYDRALAZINE HYDROCHLORIDE 100 MG: 50 TABLET ORAL at 03:08

## 2018-08-07 RX ADMIN — ATORVASTATIN CALCIUM 40 MG: 20 TABLET, FILM COATED ORAL at 08:08

## 2018-08-07 RX ADMIN — INSULIN ASPART 8 UNITS: 100 INJECTION, SOLUTION INTRAVENOUS; SUBCUTANEOUS at 12:08

## 2018-08-07 RX ADMIN — HEPARIN SODIUM 5000 UNITS: 5000 INJECTION, SOLUTION INTRAVENOUS; SUBCUTANEOUS at 03:08

## 2018-08-07 RX ADMIN — HEPARIN SODIUM 5000 UNITS: 5000 INJECTION, SOLUTION INTRAVENOUS; SUBCUTANEOUS at 06:08

## 2018-08-07 RX ADMIN — INSULIN ASPART 5 UNITS: 100 INJECTION, SOLUTION INTRAVENOUS; SUBCUTANEOUS at 05:08

## 2018-08-07 RX ADMIN — METOPROLOL TARTRATE 25 MG: 25 TABLET ORAL at 09:08

## 2018-08-07 RX ADMIN — AMIODARONE HYDROCHLORIDE 200 MG: 200 TABLET ORAL at 08:08

## 2018-08-07 RX ADMIN — SODIUM CHLORIDE 1.5 G: 9 INJECTION, SOLUTION INTRAVENOUS at 06:08

## 2018-08-07 RX ADMIN — INSULIN DETEMIR 12 UNITS: 100 INJECTION, SOLUTION SUBCUTANEOUS at 08:08

## 2018-08-07 RX ADMIN — INSULIN ASPART 5 UNITS: 100 INJECTION, SOLUTION INTRAVENOUS; SUBCUTANEOUS at 12:08

## 2018-08-07 RX ADMIN — HYDRALAZINE HYDROCHLORIDE 100 MG: 50 TABLET ORAL at 09:08

## 2018-08-07 RX ADMIN — IPRATROPIUM BROMIDE AND ALBUTEROL SULFATE 3 ML: .5; 3 SOLUTION RESPIRATORY (INHALATION) at 04:08

## 2018-08-07 RX ADMIN — INSULIN ASPART 4 UNITS: 100 INJECTION, SOLUTION INTRAVENOUS; SUBCUTANEOUS at 09:08

## 2018-08-07 RX ADMIN — LOSARTAN POTASSIUM 100 MG: 50 TABLET, FILM COATED ORAL at 08:08

## 2018-08-07 RX ADMIN — METOPROLOL TARTRATE 25 MG: 25 TABLET ORAL at 08:08

## 2018-08-07 RX ADMIN — MODAFINIL 200 MG: 100 TABLET ORAL at 06:08

## 2018-08-07 RX ADMIN — HYDRALAZINE HYDROCHLORIDE 100 MG: 50 TABLET ORAL at 06:08

## 2018-08-07 RX ADMIN — IPRATROPIUM BROMIDE AND ALBUTEROL SULFATE 3 ML: .5; 3 SOLUTION RESPIRATORY (INHALATION) at 08:08

## 2018-08-07 RX ADMIN — IPRATROPIUM BROMIDE AND ALBUTEROL SULFATE 3 ML: .5; 3 SOLUTION RESPIRATORY (INHALATION) at 12:08

## 2018-08-07 RX ADMIN — HEPARIN SODIUM 5000 UNITS: 5000 INJECTION, SOLUTION INTRAVENOUS; SUBCUTANEOUS at 09:08

## 2018-08-07 NOTE — PLAN OF CARE
FOZIA sent clinical updates to [facility] with [facility]. Will prepare to discharge patient tomorrow if medically stable.    Judy Brooks, VICKEY  [facility]  Ext. [phone]

## 2018-08-07 NOTE — ASSESSMENT & PLAN NOTE
- Stroke risk factor  - Held anticoagulation due to ICH initially; resume coumadin 8/6/18. Will continue heparin VTE until INR therapeutic (2.0-3.0)   - Continue amiodarone and metoprolol

## 2018-08-07 NOTE — SUBJECTIVE & OBJECTIVE
Neurologic Chief Complaint: ICH    Subjective:     Interval History: Patient is seen for follow-up neurological assessment and treatment recommendations:    No acute events overnight. Made changes to insulin due to hyperglycemia. Episode of chest pain reported by nurse <5 minutes, vital signs stable, troponin negative and ECG stable.     HPI, Past Medical, Family, and Social History remains the same as documented in the initial encounter.     Review of Systems   Constitutional: Negative for chills and fever.   HENT: Positive for trouble swallowing.    Respiratory: Positive for cough.    Cardiovascular: Positive for chest pain (one episode, transient).   Gastrointestinal: Negative for vomiting.   Neurological: Positive for facial asymmetry, speech difficulty and weakness.   Psychiatric/Behavioral: Positive for confusion. Negative for agitation.     Scheduled Meds:   albuterol-ipratropium  3 mL Nebulization Q4H    amiodarone  200 mg Oral Daily    amLODIPine  10 mg Oral Daily    atorvastatin  40 mg Oral Daily    heparin (porcine)  5,000 Units Subcutaneous Q8H    hydrALAZINE  100 mg Oral Q8H    insulin aspart U-100  5 Units Subcutaneous TIDWM    insulin detemir U-100  12 Units Subcutaneous BID    losartan  100 mg Oral Daily    metoprolol tartrate  25 mg Oral BID    modafinil  100 mg Oral After lunch    modafinil  200 mg Oral Daily    polyethylene glycol  17 g Oral Daily    senna-docusate 8.6-50 mg  1 tablet Oral BID     Continuous Infusions:  PRN Meds:acetaminophen, dextrose 50%, dextrose 50%, glucagon (human recombinant), insulin aspart U-100, labetalol, sodium chloride 0.9%    Objective:     Vital Signs (Most Recent):  Temp: (!) 100.4 °F (38 °C) (08/06/18 2000)  Pulse: 85 (08/06/18 2000)  Resp: 19 (08/06/18 2000)  BP: 126/68 (08/06/18 2000)  SpO2: 96 % (08/06/18 2000)  BP Location: Right arm    Vital Signs Range (Last 24H):  Temp:  [98.1 °F (36.7 °C)-100.4 °F (38 °C)]   Pulse:  [71-95]   Resp:  [16-20]    BP: (120-142)/(63-68)   SpO2:  [90 %-98 %]   BP Location: Right arm    Physical Exam   Constitutional: He appears well-developed and well-nourished. He appears lethargic. He appears ill. No distress. Nasal cannula in place.   HENT:   Head: Normocephalic.   Eyes: Pupils are equal, round, and reactive to light.   Neck: Normal range of motion.   Cardiovascular: Normal rate.    Pulmonary/Chest: Effort normal. No respiratory distress.   Abdominal: Soft.   Neurological: He appears lethargic. He displays no seizure activity.   Skin: Skin is warm and dry. He is not diaphoretic.   Psychiatric: He is withdrawn. He is inattentive.   Vitals reviewed.    Neurological Exam: exam appears effort dependent, patient seems unwilling to participate  LOC: drowsy  Attention Span: poor  Language: aphasia  Articulation: Dysarthria  Orientation: not oriented to person, place, or time  Visual Fields: L hemianopsia  EOM (CN III, IV, VI): R gaze preference  Pupils (CN II, III): PERRL  Facial Sensation (CN V): Normal  Facial Movement (CN VII): Lower facial weakness on the Left  Unable to accurately assess strength or sensation due to lethargy and patient not being cooperative  Tone: normal     Laboratory:  BMP:     Recent Labs  Lab 08/06/18  0334      K 4.0      CO2 25   BUN 16   CREATININE 0.8   CALCIUM 8.8     CBC:     Recent Labs  Lab 08/06/18  0334   WBC 8.70   RBC 3.08*   HGB 9.2*   HCT 27.3*      MCV 89   MCH 29.9   MCHC 33.7       Diagnostic Results     Brain imaging:  CT 7/25/18  Unchanged position of right frontal coursing ventriculostomy catheter with unchanged size and configuration of prominent ventricular system.    Stable right thalamic intraparenchymal hemorrhage with associated interventricular hemorrhage layering in the occipital horns of the lateral ventricles.    Chronic microvascular ischemic changes above with associated remote infarcts.     CT 7/21/18  1. Interval placement of right frontal coursing  ventriculostomy with unchanged size and configuration of the shunted ventricular system, which remains prominent.  2. Evolution of right thalamic intraparenchymal hemorrhage with stable mass effect.  3. Chronic microvascular ischemic change and remote infarcts as above.       CTH: 1. Redemonstration of evolving right thalamic hemorrhage with mild adjacent edema and slight localized mass effect.  Subtle, subcentimeter focus of hyperdensity within the left thalamus, concerning for additional focus of evolving hemorrhage.  2. Persistent interventricular extension of hemorrhage with unchanged dilatation of the ventricular system concerning for component of hydrocephalus.  3. Generalized cerebral volume loss, chronic microvascular ischemic change and remote infarcts as above.     CTH ( 07/29/2018)  Evolving right thalamic intraparenchymal hematoma with intraventricular extension, as above, without detrimental change from 07/29/2018 CT at 12:24 p.m. No new infarct or hemorrhage.    Persistent diffuse enlargement of the ventricular system, unchanged.  Small focus of pneumocephalus in the right frontal horn persists.  Vessel Imaging:  None     Cardiac Evaluation:   ECHO- CONCLUSIONS     1 - Low normal left ventricular systolic function.     2 - Indeterminate LV diastolic function.     3 - Normal right ventricular systolic function .     4 - There is anterior pericardial fat pad..

## 2018-08-07 NOTE — ASSESSMENT & PLAN NOTE
- Hgb downtrended from 07/30-7/31 and again 8/5/18   - Unclear source, patient on SQ Heparin and coumadin resumed 8/6  - No melenic stools per nursing staff  - no apparent bleeding  - Will continue to monitor

## 2018-08-07 NOTE — PROGRESS NOTES
[facility]  Vascular Neurology  Comprehensive Stroke Center  Progress Note    Assessment/Plan:     * 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    R thalamic IPH w IVH on 7/9/2018. S/P EVD, Removed 7/27/18. Etiology likely hypertensive. Pending SNF placement.   Patient has atrial fibrillation, previously anticoagulated. Held upon admission due to ICH. Will resume anticoagulation 8/6/18 with coumadin.     Antithrombotics for secondary stroke prevention:  Anticoagulants: coumadin 5mg daily   Statins for secondary stroke prevention and hyperlipidemia, if present: Statins: Atorvastatin- 40 mg daily  Aggressive risk factor modification: HTN, DM, HLD, Diet, Exercise, A-Fib  Rehab efforts: Occupational Therapy, PT/OT/SLP to evaluate and treat, PM&R consult   Diagnostics ordered/pending: None   VTE prophylaxis: Heparin and SCD, resuming coumadin 8/6/18 - d/c heparin when INR therapeutic  BP parameters: ICH: SBP <140          Vasogenic cerebral edema    Area of vasogenic cerebral edema identified when reviewing brain imaging in the [facility]. There is not mild mass effect associated with it. We will continue to monitor the patients clinical exam for any worsening of symptoms which may indicate expansion of the hemorrhage or the area of the edema resulting in the clinical change. The ICH is likely 2/2 HTN.          Normocytic anemia    - Hgb downtrended from 07/30-7/31 and again 8/5/18   - Unclear source, patient on SQ Heparin and coumadin resumed 8/6  - No melenic stools per nursing staff  - no apparent bleeding  - Will continue to monitor         Obstructive hydrocephalus    - S/P EVD, removed on 07/27  - CTH on 07/29 w/ stable ventricular size  - exam stable        Dysphagia    - passed MBSS  - currently on puree diet with thin liquids  - calorie counts ordered and nutrition consulted        ICH (intracerebral hemorrhage)              Cardiomyopathy    - Latest Echo with Low  normal left ventricular systolic function.   - Continue BB and ARB        Persistent atrial fibrillation    - Stroke risk factor  - Held anticoagulation due to ICH initially; resume coumadin 8/6/18. Will continue heparin VTE until INR therapeutic (2.0-3.0)   - Continue amiodarone and metoprolol         Dyslipidemia    Stroke risk factor, well-controlled, most recent LDL 47  Continue Atorvastatin 40mg QHS        Essential hypertension    sBP goal <140   Increased hydralazine 8/3, current BP WNL  Continue amlodipine, metoprolol, hydralazine, and losartan  Labetalol PRN        Type 2 diabetes mellitus with diabetic polyneuropathy, with long-term current use of insulin    Stroke risk factor. A1C 7.2  on Detemir 14 units q12 + SSI 5U TID WM             7/21: EVD at bedside by NSGY  7/23 EVD in place lowered to 5cm h2o per nsgy.. Blood sugars running >200 adjusted detemir. Increased hydralazine for better BP control  7/24: EVD at 5, insulin adjustments   7/25: EVD clamped per Nsgy, [facility] tonight  7/26: Held tube feeds, insulin adjustments, Nsgy contacted about MSC and EVD.   7/27: Evd pulled per Nsgy, Ngt replace, transfer to stroke service  07/28/2018  Pending bed availability, largely stable neuro exam. SNF placement on discharge   07/29/2018 More somnolent and less responsive this AM. CTH stable but patient w/ fevers now.   07/30/2018 Patient a lot more responsive this AM. Animated. TF held and re-started overnight. Ceftriaxone started for UTI.   07/31/2018 Neurologically unchanged. Hgb continues to trend down.  No over signs of bleeding per nursing staff.  08/01/2018 Hgb stable today. No blood in stools. Continues to be fatigued but per nursing, patient's mental status greatly waxes and wanes. At his best, he is able to work with PT/OT and mumble but understands speech.   08/02/2018 H&H stable, attempting to repeat MBSS today pending patient's level of alertness  08/03/2018 passed MBSS yesterday and started on a puree  diet with thin liquids, calorie counts ordered, increased hydralazine  8/6/18: No acute events overnight. Made changes to insulin due to hyperglycemia. Episode of chest pain reported by nurse <5 minutes, vital signs stable, troponin negative and ECG stable.   8/7/18: No acute events overnight. Low grade temperature overnight, unclear source. UA negative. Accepted to SNF but will hold discharge to monitor patient's VS overnight.     STROKE DOCUMENTATION        NIH Scale:  1a. Level Of Consciousness: 1-->Not alert: but arousable by minor stimulation to obey, answer, or respond  1b. LOC Questions: 2-->Answers neither question correctly  1c. LOC Commands: 2-->Performs neither task correctly  2. Best Gaze: 1-->Partial gaze palsy: gaze is abnormal in one or both eyes, but forced deviation or total gaze paresis is not present  3. Visual: 0-->No visual loss  4. Facial Palsy: 1-->Minor paralysis (flattened nasolabial fold, asymmetry on smiling)  5a. Motor Arm, Left: 2-->Some effort against gravity: limb cannot get to or maintain (if cued) 90 (or 45) degrees, drifts down to bed, but has some effort against gravity  5b. Motor Arm, Right: 2-->Some effort against gravity: limb cannot get to or maintain (if cued) 90 (or 45) degrees, drifts down to bed, but has some effort against gravity  6a. Motor Leg, Left: 4-->No movement  6b. Motor Leg, Right: 3-->No effort against gravity: leg falls to bed immediately  7. Limb Ataxia: 0-->Absent  8. Sensory: 1-->Mild-to-moderate sensory loss: patient feels pinprick is less sharp or is dull on the affected side: or there is a loss of superficial pain with pinprick, but patient is aware of being touched  9. Best Language: 2-->Severe aphasia: all communication is through fragmentary expression: great need for inference, questioning, and guessing by the listener. Range of information that can be exchanged is limited: listener carries burden of. . . (see row details)  10. Dysarthria: 2-->Severe  dysarthria: patients speech is so slurred as to be unintelligible in the absence of or out of proportion to any dysphasia, or is mute/anarthric  11. Extinction and Inattention (formerly Neglect): 0-->No abnormality  Total (NIH Stroke Scale): 23       Modified Tanisha    Lucian Coma Scale:    ABCD2 Score:    [id number] Score:6  HAS -BLED Score:2  ICH Score:3  Hunt & Fox Classification:      Hemorrhagic change of an Ischemic Stroke: Does this patient have an ischemic stroke with hemorrhagic changes? No     Neurologic Chief Complaint: ICH    Subjective:     Interval History: Patient is seen for follow-up neurological assessment and treatment recommendations:    No acute events overnight. Low grade temperature overnight, unclear source. UA negative. Accepted to SNF but will hold discharge to monitor patient's VS overnight.     HPI, Past Medical, Family, and Social History remains the same as documented in the initial encounter.     Review of Systems   Constitutional: Negative for chills and fever.   HENT: Positive for trouble swallowing.    Respiratory: Positive for cough.    Cardiovascular: Negative for chest pain.   Gastrointestinal: Negative for vomiting.   Neurological: Positive for facial asymmetry, speech difficulty and weakness.   Psychiatric/Behavioral: Positive for confusion. Negative for agitation.     Scheduled Meds:   albuterol-ipratropium  3 mL Nebulization Q4H    amiodarone  200 mg Oral Daily    amLODIPine  10 mg Oral Daily    atorvastatin  40 mg Oral Daily    heparin (porcine)  5,000 Units Subcutaneous Q8H    hydrALAZINE  100 mg Oral Q8H    insulin aspart U-100  5 Units Subcutaneous TIDWM    insulin detemir U-100  14 Units Subcutaneous BID    losartan  100 mg Oral Daily    metoprolol tartrate  25 mg Oral BID    modafinil  100 mg Oral After lunch    modafinil  200 mg Oral Daily    polyethylene glycol  17 g Oral Daily    senna-docusate 8.6-50 mg  1 tablet Oral BID    warfarin  5 mg Oral  Daily     Continuous Infusions:  PRN Meds:acetaminophen, dextrose 50%, dextrose 50%, glucagon (human recombinant), insulin aspart U-100, labetalol, sodium chloride 0.9%    Objective:     Vital Signs (Most Recent):  Temp: 97.9 °F (36.6 °C) (08/07/18 1553)  Pulse: 86 (08/07/18 1553)  Resp: 18 (08/07/18 1257)  BP: (!) 148/70 (08/07/18 1553)  SpO2: (!) 94 % (08/07/18 1553)  BP Location: Right arm    Vital Signs Range (Last 24H):  Temp:  [97.9 °F (36.6 °C)-100.4 °F (38 °C)]   Pulse:  [73-96]   Resp:  [16-20]   BP: (118-158)/(59-71)   SpO2:  [91 %-97 %]   BP Location: Right arm    Physical Exam   Constitutional: He appears well-developed and well-nourished. He appears lethargic. He appears ill. No distress. Nasal cannula in place.   HENT:   Head: Normocephalic.   Eyes: Pupils are equal, round, and reactive to light.   Neck: Normal range of motion.   Cardiovascular: Normal rate.    Pulmonary/Chest: Effort normal. No respiratory distress.   Abdominal: Soft.   Neurological: He appears lethargic. He displays no seizure activity.   Skin: Skin is warm and dry. He is not diaphoretic.   Psychiatric: He is withdrawn. He is inattentive.   Vitals reviewed.    Neurological Exam:   exam appears effort dependent, patient seems unwilling to participate  LOC: drowsy  Attention Span: poor  Language: aphasia  Articulation: Dysarthria  Orientation: not oriented to person, place, or time  Visual Fields: L hemianopsia  EOM (CN III, IV, VI): R gaze preference  Pupils (CN II, III): PERRL  Facial Sensation (CN V): Normal  Facial Movement (CN VII): Lower facial weakness on the Left  Strength: No effort against gravity bilateral lower extremities. Some effort against gravity BUE   Tone: normal     Laboratory:  BMP:     Recent Labs  Lab 08/07/18  0505      K 3.8      CO2 25   BUN 15   CREATININE 0.8   CALCIUM 9.0     CBC:     Recent Labs  Lab 08/07/18  0505   WBC 9.05   RBC 3.31*   HGB 9.4*   HCT 29.9*      MCV 90   MCH 28.4    [facility] 31.4*       Diagnostic Results     Brain imaging:  CTH 7/25/18  Unchanged position of right frontal coursing ventriculostomy catheter with unchanged size and configuration of prominent ventricular system.    Stable right thalamic intraparenchymal hemorrhage with associated interventricular hemorrhage layering in the occipital horns of the lateral ventricles.    Chronic microvascular ischemic changes above with associated remote infarcts.     CTH 7/21/18  1. Interval placement of right frontal coursing ventriculostomy with unchanged size and configuration of the shunted ventricular system, which remains prominent.  2. Evolution of right thalamic intraparenchymal hemorrhage with stable mass effect.  3. Chronic microvascular ischemic change and remote infarcts as above.       CTH: 1. Redemonstration of evolving right thalamic hemorrhage with mild adjacent edema and slight localized mass effect.  Subtle, subcentimeter focus of hyperdensity within the left thalamus, concerning for additional focus of evolving hemorrhage.  2. Persistent interventricular extension of hemorrhage with unchanged dilatation of the ventricular system concerning for component of hydrocephalus.  3. Generalized cerebral volume loss, chronic microvascular ischemic change and remote infarcts as above.     CTH ( 07/29/2018)  Evolving right thalamic intraparenchymal hematoma with intraventricular extension, as above, without detrimental change from 07/29/2018 CT at 12:24 p.m. No new infarct or hemorrhage.    Persistent diffuse enlargement of the ventricular system, unchanged.  Small focus of pneumocephalus in the right frontal horn persists.  Vessel Imaging:  None     Cardiac Evaluation:   ECHO- CONCLUSIONS     1 - Low normal left ventricular systolic function.     2 - Indeterminate LV diastolic function.     3 - Normal right ventricular systolic function .     4 - There is anterior pericardial fat pad..          Kelsie Ibrahim,  MIRIAM  Comprehensive Stroke Center  Department of Vascular Neurology   [facility]

## 2018-08-07 NOTE — PLAN OF CARE
[facility]     Department of Hospital Medicine     [address] [place] [address]     [phone] after hours  [phone] fax       NURSING HOME ORDERS    08/07/2018    Admit to Nursing Home:      Skilled Bed                                              Diagnoses:  Active Hospital Problems    Diagnosis  POA    *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 [I61.0]  Yes     Priority: 1 - High    Vasogenic cerebral edema [G93.6]  Yes     Priority: 2     Normocytic anemia [D64.9]  Unknown    Obstructive hydrocephalus [G91.1]  Yes     EVD by NSGY this morning      Brain compression [G93.5]  Yes    Encephalopathy acute [G93.40]  Yes    Dysphagia [R13.10]  Yes    ICH (intracerebral hemorrhage) [I61.9]  Yes    Dementia without behavioral disturbance [F03.90]  Yes    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 [I61.3]  Yes    Cardiomyopathy [I42.9]  Yes    Persistent atrial fibrillation [I48.1]  Yes    Essential hypertension [I10]  Yes    Dyslipidemia [E78.5]  Yes     statin      Typical atrial flutter [I48.3]  Yes     Last documented episode April 2014      Type 2 diabetes mellitus with diabetic polyneuropathy, with long-term current use of insulin [E11.42, Z79.4]  Not Applicable      Resolved Hospital Problems    Diagnosis Date Resolved POA    UTI (urinary tract infection) [N39.0] 08/05/2018 Unknown    Long term (current) use of anticoagulants [Z79.01] 07/31/2018 Not Applicable       Patient is homebound due to:  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    Allergies:Review of patient's allergies indicates:  No Known Allergies    Vitals:       Every shift (Skilled Nursing patients)    Diet: Puree/thin liquids; diabetic diet. Meds crushed in puree     Acitivities:     -activity as tolerated     LABS:  Per facility protocol   CMP, CBC each month for 3 months   PT-INR daily once therapeutic then per  facility      Nursing Precautions:    - Aspiration precautions:             - Total assistance with meals            -  Upright 90 degrees befor during and after meals     - Fall precautions per nursing home protocol   - Seizure precaution per [facility] protocol   - Decubitus precautions:        -  for positioning   - Pressure reducing foam mattress   - Turn patient every two hours. Use wedge pillows to anchor patient    CONSULTS:     Physical Therapy to evaluate and treat     Occupational Therapy to evaluate and treat     Speech Therapy  to evaluate and treat     Nutrition to evaluate and recommend diet    MISCELLANEOUS CARE:       Villeda Care: Empty Villeda bag every shift. Recommend voiding trial.      Routine Skin for Bedridden Patients:  Apply moisture barrier cream to all    skin folds and wet areas in perineal area daily and after baths and                           all bowel movements.           DIABETES CARE:     Check blood sugar:       Fingerstick blood sugar AC and HS   Fingerstick blood sugar every 6 hours if unable to eat      Report CBG < 60 or > 400 to physician.                                          Insulin Sliding Scale          Glucose  Novolog Insulin Subcutaneous        0 - 60   Orange juice or glucose tablet, hold insulin      No insulin   201-250  2 units   251-300  4 units   301-350  6 units   351-400  8 units   >400   10 units then call physician      Medications: Discontinue all previous medication orders, if any. See new list below.     Ciro Chandler   Home Medication Instructions KAM:[id number]    Printed on:08/07/18 1151   Medication Information                      albuterol-ipratropium (DUO-NEB) 2.5 mg-0.5 mg/3 mL nebulizer solution  Take 3 mLs by nebulization every 4 (four) hours. Rescue             amiodarone (PACERONE) 200 MG Tab   1 pill (200mg) daily             amLODIPine (NORVASC) 10 MG tablet  Take 1 tablet (10 mg total) by mouth once daily.            "  atorvastatin (LIPITOR) 80 MG tablet  Take 0.5 tablets (40 mg total) by mouth once daily.             blood sugar diagnostic (ACCU-CHEK SMARTVIEW TEST STRIP) Strp  1 each by Misc.(Non-Drug; Combo Route) route 4 (four) times daily.             blood-glucose meter (ACCU-CHEK MALLORY) Misc  1 each by Misc.(Non-Drug; Combo Route) route once daily.             hydrALAZINE (APRESOLINE) 100 MG tablet  Take 1 tablet (100 mg total) by mouth every 8 (eight) hours.             insulin aspart U-100 (NOVOLOG) 100 unit/mL InPn pen  Inject 5 Units into the skin 3 (three) times daily.             insulin aspart U-100 (NOVOLOG) 100 unit/mL InPn pen  Inject 1-10 Units into the skin every 4 (four) hours as needed (Hyperglycemia).             insulin detemir U-100 (LEVEMIR FLEXTOUCH) 100 unit/mL (3 mL) SubQ InPn pen  Inject 14 Units into the skin 2 (two) times daily.             lancets (ACCU-CHEK FASTCLIX) Misc  1 each by Misc.(Non-Drug; Combo Route) route 4 (four) times daily.             losartan (COZAAR) 100 MG tablet  Take 1 tablet (100 mg total) by mouth once daily.             metoprolol tartrate (LOPRESSOR) 25 MG tablet  Take 1 tablet (25 mg total) by mouth 2 (two) times daily.             modafinil (PROVIGIL) 200 MG Tab  Take 1 tablet (200 mg total) by mouth once daily.             pen needle, diabetic (BD ULTRA-FINE MALLORY PEN NEEDLES) 32 gauge x 5/32" Ndle  Use with lantus pen             polyethylene glycol (GLYCOLAX) 17 gram PwPk  Take 17 g by mouth once daily.             senna-docusate 8.6-50 mg (SENNA WITH DOCUSATE SODIUM) 8.6-50 mg per tablet  Take 1 tablet by mouth once daily.             warfarin (COUMADIN) 5 MG tablet  Take 1 tablet (5 mg total) by mouth Daily.                       _________________________________  Kelsie Ibrahim PA-C  08/07/2018  "

## 2018-08-07 NOTE — SUBJECTIVE & OBJECTIVE
Neurologic Chief Complaint: ICH    Subjective:     Interval History: Patient is seen for follow-up neurological assessment and treatment recommendations:    No acute events overnight. Low grade temperature overnight, unclear source. UA negative. Accepted to SNF but will hold discharge to monitor patient's VS overnight.     HPI, Past Medical, Family, and Social History remains the same as documented in the initial encounter.     Review of Systems   Constitutional: Negative for chills and fever.   HENT: Positive for trouble swallowing.    Respiratory: Positive for cough.    Cardiovascular: Negative for chest pain.   Gastrointestinal: Negative for vomiting.   Neurological: Positive for facial asymmetry, speech difficulty and weakness.   Psychiatric/Behavioral: Positive for confusion. Negative for agitation.     Scheduled Meds:   albuterol-ipratropium  3 mL Nebulization Q4H    amiodarone  200 mg Oral Daily    amLODIPine  10 mg Oral Daily    atorvastatin  40 mg Oral Daily    heparin (porcine)  5,000 Units Subcutaneous Q8H    hydrALAZINE  100 mg Oral Q8H    insulin aspart U-100  5 Units Subcutaneous TIDWM    insulin detemir U-100  14 Units Subcutaneous BID    losartan  100 mg Oral Daily    metoprolol tartrate  25 mg Oral BID    modafinil  100 mg Oral After lunch    modafinil  200 mg Oral Daily    polyethylene glycol  17 g Oral Daily    senna-docusate 8.6-50 mg  1 tablet Oral BID    warfarin  5 mg Oral Daily     Continuous Infusions:  PRN Meds:acetaminophen, dextrose 50%, dextrose 50%, glucagon (human recombinant), insulin aspart U-100, labetalol, sodium chloride 0.9%    Objective:     Vital Signs (Most Recent):  Temp: 97.9 °F (36.6 °C) (08/07/18 1553)  Pulse: 86 (08/07/18 1553)  Resp: 18 (08/07/18 1257)  BP: (!) 148/70 (08/07/18 1553)  SpO2: (!) 94 % (08/07/18 1553)  BP Location: Right arm    Vital Signs Range (Last 24H):  Temp:  [97.9 °F (36.6 °C)-100.4 °F (38 °C)]   Pulse:  [73-96]   Resp:  [16-20]   BP:  (118-158)/(59-71)   SpO2:  [91 %-97 %]   BP Location: Right arm    Physical Exam   Constitutional: He appears well-developed and well-nourished. He appears lethargic. He appears ill. No distress. Nasal cannula in place.   HENT:   Head: Normocephalic.   Eyes: Pupils are equal, round, and reactive to light.   Neck: Normal range of motion.   Cardiovascular: Normal rate.    Pulmonary/Chest: Effort normal. No respiratory distress.   Abdominal: Soft.   Neurological: He appears lethargic. He displays no seizure activity.   Skin: Skin is warm and dry. He is not diaphoretic.   Psychiatric: He is withdrawn. He is inattentive.   Vitals reviewed.    Neurological Exam:   exam appears effort dependent, patient seems unwilling to participate  LOC: drowsy  Attention Span: poor  Language: aphasia  Articulation: Dysarthria  Orientation: not oriented to person, place, or time  Visual Fields: L hemianopsia  EOM (CN III, IV, VI): R gaze preference  Pupils (CN II, III): PERRL  Facial Sensation (CN V): Normal  Facial Movement (CN VII): Lower facial weakness on the Left  Strength: No effort against gravity bilateral lower extremities. Some effort against gravity BUE   Tone: normal     Laboratory:  BMP:     Recent Labs  Lab 08/07/18  0505      K 3.8      CO2 25   BUN 15   CREATININE 0.8   CALCIUM 9.0     CBC:     Recent Labs  Lab 08/07/18  0505   WBC 9.05   RBC 3.31*   HGB 9.4*   HCT 29.9*      MCV 90   MCH 28.4   MCHC 31.4*       Diagnostic Results     Brain imaging:  [facility] 7/25/18  Unchanged position of right frontal coursing ventriculostomy catheter with unchanged size and configuration of prominent ventricular system.    Stable right thalamic intraparenchymal hemorrhage with associated interventricular hemorrhage layering in the occipital horns of the lateral ventricles.    Chronic microvascular ischemic changes above with associated remote infarcts.     CT 7/21/18  1. Interval placement of right frontal coursing  ventriculostomy with unchanged size and configuration of the shunted ventricular system, which remains prominent.  2. Evolution of right thalamic intraparenchymal hemorrhage with stable mass effect.  3. Chronic microvascular ischemic change and remote infarcts as above.       CTH: 1. Redemonstration of evolving right thalamic hemorrhage with mild adjacent edema and slight localized mass effect.  Subtle, subcentimeter focus of hyperdensity within the left thalamus, concerning for additional focus of evolving hemorrhage.  2. Persistent interventricular extension of hemorrhage with unchanged dilatation of the ventricular system concerning for component of hydrocephalus.  3. Generalized cerebral volume loss, chronic microvascular ischemic change and remote infarcts as above.     CTH ( 07/29/2018)  Evolving right thalamic intraparenchymal hematoma with intraventricular extension, as above, without detrimental change from 07/29/2018 CT at 12:24 p.m. No new infarct or hemorrhage.    Persistent diffuse enlargement of the ventricular system, unchanged.  Small focus of pneumocephalus in the right frontal horn persists.  Vessel Imaging:  None     Cardiac Evaluation:   ECHO- CONCLUSIONS     1 - Low normal left ventricular systolic function.     2 - Indeterminate LV diastolic function.     3 - Normal right ventricular systolic function .     4 - There is anterior pericardial fat pad..

## 2018-08-07 NOTE — PLAN OF CARE
Problem: Patient Care Overview  Goal: Plan of Care Review  Outcome: Ongoing (interventions implemented as appropriate)  PT HAD MILD ELEVATED TEMP THIS AM-MD MADE AWARE.  CXR AND UA PERFORMED-SEE RESULTS REVIEW.  PT TOLERATED ANY AND ALL ACTIVITIES WELL. PT VOIDING INTO INDWELLING RUBI-RUBI CARE PERFORMED, BAG EMPTIED, AND OUTPUT RECORDED. PT PARTICIPATED WITH APPROPRIATE THERAPIES. ALL QUESTIONS ANSWERED. WILL ENDORSE CARE TO NOC RN

## 2018-08-07 NOTE — PROGRESS NOTES
[facility]  Vascular Neurology  Comprehensive Stroke Center  Progress Note    Assessment/Plan:     * 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    R thalamic IPH w IVH on 7/9/2018. S/P EVD, Removed 7/27/18. Etiology likely hypertensive. Pending SNF placement.    Antithrombotics for secondary stroke prevention:  Anticoagulants: held initially due to ICH. Patient exam and imaging stable. Resuming coumadin 8/6/18   Statins for secondary stroke prevention and hyperlipidemia, if present: Statins: Atorvastatin- 40 mg daily  Aggressive risk factor modification: HTN, DM, HLD, Diet, Exercise, A-Fib  Rehab efforts: Occupational Therapy, PT/OT/SLP to evaluate and treat, PM&R consult   Diagnostics ordered/pending: None   VTE prophylaxis: Heparin and SCD, resuming coumadin 8/6/18 - d/c heparin when INR therapeutic  BP parameters: ICH: SBP <140              Vasogenic cerebral edema    Area of vasogenic cerebral edema identified when reviewing brain imaging in the [facility] [place]. There is not mild mass effect associated with it. We will continue to monitor the patients clinical exam for any worsening of symptoms which may indicate expansion of the hemorrhage or the area of the edema resulting in the clinical change. The ICH is likely 2/2 HTN.          Normocytic anemia    - Hgb downtrended from 07/30-7/31  - Unclear source, patient on SQ Heparin  - No melenic stools per nursing staff  - H&H stable since 8/1  - no apparent bleeding        Obstructive hydrocephalus    - S/P EVD, removed on 07/27  - CTH on 07/29 w/ stable ventricular size, possibly mildly increased        Dysphagia    - passed MBSS  - currently on puree diet with thin liquids  - calorie counts ordered and nutrition consulted        ICH (intracerebral hemorrhage)              Cardiomyopathy    - Latest Echo with Low normal left ventricular systolic function.   - Continue BB and ARB        Persistent atrial fibrillation    -  Stroke risk factor  - Held anticoagulation due to ICH initially; resume coumadin 8/6/18. Will continue heparin VTE until INR therapeutic (2.0-3.0)   - Continue amiodarone and metoprolol         Dyslipidemia    Stroke risk factor, well-controlled, most recent LDL 47  Continue Atorvastatin 40mg QHS        Essential hypertension    sBP goal <140   Increased hydralazine 8/3, current BP WNL  Continue amlodipine, metoprolol, hydralazine, and losartan  Labetalol PRN          Type 2 diabetes mellitus with diabetic polyneuropathy, with long-term current use of insulin    Stroke risk factor. A1C 7.2  on Detemir 12 units q12 + SSI 5U TID WM             7/21: EVD at bedside by NSGY  7/23 EVD in place lowered to 5cm h2o per nsgy.. Blood sugars running >200 adjusted detemir. Increased hydralazine for better BP control  7/24: EVD at 5, insulin adjustments   7/25: EVD clamped per Nsgy, CT tonight  7/26: Held tube feeds, insulin adjustments, Nsgy contacted about MSC and EVD.   7/27: Evd pulled per Nsgy, Ngt replace, transfer to stroke service  07/28/2018  Pending bed availability, largely stable neuro exam. SNF placement on discharge   07/29/2018 More somnolent and less responsive this AM. CTH stable but patient w/ fevers now.   07/30/2018 Patient a lot more responsive this AM. Animated. TF held and re-started overnight. Ceftriaxone started for UTI.   07/31/2018 Neurologically unchanged. Hgb continues to trend down.  No over signs of bleeding per nursing staff.  08/01/2018 Hgb stable today. No blood in stools. Continues to be fatigued but per nursing, patient's mental status greatly waxes and wanes. At his best, he is able to work with PT/OT and mumble but understands speech.   08/02/2018 H&H stable, attempting to repeat MBSS today pending patient's level of alertness  08/03/2018 passed MBSS yesterday and started on a puree diet with thin liquids, calorie counts ordered, increased hydralazine  8/6/18: No acute events overnight.  Made changes to insulin due to hyperglycemia. Episode of chest pain reported by nurse <5 minutes, vital signs stable, troponin negative and ECG stable.     STROKE DOCUMENTATION        NIH Scale:  1a. Level Of Consciousness: 1-->Not alert: but arousable by minor stimulation to obey, answer, or respond  1b. LOC Questions: 2-->Answers neither question correctly  1c. LOC Commands: 2-->Performs neither task correctly  2. Best Gaze: 1-->Partial gaze palsy: gaze is abnormal in one or both eyes, but forced deviation or total gaze paresis is not present  3. Visual: 0-->No visual loss  4. Facial Palsy: 1-->Minor paralysis (flattened nasolabial fold, asymmetry on smiling)  5a. Motor Arm, Left: 2-->Some effort against gravity: limb cannot get to or maintain (if cued) 90 (or 45) degrees, drifts down to bed, but has some effort against gravity  5b. Motor Arm, Right: 2-->Some effort against gravity: limb cannot get to or maintain (if cued) 90 (or 45) degrees, drifts down to bed, but has some effort against gravity  6a. Motor Leg, Left: 3-->No effort against gravity: leg falls to bed immediately  6b. Motor Leg, Right: 3-->No effort against gravity: leg falls to bed immediately  7. Limb Ataxia: 0-->Absent  8. Sensory: 1-->Mild-to-moderate sensory loss: patient feels pinprick is less sharp or is dull on the affected side: or there is a loss of superficial pain with pinprick, but patient is aware of being touched  9. Best Language: 2-->Severe aphasia: all communication is through fragmentary expression: great need for inference, questioning, and guessing by the listener. Range of information that can be exchanged is limited: listener carries burden of. . . (see row details)  10. Dysarthria: 2-->Severe dysarthria: patients speech is so slurred as to be unintelligible in the absence of or out of proportion to any dysphasia, or is mute/anarthric  11. Extinction and Inattention (formerly Neglect): 0-->No abnormality  Total (NIH Stroke  Scale): 22       Modified Stowell    Lucian Coma Scale:    ABCD2 Score:    [id number] Score:6  HAS -BLED Score:2  ICH Score:3  Hunt & Fox Classification:      Hemorrhagic change of an Ischemic Stroke: Does this patient have an ischemic stroke with hemorrhagic changes? No     Neurologic Chief Complaint: ICH    Subjective:     Interval History: Patient is seen for follow-up neurological assessment and treatment recommendations:    No acute events overnight. Made changes to insulin due to hyperglycemia. Episode of chest pain reported by nurse <5 minutes, vital signs stable, troponin negative and ECG stable.     HPI, Past Medical, Family, and Social History remains the same as documented in the initial encounter.     Review of Systems   Constitutional: Negative for chills and fever.   HENT: Positive for trouble swallowing.    Respiratory: Positive for cough.    Cardiovascular: Positive for chest pain (one episode, transient).   Gastrointestinal: Negative for vomiting.   Neurological: Positive for facial asymmetry, speech difficulty and weakness.   Psychiatric/Behavioral: Positive for confusion. Negative for agitation.     Scheduled Meds:   albuterol-ipratropium  3 mL Nebulization Q4H    amiodarone  200 mg Oral Daily    amLODIPine  10 mg Oral Daily    atorvastatin  40 mg Oral Daily    heparin (porcine)  5,000 Units Subcutaneous Q8H    hydrALAZINE  100 mg Oral Q8H    insulin aspart U-100  5 Units Subcutaneous TIDWM    insulin detemir U-100  12 Units Subcutaneous BID    losartan  100 mg Oral Daily    metoprolol tartrate  25 mg Oral BID    modafinil  100 mg Oral After lunch    modafinil  200 mg Oral Daily    polyethylene glycol  17 g Oral Daily    senna-docusate 8.6-50 mg  1 tablet Oral BID     Continuous Infusions:  PRN Meds:acetaminophen, dextrose 50%, dextrose 50%, glucagon (human recombinant), insulin aspart U-100, labetalol, sodium chloride 0.9%    Objective:     Vital Signs (Most Recent):  Temp: (!)  100.4 °F (38 °C) (08/06/18 2000)  Pulse: 85 (08/06/18 2000)  Resp: 19 (08/06/18 2000)  BP: 126/68 (08/06/18 2000)  SpO2: 96 % (08/06/18 2000)  BP Location: Right arm    Vital Signs Range (Last 24H):  Temp:  [98.1 °F (36.7 °C)-100.4 °F (38 °C)]   Pulse:  [71-95]   Resp:  [16-20]   BP: (120-142)/(63-68)   SpO2:  [90 %-98 %]   BP Location: Right arm    Physical Exam   Constitutional: He appears well-developed and well-nourished. He appears lethargic. He appears ill. No distress. Nasal cannula in place.   HENT:   Head: Normocephalic.   Eyes: Pupils are equal, round, and reactive to light.   Neck: Normal range of motion.   Cardiovascular: Normal rate.    Pulmonary/Chest: Effort normal. No respiratory distress.   Abdominal: Soft.   Neurological: He appears lethargic. He displays no seizure activity.   Skin: Skin is warm and dry. He is not diaphoretic.   Psychiatric: He is withdrawn. He is inattentive.   Vitals reviewed.    Neurological Exam: exam appears effort dependent, patient seems unwilling to participate  LOC: drowsy  Attention Span: poor  Language: aphasia  Articulation: Dysarthria  Orientation: not oriented to person, place, or time  Visual Fields: L hemianopsia  EOM (CN III, IV, VI): R gaze preference  Pupils (CN II, III): PERRL  Facial Sensation (CN V): Normal  Facial Movement (CN VII): Lower facial weakness on the Left  Unable to accurately assess strength or sensation due to lethargy and patient not being cooperative  Tone: normal     Laboratory:  BMP:     Recent Labs  Lab 08/06/18  0334      K 4.0      CO2 25   BUN 16   CREATININE 0.8   CALCIUM 8.8     CBC:     Recent Labs  Lab 08/06/18  0334   WBC 8.70   RBC 3.08*   HGB 9.2*   HCT 27.3*      MCV 89   MCH 29.9   MCHC 33.7       Diagnostic Results     Brain imaging:  [facility] 7/25/18  Unchanged position of right frontal coursing ventriculostomy catheter with unchanged size and configuration of prominent ventricular system.    Stable right  thalamic intraparenchymal hemorrhage with associated interventricular hemorrhage layering in the occipital horns of the lateral ventricles.    Chronic microvascular ischemic changes above with associated remote infarcts.     CTH 7/21/18  1. Interval placement of right frontal coursing ventriculostomy with unchanged size and configuration of the shunted ventricular system, which remains prominent.  2. Evolution of right thalamic intraparenchymal hemorrhage with stable mass effect.  3. Chronic microvascular ischemic change and remote infarcts as above.       CTH: 1. Redemonstration of evolving right thalamic hemorrhage with mild adjacent edema and slight localized mass effect.  Subtle, subcentimeter focus of hyperdensity within the left thalamus, concerning for additional focus of evolving hemorrhage.  2. Persistent interventricular extension of hemorrhage with unchanged dilatation of the ventricular system concerning for component of hydrocephalus.  3. Generalized cerebral volume loss, chronic microvascular ischemic change and remote infarcts as above.     CTH ( 07/29/2018)  Evolving right thalamic intraparenchymal hematoma with intraventricular extension, as above, without detrimental change from 07/29/2018 CT at 12:24 p.m. No new infarct or hemorrhage.    Persistent diffuse enlargement of the ventricular system, unchanged.  Small focus of pneumocephalus in the right frontal horn persists.  Vessel Imaging:  None     Cardiac Evaluation:   ECHO- CONCLUSIONS     1 - Low normal left ventricular systolic function.     2 - Indeterminate LV diastolic function.     3 - Normal right ventricular systolic function .     4 - There is anterior pericardial fat pad..          Kelsie Ibrahim PA-C  Comprehensive Stroke Center  Department of Vascular Neurology   [facility]

## 2018-08-07 NOTE — PLAN OF CARE
Cm spoke to the patient's daughter and family at the patient's bedside regarding discharge to [facility] either later today or tomorrow depending on if the patient has an infection in his urine. Cm expaliend that once the urine sample is processed through lab the if it is positive for infection then the paitent would discharge. If the UA is not a source of infection the patient will stay overnight to ensure he has no more fevers and discharge tomorrow if afebrile. Cm called the patient's wife but was unable to reach her or leave a message with her voicemail. Cm will cotinue to follow.

## 2018-08-07 NOTE — ASSESSMENT & PLAN NOTE
R thalamic IPH w IVH on 7/9/2018. S/P EVD, Removed 7/27/18. Etiology likely hypertensive. Pending SNF placement.    Antithrombotics for secondary stroke prevention:  Anticoagulants: held initially due to ICH. Patient exam and imaging stable. Resuming coumadin 8/6/18   Statins for secondary stroke prevention and hyperlipidemia, if present: Statins: Atorvastatin- 40 mg daily  Aggressive risk factor modification: HTN, DM, HLD, Diet, Exercise, A-Fib  Rehab efforts: Occupational Therapy, PT/OT/SLP to evaluate and treat, PM&R consult   Diagnostics ordered/pending: None   VTE prophylaxis: Heparin and SCD, resuming coumadin 8/6/18 - d/c heparin when INR therapeutic  BP parameters: ICH: SBP <140

## 2018-08-07 NOTE — ASSESSMENT & PLAN NOTE
R thalamic IPH w IVH on 7/9/2018. S/P EVD, Removed 7/27/18. Etiology likely hypertensive. Pending SNF placement.   Patient has atrial fibrillation, previously anticoagulated. Held upon admission due to ICH. Will resume anticoagulation 8/6/18 with coumadin.     Antithrombotics for secondary stroke prevention:  Anticoagulants: coumadin 5mg daily   Statins for secondary stroke prevention and hyperlipidemia, if present: Statins: Atorvastatin- 40 mg daily  Aggressive risk factor modification: HTN, DM, HLD, Diet, Exercise, A-Fib  Rehab efforts: Occupational Therapy, PT/OT/SLP to evaluate and treat, PM&R consult   Diagnostics ordered/pending: None   VTE prophylaxis: Heparin and SCD, resuming coumadin 8/6/18 - d/c heparin when INR therapeutic  BP parameters: ICH: SBP <140

## 2018-08-07 NOTE — PROGRESS NOTES
" [facility]  Adult Nutrition  Progress Note    SUMMARY       Recommendations    Recommendation/Intervention:     1. Continue dysphagia pureed diet with thin liquids.  2. If po intake <50%, recommend adding boost glucose control with meals.   3. RD following.    Goals: Meet % EEN, EPN  Nutrition Goal Status: goal met  Communication of RD Recs: other (comment) (POC)    Reason for Assessment    Reason for Assessment: RD follow-up  Diagnosis: other (see comments) (Intraventricular intracerebral hemorrhage)  Relevant Medical History: T2DM, HTN, HLD, Dementia  General Information Comments: Attempted to receive calorie count, but unsuccessful. Pt on dysphagia pureed diet with thin liquids. Pt sleeping at time of visit. Family at bedside. Reports pt with good appetite and eats 100% of meals. Denies N/V/D/C. States that appetite PTA was very good. Denies wt change. Per chart review, wt stable. RD does not feel that pt meets malnutrition criteria at this time.   Nutrition Discharge Planning: adequate po intake    Nutrition Risk Screen    Nutrition Risk Screen: no indicators present    Nutrition/Diet History    Patient Reported Diet/Restrictions/Preferences: other (see comments) ([place])  Do you have any cultural, spiritual, Christianity conflicts, given your current situation?: none stated   Factors Affecting Nutritional Intake: None identified at this time    Anthropometrics    Temp: 99.5 °F (37.5 °C)  Height Method: Stated (per family)  Height: 5' 10" (177.8 cm) (per RN 7/17)  Height (inches): 70 in  Weight Method: Bed Scale  Weight: 82.8 kg (182 lb 8.7 oz)  Weight (lb): 182.54 lb  Ideal Body Weight (IBW), Male: 166 lb  % Ideal Body Weight, Male (lb): 109.96 lb  BMI (Calculated): 26.2  BMI Grade: 25 - 29.9 - overweight    Lab/Procedures/Meds    Pertinent Labs Reviewed: reviewed  Pertinent Labs Comments: glucose 211  Pertinent Medications Reviewed: reviewed  Pertinent Medications Comments: insulin, " statin, amlodipine, lasartan, metoprolol, docusate, warfarin    Physical Findings/Assessment    Overall Physical Appearance: nourished  Tubes: other (see comments)  Oral/Mouth Cavity: WDL  Skin: intact    Estimated/Assessed Needs    Weight Used For Calorie Calculations: 79 kg (174 lb 2.6 oz)  Energy Calorie Requirements (kcal): 1951 kcal/d  Energy Need Method: [place]-St Jeor (1.25 PAL)  Protein Requirements: 79-95 g/d (1-1.2 g/kg)  Weight Used For Protein Calculations: 79 kg (174 lb 2.6 oz)  Fluid Need Method: other (see comments) (Per MD or 1 mL/kcal)  RDA Method (mL): 1951  CHO Requirement: 50% total kcals    Nutrition Prescription Ordered    Current Diet Order: dysphagia pureed diet  Nutrition Order Comments: thin liquids    Evaluation of Received Nutrient/Fluid Intake    Comments: LBM 8/7  Tolerance: tolerating  % Intake of Estimated Energy Needs: 75 - 100 %  % Meal Intake: 75 - 100 %    Nutrition Risk    Level of Risk/Frequency of Follow-up: low (f/u 1 x wk)     Assessment and Plan    No nutrition diagnosis at this time.      Monitor and Evaluation    Food and Nutrient Intake: energy intake, food and beverage intake  Food and Nutrient Adminstration: diet order  Physical Activity and Function: nutrition-related ADLs and IADLs  Anthropometric Measurements: weight, weight change, body mass index  Biochemical Data, Medical Tests and Procedures: electrolyte and renal panel, gastrointestinal profile, glucose/endocrine profile, lipid profile, inflammatory profile  Nutrition-Focused Physical Findings: overall appearance     Nutrition Follow-Up    RD Follow-up?: Yes

## 2018-08-07 NOTE — PLAN OF CARE
Problem: Patient Care Overview  Goal: Plan of Care Review  Outcome: Ongoing (interventions implemented as appropriate)  POC reviewed with patient. No acute events overnight. Pt progressing toward goals. Will continue to monitor. See flow sheets for full assessment and VS info

## 2018-08-08 LAB
ANION GAP SERPL CALC-SCNC: 10 MMOL/L
BASOPHILS # BLD AUTO: 0.04 K/UL
BASOPHILS NFR BLD: 0.4 %
BUN SERPL-MCNC: 14 MG/DL
CALCIUM SERPL-MCNC: 8.7 MG/DL
CHLORIDE SERPL-SCNC: 107 MMOL/L
CO2 SERPL-SCNC: 26 MMOL/L
CREAT SERPL-MCNC: 0.7 MG/DL
DIFFERENTIAL METHOD: ABNORMAL
EOSINOPHIL # BLD AUTO: 0.4 K/UL
EOSINOPHIL NFR BLD: 4.1 %
ERYTHROCYTE [DISTWIDTH] IN BLOOD BY AUTOMATED COUNT: 12.7 %
EST. GFR  (AFRICAN AMERICAN): >60 ML/MIN/1.73 M^2
EST. GFR  (NON AFRICAN AMERICAN): >60 ML/MIN/1.73 M^2
GLUCOSE SERPL-MCNC: 126 MG/DL
HCT VFR BLD AUTO: 27.7 %
HGB BLD-MCNC: 8.9 G/DL
IMM GRANULOCYTES # BLD AUTO: 0.04 K/UL
IMM GRANULOCYTES NFR BLD AUTO: 0.4 %
INR PPP: 1
LYMPHOCYTES # BLD AUTO: 1 K/UL
LYMPHOCYTES NFR BLD: 10.8 %
MAGNESIUM SERPL-MCNC: 1.8 MG/DL
MCH RBC QN AUTO: 28.7 PG
MCHC RBC AUTO-ENTMCNC: 32.1 G/DL
MCV RBC AUTO: 89 FL
MONOCYTES # BLD AUTO: 0.6 K/UL
MONOCYTES NFR BLD: 6.4 %
NEUTROPHILS # BLD AUTO: 7.5 K/UL
NEUTROPHILS NFR BLD: 77.9 %
NRBC BLD-RTO: 0 /100 WBC
PHOSPHATE SERPL-MCNC: 3.3 MG/DL
PLATELET # BLD AUTO: 312 K/UL
PMV BLD AUTO: 10 FL
POCT GLUCOSE: 134 MG/DL (ref 70–110)
POCT GLUCOSE: 136 MG/DL (ref 70–110)
POCT GLUCOSE: 164 MG/DL (ref 70–110)
POCT GLUCOSE: 195 MG/DL (ref 70–110)
POCT GLUCOSE: 259 MG/DL (ref 70–110)
POCT GLUCOSE: 292 MG/DL (ref 70–110)
POCT GLUCOSE: 306 MG/DL (ref 70–110)
POTASSIUM SERPL-SCNC: 3.6 MMOL/L
PROTHROMBIN TIME: 10.5 SEC
RBC # BLD AUTO: 3.1 M/UL
SODIUM SERPL-SCNC: 143 MMOL/L
TB INDURATION 48 - 72 HR READ: 0 MM
WBC # BLD AUTO: 9.66 K/UL

## 2018-08-08 PROCEDURE — 94640 AIRWAY INHALATION TREATMENT: CPT

## 2018-08-08 PROCEDURE — 99900035 HC TECH TIME PER 15 MIN (STAT)

## 2018-08-08 PROCEDURE — 92526 ORAL FUNCTION THERAPY: CPT

## 2018-08-08 PROCEDURE — 92507 TX SP LANG VOICE COMM INDIV: CPT

## 2018-08-08 PROCEDURE — 63600175 PHARM REV CODE 636 W HCPCS: Performed by: PHYSICIAN ASSISTANT

## 2018-08-08 PROCEDURE — 25000003 PHARM REV CODE 250: Performed by: PHYSICIAN ASSISTANT

## 2018-08-08 PROCEDURE — 85610 PROTHROMBIN TIME: CPT

## 2018-08-08 PROCEDURE — 85025 COMPLETE CBC W/AUTO DIFF WBC: CPT

## 2018-08-08 PROCEDURE — 80048 BASIC METABOLIC PNL TOTAL CA: CPT

## 2018-08-08 PROCEDURE — 84100 ASSAY OF PHOSPHORUS: CPT

## 2018-08-08 PROCEDURE — 97110 THERAPEUTIC EXERCISES: CPT

## 2018-08-08 PROCEDURE — 99233 SBSQ HOSP IP/OBS HIGH 50: CPT | Mod: ,,, | Performed by: PSYCHIATRY & NEUROLOGY

## 2018-08-08 PROCEDURE — 94668 MNPJ CHEST WALL SBSQ: CPT

## 2018-08-08 PROCEDURE — 25000242 PHARM REV CODE 250 ALT 637 W/ HCPCS: Performed by: PHYSICIAN ASSISTANT

## 2018-08-08 PROCEDURE — 94761 N-INVAS EAR/PLS OXIMETRY MLT: CPT

## 2018-08-08 PROCEDURE — 20600001 HC STEP DOWN PRIVATE ROOM

## 2018-08-08 PROCEDURE — 63600175 PHARM REV CODE 636 W HCPCS: Performed by: NURSE PRACTITIONER

## 2018-08-08 PROCEDURE — 36415 COLL VENOUS BLD VENIPUNCTURE: CPT

## 2018-08-08 PROCEDURE — 94799 UNLISTED PULMONARY SVC/PX: CPT

## 2018-08-08 PROCEDURE — 83735 ASSAY OF MAGNESIUM: CPT

## 2018-08-08 PROCEDURE — 27000221 HC OXYGEN, UP TO 24 HOURS

## 2018-08-08 RX ORDER — METOPROLOL TARTRATE 50 MG/1
50 TABLET ORAL 2 TIMES DAILY
Status: DISCONTINUED | OUTPATIENT
Start: 2018-08-08 | End: 2018-08-09 | Stop reason: HOSPADM

## 2018-08-08 RX ADMIN — IPRATROPIUM BROMIDE AND ALBUTEROL SULFATE 3 ML: .5; 3 SOLUTION RESPIRATORY (INHALATION) at 08:08

## 2018-08-08 RX ADMIN — ATORVASTATIN CALCIUM 40 MG: 20 TABLET, FILM COATED ORAL at 08:08

## 2018-08-08 RX ADMIN — IPRATROPIUM BROMIDE AND ALBUTEROL SULFATE 3 ML: .5; 3 SOLUTION RESPIRATORY (INHALATION) at 04:08

## 2018-08-08 RX ADMIN — INSULIN ASPART 6 UNITS: 100 INJECTION, SOLUTION INTRAVENOUS; SUBCUTANEOUS at 12:08

## 2018-08-08 RX ADMIN — AMIODARONE HYDROCHLORIDE 200 MG: 200 TABLET ORAL at 08:08

## 2018-08-08 RX ADMIN — SODIUM CHLORIDE 1.5 G: 9 INJECTION, SOLUTION INTRAVENOUS at 12:08

## 2018-08-08 RX ADMIN — SODIUM CHLORIDE 1.5 G: 9 INJECTION, SOLUTION INTRAVENOUS at 06:08

## 2018-08-08 RX ADMIN — METOPROLOL TARTRATE 25 MG: 25 TABLET ORAL at 08:08

## 2018-08-08 RX ADMIN — AMLODIPINE BESYLATE 10 MG: 10 TABLET ORAL at 08:08

## 2018-08-08 RX ADMIN — INSULIN ASPART 1 UNITS: 100 INJECTION, SOLUTION INTRAVENOUS; SUBCUTANEOUS at 09:08

## 2018-08-08 RX ADMIN — HYDRALAZINE HYDROCHLORIDE 100 MG: 50 TABLET ORAL at 02:08

## 2018-08-08 RX ADMIN — MODAFINIL 100 MG: 100 TABLET ORAL at 02:08

## 2018-08-08 RX ADMIN — ACETAMINOPHEN 650 MG: 325 TABLET ORAL at 08:08

## 2018-08-08 RX ADMIN — SENNOSIDES AND DOCUSATE SODIUM 1 TABLET: 8.6; 5 TABLET ORAL at 09:08

## 2018-08-08 RX ADMIN — INSULIN ASPART 5 UNITS: 100 INJECTION, SOLUTION INTRAVENOUS; SUBCUTANEOUS at 08:08

## 2018-08-08 RX ADMIN — MODAFINIL 200 MG: 100 TABLET ORAL at 06:08

## 2018-08-08 RX ADMIN — HEPARIN SODIUM 5000 UNITS: 5000 INJECTION, SOLUTION INTRAVENOUS; SUBCUTANEOUS at 09:08

## 2018-08-08 RX ADMIN — HEPARIN SODIUM 5000 UNITS: 5000 INJECTION, SOLUTION INTRAVENOUS; SUBCUTANEOUS at 02:08

## 2018-08-08 RX ADMIN — WARFARIN SODIUM 5 MG: 5 TABLET ORAL at 05:08

## 2018-08-08 RX ADMIN — IPRATROPIUM BROMIDE AND ALBUTEROL SULFATE 3 ML: .5; 3 SOLUTION RESPIRATORY (INHALATION) at 03:08

## 2018-08-08 RX ADMIN — HEPARIN SODIUM 5000 UNITS: 5000 INJECTION, SOLUTION INTRAVENOUS; SUBCUTANEOUS at 06:08

## 2018-08-08 RX ADMIN — INSULIN ASPART 6 UNITS: 100 INJECTION, SOLUTION INTRAVENOUS; SUBCUTANEOUS at 05:08

## 2018-08-08 RX ADMIN — INSULIN ASPART 5 UNITS: 100 INJECTION, SOLUTION INTRAVENOUS; SUBCUTANEOUS at 12:08

## 2018-08-08 RX ADMIN — LOSARTAN POTASSIUM 100 MG: 50 TABLET, FILM COATED ORAL at 08:08

## 2018-08-08 RX ADMIN — INSULIN ASPART 5 UNITS: 100 INJECTION, SOLUTION INTRAVENOUS; SUBCUTANEOUS at 05:08

## 2018-08-08 RX ADMIN — IPRATROPIUM BROMIDE AND ALBUTEROL SULFATE 3 ML: .5; 3 SOLUTION RESPIRATORY (INHALATION) at 11:08

## 2018-08-08 RX ADMIN — HYDRALAZINE HYDROCHLORIDE 100 MG: 50 TABLET ORAL at 09:08

## 2018-08-08 RX ADMIN — INSULIN ASPART 1 UNITS: 100 INJECTION, SOLUTION INTRAVENOUS; SUBCUTANEOUS at 02:08

## 2018-08-08 RX ADMIN — HYDRALAZINE HYDROCHLORIDE 100 MG: 50 TABLET ORAL at 06:08

## 2018-08-08 RX ADMIN — METOPROLOL TARTRATE 50 MG: 50 TABLET ORAL at 09:08

## 2018-08-08 NOTE — PLAN OF CARE
Problem: Occupational Therapy Goal  Goal: Occupational Therapy Goal  Goals to be met by: 8/12/18     Patient will increase functional independence with ADLs by performing:    UE Dressing with Moderate Assistance.  LE Dressing with Max Assistance.  Grooming while seated with Min Assistance.  Toileting from bedside commode with Moderate Assistance for hygiene and clothing management.   Rolling to Bilateral with Moderate Assistance.   Supine to sit with Moderate Assistance.  Stand pivot transfers with Moderate Assistance.       Outcome: Ongoing (interventions implemented as appropriate)  Goals remain appropriate, continue with OT POC.    ZOE Mina  8/8/2018  Rehab Services

## 2018-08-08 NOTE — PLAN OF CARE
Problem: SLP Goal  Goal: SLP Goal  Speech Language Pathology Goals  Goals expected to be met by 8/9/18  1.  Pt will tolerate puree diet with thin liquids with no s/s of aspiration, MIN A  2. Pt will follow simple commands with 90% accuracy, MIN A  3. Pt will respond to simple yes/no questions with 90% accuracy, MOD A  4, Pt will tespond to simple word finding tasks with 80% accuracy, MIN A  5.  Assess visual spatial skills    6. Pt will tolerate trials of advanced textures with adequate oral clearance and no overt S/S aspiration, MIN A  7. Educate Pt and family on SLP role and S/S aspiration   Goals expected to be met by 8/2/18  1.  Tolerate dental soft diet with thin liquids with no s/s of aspiration.   2.  Follow simple commands with 90% accuracy  3.  Respond to simple yes/no questions with 90% accuracy  4,  Respond to simple word finding tasks with 80% accuracy  5.  Assess visual spatial skills       Outcome: Ongoing (interventions implemented as appropriate)  Recommend advancing diet to dental soft with thin liquids  Chanel Matias MA/KEL-SLP  Speech Language Pathologist  Pager [phone]  8/8/2018

## 2018-08-08 NOTE — ASSESSMENT & PLAN NOTE
Area of vasogenic cerebral edema identified when reviewing brain imaging in the [facility]. There is mild mass effect associated with it. We will continue to monitor the patients clinical exam for any worsening of symptoms which may indicate expansion of the hemorrhage or the area of the edema resulting in the clinical change. The ICH is likely 2/2 HTN.

## 2018-08-08 NOTE — PLAN OF CARE
Cm went to the patient's room patient was asleep and no family at bedside. Cm will continue to follow.

## 2018-08-08 NOTE — ASSESSMENT & PLAN NOTE
Stroke risk factor. A1C 7.2  on Detemir 14 units q12 + SSI 5U TID WM  Fasting blood sugar 126 8/8/18

## 2018-08-08 NOTE — PROGRESS NOTES
[facility]  Vascular Neurology  Comprehensive Stroke Center  Progress Note    Assessment/Plan:     * 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    R thalamic IPH w IVH on 7/9/2018. S/P EVD, Removed 7/27/18. Etiology likely hypertensive. Pending SNF placement.   Patient has atrial fibrillation, previously anticoagulated. Held upon admission due to ICH. Resumed anticoagulation 8/6/18 with coumadin.     8/8/18: Fever has held up discharge. CXR with bilateral lower lobe edema; treating for aspiration pneumonia at this time as no other clear source of infection is apparent.     Antithrombotics for secondary stroke prevention:  Anticoagulants: coumadin 5mg daily; INR 1.0   Statins for secondary stroke prevention and hyperlipidemia, if present: Statins: Atorvastatin- 40 mg daily  Aggressive risk factor modification: HTN, DM, HLD, Diet, Exercise, A-Fib  Rehab efforts: Occupational Therapy, PT/OT/SLP to evaluate and treat, PM&R consult   Diagnostics ordered/pending: None   VTE prophylaxis: Heparin and SCD, resuming coumadin 8/6/18 - d/c heparin when INR therapeutic  BP parameters: ICH: SBP <140          Vasogenic cerebral edema    Area of vasogenic cerebral edema identified when reviewing brain imaging in the [facility]. There is mild mass effect associated with it. We will continue to monitor the patients clinical exam for any worsening of symptoms which may indicate expansion of the hemorrhage or the area of the edema resulting in the clinical change. The ICH is likely 2/2 HTN.          Normocytic anemia    - Hgb downtrended from 07/30-7/31 and again 8/5/18. Stable now around 9   - Unclear source, patient on SQ Heparin and coumadin resumed 8/6  - No melenic stools per nursing staff  - no apparent bleeding  - Will continue to monitor         Obstructive hydrocephalus    - S/P EVD, removed on 07/27  - CTH on 07/29 w/ stable ventricular size  - exam stable        Dysphagia    -  passed MBSS  - currently on puree diet with thin liquids  - calorie counts ordered and nutrition consulted        ICH (intracerebral hemorrhage)              Cardiomyopathy    - Latest Echo with Low normal left ventricular systolic function.   - Continue BB and ARB        Persistent atrial fibrillation    - Stroke risk factor  - Held anticoagulation due to ICH initially; resume coumadin 8/6/18. Will continue heparin VTE until INR therapeutic (2.0-3.0)   - Continue amiodarone and metoprolol         Dyslipidemia    Stroke risk factor, well-controlled, most recent LDL 47  Continue Atorvastatin 40mg QHS        Essential hypertension    sBP goal <140   Continue amlodipine 10mg QD, metoprolol increased 8/8 to 50mg BID, hydralazine 100 q8h, and losartan 100 qd  Labetalol PRN        Type 2 diabetes mellitus with diabetic polyneuropathy, with long-term current use of insulin    Stroke risk factor. A1C 7.2  on Detemir 14 units q12 + SSI 5U TID WM  Fasting blood sugar 126 8/8/18 7/21: EVD at bedside by NSGY  7/23 EVD in place lowered to 5cm h2o per nsgy.. Blood sugars running >200 adjusted detemir. Increased hydralazine for better BP control  7/24: EVD at 5, insulin adjustments   7/25: EVD clamped per Nsgy, [facility] tonight  7/26: Held tube feeds, insulin adjustments, Nsgy contacted about MSC and EVD.   7/27: Evd pulled per Nsgy, Ngt replace, transfer to stroke service  07/28/2018  Pending bed availability, largely stable neuro exam. SNF placement on discharge   07/29/2018 More somnolent and less responsive this AM. CTH stable but patient w/ fevers now.   07/30/2018 Patient a lot more responsive this AM. Animated. TF held and re-started overnight. Ceftriaxone started for UTI.   07/31/2018 Neurologically unchanged. Hgb continues to trend down.  No over signs of bleeding per nursing staff.  08/01/2018 Hgb stable today. No blood in stools. Continues to be fatigued but per nursing, patient's mental status greatly waxes and  wanes. At his best, he is able to work with PT/OT and mumble but understands speech.   08/02/2018 H&H stable, attempting to repeat MBSS today pending patient's level of alertness  08/03/2018 passed MBSS yesterday and started on a puree diet with thin liquids, calorie counts ordered, increased hydralazine  8/6/18: No acute events overnight. Made changes to insulin due to hyperglycemia. Episode of chest pain reported by nurse <5 minutes, vital signs stable, troponin negative and ECG stable.   8/7/18: No acute events overnight. Low grade temperature overnight, unclear source. UA negative. Accepted to SNF but will hold discharge to monitor patient's VS overnight.   8/8/18: Started unasyn last night for suspected aspiration pneumonia. Added incentive spirometry to duonebs and chest PT. 1 reading of fever overnight 100.5. INR remains 1.0. Patient complains of neck pain today. Plan of care discussed with patient's daughter and granddaughters at bedside.     STROKE DOCUMENTATION        NIH Scale:  1a. Level Of Consciousness: 1-->Not alert: but arousable by minor stimulation to obey, answer, or respond  1b. LOC Questions: 2-->Answers neither question correctly  1c. LOC Commands: 0-->Performs both tasks correctly  2. Best Gaze: 1-->Partial gaze palsy: gaze is abnormal in one or both eyes, but forced deviation or total gaze paresis is not present  3. Visual: 0-->No visual loss  4. Facial Palsy: 1-->Minor paralysis (flattened nasolabial fold, asymmetry on smiling)  5a. Motor Arm, Left: 2-->Some effort against gravity: limb cannot get to or maintain (if cued) 90 (or 45) degrees, drifts down to bed, but has some effort against gravity  5b. Motor Arm, Right: 2-->Some effort against gravity: limb cannot get to or maintain (if cued) 90 (or 45) degrees, drifts down to bed, but has some effort against gravity  6a. Motor Leg, Left: 4-->No movement  6b. Motor Leg, Right: 4-->No movement  7. Limb Ataxia: 0-->Absent  8. Sensory:  1-->Mild-to-moderate sensory loss: patient feels pinprick is less sharp or is dull on the affected side: or there is a loss of superficial pain with pinprick, but patient is aware of being touched  9. Best Language: 2-->Severe aphasia: all communication is through fragmentary expression: great need for inference, questioning, and guessing by the listener. Range of information that can be exchanged is limited: listener carries burden of. . . (see row details)  10. Dysarthria: 2-->Severe dysarthria: patients speech is so slurred as to be unintelligible in the absence of or out of proportion to any dysphasia, or is mute/anarthric  11. Extinction and Inattention (formerly Neglect): 0-->No abnormality  Total (NIH Stroke Scale): 22       Modified Vaughn    Lucian Coma Scale:    ABCD2 Score:    [id number] Score:6  HAS -BLED Score:2  ICH Score:3  Hunt & Fox Classification:      Hemorrhagic change of an Ischemic Stroke: Does this patient have an ischemic stroke with hemorrhagic changes? No     Neurologic Chief Complaint: ICH    Subjective:     Interval History: Patient is seen for follow-up neurological assessment and treatment recommendations:    Started unasyn last night for suspected aspiration pneumonia. Added incentive spirometry to duonebs and chest PT. 1 reading of fever overnight 100.5. INR remains 1.0. Patient complains of neck pain today. Plan of care discussed with patient's daughter and granddaughters at bedside.     HPI, Past Medical, Family, and Social History remains the same as documented in the initial encounter.     Review of Systems   Constitutional: Positive for fever (100.5). Negative for chills.   HENT: Positive for trouble swallowing.    Respiratory: Negative for shortness of breath.    Cardiovascular: Negative for chest pain.   Gastrointestinal: Negative for vomiting.   Genitourinary:        Villeda in place   Neurological: Positive for facial asymmetry, speech difficulty and weakness.    Psychiatric/Behavioral: Positive for confusion. Negative for agitation.     Scheduled Meds:   albuterol-ipratropium  3 mL Nebulization Q4H    amiodarone  200 mg Oral Daily    amLODIPine  10 mg Oral Daily    ampicillin-sulbactim (UNASYN) IVPB  1.5 g Intravenous Q6H    atorvastatin  40 mg Oral Daily    heparin (porcine)  5,000 Units Subcutaneous Q8H    hydrALAZINE  100 mg Oral Q8H    insulin aspart U-100  5 Units Subcutaneous TIDWM    insulin detemir U-100  14 Units Subcutaneous BID    losartan  100 mg Oral Daily    metoprolol tartrate  25 mg Oral BID    modafinil  100 mg Oral After lunch    modafinil  200 mg Oral Daily    polyethylene glycol  17 g Oral Daily    senna-docusate 8.6-50 mg  1 tablet Oral BID    warfarin  5 mg Oral Daily     Continuous Infusions:  PRN Meds:acetaminophen, dextrose 50%, dextrose 50%, glucagon (human recombinant), insulin aspart U-100, labetalol, sodium chloride 0.9%    Objective:     Vital Signs (Most Recent):  Temp: 98.6 °F (37 °C) (08/08/18 1236)  Pulse: 75 (08/08/18 1236)  Resp: 18 (08/08/18 1236)  BP: 133/61 (08/08/18 1236)  SpO2: (!) 94 % (08/08/18 1236)  BP Location: Left arm    Vital Signs Range (Last 24H):  Temp:  [97 °F (36.1 °C)-100.5 °F (38.1 °C)]   Pulse:  []   Resp:  [15-18]   BP: (132-163)/(50-76)   SpO2:  [92 %-98 %]   BP Location: Left arm    Physical Exam   Constitutional: He appears well-developed and well-nourished. He appears lethargic. He appears ill. No distress. Nasal cannula in place.   HENT:   Head: Normocephalic.   Eyes: Pupils are equal, round, and reactive to light.   Neck: Normal range of motion.   Cardiovascular: Normal rate.    Pulmonary/Chest: Effort normal. No respiratory distress.   Abdominal: Soft.   Neurological: He appears lethargic. He displays no seizure activity.   Skin: Skin is warm and dry. He is not diaphoretic.   Psychiatric: He is withdrawn. He is inattentive.   Vitals reviewed.    Neurological Exam:   exam appears effort  dependent, patient seems unwilling to participate  LOC: drowsy  Attention Span: poor  Language: aphasia  Articulation: Dysarthria  Orientation: not oriented to person, place, or time  Visual Fields: L hemianopsia  EOM (CN III, IV, VI): R gaze preference  Pupils (CN II, III): PERRL  Facial Sensation (CN V): Normal  Facial Movement (CN VII): Lower facial weakness on the Left  Strength: No effort against gravity bilateral lower extremities. Some effort against gravity BUE   Tone: normal     Laboratory:  BMP:     Recent Labs  Lab 08/08/18  0558      K 3.6      CO2 26   BUN 14   CREATININE 0.7   CALCIUM 8.7     CBC:     Recent Labs  Lab 08/08/18  0558   WBC 9.66   RBC 3.10*   HGB 8.9*   HCT 27.7*      MCV 89   MCH 28.7   MCHC 32.1       Diagnostic Results     Brain imaging:  CTH 7/25/18  Unchanged position of right frontal coursing ventriculostomy catheter with unchanged size and configuration of prominent ventricular system.    Stable right thalamic intraparenchymal hemorrhage with associated interventricular hemorrhage layering in the occipital horns of the lateral ventricles.    Chronic microvascular ischemic changes above with associated remote infarcts.     CTH 7/21/18  1. Interval placement of right frontal coursing ventriculostomy with unchanged size and configuration of the shunted ventricular system, which remains prominent.  2. Evolution of right thalamic intraparenchymal hemorrhage with stable mass effect.  3. Chronic microvascular ischemic change and remote infarcts as above.       CTH: 1. Redemonstration of evolving right thalamic hemorrhage with mild adjacent edema and slight localized mass effect.  Subtle, subcentimeter focus of hyperdensity within the left thalamus, concerning for additional focus of evolving hemorrhage.  2. Persistent interventricular extension of hemorrhage with unchanged dilatation of the ventricular system concerning for component of hydrocephalus.  3. Generalized  cerebral volume loss, chronic microvascular ischemic change and remote infarcts as above.     CTH ( 07/29/2018)  Evolving right thalamic intraparenchymal hematoma with intraventricular extension, as above, without detrimental change from 07/29/2018 CT at 12:24 p.m. No new infarct or hemorrhage.    Persistent diffuse enlargement of the ventricular system, unchanged.  Small focus of pneumocephalus in the right frontal horn persists.  Vessel Imaging:  None     Cardiac Evaluation:   ECHO- CONCLUSIONS     1 - Low normal left ventricular systolic function.     2 - Indeterminate LV diastolic function.     3 - Normal right ventricular systolic function .     4 - There is anterior pericardial fat pad..          Kelsie Ibrahim PA-C  Comprehensive Stroke Center  Department of Vascular Neurology   [facility]

## 2018-08-08 NOTE — SUBJECTIVE & OBJECTIVE
Neurologic Chief Complaint: ICH    Subjective:     Interval History: Patient is seen for follow-up neurological assessment and treatment recommendations:    Started unasyn last night for suspected aspiration pneumonia. Added incentive spirometry to duonebs and chest PT. 1 reading of fever overnight 100.5. INR remains 1.0. Patient complains of neck pain today. Plan of care discussed with patient's daughter and granddaughters at bedside.     HPI, Past Medical, Family, and Social History remains the same as documented in the initial encounter.     Review of Systems   Constitutional: Positive for fever (100.5). Negative for chills.   HENT: Positive for trouble swallowing.    Respiratory: Negative for shortness of breath.    Cardiovascular: Negative for chest pain.   Gastrointestinal: Negative for vomiting.   Genitourinary:        Villeda in place   Neurological: Positive for facial asymmetry, speech difficulty and weakness.   Psychiatric/Behavioral: Positive for confusion. Negative for agitation.     Scheduled Meds:   albuterol-ipratropium  3 mL Nebulization Q4H    amiodarone  200 mg Oral Daily    amLODIPine  10 mg Oral Daily    ampicillin-sulbactim (UNASYN) IVPB  1.5 g Intravenous Q6H    atorvastatin  40 mg Oral Daily    heparin (porcine)  5,000 Units Subcutaneous Q8H    hydrALAZINE  100 mg Oral Q8H    insulin aspart U-100  5 Units Subcutaneous TIDWM    insulin detemir U-100  14 Units Subcutaneous BID    losartan  100 mg Oral Daily    metoprolol tartrate  25 mg Oral BID    modafinil  100 mg Oral After lunch    modafinil  200 mg Oral Daily    polyethylene glycol  17 g Oral Daily    senna-docusate 8.6-50 mg  1 tablet Oral BID    warfarin  5 mg Oral Daily     Continuous Infusions:  PRN Meds:acetaminophen, dextrose 50%, dextrose 50%, glucagon (human recombinant), insulin aspart U-100, labetalol, sodium chloride 0.9%    Objective:     Vital Signs (Most Recent):  Temp: 98.6 °F (37 °C) (08/08/18 1236)  Pulse: 75  (08/08/18 1236)  Resp: 18 (08/08/18 1236)  BP: 133/61 (08/08/18 1236)  SpO2: (!) 94 % (08/08/18 1236)  BP Location: Left arm    Vital Signs Range (Last 24H):  Temp:  [97 °F (36.1 °C)-100.5 °F (38.1 °C)]   Pulse:  []   Resp:  [15-18]   BP: (132-163)/(50-76)   SpO2:  [92 %-98 %]   BP Location: Left arm    Physical Exam   Constitutional: He appears well-developed and well-nourished. He appears lethargic. He appears ill. No distress. Nasal cannula in place.   HENT:   Head: Normocephalic.   Eyes: Pupils are equal, round, and reactive to light.   Neck: Normal range of motion.   Cardiovascular: Normal rate.    Pulmonary/Chest: Effort normal. No respiratory distress.   Abdominal: Soft.   Neurological: He appears lethargic. He displays no seizure activity.   Skin: Skin is warm and dry. He is not diaphoretic.   Psychiatric: He is withdrawn. He is inattentive.   Vitals reviewed.    Neurological Exam:   exam appears effort dependent, patient seems unwilling to participate  LOC: drowsy  Attention Span: poor  Language: aphasia  Articulation: Dysarthria  Orientation: not oriented to person, place, or time  Visual Fields: L hemianopsia  EOM (CN III, IV, VI): R gaze preference  Pupils (CN II, III): PERRL  Facial Sensation (CN V): Normal  Facial Movement (CN VII): Lower facial weakness on the Left  Strength: No effort against gravity bilateral lower extremities. Some effort against gravity BUE   Tone: normal     Laboratory:  BMP:     Recent Labs  Lab 08/08/18  0558      K 3.6      CO2 26   BUN 14   CREATININE 0.7   CALCIUM 8.7     CBC:     Recent Labs  Lab 08/08/18  0558   WBC 9.66   RBC 3.10*   HGB 8.9*   HCT 27.7*      MCV 89   MCH 28.7   MCHC 32.1       Diagnostic Results     Brain imaging:  [facility] 7/25/18  Unchanged position of right frontal coursing ventriculostomy catheter with unchanged size and configuration of prominent ventricular system.    Stable right thalamic intraparenchymal hemorrhage with  associated interventricular hemorrhage layering in the occipital horns of the lateral ventricles.    Chronic microvascular ischemic changes above with associated remote infarcts.     [facility] 7/21/18  1. Interval placement of right frontal coursing ventriculostomy with unchanged size and configuration of the shunted ventricular system, which remains prominent.  2. Evolution of right thalamic intraparenchymal hemorrhage with stable mass effect.  3. Chronic microvascular ischemic change and remote infarcts as above.       [facility]: 1. Redemonstration of evolving right thalamic hemorrhage with mild adjacent edema and slight localized mass effect.  Subtle, subcentimeter focus of hyperdensity within the left thalamus, concerning for additional focus of evolving hemorrhage.  2. Persistent interventricular extension of hemorrhage with unchanged dilatation of the ventricular system concerning for component of hydrocephalus.  3. Generalized cerebral volume loss, chronic microvascular ischemic change and remote infarcts as above.     [facility] ( 07/29/2018)  Evolving right thalamic intraparenchymal hematoma with intraventricular extension, as above, without detrimental change from 07/29/2018 CT at 12:24 p.m. No new infarct or hemorrhage.    Persistent diffuse enlargement of the ventricular system, unchanged.  Small focus of pneumocephalus in the right frontal horn persists.  Vessel Imaging:  None     Cardiac Evaluation:   ECHO- CONCLUSIONS     1 - Low normal left ventricular systolic function.     2 - Indeterminate LV diastolic function.     3 - Normal right ventricular systolic function .     4 - There is anterior pericardial fat pad..

## 2018-08-08 NOTE — ASSESSMENT & PLAN NOTE
R thalamic IPH w IVH on 7/9/2018. S/P EVD, Removed 7/27/18. Etiology likely hypertensive. Pending SNF placement.   Patient has atrial fibrillation, previously anticoagulated. Held upon admission due to ICH. Resumed anticoagulation 8/6/18 with coumadin.     8/8/18: Fever has held up discharge. CXR with bilateral lower lobe edema; treating for aspiration pneumonia at this time as no other clear source of infection is apparent.     Antithrombotics for secondary stroke prevention:  Anticoagulants: coumadin 5mg daily; INR 1.0   Statins for secondary stroke prevention and hyperlipidemia, if present: Statins: Atorvastatin- 40 mg daily  Aggressive risk factor modification: HTN, DM, HLD, Diet, Exercise, A-Fib  Rehab efforts: Occupational Therapy, PT/OT/SLP to evaluate and treat, PM&R consult   Diagnostics ordered/pending: None   VTE prophylaxis: Heparin and SCD, resuming coumadin 8/6/18 - d/c heparin when INR therapeutic  BP parameters: ICH: SBP <140

## 2018-08-08 NOTE — PT/OT/SLP PROGRESS
"Speech Language Pathology Treatment    Patient Name:  Ciro Chandler   MRN:  [id number]  Admitting Diagnosis: 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    Recommendations:                 General Recommendations:  Dysphagia therapy and Speech/language therapy  Diet recommendations:  Dental Soft, Liquid Diet Level: Thin   Aspiration Precautions: Standard aspiration precautions   General Precautions: Standard, aspiration, fall  Communication strategies:  none    Subjective     "He has been eating good" per family  Patient goals: nigel    Pain/Comfort:  · Pain Rating 1: 0/10  · Pain Rating Post-Intervention 1: 0/10    Objective:     Has the patient been evaluated by SLP for swallowing?   Yes  Keep patient NPO? No   Current Respiratory Status: nasal cannula      Pt. Seen at bedside and was alert though required verbal cues to keep eyes open at times responding with eyes closed.  Left neglect persisting with pt. Not attending to examiner on left.  Verbal responses elicited were intelligible though limited in number with pt. Frequently making no response at all.  No initiation of communication noted.  He responded to personal biographical information with 50% accuracy with errored responses being "no response".  Pt. Made no response to orientation questions and simple word recall tasks.  HOB was raised to 90 degree angle and oral care provided.  Pt. Was observed swallowing 4 ounces of water and eating 1.5 crackers.  Mastication was adequate as well as bolus formation with no delay in transit and no anterior loss of bolus.  NO coughing, throat clearing or change in vocal quality following the swallow.      Assessment:     Ciro Chandler is a 68 y.o. male with an SLP diagnosis of Dysphagia and Cognitive-Linguistic Impairment.    Goals:    SLP Goals        Problem: SLP Goal    Goal Priority Disciplines Outcome   SLP Goal     SLP Ongoing (interventions implemented as appropriate)   Description:  Speech Language " Pathology Goals  Goals expected to be met by 8/9/18  1.  Pt will tolerate puree diet with thin liquids with no s/s of aspiration, MIN A  2. Pt will follow simple commands with 90% accuracy, MIN A  3. Pt will respond to simple yes/no questions with 90% accuracy, MOD A  4, Pt will tespond to simple word finding tasks with 80% accuracy, MIN A  5.  Assess visual spatial skills    6. Pt will tolerate trials of advanced textures with adequate oral clearance and no overt S/S aspiration, MIN A  7. Educate Pt and family on SLP role and S/S aspiration   Goals expected to be met by 8/2/18  1.  Tolerate dental soft diet with thin liquids with no s/s of aspiration.   2.  Follow simple commands with 90% accuracy  3.  Respond to simple yes/no questions with 90% accuracy  4,  Respond to simple word finding tasks with 80% accuracy  5.  Assess visual spatial skills                        Plan:     · Patient to be seen:  4 x/week   · Plan of Care expires:  08/23/18  · Plan of Care reviewed with:  patient, family   · SLP Follow-Up:  Yes       Discharge recommendations:  nursing facility, skilled     Time Tracking:     SLP Treatment Date:   08/08/18  Speech Start Time:  0730  Speech Stop Time:  0750     Speech Total Time (min):  20 min    Billable Minutes: Speech Therapy Individual 8 and Treatment Swallowing Dysfunction 12    Chanel Matias MA, CCC-SLP  08/08/2018

## 2018-08-08 NOTE — ASSESSMENT & PLAN NOTE
sBP goal <140   Continue amlodipine 10mg QD, metoprolol increased 8/8 to 50mg BID, hydralazine 100 q8h, and losartan 100 qd  Labetalol PRN

## 2018-08-08 NOTE — PLAN OF CARE
The patient has been accepted and has insurance authorization to go to [facility] once medically stable and no longer febrile.      08/08/18 9895   Discharge Reassessment   Assessment Type Discharge Planning Reassessment   Provided patient/caregiver education on the expected discharge date and the discharge plan Yes   Do you have any problems affording any of your prescribed medications? No   Discharge Plan A Skilled Nursing Facility   Discharge Plan B Home with family;Home Health   Patient choice form signed by patient/caregiver N/A   Can the patient answer the patient profile reliably? No, cognitively impaired   How does the patient rate their overall health at the present time? ([place])   Describe the patient's ability to walk at the present time. Major restrictions/daily assistance from another person   How often would a person be available to care for the patient? Whenever needed   Number of comorbid conditions (as recorded on the chart) Five or more   During the past month, has the patient often been bothered by feeling down, depressed or hopeless? No  (per previous Rn assessment)   During the past month, has the patient often been bothered by little interest or pleasure in doing things? No  (per previous Rn assessment)

## 2018-08-08 NOTE — ASSESSMENT & PLAN NOTE
- Hgb downtrended from 07/30-7/31 and again 8/5/18. Stable now around 9   - Unclear source, patient on SQ Heparin and coumadin resumed 8/6  - No melenic stools per nursing staff  - no apparent bleeding  - Will continue to monitor

## 2018-08-08 NOTE — NURSING
POC reviewed with pt- needs reinforcement. Pt had low grade temp this shift- no prn tylenol given d/t parameters. Villeda care performed. VSS. No acute events throughout shift. Fall precautions remain in place.

## 2018-08-08 NOTE — PLAN OF CARE
Problem: Patient Care Overview  Goal: Plan of Care Review  Outcome: Ongoing (interventions implemented as appropriate)  NO ACUTE EVENTS DURING SHIFT. PT TOLERATED ANY AND ALL ACTIVITIES WELL. RUBI DC'D FOR VOIDING TRIAL, CONDOM CATH PLACED FOR ACCURATE I/O- WILL PERFORM BLADDER SCAN FOR PVR. PT C/O PAIN TO R NECK IN AM-TYLENOL GIVEN AND PAIN WAS SOOTHED. FAMILY AT BEDSIDE FOR MOST OF DAY. REQUESTED FAMILY TO BRING PT DIET SPRITE INSTEAD OF REGULAR, CONSIDERING PT BLOOD SUGARS VERY ELEVATED.  PT PARTICIPATED WITH APPROPRIATE THERAPIES. ALL QUESTIONS ANSWERED. WILL ENDORSE CARE TO NOC RN

## 2018-08-09 VITALS
HEART RATE: 75 BPM | OXYGEN SATURATION: 93 % | WEIGHT: 182.56 LBS | DIASTOLIC BLOOD PRESSURE: 73 MMHG | HEIGHT: 70 IN | BODY MASS INDEX: 26.14 KG/M2 | SYSTOLIC BLOOD PRESSURE: 150 MMHG | RESPIRATION RATE: 18 BRPM | TEMPERATURE: 99 F

## 2018-08-09 PROBLEM — R33.9 URINARY RETENTION: Status: ACTIVE | Noted: 2018-08-09

## 2018-08-09 PROBLEM — D64.9 NORMOCYTIC ANEMIA: Status: ACTIVE | Noted: 2018-08-09

## 2018-08-09 PROBLEM — J69.0 ASPIRATION PNEUMONIA: Status: ACTIVE | Noted: 2018-08-09

## 2018-08-09 PROBLEM — I82.439 POPLITEAL DVT (DEEP VENOUS THROMBOSIS): Status: ACTIVE | Noted: 2018-08-09

## 2018-08-09 LAB
ANION GAP SERPL CALC-SCNC: 8 MMOL/L
BASOPHILS # BLD AUTO: 0.04 K/UL
BASOPHILS NFR BLD: 0.4 %
BUN SERPL-MCNC: 13 MG/DL
CALCIUM SERPL-MCNC: 8.7 MG/DL
CHLORIDE SERPL-SCNC: 107 MMOL/L
CO2 SERPL-SCNC: 25 MMOL/L
CREAT SERPL-MCNC: 0.7 MG/DL
DIFFERENTIAL METHOD: ABNORMAL
EOSINOPHIL # BLD AUTO: 0.4 K/UL
EOSINOPHIL NFR BLD: 4.6 %
ERYTHROCYTE [DISTWIDTH] IN BLOOD BY AUTOMATED COUNT: 12.6 %
EST. GFR  (AFRICAN AMERICAN): >60 ML/MIN/1.73 M^2
EST. GFR  (NON AFRICAN AMERICAN): >60 ML/MIN/1.73 M^2
GLUCOSE SERPL-MCNC: 139 MG/DL
HCT VFR BLD AUTO: 26.8 %
HGB BLD-MCNC: 8.6 G/DL
IMM GRANULOCYTES # BLD AUTO: 0.05 K/UL
IMM GRANULOCYTES NFR BLD AUTO: 0.5 %
INR PPP: 1
LYMPHOCYTES # BLD AUTO: 1.2 K/UL
LYMPHOCYTES NFR BLD: 12.5 %
MCH RBC QN AUTO: 28.7 PG
MCHC RBC AUTO-ENTMCNC: 32.1 G/DL
MCV RBC AUTO: 89 FL
MONOCYTES # BLD AUTO: 0.6 K/UL
MONOCYTES NFR BLD: 6 %
NEUTROPHILS # BLD AUTO: 7 K/UL
NEUTROPHILS NFR BLD: 76 %
NRBC BLD-RTO: 0 /100 WBC
PLATELET # BLD AUTO: 309 K/UL
PMV BLD AUTO: 10.5 FL
POCT GLUCOSE: 146 MG/DL (ref 70–110)
POCT GLUCOSE: 174 MG/DL (ref 70–110)
POCT GLUCOSE: 202 MG/DL (ref 70–110)
POCT GLUCOSE: 210 MG/DL (ref 70–110)
POTASSIUM SERPL-SCNC: 3.8 MMOL/L
PROTHROMBIN TIME: 10.9 SEC
RBC # BLD AUTO: 3 M/UL
SODIUM SERPL-SCNC: 140 MMOL/L
WBC # BLD AUTO: 9.18 K/UL

## 2018-08-09 PROCEDURE — 80048 BASIC METABOLIC PNL TOTAL CA: CPT

## 2018-08-09 PROCEDURE — 97530 THERAPEUTIC ACTIVITIES: CPT

## 2018-08-09 PROCEDURE — 85610 PROTHROMBIN TIME: CPT

## 2018-08-09 PROCEDURE — 85025 COMPLETE CBC W/AUTO DIFF WBC: CPT

## 2018-08-09 PROCEDURE — 25000242 PHARM REV CODE 250 ALT 637 W/ HCPCS: Performed by: PHYSICIAN ASSISTANT

## 2018-08-09 PROCEDURE — 25000003 PHARM REV CODE 250: Performed by: PHYSICIAN ASSISTANT

## 2018-08-09 PROCEDURE — 99238 HOSP IP/OBS DSCHRG MGMT 30/<: CPT | Mod: ,,, | Performed by: PSYCHIATRY & NEUROLOGY

## 2018-08-09 PROCEDURE — 63600175 PHARM REV CODE 636 W HCPCS: Performed by: PHYSICIAN ASSISTANT

## 2018-08-09 PROCEDURE — 63600175 PHARM REV CODE 636 W HCPCS: Performed by: NURSE PRACTITIONER

## 2018-08-09 PROCEDURE — 27000221 HC OXYGEN, UP TO 24 HOURS

## 2018-08-09 PROCEDURE — 97110 THERAPEUTIC EXERCISES: CPT

## 2018-08-09 PROCEDURE — 36415 COLL VENOUS BLD VENIPUNCTURE: CPT

## 2018-08-09 PROCEDURE — 94640 AIRWAY INHALATION TREATMENT: CPT

## 2018-08-09 PROCEDURE — 94761 N-INVAS EAR/PLS OXIMETRY MLT: CPT

## 2018-08-09 PROCEDURE — 97112 NEUROMUSCULAR REEDUCATION: CPT

## 2018-08-09 PROCEDURE — 94668 MNPJ CHEST WALL SBSQ: CPT

## 2018-08-09 PROCEDURE — 99900035 HC TECH TIME PER 15 MIN (STAT)

## 2018-08-09 RX ORDER — MOXIFLOXACIN HYDROCHLORIDE 400 MG/1
400 TABLET ORAL DAILY
Start: 2018-08-09 | End: 2018-08-09

## 2018-08-09 RX ORDER — WARFARIN 7.5 MG/1
7.5 TABLET ORAL DAILY
Qty: 30 TABLET | Refills: 0
Start: 2018-08-09 | End: 2019-08-09

## 2018-08-09 RX ORDER — MOXIFLOXACIN HYDROCHLORIDE 400 MG/1
400 TABLET ORAL DAILY
Status: DISCONTINUED | OUTPATIENT
Start: 2018-08-09 | End: 2018-08-09 | Stop reason: HOSPADM

## 2018-08-09 RX ORDER — ENOXAPARIN SODIUM 100 MG/ML
80 INJECTION SUBCUTANEOUS
Status: DISCONTINUED | OUTPATIENT
Start: 2018-08-09 | End: 2018-08-09

## 2018-08-09 RX ORDER — METOPROLOL TARTRATE 50 MG/1
50 TABLET ORAL 2 TIMES DAILY
Qty: 60 TABLET | Refills: 0
Start: 2018-08-09 | End: 2019-08-09

## 2018-08-09 RX ORDER — MOXIFLOXACIN HYDROCHLORIDE 400 MG/1
400 TABLET ORAL DAILY
Start: 2018-08-09 | End: 2018-08-14

## 2018-08-09 RX ORDER — ENOXAPARIN SODIUM 100 MG/ML
40 INJECTION SUBCUTANEOUS EVERY 12 HOURS
Status: DISCONTINUED | OUTPATIENT
Start: 2018-08-09 | End: 2018-08-09

## 2018-08-09 RX ORDER — TAMSULOSIN HYDROCHLORIDE 0.4 MG/1
0.4 CAPSULE ORAL DAILY
Status: DISCONTINUED | OUTPATIENT
Start: 2018-08-09 | End: 2018-08-09 | Stop reason: HOSPADM

## 2018-08-09 RX ORDER — ENOXAPARIN SODIUM 100 MG/ML
80 INJECTION SUBCUTANEOUS EVERY 12 HOURS
Start: 2018-08-09 | End: 2018-08-09 | Stop reason: HOSPADM

## 2018-08-09 RX ORDER — TAMSULOSIN HYDROCHLORIDE 0.4 MG/1
0.4 CAPSULE ORAL DAILY
Qty: 30 CAPSULE | Refills: 0
Start: 2018-08-09 | End: 2019-08-09

## 2018-08-09 RX ADMIN — IPRATROPIUM BROMIDE AND ALBUTEROL SULFATE 3 ML: .5; 3 SOLUTION RESPIRATORY (INHALATION) at 12:08

## 2018-08-09 RX ADMIN — IPRATROPIUM BROMIDE AND ALBUTEROL SULFATE 3 ML: .5; 3 SOLUTION RESPIRATORY (INHALATION) at 08:08

## 2018-08-09 RX ADMIN — HYDRALAZINE HYDROCHLORIDE 100 MG: 50 TABLET ORAL at 05:08

## 2018-08-09 RX ADMIN — MODAFINIL 200 MG: 100 TABLET ORAL at 05:08

## 2018-08-09 RX ADMIN — MODAFINIL 100 MG: 100 TABLET ORAL at 12:08

## 2018-08-09 RX ADMIN — ATORVASTATIN CALCIUM 40 MG: 20 TABLET, FILM COATED ORAL at 12:08

## 2018-08-09 RX ADMIN — LOSARTAN POTASSIUM 100 MG: 50 TABLET, FILM COATED ORAL at 09:08

## 2018-08-09 RX ADMIN — AMIODARONE HYDROCHLORIDE 200 MG: 200 TABLET ORAL at 09:08

## 2018-08-09 RX ADMIN — IPRATROPIUM BROMIDE AND ALBUTEROL SULFATE 3 ML: .5; 3 SOLUTION RESPIRATORY (INHALATION) at 04:08

## 2018-08-09 RX ADMIN — SODIUM CHLORIDE 1.5 G: 9 INJECTION, SOLUTION INTRAVENOUS at 12:08

## 2018-08-09 RX ADMIN — SENNOSIDES AND DOCUSATE SODIUM 1 TABLET: 8.6; 5 TABLET ORAL at 09:08

## 2018-08-09 RX ADMIN — INSULIN ASPART 5 UNITS: 100 INJECTION, SOLUTION INTRAVENOUS; SUBCUTANEOUS at 09:08

## 2018-08-09 RX ADMIN — POLYETHYLENE GLYCOL 3350 17 G: 17 POWDER, FOR SOLUTION ORAL at 09:08

## 2018-08-09 RX ADMIN — MOXIFLOXACIN HYDROCHLORIDE 400 MG: 400 TABLET, FILM COATED ORAL at 12:08

## 2018-08-09 RX ADMIN — HEPARIN SODIUM 5000 UNITS: 5000 INJECTION, SOLUTION INTRAVENOUS; SUBCUTANEOUS at 05:08

## 2018-08-09 RX ADMIN — AMLODIPINE BESYLATE 10 MG: 10 TABLET ORAL at 09:08

## 2018-08-09 RX ADMIN — METOPROLOL TARTRATE 50 MG: 50 TABLET ORAL at 09:08

## 2018-08-09 RX ADMIN — INSULIN ASPART 5 UNITS: 100 INJECTION, SOLUTION INTRAVENOUS; SUBCUTANEOUS at 12:08

## 2018-08-09 RX ADMIN — SODIUM CHLORIDE 1.5 G: 9 INJECTION, SOLUTION INTRAVENOUS at 06:08

## 2018-08-09 RX ADMIN — INSULIN ASPART 1 UNITS: 100 INJECTION, SOLUTION INTRAVENOUS; SUBCUTANEOUS at 02:08

## 2018-08-09 NOTE — PROGRESS NOTES
[facility]  Vascular Neurology  Comprehensive Stroke Center  Progress Note    Assessment/Plan:     * 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    R thalamic IPH w IVH on 7/9/2018. S/P EVD, Removed 7/27/18. Etiology likely hypertensive. Pending SNF placement.   Patient has atrial fibrillation, previously anticoagulated. Held upon admission due to ICH. Resumed anticoagulation 8/6/18 with coumadin.     8/8/18: Fever has held up discharge. CXR with bilateral lower lobe edema; treating for aspiration pneumonia at this time as no other clear source of infection is apparent. Vitals now WNL.  Plan for discharge to SNF     Antithrombotics for secondary stroke prevention:  Anticoagulants: coumadin 7.5mg qd   Statins for secondary stroke prevention and hyperlipidemia, if present: Statins: Atorvastatin- 40 mg daily  Aggressive risk factor modification: HTN, DM, HLD, Diet, Exercise, A-Fib  Rehab efforts: Occupational Therapy, PT/OT/SLP to evaluate and treat, PM&R consult.  Plan to go to SNF   Diagnostics ordered/pending: None   VTE prophylaxis: coumadin 7.5mg qd   BP parameters: ICH: SBP <140          Aspiration pneumonia    Seen on CXR  On unasyn in hospital and discharged with avelox for 5 more days         Urinary retention    Did not pass voiding trial  On flomax  Follow up with urology in 2 weeks             Vasogenic cerebral edema    Area of vasogenic cerebral edema identified when reviewing brain imaging in the R [place]. There is mild mass effect associated with it. We will continue to monitor the patients clinical exam for any worsening of symptoms which may indicate expansion of the hemorrhage or the area of the edema resulting in the clinical change. The ICH is likely 2/2 HTN.          Normocytic anemia    - Hgb downtrended from 07/30-7/31 and again 8/5/18. Stable now around 9   - Unclear source, patient on SQ Heparin and coumadin resumed 8/6  - No melenic stools per  nursing staff  - no apparent bleeding  - Will continue to monitor         Obstructive hydrocephalus    - S/P EVD, removed on 07/27  - CTH on 07/29 w/ stable ventricular size  - exam stable        Dysphagia    - passed MBSS  - currently on puree diet with thin liquids  - calorie counts ordered and nutrition consulted        ICH (intracerebral hemorrhage)              Dementia without behavioral disturbance    Needs to follow up with PCP         Cardiomyopathy    - Latest Echo with Low normal left ventricular systolic function.   - Continue BB and ARB        Persistent atrial fibrillation    - Stroke risk factor  - Held anticoagulation due to ICH initially; resume coumadin 8/6/18.   - Continue amiodarone and metoprolol         Dyslipidemia    Stroke risk factor, well-controlled, most recent LDL 47  Continue Atorvastatin 40mg QHS        Essential hypertension    SBP goal <140   Continue amlodipine 10mg QD, metoprolol increased 8/8 to 50mg BID, hydralazine 100 q8h, and losartan 100 qd  Labetalol PRN        Typical atrial flutter    Risk factor for stroke  On coumadin         Type 2 diabetes mellitus with diabetic polyneuropathy, with long-term current use of insulin    Stroke risk factor. A1C 7.2  on Detemir 14 units q12 + SSI 5U TID WM  Fasting blood sugar 126 8/8/18 7/21: EVD at bedside by NSGY  7/23 EVD in place lowered to 5cm h2o per nsgy.. Blood sugars running >200 adjusted detemir. Increased hydralazine for better BP control  7/24: EVD at 5, insulin adjustments   7/25: EVD clamped per Nsgy, [facility] tonight  7/26: Held tube feeds, insulin adjustments, Nsgy contacted about MSC and EVD.   7/27: Evd pulled per Nsgy, Ngt replace, transfer to stroke service  07/28/2018  Pending bed availability, largely stable neuro exam. SNF placement on discharge   07/29/2018 More somnolent and less responsive this AM. CTH stable but patient w/ fevers now.   07/30/2018 Patient a lot more responsive this AM. Animated. TF held  and re-started overnight. Ceftriaxone started for UTI.   07/31/2018 Neurologically unchanged. Hgb continues to trend down.  No over signs of bleeding per nursing staff.  08/01/2018 Hgb stable today. No blood in stools. Continues to be fatigued but per nursing, patient's mental status greatly waxes and wanes. At his best, he is able to work with PT/OT and mumble but understands speech.   08/02/2018 H&H stable, attempting to repeat MBSS today pending patient's level of alertness  08/03/2018 passed MBSS yesterday and started on a puree diet with thin liquids, calorie counts ordered, increased hydralazine  8/6/18: No acute events overnight. Made changes to insulin due to hyperglycemia. Episode of chest pain reported by nurse <5 minutes, vital signs stable, troponin negative and ECG stable.   8/7/18: No acute events overnight. Low grade temperature overnight, unclear source. UA negative. Accepted to SNF but will hold discharge to monitor patient's VS overnight.   8/8/18: Started unasyn last night for suspected aspiration pneumonia. Added incentive spirometry to duonebs and chest PT. 1 reading of fever overnight 100.5. INR remains 1.0. Patient complains of neck pain today. Plan of care discussed with patient's daughter and granddaughters at bedside.   08/09/18  Patient fevers resolved.  On unasyn for PNA.  Switching to avelox at discharge.  Continues to have urinary retention.  On tamulosin and urinary catheter in place.  Patient will follow up with urology in 2 weeks.  Coumadin INR 1.0 increasing dosage from 5-7.5 and will be monitored at SNF.      STROKE DOCUMENTATION        NIH Scale:  1a. Level Of Consciousness: 1-->Not alert: but arousable by minor stimulation to obey, answer, or respond  1b. LOC Questions: 2-->Answers neither question correctly  1c. LOC Commands: 0-->Performs both tasks correctly  2. Best Gaze: 1-->Partial gaze palsy: gaze is abnormal in one or both eyes, but forced deviation or total gaze paresis  is not present  3. Visual: 0-->No visual loss  4. Facial Palsy: 1-->Minor paralysis (flattened nasolabial fold, asymmetry on smiling)  5a. Motor Arm, Left: 2-->Some effort against gravity: limb cannot get to or maintain (if cued) 90 (or 45) degrees, drifts down to bed, but has some effort against gravity  5b. Motor Arm, Right: 2-->Some effort against gravity: limb cannot get to or maintain (if cued) 90 (or 45) degrees, drifts down to bed, but has some effort against gravity  6a. Motor Leg, Left: 4-->No movement  6b. Motor Leg, Right: 3-->No effort against gravity: leg falls to bed immediately  7. Limb Ataxia: 0-->Absent  8. Sensory: 1-->Mild-to-moderate sensory loss: patient feels pinprick is less sharp or is dull on the affected side: or there is a loss of superficial pain with pinprick, but patient is aware of being touched  9. Best Language: 2-->Severe aphasia: all communication is through fragmentary expression: great need for inference, questioning, and guessing by the listener. Range of information that can be exchanged is limited: listener carries burden of. . . (see row details)  10. Dysarthria: 1-->Mild-to-moderate dysarthria: patient slurs at least some words and, at worst, can be understood with some difficulty  11. Extinction and Inattention (formerly Neglect): 0-->No abnormality  Total (NIH Stroke Scale): 20       Modified Grand    Lucian Coma Scale:    ABCD2 Score:    [id number] Score:6  HAS -BLED Score:2  ICH Score:3  Hunt & Fox Classification:      Hemorrhagic change of an Ischemic Stroke: Does this patient have an ischemic stroke with hemorrhagic changes? No     Neurologic Chief Complaint: ICH    Subjective:     Interval History: Patient is seen for follow-up neurological assessment and treatment recommendations:    Started unasyn last night for suspected aspiration pneumonia. Added incentive spirometry to duonebs and chest PT. 1 reading of fever overnight 100.5. INR remains 1.0. Patient  complains of neck pain today. Plan of care discussed with patient's daughter and granddaughters at bedside.     HPI, Past Medical, Family, and Social History remains the same as documented in the initial encounter.     Review of Systems   Constitutional: Positive for fever (100.5). Negative for chills.   HENT: Positive for trouble swallowing.    Respiratory: Negative for shortness of breath.    Cardiovascular: Negative for chest pain.   Gastrointestinal: Negative for vomiting.   Genitourinary: Negative for frequency and urgency.        Villeda in place   Neurological: Positive for facial asymmetry, speech difficulty and weakness.   Psychiatric/Behavioral: Positive for confusion. Negative for agitation.     Scheduled Meds:   albuterol-ipratropium  3 mL Nebulization Q4H    amiodarone  200 mg Oral Daily    amLODIPine  10 mg Oral Daily    atorvastatin  40 mg Oral Daily    hydrALAZINE  100 mg Oral Q8H    insulin aspart U-100  5 Units Subcutaneous TIDWM    insulin detemir U-100  14 Units Subcutaneous BID    losartan  100 mg Oral Daily    metoprolol tartrate  50 mg Oral BID    modafinil  100 mg Oral After lunch    modafinil  200 mg Oral Daily    moxifloxacin  400 mg Oral Daily    polyethylene glycol  17 g Oral Daily    senna-docusate 8.6-50 mg  1 tablet Oral BID    tamsulosin  0.4 mg Oral Daily    warfarin  7.5 mg Oral Daily     Continuous Infusions:  PRN Meds:acetaminophen, dextrose 50%, dextrose 50%, glucagon (human recombinant), insulin aspart U-100, labetalol, sodium chloride 0.9%    Objective:     Vital Signs (Most Recent):  Temp: 99.3 °F (37.4 °C) (08/09/18 1222)  Pulse: 75 (08/09/18 1222)  Resp: 18 (08/09/18 1222)  BP: (!) 150/73 (08/09/18 1222)  SpO2: (!) 93 % (08/09/18 1222)  BP Location: Right arm    Vital Signs Range (Last 24H):  Temp:  [99.2 °F (37.3 °C)-99.9 °F (37.7 °C)]   Pulse:  [65-98]   Resp:  [16-24]   BP: (142-159)/(70-81)   SpO2:  [90 %-96 %]   BP Location: Right arm    Physical Exam    Constitutional: He appears well-developed and well-nourished. He appears lethargic. He appears ill. No distress. Nasal cannula in place.   HENT:   Head: Normocephalic.   Eyes: Pupils are equal, round, and reactive to light.   Neck: Normal range of motion.   Cardiovascular: Normal rate.    Pulmonary/Chest: Effort normal. No respiratory distress.   Abdominal: Soft.   Neurological: He appears lethargic. He displays no seizure activity.   Skin: Skin is warm and dry. He is not diaphoretic.   Psychiatric: He is withdrawn. He is inattentive.   Vitals reviewed.    Neurological Exam:   exam appears effort dependent, patient seems unwilling to participate  LOC: drowsy  Attention Span: poor  Language: aphasia  Articulation: Dysarthria  Orientation: not oriented to person, place, or time  Visual Fields: L hemianopsia  EOM (CN III, IV, VI): R gaze preference  Pupils (CN II, III): PERRL  Facial Sensation (CN V): Normal  Facial Movement (CN VII): Lower facial weakness on the Left  Strength: No effort against gravity bilateral lower extremities. Some effort against gravity BUE   Tone: normal     Laboratory:  BMP:     Recent Labs  Lab 08/09/18  0418      K 3.8      CO2 25   BUN 13   CREATININE 0.7   CALCIUM 8.7     CBC:     Recent Labs  Lab 08/09/18  0418   WBC 9.18   RBC 3.00*   HGB 8.6*   HCT 26.8*      MCV 89   MCH 28.7   MCHC 32.1       Diagnostic Results     Brain imaging:  [facility] 7/25/18  Unchanged position of right frontal coursing ventriculostomy catheter with unchanged size and configuration of prominent ventricular system.    Stable right thalamic intraparenchymal hemorrhage with associated interventricular hemorrhage layering in the occipital horns of the lateral ventricles.    Chronic microvascular ischemic changes above with associated remote infarcts.     CT 7/21/18  1. Interval placement of right frontal coursing ventriculostomy with unchanged size and configuration of the shunted ventricular  system, which remains prominent.  2. Evolution of right thalamic intraparenchymal hemorrhage with stable mass effect.  3. Chronic microvascular ischemic change and remote infarcts as above.       CTH: 1. Redemonstration of evolving right thalamic hemorrhage with mild adjacent edema and slight localized mass effect.  Subtle, subcentimeter focus of hyperdensity within the left thalamus, concerning for additional focus of evolving hemorrhage.  2. Persistent interventricular extension of hemorrhage with unchanged dilatation of the ventricular system concerning for component of hydrocephalus.  3. Generalized cerebral volume loss, chronic microvascular ischemic change and remote infarcts as above.     CTH ( 07/29/2018)  Evolving right thalamic intraparenchymal hematoma with intraventricular extension, as above, without detrimental change from 07/29/2018 CT at 12:24 p.m. No new infarct or hemorrhage.    Persistent diffuse enlargement of the ventricular system, unchanged.  Small focus of pneumocephalus in the right frontal horn persists.  Vessel Imaging:  None     Cardiac Evaluation:   ECHO- CONCLUSIONS     1 - Low normal left ventricular systolic function.     2 - Indeterminate LV diastolic function.     3 - Normal right ventricular systolic function .     4 - There is anterior pericardial fat pad..          Lola Baron PA-C  Comprehensive Stroke Center  Department of Vascular Neurology   [facility]

## 2018-08-09 NOTE — PLAN OF CARE
Problem: Occupational Therapy Goal  Goal: Occupational Therapy Goal  Goals to be met by: 8/12/18     Patient will increase functional independence with ADLs by performing:    UE Dressing with Moderate Assistance.  LE Dressing with Max Assistance.  Grooming while seated with Min Assistance.  Toileting from bedside commode with Moderate Assistance for hygiene and clothing management.   Rolling to Bilateral with Moderate Assistance.   Supine to sit with Moderate Assistance.  Stand pivot transfers with Moderate Assistance.       Outcome: Ongoing (interventions implemented as appropriate)  Goals remain appropriate

## 2018-08-09 NOTE — ASSESSMENT & PLAN NOTE
- Stroke risk factor  - Held anticoagulation due to ICH initially; resume coumadin 8/6/18.   - Continue amiodarone and metoprolol

## 2018-08-09 NOTE — PLAN OF CARE
08/09/18 1314   Final Note   Assessment Type Final Discharge Note   Discharge Disposition SNF     Patient is discharged to [facility] today. Sw to arrange transportation to the facility. Cm spoke with the patient's daughter at bedside and the patient's wife via phone regarding discharge today. Both were in agreement with discharge today.

## 2018-08-09 NOTE — PLAN OF CARE
Problem: Physical Therapy Goal  Goal: Physical Therapy Goal  Goals to be met by: 8/17/18    1. Pt will perform rolling to the R and L with moderate assistance.   2. Pt will perform supine to sit from both sides of the bed with max assistance.  3. Pt will perform sit to supine with max assistance.  4. Pt will sit EOB x 5 minutes with min assistance to prepare for functional tasks in sitting.   5. Pt will perform sit to stand transfers with moderate assistance.    6. Pt will perform bed <> chair transfers with max assistance.  7. Pt will perform gait x 10 feet with max assistance  8. Family will be independent with ROM using handout.        Outcome: Ongoing (interventions implemented as appropriate)  Patient participated in therapy.  POC and goals remain appropriate.  Please refer to progress note for functional mobility.     EOB with therapy only.     Suzanna Rey, PT  8/9/2018  [phone] (pager)

## 2018-08-09 NOTE — ASSESSMENT & PLAN NOTE
R thalamic IPH w IVH on 7/9/2018. S/P EVD, Removed 7/27/18. Etiology likely hypertensive. Pending SNF placement.   Patient has atrial fibrillation, previously anticoagulated. Held upon admission due to ICH. Resumed anticoagulation 8/6/18 with coumadin.     8/8/18: Fever has held up discharge. CXR with bilateral lower lobe edema; treating for aspiration pneumonia at this time as no other clear source of infection is apparent. Vitals now WNL.  Plan for discharge to SNF     Antithrombotics for secondary stroke prevention:  Anticoagulants: coumadin 7.5mg qd   Statins for secondary stroke prevention and hyperlipidemia, if present: Statins: Atorvastatin- 40 mg daily  Aggressive risk factor modification: HTN, DM, HLD, Diet, Exercise, A-Fib  Rehab efforts: Occupational Therapy, PT/OT/SLP to evaluate and treat, PM&R consult.  Plan to go to SNF   Diagnostics ordered/pending: None   VTE prophylaxis: coumadin 7.5mg qd   BP parameters: ICH: SBP <140

## 2018-08-09 NOTE — NURSING
Pt has not voided since land removal- bladder scan showed 449ml. Will straight cath per orders. See flowsheet.

## 2018-08-09 NOTE — NURSING
Vilelda Cath , 16 [place] catheter placed on patient as ordered , 10 cc of water instilled in balloon.  Urine output of 150 cc, clear yellow urine. IV discontinued. Left the unit at 14:15 pm.

## 2018-08-09 NOTE — PLAN OF CARE
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 [I61.0]  Yes    Urinary retention [R33.9]  Unknown    Popliteal DVT (deep venous thrombosis) [I82.439]  Unknown    Aspiration pneumonia [J69.0]  Unknown    Vasogenic cerebral edema [G93.6]  Yes    Normocytic anemia [D64.9]  Unknown    Obstructive hydrocephalus [G91.1]  Yes     EVD by NSGY this morning      Brain compression [G93.5]  Yes    Encephalopathy acute [G93.40]  Yes    Dysphagia [R13.10]  Yes    ICH (intracerebral hemorrhage) [I61.9]  Yes    Dementia without behavioral disturbance [F03.90]  Yes    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 [I61.3]  Yes    Cardiomyopathy [I42.9]  Yes    Persistent atrial fibrillation [I48.1]  Yes    Essential hypertension [I10]  Yes    Dyslipidemia [E78.5]  Yes     statin      Typical atrial flutter [I48.3]  Yes     Last documented episode April 2014      Type 2 diabetes mellitus with diabetic polyneuropathy, with long-term current use of insulin [E11.42, Z79.4]  Not Applicable      Resolved Hospital Problems    Diagnosis Date Resolved POA    UTI (urinary tract infection) [N39.0] 08/05/2018 Unknown    Long term (current) use of anticoagulants [Z79.01] 07/31/2018 Not Applicable       Patient is homebound due to:  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    Allergies:Review of patient's allergies indicates:  No Known Allergies    Vitals:      Routine, once monthly     Once weekly     Every shift (Skilled Nursing patients)    Diet:  Pureed diabetic diet with thin liquids             If po intake<50% add  boost glucose control with meals     Acitivities:  As tolerated     LABS:    PT/INR daily until therapeutic 2.0-3.0  Then when therapeutic twice a week for 2 weeks  Once a week for 2 weeks  Then every other week for 1 month  Then monthly      Nursing Precautions:     - Aspiration precautions:             - Total assistance with meals            -  Upright 90 degrees befor during and after meals             -  Suction at bedside          - Fall precautions per nursing home protocol   - Seizure precaution per penitentiary protocol   - Decubitus precautions:        -  for positioning   - Pressure reducing foam mattress   - Turn patient every two hours. Use wedge pillows to anchor patient    CONSULTS:      Physical Therapy to evaluate and treat     Occupational Therapy to evaluate and treat     Speech Therapy  to evaluate and treat     Nutrition to evaluate and recommend diet     Psychiatry to evaluate and follow patients for delirium    MISCELLANEOUS CARE:      Villeda Care: Empty Villeda bag every shift.  Change Villeda every month     Routine Skin for Bedridden Patients:  Apply moisture barrier cream to all    skin folds and wet areas in perineal area daily and after baths and                           all bowel movements.                     DIABETES CARE:       Check blood sugar:      Fingerstick blood sugar a.m and p.m.    Fingerstick blood sugar AC and HS   Fingerstick blood sugar every 6 hours if unable to eat      Report CBG < 60 or > 400 to physician.                                          Insulin Sliding Scale          Glucose  Novolog Insulin Subcutaneous        0 - 60   Orange juice or glucose tablet, hold insulin      No insulin   201-250  2 units   251-300  4 units   301-350  6 units   351-400  8 units   >400   10 units then call physician      Medications: Discontinue all previous medication orders, if any. See new list below.     Ciro Chandler   Home Medication Instructions KAM:[id number]     "Printed on:08/09/18 7815   Medication Information                      albuterol-ipratropium (DUO-NEB) 2.5 mg-0.5 mg/3 mL nebulizer solution  Take 3 mLs by nebulization every 4 (four) hours. Rescue             amiodarone (PACERONE) 200 MG Tab  Take 2 pills twice daily for 2 weeks then take 1 pill daily thereafter             amLODIPine (NORVASC) 10 MG tablet  Take 1 tablet (10 mg total) by mouth once daily.             atorvastatin (LIPITOR) 80 MG tablet  Take 0.5 tablets (40 mg total) by mouth once daily.             blood sugar diagnostic (ACCU-CHEK SMARTVIEW TEST STRIP) Strp  1 each by Misc.(Non-Drug; Combo Route) route 4 (four) times daily.             blood-glucose meter (ACCU-CHEK MALLORY) Misc  1 each by Misc.(Non-Drug; Combo Route) route once daily.                        hydrALAZINE (APRESOLINE) 100 MG tablet  Take 1 tablet (100 mg total) by mouth every 8 (eight) hours.             insulin aspart U-100 (NOVOLOG) 100 unit/mL InPn pen  Inject 5 Units into the skin 3 (three) times daily.             insulin detemir U-100 (LEVEMIR FLEXTOUCH) 100 unit/mL (3 mL) SubQ InPn pen  Inject 14 Units into the skin 2 (two) times daily.             lancets (ACCU-CHEK FASTCLIX) Misc  1 each by Misc.(Non-Drug; Combo Route) route 4 (four) times daily.             losartan (COZAAR) 100 MG tablet  Take 1 tablet (100 mg total) by mouth once daily.                          metoprolol tartrate (LOPRESSOR) 50 MG tablet  Take 1 tablet (50 mg total) by mouth 2 (two) times daily.             modafinil (PROVIGIL) 200 MG Tab  Take 1 tablet (200 mg total) by mouth once daily.             moxifloxacin (AVELOX) 400 mg tablet  Take 1 tablet (400 mg total) by mouth once daily for 5 days.    Last dose 8/13           pen needle, diabetic (BD ULTRA-FINE MALLORY PEN NEEDLES) 32 gauge x 5/32" Ndle  Use with lantus pen             polyethylene glycol (GLYCOLAX) 17 gram PwPk  Take 17 g by mouth once daily.             senna-docusate 8.6-50 mg (SENNA WITH " DOCUSATE SODIUM) 8.6-50 mg per tablet  Take 1 tablet by mouth once daily.             tamsulosin (FLOMAX) 0.4 mg Cap  Take 1 capsule (0.4 mg total) by mouth once daily.             warfarin (COUMADIN) 7.5 MG tablet  Take 1 tablet (7.5 mg total) by mouth Daily.                       _________________________________  Lola Baron PA-C  08/09/2018

## 2018-08-09 NOTE — PLAN OF CARE
FOZIA spoke with Mary with [facility] to confirm that SNF orders were received. Mary states that she received the orders and RN can call report to [phone] to Wing [address].     FOZIA arranged transport with [facility] for pickup at 2:00 PM.     ARA wisdom.     Judy Brooks, VICKEY  [facility]  Ext. [phone]

## 2018-08-09 NOTE — PLAN OF CARE
Problem: Patient Care Overview  Goal: Plan of Care Review  Outcome: Ongoing (interventions implemented as appropriate)  NAEON. Pt did not void 6 hrs post land removal. Bladder scan and straight cath as needed per order q6hr- see flowsheet. Wife at bedside. VSS. Fall precautions in place. Will continue to monitor.

## 2018-08-09 NOTE — SUBJECTIVE & OBJECTIVE
Neurologic Chief Complaint: ICH    Subjective:     Interval History: Patient is seen for follow-up neurological assessment and treatment recommendations:    Started unasyn last night for suspected aspiration pneumonia. Added incentive spirometry to duonebs and chest PT. 1 reading of fever overnight 100.5. INR remains 1.0. Patient complains of neck pain today. Plan of care discussed with patient's daughter and granddaughters at bedside.     HPI, Past Medical, Family, and Social History remains the same as documented in the initial encounter.     Review of Systems   Constitutional: Positive for fever (100.5). Negative for chills.   HENT: Positive for trouble swallowing.    Respiratory: Negative for shortness of breath.    Cardiovascular: Negative for chest pain.   Gastrointestinal: Negative for vomiting.   Genitourinary: Negative for frequency and urgency.        Villeda in place   Neurological: Positive for facial asymmetry, speech difficulty and weakness.   Psychiatric/Behavioral: Positive for confusion. Negative for agitation.     Scheduled Meds:   albuterol-ipratropium  3 mL Nebulization Q4H    amiodarone  200 mg Oral Daily    amLODIPine  10 mg Oral Daily    atorvastatin  40 mg Oral Daily    hydrALAZINE  100 mg Oral Q8H    insulin aspart U-100  5 Units Subcutaneous TIDWM    insulin detemir U-100  14 Units Subcutaneous BID    losartan  100 mg Oral Daily    metoprolol tartrate  50 mg Oral BID    modafinil  100 mg Oral After lunch    modafinil  200 mg Oral Daily    moxifloxacin  400 mg Oral Daily    polyethylene glycol  17 g Oral Daily    senna-docusate 8.6-50 mg  1 tablet Oral BID    tamsulosin  0.4 mg Oral Daily    warfarin  7.5 mg Oral Daily     Continuous Infusions:  PRN Meds:acetaminophen, dextrose 50%, dextrose 50%, glucagon (human recombinant), insulin aspart U-100, labetalol, sodium chloride 0.9%    Objective:     Vital Signs (Most Recent):  Temp: 99.3 °F (37.4 °C) (08/09/18 1222)  Pulse: 75  (08/09/18 1222)  Resp: 18 (08/09/18 1222)  BP: (!) 150/73 (08/09/18 1222)  SpO2: (!) 93 % (08/09/18 1222)  BP Location: Right arm    Vital Signs Range (Last 24H):  Temp:  [99.2 °F (37.3 °C)-99.9 °F (37.7 °C)]   Pulse:  [65-98]   Resp:  [16-24]   BP: (142-159)/(70-81)   SpO2:  [90 %-96 %]   BP Location: Right arm    Physical Exam   Constitutional: He appears well-developed and well-nourished. He appears lethargic. He appears ill. No distress. Nasal cannula in place.   HENT:   Head: Normocephalic.   Eyes: Pupils are equal, round, and reactive to light.   Neck: Normal range of motion.   Cardiovascular: Normal rate.    Pulmonary/Chest: Effort normal. No respiratory distress.   Abdominal: Soft.   Neurological: He appears lethargic. He displays no seizure activity.   Skin: Skin is warm and dry. He is not diaphoretic.   Psychiatric: He is withdrawn. He is inattentive.   Vitals reviewed.    Neurological Exam:   exam appears effort dependent, patient seems unwilling to participate  LOC: drowsy  Attention Span: poor  Language: aphasia  Articulation: Dysarthria  Orientation: not oriented to person, place, or time  Visual Fields: L hemianopsia  EOM (CN III, IV, VI): R gaze preference  Pupils (CN II, III): PERRL  Facial Sensation (CN V): Normal  Facial Movement (CN VII): Lower facial weakness on the Left  Strength: No effort against gravity bilateral lower extremities. Some effort against gravity BUE   Tone: normal     Laboratory:  BMP:     Recent Labs  Lab 08/09/18  0418      K 3.8      CO2 25   BUN 13   CREATININE 0.7   CALCIUM 8.7     CBC:     Recent Labs  Lab 08/09/18  0418   WBC 9.18   RBC 3.00*   HGB 8.6*   HCT 26.8*      MCV 89   MCH 28.7   MCHC 32.1       Diagnostic Results     Brain imaging:  [facility] 7/25/18  Unchanged position of right frontal coursing ventriculostomy catheter with unchanged size and configuration of prominent ventricular system.    Stable right thalamic intraparenchymal hemorrhage with  associated interventricular hemorrhage layering in the occipital horns of the lateral ventricles.    Chronic microvascular ischemic changes above with associated remote infarcts.     [facility] 7/21/18  1. Interval placement of right frontal coursing ventriculostomy with unchanged size and configuration of the shunted ventricular system, which remains prominent.  2. Evolution of right thalamic intraparenchymal hemorrhage with stable mass effect.  3. Chronic microvascular ischemic change and remote infarcts as above.       [facility]: 1. Redemonstration of evolving right thalamic hemorrhage with mild adjacent edema and slight localized mass effect.  Subtle, subcentimeter focus of hyperdensity within the left thalamus, concerning for additional focus of evolving hemorrhage.  2. Persistent interventricular extension of hemorrhage with unchanged dilatation of the ventricular system concerning for component of hydrocephalus.  3. Generalized cerebral volume loss, chronic microvascular ischemic change and remote infarcts as above.     [facility] ( 07/29/2018)  Evolving right thalamic intraparenchymal hematoma with intraventricular extension, as above, without detrimental change from 07/29/2018 CT at 12:24 p.m. No new infarct or hemorrhage.    Persistent diffuse enlargement of the ventricular system, unchanged.  Small focus of pneumocephalus in the right frontal horn persists.  Vessel Imaging:  None     Cardiac Evaluation:   ECHO- CONCLUSIONS     1 - Low normal left ventricular systolic function.     2 - Indeterminate LV diastolic function.     3 - Normal right ventricular systolic function .     4 - There is anterior pericardial fat pad..

## 2018-08-09 NOTE — PT/OT/SLP PROGRESS
"Physical Therapy Treatment  Discharge Summary     Patient Name:  Ciro Chandler   MRN:  [id number]    Recommendations:     Discharge Recommendations:  nursing facility, skilled   Discharge Equipment Recommendations: hospital bed, lift device   Barriers to discharge: Decreased caregiver support    Plan:     During this hospitalization, patient to be seen 4 x/week to address the above listed problems via therapeutic activities, therapeutic exercises, neuromuscular re-education, wheelchair management/training  · Plan of Care Expires:  08/20/18   Plan of Care Reviewed with: spouse, daughter    Subjective     Communicated with RN prior to session.  Patient found supine upon PT entry to room, agreeable to treatment.      Chief Complaint: none stated  Patient comments/goals: "I can't get up"  Pain/Comfort:  · Pain Rating 1: 0/10  · Pain Rating Post-Intervention 1: 0/10        Objective:     Patient found with: telemetry, oxygen     General Precautions: Standard, aspiration, fall     Patient was found supine in bed with dtr and wife at bedside. He agreed to therapy.  Pt with intermittent verbalizations appropriate to conversation, but inconsistent.  Non verbal most of the session.  He performed sitting EOB with therapy to improve active postural control.  Supine ROM. See below for details.     Functional Mobility:  Bed Mobility:   · Rolling Right: max to total assistance   · Rolling Left: NT  · Supine to Sit: max to total assistance with patient providing <25% help  · Sit to Supine: max to total assistance with patient providing < 25% help     Sitting Balance at Edge of Bed:  · Assistance Level Required: moderate assistance for static sitting balance with patient positioned at balancing point  · Total assistance for dynamic sitting balance and to regain neutral after LOB  · Time: 20 minutes  · Postural deviations noted: very minimal postural reactions to maintain neutral, to counteract perturbations and to regain neutral after " LOB  · Only postural reaction in response to perturbations or LOB is UE extension. However the patient is not using his UEs in functional ways or keeping them in contact with support surface in order to effectively right himself.   · Multiple trials of LOB with verbal cues, tactile cues and hand over hand assist to return to neutral; total assistance    Therapeutic Activities, Exercises, or Education:  PROM BLEs 10x/ea in all available joints; no active assistance from patient  Verbal and tactile cues for L head turn and L visual scanning to target  Assisted Cervical ROM 5x ea direction f/b 5x AROM     Functional Outcome Measures:  AM-PAC 6 CLICK MOBILITY  Turning over in bed (including adjusting bedclothes, sheets and blankets)?: 2  Sitting down on and standing up from a chair with arms (e.g., wheelchair, bedside commode, etc.): 1  Moving from lying on back to sitting on the side of the bed?: 2  Moving to and from a bed to a chair (including a wheelchair)?: 1  Need to walk in hospital room?: 1  Climbing 3-5 steps with a railing?: 1  Basic Mobility Total Score: 8     Patient left supine with all lines intact, call bell in reach, bed alarm on, RN notified, and family present.     GOALS:    Physical Therapy Goals        Problem: Physical Therapy Goal    Goal Priority Disciplines Outcome Goal Variances Interventions   Physical Therapy Goal     PT/OT, PT Ongoing (interventions implemented as appropriate)     Description:  Goals to be met by: 8/17/18    1. Pt will perform rolling to the R and L with moderate assistance.   2. Pt will perform supine to sit from both sides of the bed with max assistance.  3. Pt will perform sit to supine with max assistance.  4. Pt will sit EOB x 5 minutes with min assistance to prepare for functional tasks in sitting.   5. Pt will perform sit to stand transfers with moderate assistance.    6. Pt will perform bed <> chair transfers with max assistance.  7. Pt will perform gait x 10 feet  with max assistance  8. Family will be independent with ROM using handout.                       Assessment:     Ciro Chandler is a 68 y.o. 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  Patient participated in therapy.  He was awake and alert throughout therapy, verbalizing at times but not consistent.  He is extremely limited with functional activities d/t poor initiation vs poor motor planning. He has the abdominal and UE strength to maintain his balance in sitting, but has to postural reactions in response to LOB and very little activation of postural muscles in static sitting.  He has the potential to progress with mobility with additional therapy, but is expected to require additional time to make progress.  He is anticipating d/c to SNF today to continue with therapy.     He presents with the following impairments/functional limitations:  weakness, impaired functional mobilty, impaired cognition, decreased safety awareness, impaired coordination, impaired cardiopulmonary response to activity, impaired endurance, gait instability, impaired sensation, impaired balance, decreased upper extremity function, decreased lower extremity function, visual deficits, impaired self care skills.    Rehab Prognosis:  Limited to fair; patient would benefit from acute skilled PT services to address these deficits and reach maximum level of function.      Recent Surgery: * No surgery found *        Time Tracking:     PT Received On: 08/09/18  PT Start Time: 1055     PT Stop Time: 1136  PT Total Time (min): 41 min     Billable Minutes: Therapeutic Exercise 10 and Neuromuscular Re-education 30    Treatment Type: Treatment  PT/PTA: PT     PTA Visit Number: 0     Suzanna Rey, PT  8/9/2018  [phone] (pager)

## 2018-08-09 NOTE — DISCHARGE SUMMARY
[facility]  Vascular Neurology  Comprehensive Stroke Center  Discharge Summary     Summary:     Admit Date: 7/17/2018  8:56 PM    Discharge Date and Time:  08/09/2018 5:10 PM    Attending Physician: Aniceto Lynch MD     Discharge Provider: Lola Baron PA-C    History of Present Illness: 69 yo M with PMHx of HTN, HLD, DM, dementia, A. Fib, on coumadin as outpatient who was [place] [facility] [place] [facility] where he was reportedly admitted for R thalamic IPH w IVH on 7/9/2018. Per OSH records, patient admitted to ICU and sent to floor with little follow up imaging and no interval improvement in exam. Wife requested second opinion thus was transferred [place] [facility]. Admitted to [facility] for higher level of care on 7/18. PAtient with slight clinical improvement as he is opening his eyes at times. Wife states that he will speak a few words to her here and there this morning. Patient non verbal with me and not following commands.     Hospital Course : Patient admitted for hypertensive R thalamic intracerebral hemorrhage with interventricular extension.  Neurosurgery consulted and placed EVD.  Hydrocephalus improving and EVD removed.  He will be on norvasc 10mg qd, hydralazine 100mg TID, losartan 100mg qd for hypertension.  Hemorrhage stable and restarted on coumadin, amiodarone and metoprolol for atrial fibrillation/flutter.  For ischemic stroke prevention he will continue home medication atorvastatin 40mg qhs.  PT, OT, ST evaluated patient and recommended skilled nursing facility and a pureed diet with thin liquids.  Patient should follow up in stroke clinic with 4-6 weeks.     Hospital course complicated by aspiration pneumonia.  Started on unasyn and switched to avelox at discharge.  He experienced urinary retention, land catheter was placed and started on flomax.  He will follow up outpatient urology in 2 weeks. Started modafinil for lethargy.      STROKE DOCUMENTATION         NIH  Scale:  1a. Level Of Consciousness: 1-->Not alert: but arousable by minor stimulation to obey, answer, or respond  1b. LOC Questions: 2-->Answers neither question correctly  1c. LOC Commands: 0-->Performs both tasks correctly  2. Best Gaze: 1-->Partial gaze palsy: gaze is abnormal in one or both eyes, but forced deviation or total gaze paresis is not present  3. Visual: 0-->No visual loss  4. Facial Palsy: 1-->Minor paralysis (flattened nasolabial fold, asymmetry on smiling)  5a. Motor Arm, Left: 2-->Some effort against gravity: limb cannot get to or maintain (if cued) 90 (or 45) degrees, drifts down to bed, but has some effort against gravity  5b. Motor Arm, Right: 2-->Some effort against gravity: limb cannot get to or maintain (if cued) 90 (or 45) degrees, drifts down to bed, but has some effort against gravity  6a. Motor Leg, Left: 4-->No movement  6b. Motor Leg, Right: 3-->No effort against gravity: leg falls to bed immediately  7. Limb Ataxia: 0-->Absent  8. Sensory: 1-->Mild-to-moderate sensory loss: patient feels pinprick is less sharp or is dull on the affected side: or there is a loss of superficial pain with pinprick, but patient is aware of being touched  9. Best Language: 2-->Severe aphasia: all communication is through fragmentary expression: great need for inference, questioning, and guessing by the listener. Range of information that can be exchanged is limited: listener carries burden of. . . (see row details)  10. Dysarthria: 1-->Mild-to-moderate dysarthria: patient slurs at least some words and, at worst, can be understood with some difficulty  11. Extinction and Inattention (formerly Neglect): 0-->No abnormality  Total (NIH Stroke Scale): 20        Modified Sophia    Lucian Coma Scale:    ABCD2 Score:    [id number] Score:6  HAS -BLED Score:2  ICH Score:3  Hunt & Fox Classification:       Assessment/Plan:     Diagnostic Results:  CTH 7/25/18  Unchanged position of right frontal coursing  ventriculostomy catheter with unchanged size and configuration of prominent ventricular system.    Stable right thalamic intraparenchymal hemorrhage with associated interventricular hemorrhage layering in the occipital horns of the lateral ventricles.    Chronic microvascular ischemic changes above with associated remote infarcts.     CTH 7/21/18  1. Interval placement of right frontal coursing ventriculostomy with unchanged size and configuration of the shunted ventricular system, which remains prominent.  2. Evolution of right thalamic intraparenchymal hemorrhage with stable mass effect.  3. Chronic microvascular ischemic change and remote infarcts as above.       CTH: 1. Redemonstration of evolving right thalamic hemorrhage with mild adjacent edema and slight localized mass effect.  Subtle, subcentimeter focus of hyperdensity within the left thalamus, concerning for additional focus of evolving hemorrhage.  2. Persistent interventricular extension of hemorrhage with unchanged dilatation of the ventricular system concerning for component of hydrocephalus.  3. Generalized cerebral volume loss, chronic microvascular ischemic change and remote infarcts as above.     CTH ( 07/29/2018)  Evolving right thalamic intraparenchymal hematoma with intraventricular extension, as above, without detrimental change from 07/29/2018 CT at 12:24 p.m. No new infarct or hemorrhage.    Persistent diffuse enlargement of the ventricular system, unchanged.  Small focus of pneumocephalus in the right frontal horn persists.  Vessel Imaging:  None     Cardiac Evaluation:   ECHO- CONCLUSIONS     1 - Low normal left ventricular systolic function.     2 - Indeterminate LV diastolic function.     3 - Normal right ventricular systolic function .     4 - There is anterior pericardial fat pad..       Interventions: EVD    Complications: None    Disposition: Skilled Nursing Facility    Final Active Diagnoses:    Diagnosis Date Noted POA     PRINCIPAL PROBLEM:  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 [I61.0] 07/19/2018 Yes    Normocytic anemia [D64.9] 08/09/2018 Yes    Urinary retention [R33.9] 08/09/2018 Yes    Popliteal DVT (deep venous thrombosis) [I82.439] 08/09/2018 Yes    Aspiration pneumonia [J69.0] 08/09/2018 Yes    Vasogenic cerebral edema [G93.6] 08/06/2018 Yes    Obstructive hydrocephalus [G91.1] 07/21/2018 Yes    Brain compression [G93.5] 07/21/2018 Yes    Encephalopathy acute [G93.40] 07/21/2018 Yes    Dysphagia [R13.10] 07/20/2018 Yes    ICH (intracerebral hemorrhage) [I61.9] 07/19/2018 Yes    Dementia without behavioral disturbance [F03.90] 07/18/2018 Yes    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 [I61.3] 07/16/2018 Yes    Cardiomyopathy [I42.9] 04/19/2017 Yes    Persistent atrial fibrillation [I48.1] 04/06/2017 Yes    Essential hypertension [I10] 10/18/2016 Yes    Dyslipidemia [E78.5] 10/18/2016 Yes    Typical atrial flutter [I48.3] 10/18/2016 Yes    Type 2 diabetes mellitus with diabetic polyneuropathy, with long-term current use of insulin [E11.42, Z79.4]  Not Applicable      Problems Resolved During this Admission:    Diagnosis Date Noted Date Resolved POA    UTI (urinary tract infection) [N39.0] 07/30/2018 08/05/2018 Unknown    Long term (current) use of anticoagulants [Z79.01] 07/18/2012 07/31/2018 Not Applicable     Urinary retention    Did not pass voiding trial  On flomax  Follow up with urology in 2 weeks             Vasogenic cerebral edema    Area of vasogenic cerebral edema identified when reviewing brain imaging in the R ridge. There is mild mass effect associated with it. We will continue to monitor the patients clinical exam for any worsening of symptoms which may indicate expansion of the hemorrhage or the area of the edema resulting in the clinical change. The ICH is likely 2/2 HTN.              Recommendations:     Post-discharge complication risks: Falls,  Pneumonia, Skin breakdown, Urinary tract infections    Stroke Education given to: patient and family    Follow-up in Stroke Clinic in 4-6 weeks     Discharge Plan:  Antithrombotics: None.   Statin: Atorvastatin 40mg qhs  Anticoagulant: Warfarin  Aggresive risk factor modification:  Hypertension  Diabetes  High Cholesterol  Diet  Exercise    Follow Up:  Follow-up Information     [facility] In 6 weeks.    Specialty:  Vascular Neurology  Why:  for hospital follow up   Contact information:  [address] [facility] [facility] [address]  [phone]           Nellie Bravo MD.    Specialty:  Internal Medicine  Why:  make an appointment within 10 days of hospital discharge   Contact information:  [address]  [place] [facility] [address]  [phone]             Paul Dorado MD.    Specialty:  Urology  Why:  make an appointment for 2 week follow up   Contact information:  [address]  [facility]  [place] [id number]  [phone]                   Patient Instructions:     Activity as tolerated         Medications:  Reconciled Home Medications:      Medication List      START taking these medications    albuterol-ipratropium 2.5 mg-0.5 mg/3 mL nebulizer solution  Commonly known as:  DUO-NEB  Take 3 mLs by nebulization every 4 (four) hours. Rescue     amLODIPine 10 MG tablet  Commonly known as:  NORVASC  Take 1 tablet (10 mg total) by mouth once daily.     hydrALAZINE 100 MG tablet  Commonly known as:  APRESOLINE  Take 1 tablet (100 mg total) by mouth every 8 (eight) hours.     insulin detemir U-100 100 unit/mL (3 mL) Inpn pen  Commonly known as:  LEVEMIR FLEXTOUCH  Inject 14 Units into the skin 2 (two) times daily.     * metoprolol tartrate 25 MG tablet  Commonly known as:  LOPRESSOR  Take 1 tablet (25 mg total) by mouth 2 (two) times daily.     * metoprolol tartrate 50 MG tablet  Commonly known as:  LOPRESSOR  Take 1 tablet (50 mg total) by mouth 2 (two) times  "daily.     modafinil 200 MG Tab  Commonly known as:  PROVIGIL  Take 1 tablet (200 mg total) by mouth once daily.     moxifloxacin 400 mg tablet  Commonly known as:  AVELOX  Take 1 tablet (400 mg total) by mouth once daily. for 5 days     polyethylene glycol 17 gram Pwpk  Commonly known as:  GLYCOLAX  Take 17 g by mouth once daily.     tamsulosin 0.4 mg Cap  Commonly known as:  FLOMAX  Take 1 capsule (0.4 mg total) by mouth once daily.        * This list has 2 medication(s) that are the same as other medications prescribed for you. Read the directions carefully, and ask your doctor or other care provider to review them with you.            CHANGE how you take these medications    insulin aspart U-100 100 unit/mL Inpn pen  Commonly known as:  NovoLOG  Inject 5 Units into the skin 3 (three) times daily.  What changed:  · how much to take  · when to take this     warfarin 7.5 MG tablet  Commonly known as:  COUMADIN  Take 1 tablet (7.5 mg total) by mouth Daily.  What changed:  · medication strength  · how much to take  · how to take this  · when to take this  · additional instructions        CONTINUE taking these medications    amiodarone 200 MG Tab  Commonly known as:  PACERONE  Take 2 pills twice daily for 2 weeks then take 1 pill daily thereafter     atorvastatin 80 MG tablet  Commonly known as:  LIPITOR  Take 0.5 tablets (40 mg total) by mouth once daily.     blood sugar diagnostic Strp  Commonly known as:  ACCU-CHEK SMARTVIEW TEST STRIP  1 each by Misc.(Non-Drug; Combo Route) route 4 (four) times daily.     blood-glucose meter Misc  Commonly known as:  ACCU-CHEK MALLORY  1 each by Misc.(Non-Drug; Combo Route) route once daily.     lancets Misc  Commonly known as:  ACCU-CHEK FASTCLIX LANCING DEV  1 each by Misc.(Non-Drug; Combo Route) route 4 (four) times daily.     losartan 100 MG tablet  Commonly known as:  COZAAR  Take 1 tablet (100 mg total) by mouth once daily.     pen needle, diabetic 32 gauge x 5/32" " Ndle  Commonly known as:  BD ULTRA-FINE MALLORY PEN NEEDLE  Use with lantus pen     senna-docusate 8.6-50 mg 8.6-50 mg per tablet  Commonly known as:  SENNA WITH DOCUSATE SODIUM  Take 1 tablet by mouth once daily.        STOP taking these medications    econazole nitrate 1 % cream     insulin degludec 200 unit/mL (3 mL) Inpn  Commonly known as:  TRESIBA FLEXTOUCH U-200     lancing device with lancets Kit  Commonly known as:  ONETOUCH DELICA LANC DEVICE     metFORMIN 1000 MG tablet  Commonly known as:  GLUCOPHAGE     metoprolol succinate 50 MG 24 hr tablet  Commonly known as:  TOPROL-XL     PREVNAR 13 (PF) 0.5 mL Syrg  Generic drug:  pneumoc 13-tory conj-dip cr(PF)     traZODone 50 MG tablet  Commonly known as:  CHUCK Baron PA-C  Comprehensive Stroke Center  Department of Vascular Neurology   [facility]

## 2018-08-09 NOTE — PT/OT/SLP PROGRESS
Occupational Therapy   Treatment    Name: Ciro Chandler  MRN: [id number]  Admitting Diagnosis:  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       Recommendations:     Discharge Recommendations: nursing facility, skilled  Discharge Equipment Recommendations:   (TBD)    Subjective   Pt with occasional verbalizations to questions during session.    Communicated with: RN prior to session.  Pain/Comfort:  · Pain Rating 1: 0/10  · Pain Rating Post-Intervention 1: 0/10    Patients cultural, spiritual, Advent conflicts given the current situation: none    Objective:     Patient found with: pulse ox (continuous), telemetry, pressure relief boots    General Precautions: Standard, aspiration, fall (DNR)     Occupational Performance:    Bed Mobility:    · Patient completed Rolling/Turning to Right with total assistance  · Patient completed Scooting/Bridging with total assistance scooting forward & to the right along EOB  · Patient completed Supine to Sit with total assistance  · Patient completed Sit to Supine with total assistance     Activities of Daily Living:  · Grooming: Max-Total (A) while seated EOB using LUE (hand over hand (A) required)      Patient left supine with all lines intact, call button in reach, bed alarm on, RN notified, daughter present and white board updated.    [facility] 6 Click:  [facility] Total Score: 7    Treatment & Education:  Pt required Max-Total (A) with postural control while seated EOB.  Provided verbal & physical cues to facilitate postural control & cervical extension while seated EOB.  Provided activities to facilitate visual scanning to the left while seated EOB.  Provided weight shifting in all directions to facilitate postural control via core strengthening.  Provided LUE AArOM in all planes x 10 reps each while supine.  Pt with eyes open 40% of session.  Pt with occasional verbalizations to questions/statements during session.  Pt had no further questions & when asked  whether there were any concerns pt reported none.    Education:    Assessment:     Ciro Chandler is a 68 y.o. male with a medical diagnosis of 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.  He presents with fair participation and motivation.  Pt with improved alertness on this date.  Pt continues to be at risk for falls.l  Performance deficits affecting function are weakness, impaired endurance, impaired sensation, impaired self care skills, visual deficits, impaired balance, impaired functional mobilty, impaired cognition, decreased safety awareness, decreased coordination, decreased upper extremity function, decreased lower extremity function.      Rehab Prognosis:  fair; patient would benefit from acute skilled OT services to address these deficits and reach maximum level of function.       Plan:     Patient to be seen 3 x/week to address the above listed problems via self-care/home management, cognitive retraining, sensory integration, therapeutic exercises, therapeutic activities, neuromuscular re-education  · Plan of Care Expires: 08/24/18  · Plan of Care Reviewed with: patient, daughter    This Plan of care has been discussed with the patient who was involved in its development and understands and is in agreement with the identified goals and treatment plan    GOALS:    Occupational Therapy Goals        Problem: Occupational Therapy Goal    Goal Priority Disciplines Outcome Interventions   Occupational Therapy Goal     OT, PT/OT Ongoing (interventions implemented as appropriate)    Description:  Goals to be met by: 8/12/18     Patient will increase functional independence with ADLs by performing:    UE Dressing with Moderate Assistance.  LE Dressing with Max Assistance.  Grooming while seated with Min Assistance.  Toileting from bedside commode with Moderate Assistance for hygiene and clothing management.   Rolling to Bilateral with Moderate Assistance.   Supine to sit with Moderate  Assistance.  Stand pivot transfers with Moderate Assistance.                        Time Tracking:     OT Date of Treatment: 08/09/18  OT Start Time: 0946  OT Stop Time: 1022  OT Total Time (min): 36 min    Billable Minutes:Therapeutic Activity 20  Therapeutic Exercise 16    ZOE Hernandez  8/9/2018

## 2019-01-24 NOTE — PLAN OF CARE
Problem: Physical Therapy Goal  Goal: Physical Therapy Goal  Goals to be met by: 8/3/18    1. Pt will perform rolling to the R and L with moderate assistance.   2. Pt will perform supine to sit from both sides of the bed with max assistance.  3. Pt will perform sit to supine with max assistance.  4. Pt will sit EOB x 5 minutes with min assistance to prepare for functional tasks in sitting.   5. Pt will perform sit to stand transfers with moderate assistance.    6. Pt will perform bed <> chair transfers with max assistance.  7. Pt will perform gait x 10 feet with max assistance  8. Family will be independent with ROM using handout.       Goals remain appropriate. Continue with Physical therapy Plan of Care. Tamy Recinos, PT 8/1/2018           [FreeTextEntry1] : ekg no change

## 2019-06-22 NOTE — PROGRESS NOTES
Patient's wife denies any changes to warrant his INR. I am changing this dose to incorporate 15mg twice in his weekly dose.  
rolling walker

## 2019-08-08 ENCOUNTER — DOCUMENTATION ONLY (OUTPATIENT)
Dept: ADMINISTRATIVE | Facility: HOSPITAL | Age: 69
End: 2019-08-08

## 2019-12-21 NOTE — PLAN OF CARE
Problem: Occupational Therapy Goal  Goal: Occupational Therapy Goal  Description  Goals to be met by: 8/12/18     Patient will increase functional independence with ADLs by performing:    UE Dressing with Moderate Assistance. Not met  LE Dressing with Max Assistance. Not met  Grooming while seated with Min Assistance. Not met  Toileting from bedside commode with Moderate Assistance for hygiene and clothing management. Not met  Rolling to Bilateral with Moderate Assistance. Not met  Supine to sit with Moderate Assistance. Not met  Stand pivot transfers with Moderate Assistance. Not met        Outcome: Met   No goals met. Hospital discharge.

## 2019-12-21 NOTE — PT/OT/SLP DISCHARGE
Occupational Therapy Discharge Summary    Ciro Chandler  MRN: [id number]   Principal Problem: 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      Patient Discharged from acute Occupational Therapy on 12/21/19.  Please refer to prior OT note dated 8/9/18 for functional status.    Assessment:      Patient has not met goals.    Objective:     GOALS:   Multidisciplinary Problems     Occupational Therapy Goals     Not on file          Multidisciplinary Problems (Resolved)        Problem: Occupational Therapy Goal    Goal Priority Disciplines Outcome Interventions   Occupational Therapy Goal   (Resolved)     OT, PT/OT Met    Description:  Goals to be met by: 8/12/18     Patient will increase functional independence with ADLs by performing:    UE Dressing with Moderate Assistance. Not met  LE Dressing with Max Assistance. Not met  Grooming while seated with Min Assistance. Not met  Toileting from bedside commode with Moderate Assistance for hygiene and clothing management. Not met  Rolling to Bilateral with Moderate Assistance. Not met  Supine to sit with Moderate Assistance. Not met  Stand pivot transfers with Moderate Assistance. Not met                         Reasons for Discontinuation of Therapy Services  Transfer to alternate level of care.      Plan:     Patient Discharged to: Skilled Nursing Facility    ZOE Hernandez  12/21/2019

## 2021-07-27 NOTE — SUBJECTIVE & OBJECTIVE
Interval History: NAEON.    Medications:  Continuous Infusions:  Scheduled Meds:   amiodarone  200 mg Per NG tube Daily    atorvastatin  40 mg Per NG tube Daily    insulin detemir U-100  15 Units Subcutaneous QHS    losartan  100 mg Per NG tube Daily    metoprolol tartrate  25 mg Per NG tube BID    senna-docusate 8.6-50 mg  1 tablet Per NG tube Daily     PRN Meds:dextrose 50%, dextrose 50%, glucagon (human recombinant), insulin aspart U-100, labetalol, sodium chloride 0.9%     Review of Systems  Objective:     Weight: 78.9 kg (173 lb 15.1 oz)  Body mass index is 24.96 kg/m².  Vital Signs (Most Recent):  Temp: 98.6 °F (37 °C) (07/19/18 0702)  Pulse: 78 (07/19/18 0800)  Resp: 13 (07/19/18 0800)  BP: (!) 159/77 (07/19/18 0800)  SpO2: 100 % (07/19/18 0800) Vital Signs (24h Range):  Temp:  [97.4 °F (36.3 °C)-99.7 °F (37.6 °C)] 98.6 °F (37 °C)  Pulse:  [66-83] 78  Resp:  [13-33] 13  SpO2:  [93 %-100 %] 100 %  BP: (124-182)/(63-93) 159/77                           NG/OG Tube 07/16/18 1200 Left nostril (Active)   Placement Check placement verified by aspirate characteristics;placement verified by distal tube length measurement 7/19/2018  7:02 AM   Distal Tube Length (cm) 70 7/19/2018  7:02 AM   Tolerance no signs/symptoms of discomfort 7/19/2018  7:02 AM   Securement anchored to nostril center w/ adhesive device 7/19/2018  7:02 AM   Clamp Status/Tolerance clamped;no abdominal discomfort;no abdominal distention;no emesis;no nausea;no residual;no restlessness 7/19/2018  7:02 AM   Insertion Site Appearance no redness, warmth, tenderness, skin breakdown, drainage 7/19/2018  7:02 AM   Flush/Irrigation flushed w/;water;no resistance met 7/19/2018  7:02 AM   Feeding Action feeding held 7/19/2018  7:02 AM   Intake (mL) 40 mL 7/18/2018  9:05 PM   Residual Amount (ml) 0 ml 7/18/2018  9:05 PM       Male External Urinary Catheter 07/18/18 1800 Medium (Active)   Collection Container Urimeter 7/19/2018  7:02 AM   Skin no redness;no  breakdown 7/19/2018  7:02 AM   Tolerance no signs/symptoms of discomfort 7/19/2018  7:02 AM   Output (mL) 130 mL 7/19/2018  6:05 AM       Neurosurgery Physical Exam   [id number]  PERRL  Localizing RUE    Significant Labs:    Recent Labs  Lab 07/18/18  0212 07/19/18  0445   * 247*  247*    140  140   K 3.9 4.0  4.0    103  103   CO2 24 24  24   BUN 23 24*  24*   CREATININE 0.9 0.9  0.9   CALCIUM 9.2 9.5  9.5   MG  --  2.2  2.2       Recent Labs  Lab 07/18/18  0212 07/19/18  0445   WBC 8.05 7.11  7.11   HGB 12.7* 11.8*  11.8*   HCT 38.3* 35.5*  35.5*    271  271       Recent Labs  Lab 07/18/18  1010   INR 0.9   APTT 22.8     Microbiology Results (last 7 days)     ** No results found for the last 168 hours. **        Recent Lab Results       07/19/18  0445 07/19/18  0435 07/19/18  0017 07/18/18  2032 07/18/18  1824      Immature Granulocytes 0.3          0.3         Immature Grans (Abs) 0.02  Comment:  Mild elevation in immature granulocytes is non specific and   can be seen in a variety of conditions including stress response,   acute inflammation, trauma and pregnancy. Correlation with other   laboratory and clinical findings is essential.            0.02  Comment:  Mild elevation in immature granulocytes is non specific and   can be seen in a variety of conditions including stress response,   acute inflammation, trauma and pregnancy. Correlation with other   laboratory and clinical findings is essential.           Albumin 2.9(L)         Alkaline Phosphatase 75         Allens Test          ALT 21         Anion Gap 13          13         aPTT          AST 13         Baso # 0.03          0.03         Basophil% 0.4          0.4         Bilirubin, Direct          Total Bilirubin 0.6  Comment:  For infants and newborns, interpretation of results should be based  on gestational age, weight and in agreement with clinical  observations.  Premature Infant recommended reference ranges:  Up to  24 hours.............<8.0 mg/dL  Up to 48 hours............<12.0 mg/dL  3-5 days..................<15.0 mg/dL  6-29 days.................<15.0 mg/dL           Site          BUN, Bld 24(H)          24(H)         Calcium 9.5          9.5         Chloride 103          103         CO2 24          24         Creatinine 0.9          0.9         [facility]          Differential Method Automated          Automated         eGFR if  >60.0          >60.0         eGFR if non  >60.0  Comment:  Calculation used to obtain the estimated glomerular filtration  rate (eGFR) is the CKD-EPI equation.             >60.0  Comment:  Calculation used to obtain the estimated glomerular filtration  rate (eGFR) is the CKD-EPI equation.            Eos # 0.1          0.1         Eosinophil% 1.0          1.0         FiO2          Glucose 247(H)          247(H)         Gran # (ANC) 5.2          5.2         Gran% 73.3(H)          73.3(H)         Hematocrit 35.5(L)          35.5(L)         Hemoglobin 11.8(L)          11.8(L)         Coumadin Monitoring INR          Lymph # 1.0          1.0         Lymph% 14.6(L)          14.6(L)         Magnesium 2.2          2.2         MCH 29.6          29.6         MCHC 33.2          33.2         MCV 89          89         Mode          Mono # 0.7          0.7         Mono% 10.4          10.4         MPV 11.6          11.6         nRBC 0          0         Phosphorus 3.5          3.5         Platelets 271          271         POC BE          POC HCO3          POC PCO2          POC PH          POC PO2          POC SATURATED O2          POC TCO2          POCT Glucose  253(H) 250(H) 277(H) 310(H)     Potassium 4.0          4.0         Total Protein 7.1         Protime          RBC 3.99(L)          3.99(L)         RDW 12.8          12.8         Sample          Sodium 140          140         Sp02          WBC 7.11          7.11                     07/18/18  1424 07/18/18  1201  07/18/18  1121 07/18/18  1010      Immature Granulocytes         Immature Grans (Abs)         Albumin   2.9(L)      Alkaline Phosphatase   76      Allens Test N/A        ALT   24      Anion Gap         aPTT    22.8  Comment:  aPTT therapeutic range = 39-69 seconds     AST   11      Baso #         Basophil%         Bilirubin, Direct   0.3      Total Bilirubin   0.8  Comment:  For infants and newborns, interpretation of results should be based  on gestational age, weight and in agreement with clinical  observations.  Premature Infant recommended reference ranges:  Up to 24 hours.............<8.0 mg/dL  Up to 48 hours............<12.0 mg/dL  3-5 days..................<15.0 mg/dL  6-29 days.................<15.0 mg/dL        Site Other        BUN, Bld         Calcium         Chloride         CO2         Creatinine         DelSys Room Air        Differential Method         eGFR if          eGFR if non          Eos #         Eosinophil%         FiO2 21        Glucose         Gran # (ANC)         Gran%         Hematocrit         Hemoglobin         Coumadin Monitoring INR    0.9  Comment:  Coumadin Therapy:  2.0 - 3.0 for INR for all indicators except mechanical heart valves  and antiphospholipid syndromes which should use 2.5 - 3.5.       Lymph #         Lymph%         Magnesium         MCH         MCHC         MCV         Mode SPONT        Mono #         Mono%         MPV         nRBC         Phosphorus         Platelets         POC BE 5        POC HCO3 28.9(H)        POC PCO2 42.6        POC PH 7.440        POC PO2 33(LL)        POC SATURATED O2 65(L)        POC TCO2 30(H)        POCT Glucose  271(H)       Potassium         Total Protein   6.9      Protime    9.8     RBC         RDW         Sample VENOUS        Sodium         Sp02 95        WBC               Significant Diagnostics:  I have reviewed all pertinent imaging results/findings within the past 24 hours.   02-Nov-2021

## 2025-03-26 NOTE — ASSESSMENT & PLAN NOTE
- S/P EVD, removed on 07/27  - CTH on 07/29 w/ stable ventricular size  - exam stable   3 = A little assistance